# Patient Record
Sex: MALE | Race: WHITE | NOT HISPANIC OR LATINO | Employment: FULL TIME | URBAN - METROPOLITAN AREA
[De-identification: names, ages, dates, MRNs, and addresses within clinical notes are randomized per-mention and may not be internally consistent; named-entity substitution may affect disease eponyms.]

---

## 2018-08-29 ENCOUNTER — OFFICE VISIT (OUTPATIENT)
Dept: FAMILY MEDICINE CLINIC | Facility: CLINIC | Age: 43
End: 2018-08-29
Payer: COMMERCIAL

## 2018-08-29 VITALS
OXYGEN SATURATION: 97 % | WEIGHT: 315 LBS | RESPIRATION RATE: 18 BRPM | HEART RATE: 100 BPM | DIASTOLIC BLOOD PRESSURE: 108 MMHG | SYSTOLIC BLOOD PRESSURE: 174 MMHG | HEIGHT: 69 IN | TEMPERATURE: 98.2 F | BODY MASS INDEX: 46.65 KG/M2

## 2018-08-29 DIAGNOSIS — R11.2 NAUSEA AND VOMITING, INTRACTABILITY OF VOMITING NOT SPECIFIED, UNSPECIFIED VOMITING TYPE: Primary | ICD-10-CM

## 2018-08-29 PROCEDURE — 99213 OFFICE O/P EST LOW 20 MIN: CPT | Performed by: SPECIALIST

## 2018-08-29 RX ORDER — BUPROPION HYDROCHLORIDE 150 MG/1
TABLET ORAL EVERY 24 HOURS
COMMUNITY
Start: 2017-12-22 | End: 2018-09-26 | Stop reason: CLARIF

## 2018-08-29 RX ORDER — ONDANSETRON 4 MG/1
4 TABLET, FILM COATED ORAL EVERY 8 HOURS
COMMUNITY
Start: 2016-10-19 | End: 2018-08-29

## 2018-08-29 RX ORDER — PEN NEEDLE, DIABETIC 31 G X1/4"
NEEDLE, DISPOSABLE MISCELLANEOUS
COMMUNITY
Start: 2017-12-22 | End: 2020-06-02 | Stop reason: SDUPTHER

## 2018-08-29 RX ORDER — LISINOPRIL AND HYDROCHLOROTHIAZIDE 12.5; 1 MG/1; MG/1
TABLET ORAL EVERY 24 HOURS
COMMUNITY
Start: 2017-12-22 | End: 2018-09-26 | Stop reason: CLARIF

## 2018-08-29 RX ORDER — ROSUVASTATIN CALCIUM 10 MG/1
TABLET, COATED ORAL EVERY 24 HOURS
COMMUNITY
Start: 2017-12-22 | End: 2018-09-26 | Stop reason: CLARIF

## 2018-08-29 RX ORDER — TRIAMCINOLONE ACETONIDE 1 MG/G
CREAM TOPICAL EVERY 12 HOURS
COMMUNITY
Start: 2018-05-10 | End: 2018-09-26 | Stop reason: CLARIF

## 2018-08-29 RX ORDER — NYSTATIN 100000 U/G
CREAM TOPICAL EVERY 12 HOURS
COMMUNITY
Start: 2018-05-10 | End: 2018-09-26 | Stop reason: CLARIF

## 2018-08-29 RX ORDER — ONDANSETRON 4 MG/1
4 TABLET, FILM COATED ORAL EVERY 8 HOURS PRN
Qty: 20 TABLET | Refills: 0 | Status: SHIPPED | OUTPATIENT
Start: 2018-08-29 | End: 2018-09-26

## 2018-08-29 RX ORDER — DICYCLOMINE HCL 20 MG
20 TABLET ORAL EVERY 6 HOURS
COMMUNITY
Start: 2016-10-19 | End: 2018-09-26 | Stop reason: CLARIF

## 2018-08-29 NOTE — PATIENT INSTRUCTIONS
Try    zofran   4  Mg   Three  Times  A  Day  For  Nausea      Ok   imodium  Ad      Over  The  Counter  For  Diarrhea        Light  Diet    Stop  Metformin  For  Now    Increase  The  lantus  To  14    Bedtime    If  Not  Better   Do  Your  Lab  Tests      And  Call  [place]  For   Follow  Up

## 2018-08-29 NOTE — PROGRESS NOTES
Assessment/Plan:    Pt  Has  n  And  V    Some  Chills   Seems  To  Be  Getting  Better    3  To  4  Days    Viral  Gastroenteritis    With  n   V    D      meds  Adjusted          Diagnoses and all orders for this visit:    Nausea and vomiting, intractability of vomiting not specified, unspecified vomiting type  -     ondansetron (ZOFRAN) 4 mg tablet; Take 1 tablet (4 mg total) by mouth every 8 (eight) hours as needed for nausea or vomiting    Other orders  -     dicyclomine (BENTYL) 20 mg tablet; Take 20 mg by mouth every 6 (six) hours  -     insulin glargine (LANTUS SOLOSTAR) 100 units/mL injection pen; every 24 hours  -     lisinopril-hydrochlorothiazide (PRINZIDE,ZESTORETIC) 10-12 5 MG per tablet; every 24 hours  -     nystatin (MYCOSTATIN) cream; Every 12 hours  -     Discontinue: ondansetron (ZOFRAN) 4 mg tablet; Take 4 mg by mouth every 8 (eight) hours  -     rosuvastatin (CRESTOR) 10 MG tablet; every 24 hours  -     triamcinolone (KENALOG) 0 1 % cream; Every 12 hours  -     buPROPion (WELLBUTRIN XL) 150 mg 24 hr tablet; every 24 hours  -     Insulin Pen Needle (PEN NEEDLES) 31G X 6 MM MISC; use at hs, sliding scale          Subjective:      Patient ID: Kamari Keane is a 44 y o  male  45 Yo  Male   With   4  Days   N   V   D       Last  Stool   2  Hours  Ago      No  abd  Pain    Chills    To  Mild  Degree   But  No  Fever              Fatigue   Associated symptoms include chills, fatigue, nausea and vomiting  Pertinent negatives include no abdominal pain, chest pain, coughing, diaphoresis, fever, headaches, joint swelling, myalgias, neck pain, numbness, rash or weakness  Vomiting    Associated symptoms include chills and diarrhea  Pertinent negatives include no abdominal pain, chest pain, coughing, dizziness, fever, headaches or myalgias  Nausea   Associated symptoms include chills, fatigue, nausea and vomiting   Pertinent negatives include no abdominal pain, chest pain, coughing, diaphoresis, fever, headaches, joint swelling, myalgias, neck pain, numbness, rash or weakness  The following portions of the patient's history were reviewed and updated as appropriate: allergies, current medications, past family history, past medical history, past social history, past surgical history and problem list     Review of Systems   Constitutional: Positive for activity change, appetite change, chills and fatigue  Negative for diaphoresis and fever  HENT: Negative for sinus pain and sinus pressure  Eyes: Negative for visual disturbance  Respiratory: Negative for cough, chest tightness, shortness of breath and wheezing  Cardiovascular: Negative for chest pain and palpitations  Gastrointestinal: Positive for diarrhea, nausea and vomiting  Negative for abdominal distention, abdominal pain and blood in stool  Genitourinary: Negative for dysuria  Musculoskeletal: Negative for back pain, gait problem, joint swelling, myalgias, neck pain and neck stiffness  Skin: Negative for pallor and rash  Neurological: Negative for dizziness, tremors, seizures, syncope, facial asymmetry, speech difficulty, weakness, light-headedness, numbness and headaches  Psychiatric/Behavioral: Negative for agitation, behavioral problems and confusion  Objective:      BP (!) 174/108 (BP Location: Left arm, Patient Position: Sitting, Cuff Size: Large)   Pulse 100   Temp 98 2 °F (36 8 °C) (Tympanic)   Resp 18   Ht 5' 9" (1 753 m)   Wt (!) 154 kg (340 lb)   SpO2 97%   BMI 50 21 kg/m²          Physical Exam   Constitutional: He is oriented to person, place, and time  He appears well-developed and well-nourished  No distress  HENT:   Head: Normocephalic and atraumatic  Mouth/Throat: No oropharyngeal exudate  Eyes: Conjunctivae and EOM are normal  Pupils are equal, round, and reactive to light  Right eye exhibits no discharge  Left eye exhibits no discharge  Neck: No JVD present     Cardiovascular: Normal rate, regular rhythm and intact distal pulses  Murmur heard  Pulmonary/Chest: No respiratory distress  He has no wheezes  He has no rales  He exhibits no tenderness  Abdominal: Soft  Bowel sounds are normal  He exhibits no distension and no mass  There is no tenderness  There is no rebound and no guarding  Musculoskeletal: He exhibits no edema or tenderness  Neurological: He is alert and oriented to person, place, and time  Coordination normal    Skin: Skin is warm and dry  No rash noted  He is not diaphoretic  No erythema  No pallor  Psychiatric: He has a normal mood and affect   His behavior is normal

## 2018-09-24 PROCEDURE — 87070 CULTURE OTHR SPECIMN AEROBIC: CPT | Performed by: SPECIALIST

## 2018-09-24 PROCEDURE — 87147 CULTURE TYPE IMMUNOLOGIC: CPT | Performed by: SPECIALIST

## 2018-09-24 PROCEDURE — 87205 SMEAR GRAM STAIN: CPT | Performed by: SPECIALIST

## 2018-09-26 ENCOUNTER — OFFICE VISIT (OUTPATIENT)
Dept: FAMILY MEDICINE CLINIC | Facility: CLINIC | Age: 43
End: 2018-09-26
Payer: COMMERCIAL

## 2018-09-26 ENCOUNTER — APPOINTMENT (EMERGENCY)
Dept: CT IMAGING | Facility: HOSPITAL | Age: 43
DRG: 854 | End: 2018-09-26
Payer: COMMERCIAL

## 2018-09-26 ENCOUNTER — HOSPITAL ENCOUNTER (INPATIENT)
Facility: HOSPITAL | Age: 43
LOS: 3 days | Discharge: HOME | DRG: 854 | End: 2018-09-29
Attending: EMERGENCY MEDICINE | Admitting: INTERNAL MEDICINE
Payer: COMMERCIAL

## 2018-09-26 VITALS
HEIGHT: 68 IN | TEMPERATURE: 99.3 F | SYSTOLIC BLOOD PRESSURE: 130 MMHG | DIASTOLIC BLOOD PRESSURE: 82 MMHG | WEIGHT: 315 LBS | OXYGEN SATURATION: 97 % | HEART RATE: 125 BPM | BODY MASS INDEX: 47.74 KG/M2

## 2018-09-26 DIAGNOSIS — L02.415 ABSCESS OF RIGHT THIGH: ICD-10-CM

## 2018-09-26 DIAGNOSIS — L03.115 CELLULITIS OF RIGHT THIGH: Primary | ICD-10-CM

## 2018-09-26 DIAGNOSIS — Z78.9 FAILURE OF OUTPATIENT TREATMENT: ICD-10-CM

## 2018-09-26 DIAGNOSIS — E11.9 DIABETES MELLITUS (HCC): ICD-10-CM

## 2018-09-26 DIAGNOSIS — L02.91 ABSCESS: Primary | ICD-10-CM

## 2018-09-26 PROBLEM — A41.9 SEPSIS DUE TO CELLULITIS (HCC): Status: ACTIVE | Noted: 2018-09-26

## 2018-09-26 PROBLEM — L03.119 CELLULITIS AND ABSCESS OF LEG: Status: ACTIVE | Noted: 2018-09-26

## 2018-09-26 PROBLEM — L02.419 CELLULITIS AND ABSCESS OF LEG: Status: ACTIVE | Noted: 2018-09-26

## 2018-09-26 PROBLEM — E66.01 MORBID OBESITY (HCC): Status: ACTIVE | Noted: 2018-09-26

## 2018-09-26 PROBLEM — I10 HYPERTENSION: Status: ACTIVE | Noted: 2018-09-26

## 2018-09-26 PROBLEM — L03.90 SEPSIS DUE TO CELLULITIS (HCC): Status: ACTIVE | Noted: 2018-09-26

## 2018-09-26 PROBLEM — E87.1 HYPONATREMIA: Status: ACTIVE | Noted: 2018-09-26

## 2018-09-26 LAB
ALBUMIN SERPL BCP-MCNC: 3 G/DL (ref 3.5–5)
ALP SERPL-CCNC: 101 U/L (ref 46–116)
ALT SERPL W P-5'-P-CCNC: 22 U/L (ref 12–78)
ANION GAP SERPL CALCULATED.3IONS-SCNC: 13 MMOL/L (ref 4–13)
AST SERPL W P-5'-P-CCNC: 19 U/L (ref 5–45)
BASOPHILS # BLD AUTO: 0.07 THOUSANDS/ΜL (ref 0–0.1)
BASOPHILS NFR BLD AUTO: 1 % (ref 0–1)
BILIRUB DIRECT SERPL-MCNC: 0.19 MG/DL (ref 0–0.2)
BILIRUB SERPL-MCNC: 0.82 MG/DL (ref 0.2–1)
BUN SERPL-MCNC: 21 MG/DL (ref 5–25)
CALCIUM SERPL-MCNC: 9.5 MG/DL (ref 8.3–10.1)
CHLORIDE SERPL-SCNC: 94 MMOL/L (ref 100–108)
CO2 SERPL-SCNC: 24 MMOL/L (ref 21–32)
CREAT SERPL-MCNC: 1.3 MG/DL (ref 0.6–1.3)
EOSINOPHIL # BLD AUTO: 0.31 THOUSAND/ΜL (ref 0–0.61)
EOSINOPHIL NFR BLD AUTO: 2 % (ref 0–6)
ERYTHROCYTE [DISTWIDTH] IN BLOOD BY AUTOMATED COUNT: 12 % (ref 11.6–15.1)
GFR SERPL CREATININE-BSD FRML MDRD: 67 ML/MIN/1.73SQ M
GLUCOSE SERPL-MCNC: 290 MG/DL (ref 65–140)
GLUCOSE SERPL-MCNC: 405 MG/DL (ref 65–140)
HCT VFR BLD AUTO: 42.5 % (ref 36.5–49.3)
HGB BLD-MCNC: 14.6 G/DL (ref 12–17)
IMM GRANULOCYTES # BLD AUTO: 0.07 THOUSAND/UL (ref 0–0.2)
IMM GRANULOCYTES NFR BLD AUTO: 1 % (ref 0–2)
LACTATE SERPL-SCNC: 1.9 MMOL/L (ref 0.5–2)
LYMPHOCYTES # BLD AUTO: 2.05 THOUSANDS/ΜL (ref 0.6–4.47)
LYMPHOCYTES NFR BLD AUTO: 16 % (ref 14–44)
MAGNESIUM SERPL-MCNC: 2 MG/DL (ref 1.6–2.6)
MCH RBC QN AUTO: 30.7 PG (ref 26.8–34.3)
MCHC RBC AUTO-ENTMCNC: 34.4 G/DL (ref 31.4–37.4)
MCV RBC AUTO: 89 FL (ref 82–98)
MONOCYTES # BLD AUTO: 1.23 THOUSAND/ΜL (ref 0.17–1.22)
MONOCYTES NFR BLD AUTO: 9 % (ref 4–12)
NEUTROPHILS # BLD AUTO: 9.53 THOUSANDS/ΜL (ref 1.85–7.62)
NEUTS SEG NFR BLD AUTO: 71 % (ref 43–75)
NRBC BLD AUTO-RTO: 0 /100 WBCS
PLATELET # BLD AUTO: 346 THOUSANDS/UL (ref 149–390)
PMV BLD AUTO: 9.4 FL (ref 8.9–12.7)
POTASSIUM SERPL-SCNC: 4.2 MMOL/L (ref 3.5–5.3)
PROT SERPL-MCNC: 8.5 G/DL (ref 6.4–8.2)
RBC # BLD AUTO: 4.76 MILLION/UL (ref 3.88–5.62)
SODIUM SERPL-SCNC: 131 MMOL/L (ref 136–145)
WBC # BLD AUTO: 13.26 THOUSAND/UL (ref 4.31–10.16)

## 2018-09-26 PROCEDURE — 96366 THER/PROPH/DIAG IV INF ADDON: CPT

## 2018-09-26 PROCEDURE — 87070 CULTURE OTHR SPECIMN AEROBIC: CPT | Performed by: EMERGENCY MEDICINE

## 2018-09-26 PROCEDURE — 87040 BLOOD CULTURE FOR BACTERIA: CPT | Performed by: EMERGENCY MEDICINE

## 2018-09-26 PROCEDURE — 87147 CULTURE TYPE IMMUNOLOGIC: CPT | Performed by: EMERGENCY MEDICINE

## 2018-09-26 PROCEDURE — 3008F BODY MASS INDEX DOCD: CPT | Performed by: SPECIALIST

## 2018-09-26 PROCEDURE — 80048 BASIC METABOLIC PNL TOTAL CA: CPT | Performed by: EMERGENCY MEDICINE

## 2018-09-26 PROCEDURE — 99284 EMERGENCY DEPT VISIT MOD MDM: CPT

## 2018-09-26 PROCEDURE — 80076 HEPATIC FUNCTION PANEL: CPT | Performed by: EMERGENCY MEDICINE

## 2018-09-26 PROCEDURE — 1111F DSCHRG MED/CURRENT MED MERGE: CPT | Performed by: SPECIALIST

## 2018-09-26 PROCEDURE — 96360 HYDRATION IV INFUSION INIT: CPT

## 2018-09-26 PROCEDURE — 85025 COMPLETE CBC W/AUTO DIFF WBC: CPT | Performed by: EMERGENCY MEDICINE

## 2018-09-26 PROCEDURE — 96365 THER/PROPH/DIAG IV INF INIT: CPT

## 2018-09-26 PROCEDURE — 83735 ASSAY OF MAGNESIUM: CPT | Performed by: EMERGENCY MEDICINE

## 2018-09-26 PROCEDURE — 87205 SMEAR GRAM STAIN: CPT | Performed by: EMERGENCY MEDICINE

## 2018-09-26 PROCEDURE — 83605 ASSAY OF LACTIC ACID: CPT | Performed by: EMERGENCY MEDICINE

## 2018-09-26 PROCEDURE — 99223 1ST HOSP IP/OBS HIGH 75: CPT | Performed by: INTERNAL MEDICINE

## 2018-09-26 PROCEDURE — 73701 CT LOWER EXTREMITY W/DYE: CPT

## 2018-09-26 PROCEDURE — 96361 HYDRATE IV INFUSION ADD-ON: CPT

## 2018-09-26 PROCEDURE — 82948 REAGENT STRIP/BLOOD GLUCOSE: CPT

## 2018-09-26 PROCEDURE — 99212 OFFICE O/P EST SF 10 MIN: CPT | Performed by: SPECIALIST

## 2018-09-26 PROCEDURE — 36415 COLL VENOUS BLD VENIPUNCTURE: CPT | Performed by: EMERGENCY MEDICINE

## 2018-09-26 RX ORDER — DIPHENHYDRAMINE HCL 25 MG
25 TABLET ORAL EVERY 6 HOURS PRN
Status: DISCONTINUED | OUTPATIENT
Start: 2018-09-26 | End: 2018-09-29 | Stop reason: HOSPADM

## 2018-09-26 RX ORDER — ATENOLOL 50 MG/1
100 TABLET ORAL DAILY
Status: DISCONTINUED | OUTPATIENT
Start: 2018-09-27 | End: 2018-09-29 | Stop reason: HOSPADM

## 2018-09-26 RX ORDER — LISINOPRIL 2.5 MG/1
2.5 TABLET ORAL DAILY
Status: DISCONTINUED | OUTPATIENT
Start: 2018-09-27 | End: 2018-09-29 | Stop reason: HOSPADM

## 2018-09-26 RX ORDER — HYDRALAZINE HYDROCHLORIDE 20 MG/ML
10 INJECTION INTRAMUSCULAR; INTRAVENOUS EVERY 4 HOURS PRN
Status: DISCONTINUED | OUTPATIENT
Start: 2018-09-26 | End: 2018-09-29 | Stop reason: HOSPADM

## 2018-09-26 RX ORDER — HYDROCODONE BITARTRATE AND ACETAMINOPHEN 5; 325 MG/1; MG/1
1 TABLET ORAL EVERY 4 HOURS PRN
Status: DISCONTINUED | OUTPATIENT
Start: 2018-09-26 | End: 2018-09-29 | Stop reason: HOSPADM

## 2018-09-26 RX ORDER — INSULIN GLARGINE 100 [IU]/ML
15 INJECTION, SOLUTION SUBCUTANEOUS
Status: DISCONTINUED | OUTPATIENT
Start: 2018-09-26 | End: 2018-09-27

## 2018-09-26 RX ORDER — DOCUSATE SODIUM 100 MG/1
100 CAPSULE, LIQUID FILLED ORAL 2 TIMES DAILY PRN
Status: DISCONTINUED | OUTPATIENT
Start: 2018-09-26 | End: 2018-09-29 | Stop reason: HOSPADM

## 2018-09-26 RX ORDER — METFORMIN HYDROCHLORIDE 750 MG/1
750 TABLET, EXTENDED RELEASE ORAL
Status: DISCONTINUED | OUTPATIENT
Start: 2018-09-27 | End: 2018-09-27

## 2018-09-26 RX ORDER — ONDANSETRON 2 MG/ML
4 INJECTION INTRAMUSCULAR; INTRAVENOUS EVERY 4 HOURS PRN
Status: DISCONTINUED | OUTPATIENT
Start: 2018-09-26 | End: 2018-09-29 | Stop reason: HOSPADM

## 2018-09-26 RX ORDER — ACETAMINOPHEN 160 MG/5ML
650 SUSPENSION, ORAL (FINAL DOSE FORM) ORAL EVERY 4 HOURS PRN
Status: DISCONTINUED | OUTPATIENT
Start: 2018-09-26 | End: 2018-09-29 | Stop reason: HOSPADM

## 2018-09-26 RX ADMIN — VANCOMYCIN HYDROCHLORIDE 2000 MG: 1 INJECTION, POWDER, LYOPHILIZED, FOR SOLUTION INTRAVENOUS at 16:30

## 2018-09-26 RX ADMIN — INSULIN GLARGINE 15 UNITS: 100 INJECTION, SOLUTION SUBCUTANEOUS at 21:27

## 2018-09-26 RX ADMIN — SODIUM CHLORIDE 1000 ML: 0.9 INJECTION, SOLUTION INTRAVENOUS at 16:06

## 2018-09-26 RX ADMIN — IOHEXOL 100 ML: 350 INJECTION, SOLUTION INTRAVENOUS at 17:04

## 2018-09-26 RX ADMIN — CEFAZOLIN SODIUM 2000 MG: 2 SOLUTION INTRAVENOUS at 20:11

## 2018-09-26 RX ADMIN — INSULIN LISPRO 3 UNITS: 100 INJECTION, SOLUTION INTRAVENOUS; SUBCUTANEOUS at 21:25

## 2018-09-26 NOTE — H&P
H&P- Evonne Bucio 1975, 44 y o  male MRN: [id number]    Unit/Bed#: ED 22 Encounter: [id number]    Primary Care Provider: Mela Menendez MD   Date and time admitted to hospital: 9/26/2018  3:43 PM      Assessment and Plan        * Sepsis due to cellulitis [facility])   Assessment & Plan    Sepsis secondary to cellulitis/abscess of leg  Cellulitis and abscess of leg   Assessment & Plan    Recently saw PCP on Monday for symptoms as started Friday  At that time was recommended admission but patient declined  Trialed doxycycline and keflex without improvement  No history of MRSA  Culture consistent with streptococcus  Will trial cefazolin  Have surgery and ID evaluate  Hyponatremia   Assessment & Plan    Nonspecific  Recheck labs in a m  Morbid obesity ([facility] [address] )   Assessment & Plan    Body mass index is 49 28 kg/m²  Diabetes mellitus ([facility] [address] )   Assessment & Plan    Diabetes mellitus with hyperglycemia secondary to infection  Continue glargine 15 units q h s     Add sliding scale  Check A1c in a m  Hold metformin given contrast study  Hypertension   Assessment & Plan    Blood pressure is elevated secondary to acute distress  Continue lisinopril and atenolol in a m  [place] Hydralazine p r n  For SBP greater than 160        VTE Prophylaxis: Enoxaparin (Lovenox)  Code Status:  Level one full code  Anticipated Length of Stay:  Patient will be admitted on an Inpatient basis with an anticipated length of stay of  greater than 2 midnights  Justification for Hospital Stay: Sepsis due to cellulitis [facility])  Total Time for Visit, including Counseling / Coordination of Care: 40 mins  Greater than 50% of this total time spent on direct patient counseling and coordination of care  Chief Complaint:     Chief Complaint   Patient presents with    Abscess     Abscess on R leg   First noticed last week  Has been draining  Pt was told to come to ED for eval and possible admission       History of Present Illness:    Suzan Faustin is a 44 y o  male who presents with worsening "boil" of right inner thigh  Patient first noticed on Friday and had been draining  He denies any trauma to area, camping, was swimming  Saw PCP on Monday who recommended admission for IV antibiotics but patient declined  Was discharged with keflex and doxycycline without improvement  Patient has had subjective fevers without chills  Presents here to the emergency department where initial CT scan negative for abscess however continues to drain purulent material   Admission was requested after one dose vancomycin  Review of Systems:  History obtained from chart review and the patient  General ROS: positive for  - chills and fever  Psychological ROS: negative for - anxiety or depression  Ophthalmic ROS: negative for - dry eyes or eye pain  Respiratory ROS: negative for - cough or shortness of breath  Cardiovascular ROS: negative for - chest pain  Gastrointestinal ROS: negative for - constipation or diarrhea  Genito-Urinary ROS: negative for - dysuria  Musculoskeletal ROS: positive for - erythema and abscess right inner thigh  Neurological ROS: negative for - impaired coordination/balance or memory loss  Otherwise, all other 12 point review of systems normal     Past Medical and Surgical History:   Past Medical History:   Diagnosis Date    Diabetes mellitus ([facility] [address] )     Hypertension      Past Surgical History:   Procedure Laterality Date    CATARACT EXTRACTION       Meds/Allergies: Allergies: Allergies   Allergen Reactions    Penicillins      Prior to Admission Medications   Prescriptions Last Dose Informant Patient Reported? Taking? Insulin Pen Needle (PEN NEEDLES) 31G X 6 MM MISC  Self Yes Yes   Sig: use at hs, sliding scale   atenolol (TENORMIN) 100 mg tablet  Self Yes Yes   Sig: Take by mouth daily     cephalexin (KEFLEX) 500 mg capsule  Self No Yes   Sig: Take 1 capsule (500 mg total) by mouth every 6 (six) hours for 10 days   doxycycline hyclate (VIBRA-TABS) 100 mg tablet  Self No Yes   Sig: Take 1 tablet (100 mg total) by mouth 2 (two) times a day for 10 days   insulin glargine (LANTUS SOLOSTAR) 100 units/mL injection pen  Self Yes Yes   Sig: Inject 15 Units under the skin daily at bedtime     lisinopril (ZESTRIL) 2 5 mg tablet  Self Yes No   Sig: Take by mouth daily  metFORMIN (GLUCOPHAGE-XR) 750 mg 24 hr tablet  Self Yes Yes   Sig: Take 750 mg by mouth daily with breakfast    mupirocin (BACTROBAN) 2 % ointment  Self No Yes   Sig: Apply topically 3 (three) times a day      Facility-Administered Medications: None     Social History:     Social History     Social History    Marital status: Single     Spouse name: N/A    Number of children: N/A    Years of education: N/A     Occupational History    Not on file       Social History Main Topics    Smoking status: Former Smoker    Smokeless tobacco: Never Used      Comment: Stevie says never smoker and no passive smoke exposure    Alcohol use No    Drug use: No    Sexual activity: No     Other Topics Concern    Not on file     Social History Narrative    No narrative on file     Patient Pre-hospital Living Situation:   Patient Pre-hospital Level of Mobility:   Patient Pre-hospital Diet Restrictions:     Family History:  Family History   Problem Relation Age of Onset    Diabetes Mother     Breast cancer Mother     Heart disease Father     Prostate cancer Father      Physical Exam:   Vitals:   Blood Pressure: 163/70 (09/26/18 1542)  Pulse: (!) 119 (09/26/18 1542)  Temperature: 99 °F (37 2 °C) (09/26/18 1542)  Temp Source: Temporal (09/26/18 1542)  Respirations: 18 (09/26/18 1542)  Weight - Scale: (!) 147 kg (324 lb 1 2 oz) (09/26/18 1542)  SpO2: 97 % (09/26/18 1542)    General appearance: alert, appears stated age and cooperative  Skin: Erythema right inner thigh as pictured above  Head: Normocephalic, without obvious abnormality, atraumatic  Eyes: conjunctivae/corneas clear  PERRL, EOM's intact  Lungs: clear to auscultation bilaterally  Heart: Tachycardic but regular no murmurs  Abdomen: soft, non-tender; bowel sounds normal; no masses,  no organomegaly  Back: negative, range of motion normal  Extremities: erythema of right inner thigh  Neurologic: Grossly normal  Psychiatric:  Good eye contact mood appropriate    Lab Results: I have personally reviewed pertinent reports  Results from last 7 days  Lab Units 09/26/18  1602   WBC Thousand/uL 13 26*   HEMOGLOBIN g/dL 14 6   HEMATOCRIT % 42 5   PLATELETS Thousands/uL 346   NEUTROS PCT % 71   LYMPHS PCT % 16   MONOS PCT % 9   EOS PCT % 2       Results from last 7 days  Lab Units 09/26/18  1602   SODIUM mmol/L 131*   POTASSIUM mmol/L 4 2   CHLORIDE mmol/L 94*   CO2 mmol/L 24   ANION GAP mmol/L 13   BUN mg/dL 21   CREATININE mg/dL 1 30   CALCIUM mg/dL 9 5   ALBUMIN g/dL 3 0*   TOTAL BILIRUBIN mg/dL 0 82   ALK PHOS U/L 101   ALT U/L 22   AST U/L 19   EGFR ml/min/1 73sq m 67   GLUCOSE RANDOM mg/dL 405*               Results from last 7 days  Lab Units 09/26/18  1602   LACTIC ACID mmol/L 1 9       Imaging: I have personally reviewed pertinent films in PACS  Ct Femur Right W Contrast  Result Date: 9/26/2018  Impression: Cellulitis in the medial aspect of the right thigh without evidence of abscess, osteomyelitis or necrotizing fasciitis  Workstation performed: [id number]       EKG, Pathology, and Other Studies Reviewed on Admission:   Reviewed outside records    Allscripts/ Epic Records Reviewed: Yes    ** Please Note: This note has been constructed using a voice recognition system   **

## 2018-09-26 NOTE — ASSESSMENT & PLAN NOTE
Recently saw PCP on Monday for symptoms as started Friday  At that time was recommended admission but patient declined  Trialed doxycycline and keflex without improvement  No history of MRSA  Culture consistent with streptococcus  Will trial cefazolin  Have surgery and ID evaluate

## 2018-09-26 NOTE — ASSESSMENT & PLAN NOTE
Diabetes mellitus with hyperglycemia secondary to infection  Continue glargine 15 units q h s     Add sliding scale  Check A1c in a m  Hold metformin given contrast study

## 2018-09-26 NOTE — PROGRESS NOTES
Assessment/Plan:42 Yo  Diabetic  abscess  Left  Inner  Upper  Thigh    Did  Not  See  Surgeon as  Directed   Feels  Better  But   abscess   Larger  And   Skin  Break  Down        agrees  To now  Be  Admitted    Refused first  Visit     Needs  Drainage  And  Iv  Antibiotics  And   Careful  Diabetic  Monitoring            There are no diagnoses linked to this encounter  Subjective:      Patient ID: Higinio Beckman is a 44 y o  male  45 Yo  Return  Visit  For  Abscess   And  Infection  Right  Inner  Thigh     recommendeed  Admission last  Visit   But  declined  Start  On   Cephalexin  And  Doxycycline   And  Ref[place]  [place]  [place] Elizbeth [place]     Failed  To  See  Surgeon       Feels  Better  But  The  Infected  Area   Larger    More  Red  And  Has  Skin   Breakdown     Needs  To  Be  Treated  In  hospital         The following portions of the patient's history were reviewed and updated as appropriate: allergies, current medications, past family history, past medical history, past social history, past surgical history and problem list     Review of Systems   Constitutional: Positive for fatigue  Negative for activity change, appetite change, chills, diaphoresis and fever  Eyes: Negative for visual disturbance  Respiratory: Negative for cough, chest tightness, shortness of breath and wheezing  Cardiovascular: Positive for leg swelling  Negative for chest pain and palpitations  Skin: Positive for color change and wound  Neurological: Positive for weakness  Negative for dizziness, light-headedness and headaches  Psychiatric/Behavioral: Negative for agitation, behavioral problems and confusion           Objective:      /82 (BP Location: Left arm, Patient Position: Sitting, Cuff Size: Large)   Pulse (!) 125   Temp 99 3 °F (37 4 °C) (Tympanic)   Ht 5' 8" (1 727 m)   Wt (!) 147 kg (324 lb 6 4 oz)   SpO2 97%   BMI 49 32 kg/m²          Physical Exam   Constitutional: He is oriented to person, place, and time  He appears well-developed and well-nourished  overweight   HENT:   Head: Normocephalic  Neck: No JVD present  Cardiovascular: Normal rate  No murmur heard  Pulmonary/Chest: No respiratory distress  He has no wheezes  He has no rales  Neurological: He is alert and oriented to person, place, and time  Skin: Rash noted  There is erythema     Large  Swollen  Area  Right  Inner  Thigh   Superficial  Skin  Breakdown    Red  Inflamed     Some  Drainage

## 2018-09-26 NOTE — LETTER
[address] [place]  Dept: [phone]    September 29, 2018     Patient: Swapna Garcia   YOB: 1975   Date of Visit: 9/26/2018       To Whom it May Concern:    Sushma Resendez is under my professional care  He was seen in the hospital from 9/26/2018   to 09/29/18  He may return to work on [employer]  67  10/8/2018       If you have any questions or concerns, please don't hesitate to call           Sincerely,              DO Marcial Leonard [facility]  Diplomat, [employer] Board of Internal Medicine

## 2018-09-26 NOTE — ED PROVIDER NOTES
History  Chief Complaint   Patient presents with    Abscess     Abscess on R leg   First noticed last week  Has been draining  Pt was told to come to ED for eval and possible admission  45 YO male with Hx of DM presents with swelling and pain to the inner thigh of the Right leg  States this began last week, swelling and redness had been worsening  Pt saw his PCP 2 days ago, was placed on Keflex and doxycycline as treatment  States he has felt a little better since he was started on the Abx but was sent in as the swelling appeared worse  Pt states he had felt feverish prior to the starting Abx but this has improved  Pt now has drainage from the site  Denies similar in the past  Pt denies CP/SOB/F/C/N/V/D/C, no dysuria, burning on urination or blood in urine  History provided by:  Patient   used: No    Abscess   Location:  Leg  Leg abscess location:  R upper leg  Abscess quality: draining    Red streaking: no    Duration:  1 week  Progression:  Improving  Chronicity:  New  Context: diabetes    Relieved by:  Nothing  Worsened by:  Nothing  Associated symptoms: no fever, no nausea and no vomiting        Prior to Admission Medications   Prescriptions Last Dose Informant Patient Reported? Taking? Insulin Pen Needle (PEN NEEDLES) 31G X 6 MM MISC  Self Yes Yes   Sig: use at hs, sliding scale   atenolol (TENORMIN) 100 mg tablet  Self Yes Yes   Sig: Take by mouth daily  cephalexin (KEFLEX) 500 mg capsule  Self No Yes   Sig: Take 1 capsule (500 mg total) by mouth every 6 (six) hours for 10 days   doxycycline hyclate (VIBRA-TABS) 100 mg tablet  Self No Yes   Sig: Take 1 tablet (100 mg total) by mouth 2 (two) times a day for 10 days   insulin glargine (LANTUS SOLOSTAR) 100 units/mL injection pen  Self Yes Yes   Sig: Inject 15 Units under the skin daily at bedtime     lisinopril (ZESTRIL) 2 5 mg tablet  Self Yes No   Sig: Take by mouth daily     metFORMIN (GLUCOPHAGE-XR) 750 mg 24 hr tablet Self Yes Yes   Sig: Take 750 mg by mouth daily with breakfast    mupirocin (BACTROBAN) 2 % ointment  Self No Yes   Sig: Apply topically 3 (three) times a day      Facility-Administered Medications: None       Past Medical History:   Diagnosis Date    Diabetes mellitus ([address] )     Hypertension        Past Surgical History:   Procedure Laterality Date    CATARACT EXTRACTION         Family History   Problem Relation Age of Onset    Diabetes Mother     Breast cancer Mother     Heart disease Father     Prostate cancer Father      I have reviewed and agree with the history as documented  Social History   Substance Use Topics    Smoking status: Former Smoker    Smokeless tobacco: Never Used      Comment: Eleanorgen says never smoker and no passive smoke exposure    Alcohol use No        Review of Systems   Constitutional: Negative for fever  HENT: Negative for dental problem  Eyes: Negative for visual disturbance  Respiratory: Negative for shortness of breath  Cardiovascular: Negative for chest pain  Gastrointestinal: Negative for abdominal pain, nausea and vomiting  Genitourinary: Negative for dysuria and frequency  Musculoskeletal: Negative for neck pain and neck stiffness  Skin: Negative for rash  Neurological: Negative for dizziness, weakness and light-headedness  Psychiatric/Behavioral: Negative for agitation, behavioral problems and confusion  All other systems reviewed and are negative  Physical Exam  Physical Exam   Constitutional: He is oriented to person, place, and time  He appears well-developed and well-nourished  HENT:   Head: Normocephalic and atraumatic  Eyes: EOM are normal    Neck: Normal range of motion  Cardiovascular: Normal rate, regular rhythm and normal heart sounds  Pulmonary/Chest: Effort normal and breath sounds normal    Abdominal: Soft  There is no tenderness  Musculoskeletal: Normal range of motion     Neurological: He is alert and oriented to person, place, and time  Skin: Skin is warm and dry  Large area (5cm) of swelling in the Right upper thigh with central necrotic ulceration draining purulosanguinous material, surrounding erythema  Psychiatric: He has a normal mood and affect  His behavior is normal    Nursing note and vitals reviewed  Vital Signs  ED Triage Vitals [09/26/18 1542]   Temperature Pulse Respirations Blood Pressure SpO2   99 °F (37 2 °C) (!) 119 18 163/70 97 %      Temp Source Heart Rate Source Patient Position - Orthostatic VS BP Location FiO2 (%)   Temporal Monitor Sitting Right arm --      Pain Score       5           Vitals:    09/26/18 1542 09/26/18 1828   BP: 163/70 119/57   Pulse: (!) 119 104   Patient Position - Orthostatic VS: Sitting Lying       Visual Acuity      ED Medications  Medications   sodium chloride 0 9 % bolus 1,000 mL (1,000 mL Intravenous New Bag 9/26/18 1606)   vancomycin (VANCOCIN) 2,000 mg in sodium chloride 0 9 % 500 mL IVPB (2,000 mg Intravenous New Bag 9/26/18 1630)   iohexol (OMNIPAQUE) 350 MG/ML injection (MULTI-DOSE) 100 mL (100 mL Intravenous Given 9/26/18 1704)       Diagnostic Studies  Results Reviewed     Procedure Component Value Units Date/Time    Wound culture and Gram stain [[id number]] Collected:  09/26/18 1826    Lab Status:   In process Specimen:  Wound from Leg, Right Updated:  09/26/18 1833    Hepatic function panel [[id number]]  (Abnormal) Collected:  09/26/18 1602    Lab Status:  Final result Specimen:  Blood from Arm, Right Updated:  09/26/18 1721     Total Bilirubin 0 82 mg/dL      Bilirubin, Direct 0 19 mg/dL      Alkaline Phosphatase 101 U/L      AST 19 U/L      ALT 22 U/L      Total Protein 8 5 (H) g/dL      Albumin 3 0 (L) g/dL     Basic metabolic panel [[id number]]  (Abnormal) Collected:  09/26/18 1602    Lab Status:  Final result Specimen:  Blood from Arm, Right Updated:  09/26/18 1640     Sodium 131 (L) mmol/L      Potassium 4 2 mmol/L      Chloride 94 (L) mmol/L      CO2 24 mmol/L      ANION GAP 13 mmol/L      BUN 21 mg/dL      Creatinine 1 30 mg/dL      Glucose 405 (H) mg/dL      Calcium 9 5 mg/dL      eGFR 67 ml/min/1 73sq m     Narrative:         National Kidney Disease Education Program recommendations are as follows:  GFR calculation is accurate only with a steady state creatinine  Chronic Kidney disease less than 60 ml/min/1 73 sq  meters  Kidney failure less than 15 ml/min/1 73 sq  meters  Magnesium [[id number]]  (Normal) Collected:  09/26/18 1602    Lab Status:  Final result Specimen:  Blood from Arm, Right Updated:  09/26/18 1640     Magnesium 2 0 mg/dL     Lactic acid, plasma [[id number]]  (Normal) Collected:  09/26/18 1602    Lab Status:  Final result Specimen:  Blood from Arm, Right Updated:  09/26/18 1633     LACTIC ACID 1 9 mmol/L     Narrative:         Result may be elevated if tourniquet was used during collection  CBC and differential [[id number]]  (Abnormal) Collected:  09/26/18 1602    Lab Status:  Final result Specimen:  Blood from Arm, Right Updated:  09/26/18 1614     WBC 13 26 (H) Thousand/uL      RBC 4 76 Million/uL      Hemoglobin 14 6 g/dL      Hematocrit 42 5 %      MCV 89 fL      MCH 30 7 pg      MCHC 34 4 g/dL      RDW 12 0 %      MPV 9 4 fL      Platelets 067 Thousands/uL      nRBC 0 /100 WBCs      Neutrophils Relative 71 %      Immat GRANS % 1 %      Lymphocytes Relative 16 %      Monocytes Relative 9 %      Eosinophils Relative 2 %      Basophils Relative 1 %      Neutrophils Absolute 9 53 (H) Thousands/µL      Immature Grans Absolute 0 07 Thousand/uL      Lymphocytes Absolute 2 05 Thousands/µL      Monocytes Absolute 1 23 (H) Thousand/µL      Eosinophils Absolute 0 31 Thousand/µL      Basophils Absolute 0 07 Thousands/µL     Blood culture #1 [[id number]] Collected:  09/26/18 1604    Lab Status: In process Specimen:  Blood from Arm, Left Updated:  09/26/18 1610    Blood culture #2 [[id number]] Collected:  09/26/18 1602    Lab Status:   In process Specimen: Blood from Arm, Right Updated:  09/26/18 1609                 CT femur right w contrast   Final Result by Cristina Stoddard MD (09/26 1726)      Cellulitis in the medial aspect of the right thigh without evidence of abscess, osteomyelitis or necrotizing fasciitis  Workstation performed: [id number]                    Procedures  Procedures       Phone Contacts  ED Phone Contact    ED Course                         Initial Sepsis Screening     [address] Name 09/26/18 1831                Is the patient's history suggestive of a new or worsening infection? No  -CL        Suspected source of infection          Are two or more of the following signs & symptoms of infection both present and new to the patient? No  -CL        Indicate SIRS criteria Tachycardia > 90 bpm;Leukocytosis (WBC > 11921 IJL)  -CL        If the answer is yes to both questions, suspicion of sepsis is present          If severe sepsis is present AND tissue hypoperfusion perists in the hour after fluid resuscitation or lactate > 4, the patient meets criteria for SEPTIC SHOCK          Are any of the following organ dysfunction criteria present within 6 hours of suspected infection and SIRS criteria that are NOT considered to be chronic conditions? No  -CL        Organ dysfunction          Date of presentation of severe sepsis          Time of presentation of severe sepsis          Tissue hypoperfusion persists in the hour after crystalloid fluid administration, evidenced, by either:          Was hypotension present within one hour of the conclusion of crystalloid fluid administration?           Date of presentation of septic shock          Time of presentation of septic shock            User Key  (r) = Recorded By, (t) = Taken By, (c) = Cosigned By    Initials Name Provider Type    CL Sergio DO Tommie Physician                  MDM  Number of Diagnoses or Management Options  Abscess of right thigh: new and requires workup  Cellulitis of right thigh: new and requires workup  Failure of outpatient treatment: new and requires workup  Diagnosis management comments: 1  Abscess - Pt has spontaneous drainage from this but possible this tracks deep, there is significant surrounding erythema  Pt has been on O/P Abx for greater than 48 hours  Will check electrolytes for kidney function, CBC, lactic acid, blood and wound culture  Will CT leg, start on vancomycin  Feel pt may be appropriate for surgical consultation due to size and possibly depth of this abscess  Amount and/or Complexity of Data Reviewed  Clinical lab tests: ordered and reviewed  Tests in the radiology section of CPT®: ordered and reviewed  Review and summarize past medical records: yes  Discuss the patient with other providers: yes  Independent visualization of images, tracings, or specimens: yes    Patient Progress  Patient progress: stable    CritCare Time    Disposition  Final diagnoses:   Cellulitis of right thigh   Abscess of right thigh   Failure of outpatient treatment     Time reflects when diagnosis was documented in both MDM as applicable and the Disposition within this note     Time User Action Codes Description Comment    9/26/2018  6:06 PM Cinthya LOPEZ Add [L03 115] Cellulitis of right thigh     9/26/2018  6:06 PM Keith Chavarria Add [L02 415] Abscess of right thigh     9/26/2018  6:06 PM Cinthya LOPEZ Add [Z78 9] Failure of outpatient treatment       ED Disposition     ED Disposition Condition Comment    Admit  Case was discussed with Dr Ravinder Prater and the patient's admission status was agreed to be Admission Status: inpatient status to the service of Dr Ravinder Prater   Follow-up Information    None         Patient's Medications   Discharge Prescriptions    No medications on file     No discharge procedures on file      ED Provider  Electronically Signed by           Flor Cuenca MD  09/26/18 2303

## 2018-09-27 LAB
ALBUMIN SERPL BCP-MCNC: 2.6 G/DL (ref 3.5–5)
ALP SERPL-CCNC: 89 U/L (ref 46–116)
ALT SERPL W P-5'-P-CCNC: 19 U/L (ref 12–78)
ANION GAP SERPL CALCULATED.3IONS-SCNC: 8 MMOL/L (ref 4–13)
AST SERPL W P-5'-P-CCNC: 17 U/L (ref 5–45)
BILIRUB SERPL-MCNC: 0.35 MG/DL (ref 0.2–1)
BUN SERPL-MCNC: 17 MG/DL (ref 5–25)
CALCIUM SERPL-MCNC: 8.6 MG/DL (ref 8.3–10.1)
CHLORIDE SERPL-SCNC: 98 MMOL/L (ref 100–108)
CHOLEST SERPL-MCNC: 175 MG/DL (ref 50–200)
CO2 SERPL-SCNC: 25 MMOL/L (ref 21–32)
CREAT SERPL-MCNC: 1.04 MG/DL (ref 0.6–1.3)
ERYTHROCYTE [DISTWIDTH] IN BLOOD BY AUTOMATED COUNT: 12.1 % (ref 11.6–15.1)
EST. AVERAGE GLUCOSE BLD GHB EST-MCNC: 237 MG/DL
GFR SERPL CREATININE-BSD FRML MDRD: 88 ML/MIN/1.73SQ M
GLUCOSE SERPL-MCNC: 267 MG/DL (ref 65–140)
GLUCOSE SERPL-MCNC: 291 MG/DL (ref 65–140)
GLUCOSE SERPL-MCNC: 294 MG/DL (ref 65–140)
GLUCOSE SERPL-MCNC: 303 MG/DL (ref 65–140)
GLUCOSE SERPL-MCNC: 343 MG/DL (ref 65–140)
HBA1C MFR BLD: 9.9 % (ref 4.2–6.3)
HCT VFR BLD AUTO: 39.4 % (ref 36.5–49.3)
HDLC SERPL-MCNC: 23 MG/DL (ref 40–60)
HGB BLD-MCNC: 13.5 G/DL (ref 12–17)
LDLC SERPL CALC-MCNC: 102 MG/DL (ref 0–100)
MCH RBC QN AUTO: 30.8 PG (ref 26.8–34.3)
MCHC RBC AUTO-ENTMCNC: 34.3 G/DL (ref 31.4–37.4)
MCV RBC AUTO: 90 FL (ref 82–98)
NONHDLC SERPL-MCNC: 152 MG/DL
PLATELET # BLD AUTO: 336 THOUSANDS/UL (ref 149–390)
PMV BLD AUTO: 9.5 FL (ref 8.9–12.7)
POTASSIUM SERPL-SCNC: 3.6 MMOL/L (ref 3.5–5.3)
PROT SERPL-MCNC: 7.7 G/DL (ref 6.4–8.2)
RBC # BLD AUTO: 4.38 MILLION/UL (ref 3.88–5.62)
SODIUM SERPL-SCNC: 131 MMOL/L (ref 136–145)
TRIGL SERPL-MCNC: 252 MG/DL
WBC # BLD AUTO: 11.16 THOUSAND/UL (ref 4.31–10.16)

## 2018-09-27 PROCEDURE — 83036 HEMOGLOBIN GLYCOSYLATED A1C: CPT | Performed by: INTERNAL MEDICINE

## 2018-09-27 PROCEDURE — 10060 I&D ABSCESS SIMPLE/SINGLE: CPT | Performed by: PHYSICIAN ASSISTANT

## 2018-09-27 PROCEDURE — 80053 COMPREHEN METABOLIC PANEL: CPT | Performed by: INTERNAL MEDICINE

## 2018-09-27 PROCEDURE — 82948 REAGENT STRIP/BLOOD GLUCOSE: CPT

## 2018-09-27 PROCEDURE — 85027 COMPLETE CBC AUTOMATED: CPT | Performed by: INTERNAL MEDICINE

## 2018-09-27 PROCEDURE — 99232 SBSQ HOSP IP/OBS MODERATE 35: CPT | Performed by: INTERNAL MEDICINE

## 2018-09-27 PROCEDURE — 0J9L0ZZ DRAINAGE OF RIGHT UPPER LEG SUBCUTANEOUS TISSUE AND FASCIA, OPEN APPROACH: ICD-10-PCS | Performed by: SURGERY

## 2018-09-27 PROCEDURE — 80061 LIPID PANEL: CPT | Performed by: INTERNAL MEDICINE

## 2018-09-27 PROCEDURE — 99255 IP/OBS CONSLTJ NEW/EST HI 80: CPT | Performed by: INTERNAL MEDICINE

## 2018-09-27 RX ORDER — INSULIN GLARGINE 100 [IU]/ML
30 INJECTION, SOLUTION SUBCUTANEOUS
Status: DISCONTINUED | OUTPATIENT
Start: 2018-09-27 | End: 2018-09-28

## 2018-09-27 RX ORDER — LIDOCAINE HYDROCHLORIDE AND EPINEPHRINE 10; 10 MG/ML; UG/ML
10 INJECTION, SOLUTION INFILTRATION; PERINEURAL ONCE
Status: DISCONTINUED | OUTPATIENT
Start: 2018-09-27 | End: 2018-09-27

## 2018-09-27 RX ORDER — LIDOCAINE HYDROCHLORIDE 10 MG/ML
10 INJECTION, SOLUTION EPIDURAL; INFILTRATION; INTRACAUDAL; PERINEURAL ONCE
Status: COMPLETED | OUTPATIENT
Start: 2018-09-27 | End: 2018-09-27

## 2018-09-27 RX ADMIN — CEFAZOLIN SODIUM 2000 MG: 2 SOLUTION INTRAVENOUS at 20:18

## 2018-09-27 RX ADMIN — METFORMIN HYDROCHLORIDE 750 MG: 750 TABLET, EXTENDED RELEASE ORAL at 09:27

## 2018-09-27 RX ADMIN — INSULIN LISPRO 3 UNITS: 100 INJECTION, SOLUTION INTRAVENOUS; SUBCUTANEOUS at 21:32

## 2018-09-27 RX ADMIN — INSULIN LISPRO 5 UNITS: 100 INJECTION, SOLUTION INTRAVENOUS; SUBCUTANEOUS at 17:13

## 2018-09-27 RX ADMIN — CEFAZOLIN SODIUM 2000 MG: 2 SOLUTION INTRAVENOUS at 03:49

## 2018-09-27 RX ADMIN — CEFAZOLIN SODIUM 2000 MG: 2 SOLUTION INTRAVENOUS at 11:48

## 2018-09-27 RX ADMIN — INSULIN GLARGINE 30 UNITS: 100 INJECTION, SOLUTION SUBCUTANEOUS at 21:32

## 2018-09-27 RX ADMIN — ENOXAPARIN SODIUM 40 MG: 40 INJECTION SUBCUTANEOUS at 09:27

## 2018-09-27 RX ADMIN — LISINOPRIL 2.5 MG: 2.5 TABLET ORAL at 09:26

## 2018-09-27 RX ADMIN — INSULIN LISPRO 6 UNITS: 100 INJECTION, SOLUTION INTRAVENOUS; SUBCUTANEOUS at 17:14

## 2018-09-27 RX ADMIN — INSULIN LISPRO 4 UNITS: 100 INJECTION, SOLUTION INTRAVENOUS; SUBCUTANEOUS at 11:53

## 2018-09-27 RX ADMIN — ATENOLOL 100 MG: 50 TABLET ORAL at 09:26

## 2018-09-27 RX ADMIN — HYDROMORPHONE HYDROCHLORIDE 1 MG: 1 INJECTION, SOLUTION INTRAMUSCULAR; INTRAVENOUS; SUBCUTANEOUS at 09:21

## 2018-09-27 RX ADMIN — INSULIN LISPRO 6 UNITS: 100 INJECTION, SOLUTION INTRAVENOUS; SUBCUTANEOUS at 11:53

## 2018-09-27 RX ADMIN — LIDOCAINE HYDROCHLORIDE 10 ML: 10 INJECTION, SOLUTION EPIDURAL; INFILTRATION; INTRACAUDAL; PERINEURAL at 09:24

## 2018-09-27 RX ADMIN — INSULIN LISPRO 3 UNITS: 100 INJECTION, SOLUTION INTRAVENOUS; SUBCUTANEOUS at 09:30

## 2018-09-27 NOTE — ASSESSMENT & PLAN NOTE
Recently saw PCP on Monday for symptoms as started Friday  At that time was recommended admission but patient declined  Trialed doxycycline and keflex without improvement  No history of MRSA  Culture consistent with streptococcus  Surgery attempted I&D this morning    Continue cefazolin for now

## 2018-09-27 NOTE — PLAN OF CARE
DISCHARGE PLANNING     Discharge to home or other facility with appropriate resources Progressing        INFECTION - ADULT     Absence or prevention of progression during hospitalization Progressing     Absence of fever/infection during neutropenic period Progressing        Knowledge Deficit     Patient/family/caregiver demonstrates understanding of disease process, treatment plan, medications, and discharge instructions Progressing        METABOLIC, FLUID AND ELECTROLYTES - ADULT     Fluid balance maintained Progressing     Glucose maintained within target range Progressing        MUSCULOSKELETAL - ADULT     Maintain or return mobility to safest level of function Progressing        PAIN - ADULT     Verbalizes/displays adequate comfort level or baseline comfort level Progressing        SAFETY ADULT     Patient will remain free of falls Progressing     Maintain or return to baseline ADL function Progressing     Maintain or return mobility status to optimal level Progressing        SKIN/TISSUE INTEGRITY - ADULT     Skin integrity remains intact Progressing     Incision(s), wounds(s) or drain site(s) healing without S/S of infection Progressing

## 2018-09-27 NOTE — CASE MANAGEMENT
Initial Clinical Review    Admission: Date/Time/Statement: 9/26/18 @ 1807     Orders Placed This Encounter   Procedures    Inpatient Admission (expected length of stay for this patient is greater than two midnights)     Standing Status:   Standing     Number of Occurrences:   1     Order Specific Question:   Admitting Physician     Answer:   Dhruv Avila [[id number]]     Order Specific Question:   Level of Care     Answer:   Med Surg [[id number]]     Order Specific Question:   Estimated length of stay     Answer:   More than 2 Midnights     Order Specific Question:   Certification     Answer:   I certify that inpatient services are medically necessary for this patient for a duration of greater than two midnights  See H&P and MD Progress Notes for additional information about the patient's course of treatment  ED: Date/Time/Mode of Arrival:   ED Arrival Information     Expected Arrival Acuity Means of Arrival Escorted By Service Admission Type    - 9/26/2018 15:36 Urgent Walk-In Self General Medicine Urgent    Arrival Complaint    mass          Chief Complaint:   Chief Complaint   Patient presents with    Abscess     Abscess on R leg   First noticed last week  Has been draining  Pt was told to come to ED for eval and possible admission  History of Illness:   Pt  Presents to ED with a  Worsening  "boil" on R  Inner thigh for past  4 days  Denies  Any  Trauma, swimming, etc   Saw  PCP  The day PTA and  DOC  Recommended in house but  Pt  Declined  Was  Given po keflex and  Doxycycline  W/o improvement  Has  Fevers but  No chills  Initial  CT scan in ED  Neg for abscess but  Has persistent  Purulent  Drainage      ED Vital Signs:   ED Triage Vitals [09/26/18 1542]   Temperature Pulse Respirations Blood Pressure SpO2   99 °F (37 2 °C) (!) 119 18 163/70 97 %      Temp Source Heart Rate Source Patient Position - Orthostatic VS BP Location FiO2 (%)   Temporal Monitor Sitting Right arm --      Pain Score       5 Wt Readings from Last 1 Encounters:   09/26/18 (!) 147 kg (324 lb 1 2 oz)       Vital Signs (abnormal):    above    Abnormal Labs/Diagnostic Test Results: Albumin  3 0  NA  131  BS  405  Chloride   94  WBC   13 26  Abs  neutro    9 53  Ct   R  Femur:     Cellulitis in the medial aspect of the right thigh without evidence of abscess, osteomyelitis or necrotizing fasciitis  ED Treatment:   Medication Administration from 09/26/2018 1536 to 09/26/2018 1940       Date/Time Order Dose Route Action Action by Comments     09/26/2018 1900 sodium chloride 0 9 % bolus 1,000 mL 0 mL Intravenous Stopped Kory Gibson RN      09/26/2018 1606 sodium chloride 0 9 % bolus 1,000 mL 1,000 mL Intravenous [address] Benito Che RN      09/26/2018 1900 vancomycin (VANCOCIN) 2,000 mg in sodium chloride 0 9 % 500 mL IVPB 0 mg Intravenous Stopped Kory Gibson RN      09/26/2018 1630 vancomycin (VANCOCIN) 2,000 mg in sodium chloride 0 9 % 500 mL IVPB 2,000 mg Intravenous [address] Benito Che RN      09/26/2018 1704 iohexol (OMNIPAQUE) 350 MG/ML injection (MULTI-DOSE) 100 mL 100 mL Intravenous Given Navi Band           Past Medical/Surgical History: Active Ambulatory Problems     Diagnosis Date Noted    No Active Ambulatory Problems     Resolved Ambulatory Problems     Diagnosis Date Noted    No Resolved Ambulatory Problems     Past Medical History:   Diagnosis Date    Diabetes mellitus ([facility] [address] )     Hypertension        Admitting Diagnosis: Mass [R22 9]  Cellulitis of right thigh [N91 948]  Abscess of right thigh [L02 415]  Failure of outpatient treatment [Z78 9]    Age/Sex: 44 y o  male    Assessment/Plan:   Sepsis due to cellulitis [facility])   Assessment & Plan     Sepsis secondary to cellulitis/abscess of leg           Cellulitis and abscess of leg   Assessment & Plan     Recently saw PCP on Monday for symptoms as started Friday  At that time was recommended admission but patient declined    Trialed doxycycline and keflex without improvement  No history of MRSA  Culture consistent with streptococcus  Will trial cefazolin  Have surgery and ID evaluate           Hyponatremia   Assessment & Plan     Nonspecific  Recheck labs in a m           Morbid obesity ([facility] [address] )   Assessment & Plan     Body mass index is 49 28 kg/m²  Diabetes mellitus ([facility] [address] )   Assessment & Plan     Diabetes mellitus with hyperglycemia secondary to infection  Continue glargine 15 units q h s     Add sliding scale  Check A1c in a m  Hold metformin given contrast study              Hypertension   Assessment & Plan     Blood pressure is elevated secondary to acute distress  Continue lisinopril and atenolol in a m  Nicol Stephen Hydralazine p r n  For SBP greater than 160   Patient will be admitted on an Inpatient basis with an anticipated length of stay of  greater than 2 midnights       Justification for Hospital Stay: Sepsis due to cellulitis [facility]      Admission Orders:    IP  9/26  @  1037  Scheduled Meds:   Current Facility-Administered Medications:  acetaminophen 650 mg Oral Q4H PRN Asmita Philipp, DO    atenolol 100 mg Oral Daily Horacio Ulrich, DO    cefazolin 2,000 mg Intravenous Q8H Horacio Ulrich, DO Last Rate: 2,000 mg (09/27/18 0349)   diphenhydrAMINE 25 mg Oral Q6H PRN Asmita Segal, DO    docusate sodium 100 mg Oral BID PRN Asmita Segal, DO    enoxaparin 40 mg Subcutaneous Q24H [facility] Horacio Ulrich, DO    hydrALAZINE 10 mg Intravenous Q4H PRN Asmita Philipp, DO    HYDROcodone-acetaminophen 1 tablet Oral Q4H PRN sAmita Segal, DO    HYDROmorphone 0 5 mg Intravenous Q4H PRN Asmita Brewsterert, DO    insulin glargine 15 Units Subcutaneous HS Horacio Ulrich, DO    insulin lispro 1-5 Units Subcutaneous HS Horacio Ulrich, DO    insulin lispro 1-6 Units Subcutaneous TID AC Horacio Ulrich, DO    lisinopril 2 5 mg Oral Daily Horacio Ulrich, DO    metFORMIN 750 mg Oral Daily With Breakfast Horacio Ulrich, DO    ondansetron 4 mg Intravenous Q4H PRN Asmita Segal, DO      Continuous Infusions:    PRN Meds:   acetaminophen    diphenhydrAMINE    docusate sodium    hydrALAZINE    HYDROcodone-acetaminophen    HYDROmorphone    ondansetron     BC  Wound  C/s  CCD diet  Cons general surgery  Cons  ID    Per  General  Surgery  Consult:  Cellulitis, right thigh, with abscess               -Preliminarily growing Group B Strep  PCN allergy, on Cefazolin               -Clinically improving on IV ABX with WBC improving, down to 11,000 today from 13,000 yesterday               -Bedside I&D today  If abscess persists, may need operative mgmt  Will make NPO initially this AM pending bedside procedure               -Infectious disease consult appreciated for PO ABX recommendations  Continue Cefazolin 2gm Q8H IV until PO transition  Poorly controlled, type 2 Diabetes Mellitus              -Expect labile blood sugars during active infection  Defer to hospital medicine for medical mgmt  Hypertension              -resume home meds per hospital medicine  Blood pressure continues to be elevated up to 656Z systolic and may need medication adjustment  Defer to hospital medicine    Bedside  I & D   9/27  Location:     Type:  Abscess    Size:  5 cm   Procedure details:     Complexity:  Simple    Needle aspiration: no      Incision types:  Elliptical    Scalpel blade:  11    Approach:  Open    Incision depth:  Skin and subcutaneous    Wound management:  Probed and deloculated and irrigated with saline    Drainage:  Bloody and purulent    Drainage amount: Moderate    Wound treatment:  Packing placed    Thank you,  [address]  Utilization Review Department  Phone: [phone]; Fax [phone]  ATTENTION: Please call with any questions or concerns to [phone]  and carefully follow the prompts so that you are directed to the right person     Send all requests for admission clinical reviews, approved or denied determinations and any other requests to fax [phone]   All voicemails are confidential

## 2018-09-27 NOTE — ASSESSMENT & PLAN NOTE
Results from last 7 days  Lab Units 09/27/18  0814 09/26/18  2111   POC GLUCOSE mg/dl 267* 290*     Diabetes mellitus with hyperglycemia secondary to infection  Increase glargine to 30 units q h s     Await A1c  Add standing lispro 6 units t i d  on top of sliding scale  DC metformin given contrast study yesterday

## 2018-09-27 NOTE — CONSULTS
Consultation - General Surgery   Tarsha Wilde 44 y o  male MRN: [id number]  Unit/Bed#: [place] 2 -01 Encounter: [id number]    Assessment/Plan   Cellulitis, right thigh, with abscess    -Preliminarily growing Group B Strep  PCN allergy, on Cefazolin    -Clinically improving on IV ABX with WBC improving, down to 11,000 today from 13,000 yesterday    -Bedside I&D today  If abscess persists, may need operative mgmt  Will make NPO initially this AM pending bedside procedure    -Infectious disease consult appreciated for PO ABX recommendations  Continue Cefazolin 2gm Q8H IV until PO transition  Poorly controlled, type 2 Diabetes Mellitus   -Expect labile blood sugars during active infection  Defer to hospital medicine for medical mgmt  Hypertension   -resume home meds per hospital medicine  Blood pressure continues to be elevated up to 192M systolic and may need medication adjustment  Defer to hospital medicine    History of Present Illness     HPI:  Tarsha Wilde is a 44 y o  male who presents with an abscess of his right proximal medial thigh, not involving his scrotum or perineum  He states that for 2 days he was treated by his PCP with oral antibiotics (Keflex and Doxycycline), and then upon reevaluation, his PCP referred him for inpatient management  He was started on Cefazolin 2 gm Q8h IV since admission  His preliminary wound cultures grew Group B Strep  He states that he started with a "boil" but then it got bigger  He denies F/S/C  He has had boils in the past but no prior admissions for cellulitis  He is allergic to PCN  He is diabetic, and his blood sugars have been markedly elevated since his infection started  He is being managed by the medicine service while he is admitted  He denies severe pain at the infection site but has noticed moderate amounts of bloody, foul smelling drainage  This does not involve his scrotum or perineum    He denies change in bowel or bladder habits  Inpatient consult to Acute Care Surgery  Consult performed by: Malia Shaver ordered by: Vasquez Bradshaw          Review of Systems   Constitutional: Negative for activity change and fever  HENT: Negative  Eyes: Negative  Respiratory: Negative  Cardiovascular: Negative  Gastrointestinal: Negative  Endocrine: Negative  Genitourinary: Negative for difficulty urinating, scrotal swelling, testicular pain and urgency  Musculoskeletal: Negative  Skin: Positive for wound  Negative for color change, pallor and rash  Allergic/Immunologic: Negative  Neurological: Negative  Hematological: Negative  Psychiatric/Behavioral: Negative  All other systems reviewed and are negative        Historical Information   Past Medical History:   Diagnosis Date    Diabetes mellitus ([address] )     Hypertension      Past Surgical History:   Procedure Laterality Date    CATARACT EXTRACTION       Social History   History   Alcohol Use No     History   Drug Use No     History   Smoking Status    Former Smoker   Smokeless Tobacco    Never Used     Comment: Stevie says never smoker and no passive smoke exposure     Family History:   Family History   Problem Relation Age of Onset    Diabetes Mother     Breast cancer Mother     Heart disease Father     Prostate cancer Father        Meds/Allergies   current meds:   Current Facility-Administered Medications   Medication Dose Route Frequency    acetaminophen (TYLENOL) oral suspension 650 mg  650 mg Oral Q4H PRN    atenolol (TENORMIN) tablet 100 mg  100 mg Oral Daily    ceFAZolin (ANCEF) IVPB (premix) 2,000 mg  2,000 mg Intravenous Q8H    diphenhydrAMINE (BENADRYL) tablet 25 mg  25 mg Oral Q6H PRN    docusate sodium (COLACE) capsule 100 mg  100 mg Oral BID PRN    enoxaparin (LOVENOX) subcutaneous injection 40 mg  40 mg Subcutaneous Q24H STEFFI    hydrALAZINE (APRESOLINE) injection 10 mg  10 mg Intravenous Q4H PRN    HYDROcodone-acetaminophen (NORCO) 5-325 mg per tablet 1 tablet  1 tablet Oral Q4H PRN    HYDROmorphone (DILAUDID) injection 0 5 mg  0 5 mg Intravenous Q4H PRN    HYDROmorphone (DILAUDID) injection 1 mg  1 mg Intravenous Once    insulin glargine (LANTUS) subcutaneous injection 15 Units 0 15 mL  15 Units Subcutaneous HS    insulin lispro (HumaLOG) 100 units/mL subcutaneous injection 1-5 Units  1-5 Units Subcutaneous HS    insulin lispro (HumaLOG) 100 units/mL subcutaneous injection 1-6 Units  1-6 Units Subcutaneous TID AC    lidocaine-epinephrine (XYLOCAINE/EPINEPHRINE) 1 %-1:100,000 injection 10 mL  10 mL Infiltration Once    lisinopril (ZESTRIL) tablet 2 5 mg  2 5 mg Oral Daily    metFORMIN (GLUCOPHAGE-XR) 24 hr tablet 750 mg  750 mg Oral Daily With Breakfast    ondansetron (ZOFRAN) injection 4 mg  4 mg Intravenous Q4H PRN     Allergies   Allergen Reactions    Penicillins        Objective   First Vitals:   Blood Pressure: 163/70 (09/26/18 1542)  Pulse: (!) 119 (09/26/18 1542)  Temperature: 99 °F (37 2 °C) (09/26/18 1542)  Temp Source: Temporal (09/26/18 1542)  Respirations: 18 (09/26/18 1542)  Weight - Scale: (!) 147 kg (324 lb 1 2 oz) (09/26/18 1542)  SpO2: 97 % (09/26/18 1542)    Current Vitals:   Blood Pressure: 137/76 (09/26/18 2300)  Pulse: 104 (09/26/18 2300)  Temperature: 98 5 °F (36 9 °C) (09/26/18 2300)  Temp Source: Temporal (09/26/18 2300)  Respirations: 18 (09/26/18 2300)  Weight - Scale: (!) 147 kg (324 lb 1 2 oz) (09/26/18 1542)  SpO2: 97 % (09/26/18 2300)      Intake/Output Summary (Last 24 hours) at 09/27/18 0814  Last data filed at 09/26/18 1900   Gross per 24 hour   Intake             1625 ml   Output                0 ml   Net             1625 ml       Invasive Devices     Peripheral Intravenous Line            Peripheral IV 09/26/18 Left Antecubital less than 1 day    Peripheral IV 09/26/18 Right Antecubital less than 1 day                Physical Exam   Constitutional: He is oriented to person, place, and time  He appears well-developed and well-nourished  No distress  HENT:   Head: Normocephalic and atraumatic  Eyes: Pupils are equal, round, and reactive to light  No scleral icterus  Cardiovascular: Normal rate, regular rhythm, normal heart sounds and intact distal pulses  Exam reveals no gallop and no friction rub  No murmur heard  Pulmonary/Chest: Effort normal and breath sounds normal  No respiratory distress  He has no wheezes  He has no rales  He exhibits no tenderness  Abdominal: Soft  He exhibits no distension  There is no tenderness  Musculoskeletal: Normal range of motion  He exhibits tenderness  He exhibits no edema or deformity  Mild-mod tenderness of right proximal medial thigh as below  Neurological: He is alert and oriented to person, place, and time  Skin: Skin is warm and dry  Rash noted  There is erythema  No pallor  Right proximal, medial thigh with erythema and induration extending [id number] approaching but not including groin fold, perineum, or scrotum  Central area 3x3cm of wound breakdown with malodorous, sanguinous, purulent drainage easily expressed  No discrete abscess pocket  Psychiatric: He has a normal mood and affect  His behavior is normal    Nursing note and vitals reviewed  Bedside I&D Procedure Note to be completed separately      Lab Results:   CBC:   Lab Results   Component Value Date    WBC 11 16 (H) 09/27/2018    HGB 13 5 09/27/2018    HCT 39 4 09/27/2018    MCV 90 09/27/2018     09/27/2018    MCH 30 8 09/27/2018    MCHC 34 3 09/27/2018    RDW 12 1 09/27/2018    MPV 9 5 09/27/2018    NRBC 0 09/26/2018   , CMP:   Lab Results   Component Value Date     (L) 09/27/2018    K 3 6 09/27/2018    CL 98 (L) 09/27/2018    CO2 25 09/27/2018    BUN 17 09/27/2018    CREATININE 1 04 09/27/2018    CALCIUM 8 6 09/27/2018    AST 17 09/27/2018    ALT 19 09/27/2018    ALKPHOS 89 09/27/2018    EGFR 88 09/27/2018     Imaging: I have personally reviewed pertinent reports  CT Right Thigh 9/26/18  FINDINGS:   OSSEOUS STRUCTURES: No fracture, dislocation or destructive osseous lesion  VISUALIZED MUSCULATURE: Unremarkable  SOFT TISSUES: Increased attenuation in the cutaneous soft tissues and subjacent subcutaneous fat, consistent with cellulitis, in the medial right thigh centered at the level of the midshaft of the right femur with subcutaneous edema tracking cephalad   into the proximal right thigh and portions of the right buttock  No evidence of extension into the perineum or scrotum  Small amount of air in the central portion of the cellulitis  This more likely represents room air introduced via cutaneous lesion rather than the result of a gas-forming organism  No discrete fluid collection to suggest the presence of an abscess  No evidence of involvement in the muscles of the right thigh or the intermuscular fascia  OTHER PERTINENT FINDINGS: No evidence of soft tissue mass  Several nonspecific mildly prominent right inguinal lymph nodes  IMPRESSION:   Cellulitis in the medial aspect of the right thigh without evidence of abscess, osteomyelitis or necrotizing fasciitis  EKG, Pathology, and Other Studies: I have personally reviewed pertinent reports  Preliminary wound cultures taken PTA 9/24/18 show 3+ growth of Group B Beta Hemolytic Strep intrinsically susceptible to PCN

## 2018-09-27 NOTE — PROGRESS NOTES
Progress Note - Tami Bustos 1975, 44 y o  male MRN: [id number]    Unit/Bed#: [place] [address] -01 Encounter: [id number]    Primary Care Provider: Richard Madrigal MD   Date and time admitted to hospital: 9/26/2018  3:43 PM        Assessment and Plan  * Sepsis due to cellulitis [facility])   Assessment & Plan    Sepsis secondary to cellulitis/abscess of leg  Cellulitis and abscess of leg   Assessment & Plan    Recently saw PCP on Monday for symptoms as started Friday  At that time was recommended admission but patient declined  Trialed doxycycline and keflex without improvement  No history of MRSA  Culture consistent with streptococcus  Surgery attempted I&D this morning  Continue cefazolin for now     Hyponatremia   Assessment & Plan    stable     Morbid obesity (HCC)   Assessment & Plan    Body mass index is 49 28 kg/m²  Diabetes mellitus ([facility] [address] )   Assessment & Plan      Results from last 7 days  Lab Units 09/27/18  0814 09/26/18  2111   POC GLUCOSE mg/dl 267* 290*     Diabetes mellitus with hyperglycemia secondary to infection  Increase glargine to 30 units q h s     Await A1c  Add standing lispro 6 units t i d  on top of sliding scale  DC metformin given contrast study yesterday  Hypertension   Assessment & Plan    Blood pressure is elevated secondary to acute distress  Continue lisinopril and atenolol; consider HCTZ if continues to elevate     VTE Pharmacologic Prophylaxis: Enoxaparin (Lovenox)    Patient Centered Rounds: I have performed bedside rounds with nursing staff today  Discussions with Specialists or Other Care Team Provider:  Surgery    Education and Discussions with Family / Patient:     Time Spent for Care: 25 mins  More than 50% of total time spent on counseling and coordination of care as described above      Current Length of Stay: 1 day(s)    Current Patient Status: Inpatient   Certification Statement: The patient will continue to require additional inpatient hospital stay due to Sepsis due to cellulitis [facility])    Discharge Plan / Estimated Discharge Date:     Code Status: Level 1 - Full Code  ______________________________________________________________________________    Subjective:   Patient seen and examined  Somewhat lethargic after given hydromorphone for procedure  No new complaints  Attempted I and D by surgery at bedside  Objective:   Vitals: Blood pressure 164/91, pulse 98, temperature (!) 97 4 °F (36 3 °C), temperature source Temporal, resp  rate 18, weight (!) 147 kg (324 lb 1 2 oz), SpO2 97 %  Physical Exam:   General appearance: alert, appears stated age and cooperative  Head: Normocephalic, without obvious abnormality, atraumatic  Lungs: clear to auscultation bilaterally  Heart: regular rate and rhythm  Abdomen: obese nontender positive bowel sounds  Back: negative, range of motion normal  Extremities: right inner thigh wrapped  Neurologic: Grossly normal    Additional Data:   Labs:    Results from last 7 days  Lab Units 09/27/18  0501 09/26/18  1602   WBC Thousand/uL 11 16* 13 26*   HEMOGLOBIN g/dL 13 5 14 6   HEMATOCRIT % 39 4 42 5   MCV fL 90 89   PLATELETS Thousands/uL 336 346       Results from last 7 days  Lab Units 09/27/18  0501 09/26/18  1602   SODIUM mmol/L 131* 131*   POTASSIUM mmol/L 3 6 4 2   CHLORIDE mmol/L 98* 94*   CO2 mmol/L 25 24   ANION GAP mmol/L 8 13   BUN mg/dL 17 21   CREATININE mg/dL 1 04 1 30   CALCIUM mg/dL 8 6 9 5   ALBUMIN g/dL 2 6* 3 0*   TOTAL BILIRUBIN mg/dL 0 35 0 82   ALK PHOS U/L 89 101   ALT U/L 19 22   AST U/L 17 19   EGFR ml/min/1 73sq m 88 67   GLUCOSE RANDOM mg/dL 294* 405*       Results from last 7 days  Lab Units 09/26/18  1602   MAGNESIUM mg/dL 2 0                Results from last 7 days  Lab Units 09/26/18  1602   LACTIC ACID mmol/L 1 9       Results from last 7 days  Lab Units 09/27/18  0814 09/26/18  2111   POC GLUCOSE mg/dl 267* 290*             * I Have Reviewed All Lab Data Listed Above      Cultures:     Results from last 7 days  Lab Units 09/26/18  1826 09/24/18  1555   GRAM STAIN RESULT  2+ Polys  2+ Gram positive cocci in pairs and chains 1+ Disintegrating polys  2+ Gram positive cocci in pairs and chains  1+ Gram negative rods   WOUND CULTURE   --  3+ Growth of Beta Hemolytic Streptococcus Group B*  1+ Growth of          Imaging:  Imaging Reports Reviewed Today Include:   Procedure: Ct Femur Right W Contrast  Result Date: 9/26/2018  Impression: Cellulitis in the medial aspect of the right thigh without evidence of abscess, osteomyelitis or necrotizing fasciitis  Workstation performed: [id number]         Scheduled Meds:  Current Facility-Administered Medications:  acetaminophen 650 mg Oral Q4H PRN Chavez Spotted, DO    atenolol 100 mg Oral Daily Horacio Ulrich, DO    cefazolin 2,000 mg Intravenous Q8H Horacio Ulrich, DO Last Rate: 2,000 mg (09/27/18 0349)   diphenhydrAMINE 25 mg Oral Q6H PRN Chavez Spotted, DO    docusate sodium 100 mg Oral BID PRN Chavez Spotted, DO    enoxaparin 40 mg Subcutaneous Q24H [address] Horacio Ulrich, DO    hydrALAZINE 10 mg Intravenous Q4H PRN Chavez Spotted, DO    HYDROcodone-acetaminophen 1 tablet Oral Q4H PRN Chavez Spotted, DO    HYDROmorphone 0 5 mg Intravenous Q4H PRN Chavez Spotted, DO    insulin glargine 15 Units Subcutaneous HS Horacio Ulrich, DO    insulin lispro 1-5 Units Subcutaneous HS Horacio Ulrich, DO    insulin lispro 1-6 Units Subcutaneous TID AC Horacio Ulrich, DO    lisinopril 2 5 mg Oral Daily Horacio Ulrich, DO    metFORMIN 750 mg Oral Daily With Breakfast Horacio Ulrich, DO    ondansetron 4 mg Intravenous Q4H PRN Chavez Spotted, DO        Chavez Spotted, DO  [facility] Internal Medicine  Hospitalist    ** Please Note: This note has been constructed using a voice recognition system   **

## 2018-09-27 NOTE — CONSULTS
Consultation - Infectious Disease   Pavithra Salinas 44 y o  male MRN: [id number]  Unit/Bed#: [place] [address] -01 Encounter: [id number]      IMPRESSION & RECOMMENDATIONS:   Impression/Recommendations:  1  Sepsis  POA: Tachycardia and leukocytosis  Due to #2  Blood cultures Negative   Clinically stable and nontoxic; improving  Rec:  ·  continue antibiotics as below  · Follow up final blood cultures from admission   · Follow temperatures closely  · Check CBC in a m  · Supportive care as per the primary service    2  Right thigh cellulitis with abscess  Recent outpatient culture with GBS  Status post bedside I&D  Still with significant amount of induration and erythema  Rec:  · Continue high-dose cefazolin for now   · Serial exams   · [address] per surgery  · Anticipate hopeful transition to PO antibiotics in next 48-72 hours depending on rate of clinical improvement    3  Diabetes with hyperglycemia  Poorly controlled with A1c 9 9   Risk factor for infection  Rec:  · Continue management per the primary service    4  MO  Risk factor for all of above  Lost 25 lbs in past year with dietary changes  Rec:  · Needs weight loss  Discussed with patient    Antibiotics:  Cefazolin #2    Thank you for this consultation  We will follow along with you  HISTORY OF PRESENT ILLNESS:  Reason for Consult: Right thigh cellulitis    HPI: Pavithra Salinas is a 44 y o  male  With diabetes and morbid obesity  Who seen by his primary care doctor initially on 09/24 complaining of 4 days of swelling, pain, and drainage over his right thigh  Was recommended that that time that the patient seek patient at the emergency department but he declined  He was started on Keflex and doxycycline and advised to see a surgeon but he also did not follow through with this  A wound culture was obtained and grew group B strep    He was seen in follow-up in the office  Yesterday and was noted to have increased erythema and breakdown and he was referred to the emergency department  Upon presentation he was noted to be afebrile but had  Tachycardia and leukocytosis  He underwent a CT of the thigh which showed cellulitis without abscess or necrotizing fasciitis  He was given a dose of vancomycin and started on high-dose cefazolin  He was seen by surgery and underwent I and D of a superficial abscess at the bedside which yielded bloody purulent fluid  We are asked to comment on further evaluation and management  REVIEW OF SYSTEMS:  Denies fevers, chills, sweats, nausea, vomiting, or diarrhea  A complete system-based review of systems is otherwise negative  PAST MEDICAL HISTORY:  Past Medical History:   Diagnosis Date    Diabetes mellitus ([facility] [address] )     Hypertension      Past Surgical History:   Procedure Laterality Date    CATARACT EXTRACTION         FAMILY HISTORY:  Non-contributory    SOCIAL HISTORY:  History   Alcohol Use No     History   Drug Use No     History   Smoking Status    Former Smoker   Smokeless Tobacco    Never Used     Comment: Stevie says never smoker and no passive smoke exposure       ALLERGIES:  Allergies   Allergen Reactions    Penicillins        MEDICATIONS:  All current active medications have been reviewed      PHYSICAL EXAM:  Vitals:  HR:  [] 98  Resp:  [18] 18  BP: (119-164)/(57-91) 164/91  SpO2:  [95 %-98 %] 97 %  Temp (24hrs), Av 6 °F (37 °C), Min:97 4 °F (36 3 °C), Max:99 3 °F (37 4 °C)  Current: Temperature: (!) 97 4 °F (36 3 °C)     Physical Exam:  General:  Well-nourished, well-developed, in no acute distress  Eyes:  Conjunctive clear with no hemorrhages or effusions  Oropharynx:  No ulcers, no lesions  Neck:  Supple, no lymphadenopathy  Lungs:  Clear to auscultation bilaterally, no accessory muscle use  Cardiac:  Regular rate and rhythm, no murmurs  Abdomen:  Soft, non-tender, non-distended, obese  Extremities:  No peripheral cyanosis, clubbing, or edema  Skin:  No rashes, no ulcers  Neurological:  Moves all four extremities spontaneously, sensation grossly intact  Right thigh:  Large area of erythema and induration with central I&D area with bloody drainage    LABS, IMAGING, & OTHER STUDIES:  Lab Results:  I have personally reviewed pertinent labs  Results from last 7 days  Lab Units 09/27/18  0501 09/26/18  1602   SODIUM mmol/L 131* 131*   POTASSIUM mmol/L 3 6 4 2   CHLORIDE mmol/L 98* 94*   CO2 mmol/L 25 24   BUN mg/dL 17 21   CREATININE mg/dL 1 04 1 30   EGFR ml/min/1 73sq m 88 67   CALCIUM mg/dL 8 6 9 5   AST U/L 17 19   ALT U/L 19 22   ALK PHOS U/L 89 101       Results from last 7 days  Lab Units 09/27/18  0501 09/26/18  1602   WBC Thousand/uL 11 16* 13 26*   HEMOGLOBIN g/dL 13 5 14 6   PLATELETS Thousands/uL 336 346       Results from last 7 days  Lab Units 09/26/18  1826 09/24/18  1555   GRAM STAIN RESULT  2+ Polys  2+ Gram positive cocci in pairs and chains 1+ Disintegrating polys  2+ Gram positive cocci in pairs and chains  1+ Gram negative rods   WOUND CULTURE   --  3+ Growth of Beta Hemolytic Streptococcus Group B*  1+ Growth of        Imaging Studies:   I have personally reviewed pertinent imaging study reports and images in PACS  CT right thigh cellulitis without abscess    EKG, Pathology, and Other Studies:   I have personally reviewed pertinent reports

## 2018-09-27 NOTE — PROCEDURES
Incision and drain  Date/Time: 9/27/2018 9:39 AM  Performed by: Roxana Higginbotham  Authorized by: Roxana Higginbotham     Patient location:  Bedside  Consent:     Consent obtained:  Verbal    Consent given by:  Patient    Risks discussed:  Bleeding, incomplete drainage and infection    Alternatives discussed:  Delayed treatment, alternative treatment and observation  Universal protocol:     Procedure explained and questions answered to patient or proxy's satisfaction: yes      Site/side marked: yes      Immediately prior to procedure a time out was called: yes      Patient identity confirmed:  Verbally with patient  Location:     Type:  Abscess    Size:  5 cm   Pre-procedure details:     Skin preparation:  Betadine  Anesthesia (see MAR for exact dosages): Anesthesia method:  Local infiltration    Local anesthetic:  Lidocaine 1% w/o epi  Procedure details:     Complexity:  Simple    Needle aspiration: no      Incision types:  Elliptical    Scalpel blade:  11    Approach:  Open    Incision depth:  Skin and subcutaneous    Wound management:  Probed and deloculated and irrigated with saline    Drainage:  Bloody and purulent    Drainage amount: Moderate    Wound treatment:  Packing placed    Packing materials:  1/2 in iodoform gauze  Post-procedure details:     Patient tolerance of procedure:   Tolerated well, no immediate complications

## 2018-09-27 NOTE — ASSESSMENT & PLAN NOTE
Blood pressure is elevated secondary to acute distress    Continue lisinopril and atenolol; consider HCTZ if continues to elevate

## 2018-09-28 PROBLEM — K59.00 CONSTIPATION: Status: ACTIVE | Noted: 2018-09-28

## 2018-09-28 LAB
ANION GAP SERPL CALCULATED.3IONS-SCNC: 11 MMOL/L (ref 4–13)
BACTERIA WND AEROBE CULT: ABNORMAL
BUN SERPL-MCNC: 18 MG/DL (ref 5–25)
CALCIUM SERPL-MCNC: 8.7 MG/DL (ref 8.3–10.1)
CHLORIDE SERPL-SCNC: 100 MMOL/L (ref 100–108)
CO2 SERPL-SCNC: 25 MMOL/L (ref 21–32)
CREAT SERPL-MCNC: 0.96 MG/DL (ref 0.6–1.3)
ERYTHROCYTE [DISTWIDTH] IN BLOOD BY AUTOMATED COUNT: 12.2 % (ref 11.6–15.1)
GFR SERPL CREATININE-BSD FRML MDRD: 97 ML/MIN/1.73SQ M
GLUCOSE SERPL-MCNC: 233 MG/DL (ref 65–140)
GLUCOSE SERPL-MCNC: 256 MG/DL (ref 65–140)
GLUCOSE SERPL-MCNC: 284 MG/DL (ref 65–140)
GLUCOSE SERPL-MCNC: 300 MG/DL (ref 65–140)
GLUCOSE SERPL-MCNC: 359 MG/DL (ref 65–140)
GRAM STN SPEC: ABNORMAL
GRAM STN SPEC: ABNORMAL
HCT VFR BLD AUTO: 38.1 % (ref 36.5–49.3)
HGB BLD-MCNC: 13.1 G/DL (ref 12–17)
MCH RBC QN AUTO: 30.8 PG (ref 26.8–34.3)
MCHC RBC AUTO-ENTMCNC: 34.4 G/DL (ref 31.4–37.4)
MCV RBC AUTO: 90 FL (ref 82–98)
PLATELET # BLD AUTO: 370 THOUSANDS/UL (ref 149–390)
PMV BLD AUTO: 9.3 FL (ref 8.9–12.7)
POTASSIUM SERPL-SCNC: 3.8 MMOL/L (ref 3.5–5.3)
RBC # BLD AUTO: 4.25 MILLION/UL (ref 3.88–5.62)
SODIUM SERPL-SCNC: 136 MMOL/L (ref 136–145)
WBC # BLD AUTO: 9.85 THOUSAND/UL (ref 4.31–10.16)

## 2018-09-28 PROCEDURE — 85027 COMPLETE CBC AUTOMATED: CPT | Performed by: INTERNAL MEDICINE

## 2018-09-28 PROCEDURE — 82948 REAGENT STRIP/BLOOD GLUCOSE: CPT

## 2018-09-28 PROCEDURE — 80048 BASIC METABOLIC PNL TOTAL CA: CPT | Performed by: INTERNAL MEDICINE

## 2018-09-28 PROCEDURE — 99232 SBSQ HOSP IP/OBS MODERATE 35: CPT | Performed by: INTERNAL MEDICINE

## 2018-09-28 RX ORDER — LACTULOSE 20 G/30ML
20 SOLUTION ORAL 3 TIMES DAILY PRN
Status: DISCONTINUED | OUTPATIENT
Start: 2018-09-28 | End: 2018-09-29 | Stop reason: HOSPADM

## 2018-09-28 RX ORDER — INSULIN GLARGINE 100 [IU]/ML
45 INJECTION, SOLUTION SUBCUTANEOUS
Status: DISCONTINUED | OUTPATIENT
Start: 2018-09-28 | End: 2018-09-29 | Stop reason: HOSPADM

## 2018-09-28 RX ADMIN — LISINOPRIL 2.5 MG: 2.5 TABLET ORAL at 08:15

## 2018-09-28 RX ADMIN — CEFAZOLIN SODIUM 2000 MG: 2 SOLUTION INTRAVENOUS at 12:06

## 2018-09-28 RX ADMIN — INSULIN LISPRO 4 UNITS: 100 INJECTION, SOLUTION INTRAVENOUS; SUBCUTANEOUS at 12:06

## 2018-09-28 RX ADMIN — CEFAZOLIN SODIUM 2000 MG: 2 SOLUTION INTRAVENOUS at 19:53

## 2018-09-28 RX ADMIN — INSULIN LISPRO 3 UNITS: 100 INJECTION, SOLUTION INTRAVENOUS; SUBCUTANEOUS at 21:34

## 2018-09-28 RX ADMIN — INSULIN GLARGINE 45 UNITS: 100 INJECTION, SOLUTION SUBCUTANEOUS at 21:35

## 2018-09-28 RX ADMIN — INSULIN LISPRO 6 UNITS: 100 INJECTION, SOLUTION INTRAVENOUS; SUBCUTANEOUS at 08:16

## 2018-09-28 RX ADMIN — INSULIN LISPRO 6 UNITS: 100 INJECTION, SOLUTION INTRAVENOUS; SUBCUTANEOUS at 12:06

## 2018-09-28 RX ADMIN — INSULIN LISPRO 3 UNITS: 100 INJECTION, SOLUTION INTRAVENOUS; SUBCUTANEOUS at 08:16

## 2018-09-28 RX ADMIN — ATENOLOL 100 MG: 50 TABLET ORAL at 08:15

## 2018-09-28 RX ADMIN — INSULIN LISPRO 15 UNITS: 100 INJECTION, SOLUTION INTRAVENOUS; SUBCUTANEOUS at 18:28

## 2018-09-28 RX ADMIN — CEFAZOLIN SODIUM 2000 MG: 2 SOLUTION INTRAVENOUS at 03:23

## 2018-09-28 RX ADMIN — INSULIN LISPRO 6 UNITS: 100 INJECTION, SOLUTION INTRAVENOUS; SUBCUTANEOUS at 18:28

## 2018-09-28 RX ADMIN — ENOXAPARIN SODIUM 40 MG: 40 INJECTION SUBCUTANEOUS at 08:16

## 2018-09-28 NOTE — PROGRESS NOTES
Progress Note - Infectious Disease   Jayesh Brown 44 y o  male MRN: [id number]  [address] Encounter: [id number]      Impression/Recommendations:  1  Sepsis  POA: Tachycardia and leukocytosis  Due to #2  Blood cultures negative   Clinically stable and nontoxic; improving  Rec:  ? Continue antibiotics as below  ? Follow up final blood cultures from admission   ? Follow temperatures closely  ? Supportive care as per the primary service     2  Right thigh cellulitis with abscess  Recent outpatient culture with GBS  Status post bedside I&D  Slow clinical improvement  Rec:  ? Continue high-dose cefazolin for now   ? Serial exams   ? [address] per surgery  ? If continues to improve anticipate hopeful transition to Keflex 500 mg PO Q6 in next 24-48 hours with plan to complete 10 days total of antibiotics through 10/5/18     3  Diabetes with hyperglycemia  Poorly controlled with A1c 9 9   Risk factor for infection  Rec:  ? Continue management per the primary service     4   MO  Risk factor for all of above  Lost 25 lbs in past year with dietary changes  Rec:  ? Needs weight loss  Discussed with patient     Antibiotics:  Cefazolin #3    Subjective:  Patient seen on PM rounds  Denies fevers, chills, sweats, nausea, vomiting, or diarrhea  24 Hour Events:  No documented fevers, chills, sweats, nausea, vomiting, or diarrhea  Objective:  Vitals:  HR:  [76-86] 86  Resp:  [18-20] 20  BP: (125-163)/(82-92) 163/92  SpO2:  [93 %-99 %] 96 %  Temp (24hrs), Av 8 °F (36 6 °C), Min:97 6 °F (36 4 °C), Max:98 1 °F (36 7 °C)  Current: Temperature: 98 1 °F (36 7 °C)    Physical Exam:   General:  No acute distress  Psychiatric:  Awake and alert  Pulmonary:  Normal respiratory excursion without accessory muscle use  Abdomen:  Soft, nontender  Extremities:  Decreasing erythema and induration right inner thigh  Skin:  No rashes    Lab Results:  I have personally reviewed pertinent labs      Results from last 7 days  Lab Units 09/28/18  0621 09/27/18  0501 09/26/18  1602   SODIUM mmol/L 136 131* 131*   POTASSIUM mmol/L 3 8 3 6 4 2   CHLORIDE mmol/L 100 98* 94*   CO2 mmol/L 25 25 24   BUN mg/dL 18 17 21   CREATININE mg/dL 0 96 1 04 1 30   EGFR ml/min/1 73sq m 97 88 67   CALCIUM mg/dL 8 7 8 6 9 5   AST U/L  --  17 19   ALT U/L  --  19 22   ALK PHOS U/L  --  89 101       Results from last 7 days  Lab Units 09/28/18  0621 09/27/18  0501 09/26/18  1602   WBC Thousand/uL 9 85 11 16* 13 26*   HEMOGLOBIN g/dL 13 1 13 5 14 6   PLATELETS Thousands/uL 370 336 346       Results from last 7 days  Lab Units 09/26/18  1826 09/26/18  1604 09/26/18  1602 09/24/18  1555   BLOOD CULTURE   --  No Growth at 24 hrs  No Growth at 24 hrs   --    GRAM STAIN RESULT  2+ Polys  2+ Gram positive cocci in pairs and chains  --   --  1+ Disintegrating polys  2+ Gram positive cocci in pairs and chains  1+ Gram negative rods   WOUND CULTURE  1+ Growth of Beta Hemolytic Streptococcus Group B*  --   --  3+ Growth of Beta Hemolytic Streptococcus Group B*  1+ Growth of        Imaging Studies:   I have personally reviewed pertinent imaging study reports and images in PACS  EKG, Pathology, and Other Studies:   I have personally reviewed pertinent reports

## 2018-09-28 NOTE — ASSESSMENT & PLAN NOTE
Status post I&D by surgery yesterday  Culture prior to admission consistent with streptococcus  Awaiting follow-up cultures  Stable cefazolin per ID

## 2018-09-28 NOTE — ASSESSMENT & PLAN NOTE
Results from last 7 days  Lab Units 09/28/18  1556 09/28/18  1110 09/28/18  0756 09/27/18  2057 09/27/18  1609 09/27/18  1102 09/27/18  0814 09/26/18  2111   POC GLUCOSE mg/dl 359* 284* 233* 303* 343* 291* 267* 290*     Diabetes mellitus with hyperglycemia secondary to infection  Continue to further increase glargine and standing lispro    A1c 9 9 prior to arrival

## 2018-09-28 NOTE — PLAN OF CARE
DISCHARGE PLANNING     Discharge to home or other facility with appropriate resources Progressing        INFECTION - ADULT     Absence or prevention of progression during hospitalization Progressing     Absence of fever/infection during neutropenic period Progressing        Knowledge Deficit     Patient/family/caregiver demonstrates understanding of disease process, treatment plan, medications, and discharge instructions Progressing        METABOLIC, FLUID AND ELECTROLYTES - ADULT     Fluid balance maintained Progressing     Glucose maintained within target range Progressing        MUSCULOSKELETAL - ADULT     Maintain or return mobility to safest level of function Progressing        PAIN - ADULT     Verbalizes/displays adequate comfort level or baseline comfort level Progressing        Potential for Falls     Patient will remain free of falls Progressing        SAFETY ADULT     Patient will remain free of falls Progressing     Maintain or return to baseline ADL function Progressing     Maintain or return mobility status to optimal level Progressing        SKIN/TISSUE INTEGRITY - ADULT     Skin integrity remains intact Progressing     Incision(s), wounds(s) or drain site(s) healing without S/S of infection Progressing

## 2018-09-28 NOTE — PROGRESS NOTES
Progress Note - General Surgery   Suzan Faustin 44 y o  male MRN: [id number]  Unit/Bed#: [address] [address] [place] 204-01 Encounter: [id number]    Assessment/Plan:  Suzan Faustin is doing improved with cellulitis and abscess right groin  1  Right groin abscess s/p bedside I+D  - area redressed and irrgated at time of visit  -improved erythema and edema  -continue local wound care   -follow up as outpatient    2  VTE Prophylaxis   Pharmacologic: Fondaparinux (Arixtra)   Mechanical: sequential compression device    Subjective:  - Pain: has  - Current diet: Regular house diet   - no nausea and no vomiting  - + BM and + Flatus      Objective:     Vitals:   Vitals:    09/27/18 0710 09/27/18 1514 09/27/18 2352 09/28/18 0751   BP: 164/91 135/87 125/82 163/92   BP Location: Right arm Left arm Left arm Left arm   Pulse: 98 76 82 86   Resp: 18 18 20 20   Temp: (!) 97 4 °F (36 3 °C) 97 7 °F (36 5 °C) 97 6 °F (36 4 °C) 98 1 °F (36 7 °C)   TempSrc: Temporal Temporal Tympanic Tympanic   SpO2: 97% 93% 99% 96%   Weight:           I/O:   I/O       09/26 0701 - 09/27 0700 09/27 0701 - 09/28 0700 09/28 0701 - 09/29 0700    P  O   900     I V  (mL/kg)  40 (0 3)     IV Piggyback 1625 50     Total Intake(mL/kg) 1625 (11 1) 990 (6 7)     Net +1625 +990             Unmeasured Urine Occurrence  1 x           Labs:  [unfilled]    [unfilled]    [unfilled]      Results from last 7 days  Lab Units 09/27/18  0501   HEMOGLOBIN A1C % 9 9*       Imaging: No new pertinent imaging studies  Exam:  General: alert, appears stated age and no distress  HEENT: Head: Normocephalic, no lesions, without obvious abnormality  Eyes: PERRL, EOM's intact  Fundi benign  , conjunctivae/corneas clear  Neck: normal, supple, no adenopathy and trachea midline  Skin: open wound right thigh/groin with surrounding erythema, moderate serous sanguinous drainage  Chest: Normal chest wall and respirations  Clear to auscultation    Heart[de-identified] regular rate and rhythm, S1, S2 normal, no murmur, click, rub or gallop  Abdomen: abdomen is soft without significant tenderness, masses, organomegaly or guarding Bowel sounds are normal   Extremities: no redness or tenderness in the calves or thighs, no edema      Some portions of this records may have been generated with voice recognition software  There may be translation, syntax,  or grammatical errors  Occasional wrong word or "sound-a-like" substitutions may have occurred due to the inherent limitations of the voice recognition software  Read the chart carefully and recognize, using context, where substations may have occurred  If you have any questions, please contact the dictating provider for clarification or correction, as needed          Holli Hines PA-C  9/28/2018

## 2018-09-28 NOTE — PROGRESS NOTES
Progress Note - Alvin Villanueva 1975, 44 y o  male MRN: [id number]    Unit/Bed#: [place] [address] -01 Encounter: [id number]    Primary Care Provider: Daren Chowdhury MD   Date and time admitted to hospital: 9/26/2018  3:43 PM        Assessment and Plan  * Sepsis due to cellulitis [facility])   Assessment & Plan    Sepsis secondary to cellulitis/abscess of leg  Cellulitis and abscess of leg   Assessment & Plan    Status post I&D by surgery yesterday  Culture prior to admission consistent with streptococcus  Awaiting follow-up cultures  Stable cefazolin per ID  Constipation   Assessment & Plan    No bowel movement x2 days  Will add laxative p r n  Hyponatremia   Assessment & Plan    Resolved     Morbid obesity ([facility] [address] )   Assessment & Plan    Body mass index is 49 28 kg/m²  Diabetes mellitus [facility])   Assessment & Plan      Results from last 7 days  Lab Units 09/28/18  1556 09/28/18  1110 09/28/18  0756 09/27/18  2057 09/27/18  1609 09/27/18  1102 09/27/18  0814 09/26/18  2111   POC GLUCOSE mg/dl 359* 284* 233* 303* 343* 291* 267* 290*     Diabetes mellitus with hyperglycemia secondary to infection  Continue to further increase glargine and standing lispro  A1c 9 9 prior to arrival     Hypertension   Assessment & Plan    Blood pressure stable on lisinopril and atenolol     VTE Pharmacologic Prophylaxis: Enoxaparin (Lovenox)    Patient Centered Rounds: I have performed bedside rounds with nursing staff today  Discussions with Specialists or Other Care Team Provider:     Education and Discussions with Family / Patient:     Time Spent for Care: 30 mins  More than 50% of total time spent on counseling and coordination of care as described above      Current Length of Stay: 2 day(s)    Current Patient Status: Inpatient   Certification Statement: The patient will continue to require additional inpatient hospital stay due to Sepsis due to cellulitis [facility])    Discharge Plan / Estimated Discharge Date: 24-48 hours    Code Status: Level 1 - Full Code  ______________________________________________________________________________    Subjective:   Patient seen and examined  Pain control, no new complaints  Afebrile  Objective:   Vitals: Blood pressure 133/73, pulse 79, temperature 98 2 °F (36 8 °C), temperature source Tympanic, resp  rate 20, weight (!) 147 kg (324 lb 1 2 oz), SpO2 97 %      Physical Exam:   General appearance: alert, appears stated age and cooperative  Head: Normocephalic, without obvious abnormality, atraumatic  Lungs: clear to auscultation bilaterally  Heart: regular rate and rhythm  Abdomen: Soft obese nontender positive bowel sounds  Back: negative, range of motion normal  Extremities: right inner thigh covered  Neurologic: Grossly normal    Additional Data:   Labs:    Results from last 7 days  Lab Units 09/28/18  0621 09/27/18  0501 09/26/18  1602   WBC Thousand/uL 9 85 11 16* 13 26*   HEMOGLOBIN g/dL 13 1 13 5 14 6   HEMATOCRIT % 38 1 39 4 42 5   MCV fL 90 90 89   PLATELETS Thousands/uL 370 336 346       Results from last 7 days  Lab Units 09/28/18  0621 09/27/18  0501 09/26/18  1602   SODIUM mmol/L 136 131* 131*   POTASSIUM mmol/L 3 8 3 6 4 2   CHLORIDE mmol/L 100 98* 94*   CO2 mmol/L 25 25 24   ANION GAP mmol/L 11 8 13   BUN mg/dL 18 17 21   CREATININE mg/dL 0 96 1 04 1 30   CALCIUM mg/dL 8 7 8 6 9 5   ALBUMIN g/dL  --  2 6* 3 0*   TOTAL BILIRUBIN mg/dL  --  0 35 0 82   ALK PHOS U/L  --  89 101   ALT U/L  --  19 22   AST U/L  --  17 19   EGFR ml/min/1 73sq m 97 88 67   GLUCOSE RANDOM mg/dL 256* 294* 405*       Results from last 7 days  Lab Units 09/26/18  1602   MAGNESIUM mg/dL 2 0                Results from last 7 days  Lab Units 09/26/18  1602   LACTIC ACID mmol/L 1 9       Results from last 7 days  Lab Units 09/28/18  1556 09/28/18  1110 09/28/18  0756 09/27/18  2057 09/27/18  1609 09/27/18  1102 09/27/18  0814 09/26/18  2111   POC GLUCOSE mg/dl 359* 284* 233* 303* 343* 291* 267* 290* Results from last 7 days  Lab Units 09/27/18  0501   HEMOGLOBIN A1C % 9 9*         * I Have Reviewed All Lab Data Listed Above  Cultures:     Results from last 7 days  Lab Units 09/26/18  1826 09/26/18  1604 09/26/18  1602 09/24/18  1555   BLOOD CULTURE   --  No Growth at 24 hrs  No Growth at 24 hrs   --    GRAM STAIN RESULT  2+ Polys  2+ Gram positive cocci in pairs and chains  --   --  1+ Disintegrating polys  2+ Gram positive cocci in pairs and chains  1+ Gram negative rods   WOUND CULTURE  1+ Growth of Beta Hemolytic Streptococcus Group B*  --   --  3+ Growth of Beta Hemolytic Streptococcus Group B*  1+ Growth of      Scheduled Meds:  Current Facility-Administered Medications:  acetaminophen 650 mg Oral Q4H PRN Mathew Yael, DO    atenolol 100 mg Oral Daily Horacio Ulrich, DO    cefazolin 2,000 mg Intravenous Q8H Horacio Ulrich, DO Last Rate: Stopped (09/28/18 1236)   diphenhydrAMINE 25 mg Oral Q6H PRN Mathew Yael, DO    docusate sodium 100 mg Oral BID PRN Mathew Yael, DO    enoxaparin 40 mg Subcutaneous Q24H [address] Horacio Ulrich, DO    hydrALAZINE 10 mg Intravenous Q4H PRN Mathew Yael, DO    HYDROcodone-acetaminophen 1 tablet Oral Q4H PRN Mathew Yael, DO    HYDROmorphone 0 5 mg Intravenous Q4H PRN Mathew Yael, DO    insulin glargine 30 Units Subcutaneous HS Horacio Ulrich, DO    insulin lispro 1-5 Units Subcutaneous HS Horacio Ulrich, DO    insulin lispro 1-6 Units Subcutaneous TID AC Horacio Ulrich, DO    insulin lispro 6 Units Subcutaneous TID With Meals Horacio Ulrich, DO    lisinopril 2 5 mg Oral Daily Horacio Ulrich, DO    ondansetron 4 mg Intravenous Q4H PRN Mathew Yael, DO        Mathew Yael, DO  [facility] Internal Medicine  Hospitalist    ** Please Note: This note has been constructed using a voice recognition system   **

## 2018-09-29 VITALS
RESPIRATION RATE: 20 BRPM | SYSTOLIC BLOOD PRESSURE: 127 MMHG | DIASTOLIC BLOOD PRESSURE: 76 MMHG | TEMPERATURE: 96.8 F | OXYGEN SATURATION: 97 % | HEART RATE: 77 BPM | BODY MASS INDEX: 49.28 KG/M2 | WEIGHT: 315 LBS

## 2018-09-29 PROBLEM — E87.1 HYPONATREMIA: Status: RESOLVED | Noted: 2018-09-26 | Resolved: 2018-09-29

## 2018-09-29 LAB
GLUCOSE SERPL-MCNC: 196 MG/DL (ref 65–140)
GLUCOSE SERPL-MCNC: 225 MG/DL (ref 65–140)

## 2018-09-29 PROCEDURE — 82948 REAGENT STRIP/BLOOD GLUCOSE: CPT

## 2018-09-29 PROCEDURE — 99239 HOSP IP/OBS DSCHRG MGMT >30: CPT | Performed by: INTERNAL MEDICINE

## 2018-09-29 PROCEDURE — 99232 SBSQ HOSP IP/OBS MODERATE 35: CPT | Performed by: INTERNAL MEDICINE

## 2018-09-29 RX ADMIN — LISINOPRIL 2.5 MG: 2.5 TABLET ORAL at 08:28

## 2018-09-29 RX ADMIN — INSULIN LISPRO 15 UNITS: 100 INJECTION, SOLUTION INTRAVENOUS; SUBCUTANEOUS at 08:28

## 2018-09-29 RX ADMIN — CEFAZOLIN SODIUM 2000 MG: 2 SOLUTION INTRAVENOUS at 04:09

## 2018-09-29 RX ADMIN — ATENOLOL 100 MG: 50 TABLET ORAL at 08:28

## 2018-09-29 RX ADMIN — INSULIN LISPRO 2 UNITS: 100 INJECTION, SOLUTION INTRAVENOUS; SUBCUTANEOUS at 08:28

## 2018-09-29 RX ADMIN — ENOXAPARIN SODIUM 40 MG: 40 INJECTION SUBCUTANEOUS at 08:29

## 2018-09-29 NOTE — NURSING NOTE
Patient discharged to home  Discharge instructions were reviewed along with wound care instructions  Patients IV was removed and patient agreed to follow up with General surgery within 2 weeks

## 2018-09-29 NOTE — PROGRESS NOTES
Progress Note - Aiken Peak 1975, 44 y o  male MRN: [id number]    Unit/Bed#: [place] [address] -01 Encounter: [id number]    Primary Care Provider: Keysha Hernández MD   Date and time admitted to hospital: 9/26/2018  3:43 PM        Assessment and Plan  * Sepsis due to cellulitis [facility])   Assessment & Plan    Sepsis secondary to cellulitis/abscess of leg  Cellulitis and abscess of leg   Assessment & Plan    Status post I&D by surgery; erythema much improved compared to time of admission  Culture prior to admission and does obtain on 9/26/2018 consistent with streptococcus  Continue cefazolin until okay by ID to switch to oral     Constipation   Assessment & Plan    Resolved with out the need for laxative; will be available p r n  Morbid obesity ([facility] [address] )   Assessment & Plan    Body mass index is 49 28 kg/m²  Diabetes mellitus [facility])   Assessment & Plan      Results from last 7 days  Lab Units 09/29/18  0724 09/28/18  2048 09/28/18  1556 09/28/18  1110 09/28/18  0756 09/27/18  2057 09/27/18  1609 09/27/18  1102 09/27/18  0814 09/26/18  2111   POC GLUCOSE mg/dl 196* 300* 359* 284* 233* 303* 343* 291* 267* 290*     Diabetes mellitus with hyperglycemia secondary to infection  Sugars much improved with the increase of glargine and standing lispro to 45 units and 15 units t i d  respective     Hypertension   Assessment & Plan    Blood pressure stable on lisinopril and atenolol     Hyponatremiaresolved as of 9/29/2018   Assessment & Plan    Resolved     VTE Pharmacologic Prophylaxis: Enoxaparin (Lovenox)    Patient Centered Rounds: I have performed bedside rounds with nursing staff today  Discussions with Specialists or Other Care Team Provider:     Education and Discussions with Family / Patient:     Time Spent for Care: 25 mins  More than 50% of total time spent on counseling and coordination of care as described above      Current Length of Stay: 3 day(s)    Current Patient Status: Inpatient Certification Statement: The patient will continue to require additional inpatient hospital stay due to Sepsis due to cellulitis [facility])    Discharge Plan / Estimated Discharge Date:  Hopefully next 24-48 hours    Code Status: Level 1 - Full Code  ______________________________________________________________________________    Subjective:   Patient seen and examined  No new complaints  Had bowel movement    Objective:   Vitals: Blood pressure 127/76, pulse 77, temperature (!) 96 8 °F (36 °C), temperature source Temporal, resp  rate 20, weight (!) 147 kg (324 lb 1 2 oz), SpO2 97 %      Physical Exam:   General appearance: alert, appears stated age and cooperative  Head: Normocephalic, without obvious abnormality, atraumatic  Lungs: clear to auscultation bilaterally  Heart: regular rate and rhythm  Abdomen: Soft obese nontender positive bowel sounds  Back: negative  Extremities: Improved erythema of right inner thigh  Neurologic: Grossly normal    Additional Data:   Labs:    Results from last 7 days  Lab Units 09/28/18  0621 09/27/18  0501 09/26/18  1602   WBC Thousand/uL 9 85 11 16* 13 26*   HEMOGLOBIN g/dL 13 1 13 5 14 6   HEMATOCRIT % 38 1 39 4 42 5   MCV fL 90 90 89   PLATELETS Thousands/uL 370 336 346       Results from last 7 days  Lab Units 09/28/18  0621 09/27/18  0501 09/26/18  1602   SODIUM mmol/L 136 131* 131*   POTASSIUM mmol/L 3 8 3 6 4 2   CHLORIDE mmol/L 100 98* 94*   CO2 mmol/L 25 25 24   ANION GAP mmol/L 11 8 13   BUN mg/dL 18 17 21   CREATININE mg/dL 0 96 1 04 1 30   CALCIUM mg/dL 8 7 8 6 9 5   ALBUMIN g/dL  --  2 6* 3 0*   TOTAL BILIRUBIN mg/dL  --  0 35 0 82   ALK PHOS U/L  --  89 101   ALT U/L  --  19 22   AST U/L  --  17 19   EGFR ml/min/1 73sq m 97 88 67   GLUCOSE RANDOM mg/dL 256* 294* 405*       Results from last 7 days  Lab Units 09/26/18  1602   MAGNESIUM mg/dL 2 0                Results from last 7 days  Lab Units 09/26/18  1602   LACTIC ACID mmol/L 1 9       Results from last 7 days  Lab Units 09/29/18  0724 09/28/18  2048 09/28/18  1556 09/28/18  1110 09/28/18  0756 09/27/18  2057 09/27/18  1609 09/27/18  1102 09/27/18  0814 09/26/18  2111   POC GLUCOSE mg/dl 196* 300* 359* 284* 233* 303* 343* 291* 267* 290*       Results from last 7 days  Lab Units 09/27/18  0501   HEMOGLOBIN A1C % 9 9*         * I Have Reviewed All Lab Data Listed Above  Cultures:     Results from last 7 days  Lab Units 09/26/18  1826 09/26/18  1604 09/26/18  1602 09/24/18  1555   BLOOD CULTURE   --  No Growth at 48 hrs  No Growth at 48 hrs   --    GRAM STAIN RESULT  2+ Polys  2+ Gram positive cocci in pairs and chains  --   --  1+ Disintegrating polys  2+ Gram positive cocci in pairs and chains  1+ Gram negative rods   WOUND CULTURE  1+ Growth of Beta Hemolytic Streptococcus Group B*  --   --  3+ Growth of Beta Hemolytic Streptococcus Group B*  1+ Growth of          Imaging:  Imaging Reports Reviewed Today Include:   Procedure: Ct Femur Right W Contrast  Result Date: 9/26/2018  Impression: Cellulitis in the medial aspect of the right thigh without evidence of abscess, osteomyelitis or necrotizing fasciitis   Workstation performed: [id number]         Scheduled Meds:  Current Facility-Administered Medications:  acetaminophen 650 mg Oral Q4H PRN Claribel Bering, DO    atenolol 100 mg Oral Daily Horacio Ulrich, DO    cefazolin 2,000 mg Intravenous Q8H Horacio Ulrich,  Last Rate: 2,000 mg (09/29/18 0409)   diphenhydrAMINE 25 mg Oral Q6H PRN Claribel Bering, DO    docusate sodium 100 mg Oral BID PRN Claribel Bering, DO    enoxaparin 40 mg Subcutaneous Q24H [address] Horacio Ulrich, DO    hydrALAZINE 10 mg Intravenous Q4H PRN Claribel Bering, DO    HYDROcodone-acetaminophen 1 tablet Oral Q4H PRN Claribel Bering, DO    HYDROmorphone 0 5 mg Intravenous Q4H PRN Horacio Ulrich, DO    insulin glargine 45 Units Subcutaneous HS Horacio Serg, DO    insulin lispro 1-5 Units Subcutaneous HS Horacio Ulrich, DO    insulin lispro 1-6 Units Subcutaneous TID AC Richard Shah, DO    insulin lispro 15 Units Subcutaneous TID With Meals Horacio Ulrich, DO    lactulose 20 g Oral TID PRN Richard Shah, DO    lisinopril 2 5 mg Oral Daily Horacio Ulrich, DO    ondansetron 4 mg Intravenous Q4H PRN Richard Hawthorneing, DO        Richard Shah, DO  [facility] Internal Medicine  Hospitalist    ** Please Note: This note has been constructed using a voice recognition system   **

## 2018-09-29 NOTE — DISCHARGE SUMMARY
Discharge- Miguel Angel Baumann 1975, 44 y o  male MRN: [id number]    Unit/Bed#: [address] 204-01 Encounter: [id number]      Admitting Provider:  Chuck Anaya DO  Discharge Provider:  Chuck Anaya DO  Admission Date: 9/26/2018       Discharge Date: 09/29/18   LOS: 3  Primary Care Physician at Discharge: Lucina Bryson 38:  Miguel Angel Baumann is a 44 y o  male who presented to the hospital worsening erythema right thigh  Failed outpatient Keflex/doxycycline x2 days  He was recommended admission initially but then due to worsening decided to come to the emergency department where he was admitted  Please see below  DISCHARGE DIAGNOSES  * Sepsis due to cellulitis [facility])   Assessment & Plan    Sepsis secondary to cellulitis/abscess of leg  Cellulitis and abscess of leg   Assessment & Plan    Status post I&D by surgery; erythema much improved compared to time of admission  Culture prior to admission and does obtain on 9/26/2018 consistent with streptococcus  Much improved with cefazolin during hospitalization  Will be discharged with keflex for five more days to complete seven day course  Constipation   Assessment & Plan    Resolved with out the need for laxative     Morbid obesity ([address] )   Assessment & Plan    Body mass index is 49 28 kg/m²  Diabetes mellitus [facility])   Assessment & Plan      Results from last 7 days  Lab Units 09/29/18  0724 09/28/18  2048 09/28/18  1556 09/28/18  1110 09/28/18  0756   POC GLUCOSE mg/dl 196* 300* 359* 284* 233*       Results from last 7 days  Lab Units 09/27/18  0501   HEMOGLOBIN A1C % 9 9*     Diabetes mellitus with hyperglycemia secondary to infection  Sugars much improved with the increase of glargine to 45 units  Advised to continue glargine at this dose and follow up with PCP       Hypertension   Assessment & Plan    Blood pressure stable on lisinopril and atenolol     Delynn Martinet Dr Lehman Burns Dr Jolyn Goltz - surgery    PROCEDURES PERFORMED  Ct Femur Right W Contrast  Result Date: 9/26/2018  Impression: Cellulitis in the medial aspect of the right thigh without evidence of abscess, osteomyelitis or necrotizing fasciitis  Workstation performed: [id number]     I&D right thigh  Result Date: 9/27/2018  Impression:  Cultures resulted as below    Other Pertinent Test Results    Results from last 7 days  Lab Units 09/28/18  0621 09/27/18  0501 09/26/18  1602   WBC Thousand/uL 9 85 11 16* 13 26*   HEMOGLOBIN g/dL 13 1 13 5 14 6   HEMATOCRIT % 38 1 39 4 42 5   MCV fL 90 90 89   PLATELETS Thousands/uL 370 336 346       Results from last 7 days  Lab Units 09/28/18  0621 09/27/18  0501 09/26/18  1602   SODIUM mmol/L 136 131* 131*   POTASSIUM mmol/L 3 8 3 6 4 2   CHLORIDE mmol/L 100 98* 94*   CO2 mmol/L 25 25 24   BUN mg/dL 18 17 21   CREATININE mg/dL 0 96 1 04 1 30   CALCIUM mg/dL 8 7 8 6 9 5   ALBUMIN g/dL  --  2 6* 3 0*   TOTAL BILIRUBIN mg/dL  --  0 35 0 82   ALK PHOS U/L  --  89 101   ALT U/L  --  19 22   AST U/L  --  17 19   EGFR ml/min/1 73sq m 97 88 67   GLUCOSE RANDOM mg/dL 256* 294* 405*       Results from last 7 days  Lab Units 09/29/18  0724 09/28/18  2048 09/28/18  1556 09/28/18  1110 09/28/18  0756 09/27/18  2057 09/27/18  1609 09/27/18  1102 09/27/18  0814 09/26/18  2111   POC GLUCOSE mg/dl 196* 300* 359* 284* 233* 303* 343* 291* 267* 290*       Results from last 7 days  Lab Units 09/27/18  0501   HEMOGLOBIN A1C % 9 9*           Results from last 7 days  Lab Units 09/26/18  1602   LACTIC ACID mmol/L 1 9       Results from last 7 days  Lab Units 09/27/18  0501   TRIGLYCERIDES mg/dL 252*   LDL CALC mg/dL 102*   HDL mg/dL 23*       Cultures:       Results from last 7 days  Lab Units 09/26/18  1826 09/26/18  1604 09/26/18  1602 09/24/18  1555   BLOOD CULTURE   --  No Growth at 48 hrs   No Growth at 48 hrs   --    GRAM STAIN RESULT  2+ Polys  2+ Gram positive cocci in pairs and chains  --   --  1+ Disintegrating polys  2+ Gram positive cocci in pairs and chains  1+ Gram negative rods   WOUND CULTURE  1+ Growth of Beta Hemolytic Streptococcus Group B*  --   --  3+ Growth of Beta Hemolytic Streptococcus Group B*  1+ Growth of      Planned Re-admission: No  Discharge Disposition: Home/Self Care  Test Results Pending at Discharge:    Order Current Status    Blood culture #1 Preliminary result    Blood culture #2 Preliminary result        Incidental findings: None    Medications   Please see After Visit Summary for reconciled discharge medications provided to patient and family  Diet restrictions: Diabetic diet   Activity restrictions: No strenuous activity  Discharge Condition: good    Outpatient Follow-Up  1  [facility], [address] [place] [address] 500 / [id number] [facility] [phone] - follow-up within one week  2  Kiera Ochoa MD general surgery  Follow-up within one two weeks    Code Status: Level 1 - Full Code  Discharge Statement   I spent 40 minutes discharging the patient  This time was spent on the day of discharge  Greater than 50% of total time was spent with the patient and / or family counseling and / or coordination of care  ** Please Note: This note has been constructed using a voice recognition system   **

## 2018-09-29 NOTE — PLAN OF CARE
DISCHARGE PLANNING     Discharge to home or other facility with appropriate resources Adequate for Discharge        INFECTION - ADULT     Absence or prevention of progression during hospitalization Adequate for Discharge     Absence of fever/infection during neutropenic period Adequate for Discharge        Knowledge Deficit     Patient/family/caregiver demonstrates understanding of disease process, treatment plan, medications, and discharge instructions Adequate for Discharge        METABOLIC, FLUID AND ELECTROLYTES - ADULT     Fluid balance maintained Adequate for Discharge     Glucose maintained within target range Adequate for Discharge        MUSCULOSKELETAL - ADULT     Maintain or return mobility to safest level of function Adequate for Discharge        PAIN - ADULT     Verbalizes/displays adequate comfort level or baseline comfort level Adequate for Discharge        Potential for Falls     Patient will remain free of falls Adequate for Discharge        SAFETY ADULT     Patient will remain free of falls Adequate for Discharge     Maintain or return to baseline ADL function Adequate for Discharge     Maintain or return mobility status to optimal level Adequate for Discharge        SKIN/TISSUE INTEGRITY - ADULT     Skin integrity remains intact Adequate for Discharge     Incision(s), wounds(s) or drain site(s) healing without S/S of infection Adequate for Discharge

## 2018-09-29 NOTE — ASSESSMENT & PLAN NOTE
Results from last 7 days  Lab Units 09/29/18  0724 09/28/18  2048 09/28/18  1556 09/28/18  1110 09/28/18  0756   POC GLUCOSE mg/dl 196* 300* 359* 284* 233*       Results from last 7 days  Lab Units 09/27/18  0501   HEMOGLOBIN A1C % 9 9*     Diabetes mellitus with hyperglycemia secondary to infection  Sugars much improved with the increase of glargine to 45 units  Advised to continue glargine at this dose and follow up with PCP

## 2018-09-29 NOTE — PROGRESS NOTES
Progress Note - Infectious Disease   Robert Ansari 44 y o  male MRN: [id number]  Unit/Bed#: [address] 204-01 Encounter: [id number]      Impression/Recommendations: 1    Sepsis  POA: Tachycardia and leukocytosis  Due to #2  Blood cultures negative   Clinically stable and nontoxic; improving  Rec:  ? Continue antibiotics as below  ? Follow temperatures closely  ? Supportive care as per the primary service     2    Right thigh GBS cellulitis with abscess  Status post bedside I&D  Slow clinical improvement  Rec:  ? OK for D/C on Keflex 500 mg PO Q6 to complete 10 days total of antibiotics through 10/5/18  ? [facility] per surgery     3    Diabetes with hyperglycemia  Poorly controlled with A1c 9 9   Risk factor for infection  Rec:  ? Continue management per the primary service     4   MO  Risk factor for all of above  Lost 25 lbs in past year with dietary changes  Rec:  ? Needs weight loss   Discussed with patient     The patient is stable from an ID standpoint for D/C home today    Antibiotics:  Cefazolin #4    Subjective:  Patient seen on AM rounds  Denies fevers, chills, sweats, nausea, vomiting, or diarrhea  24 Hour Events:  No documented fevers, chills, sweats, nausea, vomiting, or diarrhea  Objective:  Vitals:  HR:  [77-79] 77  Resp:  [20] 20  BP: (127-139)/(73-83) 127/76  SpO2:  [97 %-99 %] 97 %  Temp (24hrs), Av 7 °F (36 5 °C), Min:96 8 °F (36 °C), Max:98 2 °F (36 8 °C)  Current: Temperature: (!) 96 8 °F (36 °C)    Physical Exam:   General:  No acute distress  Psychiatric:  Awake and alert  Pulmonary:  Normal respiratory excursion without accessory muscle use  Abdomen:  Soft, nontender  Extremities:  No edema  Skin:  No rashes  Right thigh:  Persistent induration but markedly improved erythema and warmth    Lab Results:  I have personally reviewed pertinent labs      Results from last 7 days  Lab Units 18  0621 18  0501 18  1602   SODIUM mmol/L 136 131* 131*   POTASSIUM mmol/L 3 8 3 6 4 2 CHLORIDE mmol/L 100 98* 94*   CO2 mmol/L 25 25 24   BUN mg/dL 18 17 21   CREATININE mg/dL 0 96 1 04 1 30   EGFR ml/min/1 73sq m 97 88 67   CALCIUM mg/dL 8 7 8 6 9 5   AST U/L  --  17 19   ALT U/L  --  19 22   ALK PHOS U/L  --  89 101       Results from last 7 days  Lab Units 09/28/18  0621 09/27/18  0501 09/26/18  1602   WBC Thousand/uL 9 85 11 16* 13 26*   HEMOGLOBIN g/dL 13 1 13 5 14 6   PLATELETS Thousands/uL 370 336 346       Results from last 7 days  Lab Units 09/26/18  1826 09/26/18  1604 09/26/18  1602 09/24/18  1555   BLOOD CULTURE   --  No Growth at 48 hrs  No Growth at 48 hrs   --    GRAM STAIN RESULT  2+ Polys  2+ Gram positive cocci in pairs and chains  --   --  1+ Disintegrating polys  2+ Gram positive cocci in pairs and chains  1+ Gram negative rods   WOUND CULTURE  1+ Growth of Beta Hemolytic Streptococcus Group B*  --   --  3+ Growth of Beta Hemolytic Streptococcus Group B*  1+ Growth of        Imaging Studies:   I have personally reviewed pertinent imaging study reports and images in PACS  EKG, Pathology, and Other Studies:   I have personally reviewed pertinent reports

## 2018-09-29 NOTE — ASSESSMENT & PLAN NOTE
Status post I&D by surgery; erythema much improved compared to time of admission  Culture prior to admission and does obtain on 9/26/2018 consistent with streptococcus  Much improved with cefazolin during hospitalization  Will be discharged with keflex for five more days to complete seven day course

## 2018-09-29 NOTE — ASSESSMENT & PLAN NOTE
Status post I&D by surgery; erythema much improved compared to time of admission  Culture prior to admission and does obtain on 9/26/2018 consistent with streptococcus    Continue cefazolin until okay by ID to switch to oral

## 2018-09-29 NOTE — ASSESSMENT & PLAN NOTE
Results from last 7 days  Lab Units 09/29/18  0724 09/28/18  2048 09/28/18  1556 09/28/18  1110 09/28/18  0756 09/27/18  2057 09/27/18  1609 09/27/18  1102 09/27/18  0814 09/26/18  2111   POC GLUCOSE mg/dl 196* 300* 359* 284* 233* 303* 343* 291* 267* 290*     Diabetes mellitus with hyperglycemia secondary to infection    Sugars much improved with the increase of glargine and standing lispro to 45 units and 15 units t i d  respective

## 2018-10-01 LAB
BACTERIA BLD CULT: NORMAL
BACTERIA BLD CULT: NORMAL

## 2018-10-22 ENCOUNTER — OFFICE VISIT (OUTPATIENT)
Dept: FAMILY MEDICINE CLINIC | Facility: CLINIC | Age: 43
End: 2018-10-22
Payer: COMMERCIAL

## 2018-10-22 VITALS
HEART RATE: 113 BPM | RESPIRATION RATE: 18 BRPM | SYSTOLIC BLOOD PRESSURE: 120 MMHG | DIASTOLIC BLOOD PRESSURE: 80 MMHG | OXYGEN SATURATION: 98 % | WEIGHT: 315 LBS | BODY MASS INDEX: 50.63 KG/M2 | TEMPERATURE: 98.4 F

## 2018-10-22 DIAGNOSIS — A49.9 BACTERIAL URINARY INFECTION: Primary | ICD-10-CM

## 2018-10-22 DIAGNOSIS — N39.0 BACTERIAL URINARY INFECTION: Primary | ICD-10-CM

## 2018-10-22 LAB
BACTERIA UR QL AUTO: ABNORMAL /HPF
BILIRUB UR QL STRIP: NEGATIVE
CLARITY UR: ABNORMAL
COLOR UR: ABNORMAL
GLUCOSE UR STRIP-MCNC: ABNORMAL MG/DL
HGB UR QL STRIP.AUTO: ABNORMAL
HYALINE CASTS #/AREA URNS LPF: ABNORMAL /LPF
KETONES UR STRIP-MCNC: ABNORMAL MG/DL
LEUKOCYTE ESTERASE UR QL STRIP: ABNORMAL
NITRITE UR QL STRIP: POSITIVE
NON-SQ EPI CELLS URNS QL MICRO: ABNORMAL /HPF
PH UR STRIP.AUTO: 5.5 [PH] (ref 4.5–8)
PROT UR STRIP-MCNC: ABNORMAL MG/DL
RBC #/AREA URNS AUTO: ABNORMAL /HPF
SL AMB  POCT GLUCOSE, UA: ABNORMAL
SL AMB LEUKOCYTE ESTERASE,UA: POSITIVE
SL AMB POCT BILIRUBIN,UA: ABNORMAL
SL AMB POCT BLOOD,UA: ABNORMAL
SL AMB POCT KETONES,UA: ABNORMAL
SL AMB POCT NITRITE,UA: POSITIVE
SL AMB POCT PH,UA: 5
SL AMB POCT SPECIFIC GRAVITY,UA: 1.02
SL AMB POCT URINE PROTEIN: ABNORMAL
SL AMB POCT UROBILINOGEN: NORMAL
SP GR UR STRIP.AUTO: 1.04 (ref 1–1.03)
UROBILINOGEN UR QL STRIP.AUTO: 0.2 E.U./DL
WBC #/AREA URNS AUTO: ABNORMAL /HPF

## 2018-10-22 PROCEDURE — 99212 OFFICE O/P EST SF 10 MIN: CPT | Performed by: SPECIALIST

## 2018-10-22 PROCEDURE — 81001 URINALYSIS AUTO W/SCOPE: CPT | Performed by: SPECIALIST

## 2018-10-22 PROCEDURE — 1036F TOBACCO NON-USER: CPT | Performed by: SPECIALIST

## 2018-10-22 PROCEDURE — 87086 URINE CULTURE/COLONY COUNT: CPT | Performed by: SPECIALIST

## 2018-10-22 PROCEDURE — 87077 CULTURE AEROBIC IDENTIFY: CPT | Performed by: SPECIALIST

## 2018-10-22 PROCEDURE — 81002 URINALYSIS NONAUTO W/O SCOPE: CPT | Performed by: SPECIALIST

## 2018-10-22 PROCEDURE — 87186 SC STD MICRODIL/AGAR DIL: CPT | Performed by: SPECIALIST

## 2018-10-22 RX ORDER — CIPROFLOXACIN 500 MG/1
500 TABLET, FILM COATED ORAL 2 TIMES DAILY
Qty: 14 TABLET | Refills: 0 | Status: SHIPPED | OUTPATIENT
Start: 2018-10-22 | End: 2018-10-29

## 2018-10-22 NOTE — PROGRESS NOTES
Assessment/Plan:    RIGHT  LEG  ABSCESS  WAS  DRAINED  AND  TREATED  AT  [facility]  HAS  FOLLOW  UP    RE   DM2  NEEDS  TO   TITRATE   HIS  LANTUS   TO   INCREASE  4  UNITS   EVERY  14  DAYS  UNTIL  AM   SUGARS  90  TO  150  WITHOUT HYPOGLYCEMIA         CONTINUE  THE  METFORMIN   EVALUATE  FOR   GLP1   RX   EG  SOLIQUA          HAS  URINARY  SYMTOMS  AND   LEUKOCYTES  IN URINE   RX  CIPRO   FOR  7  DAYS    MONITOR   SYMPTOMS   TEMP   AND  AWAIT  THE  URINE   CULTURE  AT  THE  LAB         Diagnoses and all orders for this visit:    Bacterial urinary infection  -     ciprofloxacin (CIPRO) 500 mg tablet; Take 1 tablet (500 mg total) by mouth 2 (two) times a day for 7 days  -     UA w Reflex to Microscopic w Reflex to Culture - Clinic Collect          Subjective:      Patient ID: Karlie Tapia is a 44 y o  male  45 YO   MALE  WITH  RECENT   ABSCESS  RIGHT  LEG     TREATED  AND  DRAINED            I  THINK  THERE  MAY  BE   EXCESSIVE  IRRITATION   DUE  TO  WEIGHT        ALSO  NOW  HAS  URINARY  SYMPTOMS  PLAN  CIPRO           The following portions of the patient's history were reviewed and updated as appropriate: allergies, current medications, past family history, past medical history, past social history, past surgical history and problem list     Review of Systems   Constitutional: Positive for chills  Negative for activity change, appetite change, diaphoresis, fatigue and fever  HENT: Negative for sinus pain and voice change  Eyes: Negative for visual disturbance  Respiratory: Negative for chest tightness, shortness of breath and wheezing  Cardiovascular: Negative for chest pain, palpitations and leg swelling  Gastrointestinal: Negative for abdominal distention  Genitourinary: Negative for difficulty urinating and dysuria  Musculoskeletal: Negative for arthralgias, back pain, gait problem, joint swelling and myalgias  Skin: Positive for color change and wound  Negative for pallor and rash  INNER  THIGHS    DARKER  IN  COLOR      Neurological: Negative for dizziness, tremors, speech difficulty, weakness and light-headedness  Hematological: Negative for adenopathy  Psychiatric/Behavioral: Negative for agitation, behavioral problems and confusion  Objective:      /80   Pulse (!) 113   Temp 98 4 °F (36 9 °C)   Resp 18   Wt (!) 151 kg (333 lb)   SpO2 98%   BMI 50 63 kg/m²          Physical Exam   Constitutional: He appears well-developed and well-nourished  No distress  HENT:   Head: Normocephalic  Right Ear: External ear normal    Eyes: Pupils are equal, round, and reactive to light  Conjunctivae are normal  Right eye exhibits no discharge  Left eye exhibits no discharge  No scleral icterus  Neck: No JVD present  Cardiovascular: Normal rate and normal heart sounds  Skin: Skin is warm and dry  He is not diaphoretic  INNER  RIGHT  THIGH  HAS OPENING  WHERE  ABSCESS  WAS  DRAINED      INNER  THIGHS  BILAT   DARKENED  IN  COLOR       Psychiatric: He has a normal mood and affect   His behavior is normal

## 2018-10-22 NOTE — PATIENT INSTRUCTIONS
TAKE  TEMP  THREE  TIMES  A  DAY  FOR   3  DAYS    CALL  IF  ELEVATED    AWAITING  THE  URINE  CULTURE    TAKE  CIPRO   2  TIMES  A    DAY   FOR    7  DAYS      CALL  IF  WORSE

## 2018-10-22 NOTE — LETTER
October 22, 2018     Patient: Carlos Moser   YOB: 1975   Date of Visit: 10/22/2018       To Whom it May Concern:    Zuleika Hollis is under my professional care  He was seen in my office on 10/22/2018  He may return to work on Wednesday, October 24, 2018  If you have any questions or concerns, please don't hesitate to call           Sincerely,          Letty Ochoa MD

## 2018-10-24 LAB — BACTERIA UR CULT: ABNORMAL

## 2019-03-21 ENCOUNTER — TELEPHONE (OUTPATIENT)
Dept: FAMILY MEDICINE CLINIC | Facility: CLINIC | Age: 44
End: 2019-03-21

## 2019-03-22 DIAGNOSIS — L30.9 ECZEMA, UNSPECIFIED TYPE: Primary | ICD-10-CM

## 2019-03-25 RX ORDER — NYSTATIN 100000 U/G
CREAM TOPICAL 2 TIMES DAILY
Qty: 30 G | Refills: 2 | Status: SHIPPED | OUTPATIENT
Start: 2019-03-25 | End: 2019-03-26 | Stop reason: SDUPTHER

## 2019-03-25 RX ORDER — TRIAMCINOLONE ACETONIDE 1 MG/G
CREAM TOPICAL 2 TIMES DAILY
Qty: 80 G | Refills: 2 | Status: SHIPPED | OUTPATIENT
Start: 2019-03-25 | End: 2019-10-18 | Stop reason: SDUPTHER

## 2019-03-26 DIAGNOSIS — L30.9 ECZEMA, UNSPECIFIED TYPE: ICD-10-CM

## 2019-03-26 DIAGNOSIS — L02.91 ABSCESS: ICD-10-CM

## 2019-03-27 RX ORDER — NYSTATIN 100000 U/G
CREAM TOPICAL 2 TIMES DAILY
Qty: 30 G | Refills: 2 | Status: SHIPPED | OUTPATIENT
Start: 2019-03-27 | End: 2019-10-18 | Stop reason: SDUPTHER

## 2019-03-28 ENCOUNTER — OFFICE VISIT (OUTPATIENT)
Dept: FAMILY MEDICINE CLINIC | Facility: CLINIC | Age: 44
End: 2019-03-28
Payer: COMMERCIAL

## 2019-03-28 VITALS
WEIGHT: 315 LBS | TEMPERATURE: 98.6 F | HEART RATE: 105 BPM | BODY MASS INDEX: 51.54 KG/M2 | SYSTOLIC BLOOD PRESSURE: 120 MMHG | OXYGEN SATURATION: 98 % | DIASTOLIC BLOOD PRESSURE: 80 MMHG

## 2019-03-28 DIAGNOSIS — A08.4 VIRAL GASTROENTERITIS: Primary | ICD-10-CM

## 2019-03-28 PROCEDURE — 99213 OFFICE O/P EST LOW 20 MIN: CPT | Performed by: FAMILY MEDICINE

## 2019-03-28 RX ORDER — ONDANSETRON 4 MG/1
4 TABLET, ORALLY DISINTEGRATING ORAL EVERY 8 HOURS PRN
Qty: 30 TABLET | Refills: 0 | Status: SHIPPED | OUTPATIENT
Start: 2019-03-28 | End: 2019-07-17 | Stop reason: ALTCHOICE

## 2019-03-28 NOTE — LETTER
March 28, 2019     Patient: Kourtney Quinones   YOB: 1975   Date of Visit: 3/28/2019       To Whom it May Concern:    Russell Shone is under my professional care  He was seen in my office on 3/28/2019  He may return to work on 4/1/19  If you have any questions or concerns, please don't hesitate to call           Sincerely,          Cyndie Hopper MD        CC: No Recipients

## 2019-03-29 NOTE — PROGRESS NOTES
Assessment/Plan:    27-year-old male with:  Viral gastroenteritis  Discussed supportive care return parameters  Will give Zofran 0 DT and encouraged ample p o  Liquids with electrolytes as tolerated then advised to call back if not improving worsening    No problem-specific Assessment & Plan notes found for this encounter  Diagnoses and all orders for this visit:    Viral gastroenteritis  -     ondansetron (ZOFRAN-ODT) 4 mg disintegrating tablet; Take 1 tablet (4 mg total) by mouth every 8 (eight) hours as needed for nausea or vomiting          Subjective:     Chief Complaint   Patient presents with    Vomiting     vomiting, diarrhea, headache x 2 days        Patient ID: Roberto Bolanos is a 44 y o  male  Patient is a 27-year-old male who presents complaining of 2 days of nausea vomiting abdominal cramping diarrhea  No fevers chills  Tolerating p o  Intake  No dizziness lightheadedness or near-syncope  No other complaints at this time  The following portions of the patient's history were reviewed and updated as appropriate: allergies, current medications, past family history, past medical history, past social history, past surgical history and problem list     Review of Systems   Constitutional: Negative  HENT: Negative  Eyes: Negative  Respiratory: Negative  Cardiovascular: Negative  Gastrointestinal: Positive for abdominal pain, diarrhea, nausea and vomiting  Endocrine: Negative  Genitourinary: Negative  Musculoskeletal: Negative  Allergic/Immunologic: Negative  Neurological: Negative  Hematological: Negative  Psychiatric/Behavioral: Negative  All other systems reviewed and are negative  Objective:      /80   Pulse 105   Temp 98 6 °F (37 °C)   Wt (!) 154 kg (339 lb)   SpO2 98%   BMI 51 54 kg/m²          Physical Exam   Constitutional: He is oriented to person, place, and time  He appears well-developed and well-nourished     HENT:   Head: Atraumatic  Right Ear: External ear normal    Left Ear: External ear normal    Eyes: Pupils are equal, round, and reactive to light  Conjunctivae and EOM are normal    Neck: Normal range of motion  Cardiovascular: Normal rate, regular rhythm and normal heart sounds  Pulmonary/Chest: Effort normal and breath sounds normal  No respiratory distress  Abdominal: Soft  He exhibits no distension  There is no tenderness  There is no rebound and no guarding  Musculoskeletal: Normal range of motion  Neurological: He is alert and oriented to person, place, and time  No cranial nerve deficit  Skin: Skin is warm and dry  Psychiatric: He has a normal mood and affect   His behavior is normal  Judgment and thought content normal

## 2019-04-25 ENCOUNTER — OFFICE VISIT (OUTPATIENT)
Dept: FAMILY MEDICINE CLINIC | Facility: CLINIC | Age: 44
End: 2019-04-25
Payer: COMMERCIAL

## 2019-04-25 VITALS
OXYGEN SATURATION: 97 % | HEART RATE: 101 BPM | SYSTOLIC BLOOD PRESSURE: 182 MMHG | TEMPERATURE: 98.1 F | RESPIRATION RATE: 18 BRPM | WEIGHT: 315 LBS | HEIGHT: 68 IN | BODY MASS INDEX: 47.74 KG/M2 | DIASTOLIC BLOOD PRESSURE: 100 MMHG

## 2019-04-25 DIAGNOSIS — E11.9 TYPE 2 DIABETES MELLITUS WITHOUT COMPLICATION, UNSPECIFIED WHETHER LONG TERM INSULIN USE (HCC): ICD-10-CM

## 2019-04-25 DIAGNOSIS — I10 ESSENTIAL HYPERTENSION: ICD-10-CM

## 2019-04-25 DIAGNOSIS — A08.4 VIRAL GASTROENTERITIS: Primary | ICD-10-CM

## 2019-04-25 LAB — SL AMB POCT GLUCOSE BLD: 475

## 2019-04-25 PROCEDURE — 82948 REAGENT STRIP/BLOOD GLUCOSE: CPT | Performed by: FAMILY MEDICINE

## 2019-04-25 PROCEDURE — 99214 OFFICE O/P EST MOD 30 MIN: CPT | Performed by: FAMILY MEDICINE

## 2019-04-25 RX ORDER — ONDANSETRON 4 MG/1
4 TABLET, ORALLY DISINTEGRATING ORAL EVERY 8 HOURS PRN
Qty: 30 TABLET | Refills: 0 | Status: SHIPPED | OUTPATIENT
Start: 2019-04-25 | End: 2019-07-17 | Stop reason: ALTCHOICE

## 2019-04-25 RX ORDER — METFORMIN HYDROCHLORIDE 500 MG/1
500 TABLET, EXTENDED RELEASE ORAL
Qty: 90 TABLET | Refills: 1 | Status: SHIPPED | OUTPATIENT
Start: 2019-04-25 | End: 2019-07-17 | Stop reason: SDUPTHER

## 2019-07-17 ENCOUNTER — OFFICE VISIT (OUTPATIENT)
Dept: FAMILY MEDICINE CLINIC | Facility: CLINIC | Age: 44
End: 2019-07-17
Payer: COMMERCIAL

## 2019-07-17 VITALS
DIASTOLIC BLOOD PRESSURE: 100 MMHG | HEIGHT: 69 IN | HEART RATE: 103 BPM | SYSTOLIC BLOOD PRESSURE: 152 MMHG | BODY MASS INDEX: 46.65 KG/M2 | WEIGHT: 315 LBS | OXYGEN SATURATION: 97 %

## 2019-07-17 DIAGNOSIS — S83.91XA SPRAIN OF RIGHT KNEE/LEG, INITIAL ENCOUNTER: Primary | ICD-10-CM

## 2019-07-17 DIAGNOSIS — A08.4 VIRAL GASTROENTERITIS: ICD-10-CM

## 2019-07-17 DIAGNOSIS — I10 ESSENTIAL HYPERTENSION: ICD-10-CM

## 2019-07-17 PROCEDURE — 99213 OFFICE O/P EST LOW 20 MIN: CPT | Performed by: INTERNAL MEDICINE

## 2019-07-17 RX ORDER — ATENOLOL 100 MG/1
100 TABLET ORAL DAILY
Qty: 30 TABLET | Refills: 0 | Status: SHIPPED | OUTPATIENT
Start: 2019-07-17 | End: 2020-06-02 | Stop reason: SDUPTHER

## 2019-07-17 RX ORDER — KETOROLAC TROMETHAMINE 30 MG/ML
30 INJECTION, SOLUTION INTRAMUSCULAR; INTRAVENOUS ONCE
Status: COMPLETED | OUTPATIENT
Start: 2019-07-17 | End: 2019-07-17

## 2019-07-17 RX ORDER — CYCLOBENZAPRINE HCL 5 MG
5 TABLET ORAL 3 TIMES DAILY PRN
Qty: 30 TABLET | Refills: 0 | Status: SHIPPED | OUTPATIENT
Start: 2019-07-17 | End: 2020-06-02 | Stop reason: SDUPTHER

## 2019-07-17 RX ORDER — METFORMIN HYDROCHLORIDE 500 MG/1
500 TABLET, EXTENDED RELEASE ORAL 2 TIMES DAILY WITH MEALS
Qty: 180 TABLET | Refills: 1 | Status: SHIPPED | OUTPATIENT
Start: 2019-07-17 | End: 2019-10-18 | Stop reason: SDUPTHER

## 2019-07-17 RX ADMIN — KETOROLAC TROMETHAMINE 30 MG: 30 INJECTION, SOLUTION INTRAMUSCULAR; INTRAVENOUS at 15:42

## 2019-07-17 NOTE — LETTER
July 17, 2019     Patient: Domenica Search   YOB: 1975   Date of Visit: 7/17/2019       To Whom it May Concern:    Elliot Abdi is under my professional care  He was seen in my office on 7/17/2019  He may return to work on 07/19/2019  If you have any questions or concerns, please don't hesitate to call  Sincerely,          Trung Goetz MD        CC: Bakari Bloom

## 2019-07-17 NOTE — PROGRESS NOTES
Assessment/Plan:         Diagnoses and all orders for this visit:    Sprain of right knee/leg, initial encounter  -     cyclobenzaprine (FLEXERIL) 5 mg tablet; Take 1 tablet (5 mg total) by mouth 3 (three) times a day as needed for muscle spasms  -     XR hip/pelv 2-3 vws right if performed; Future  -     ketorolac (TORADOL) injection 30 mg  Patient will continue use NSAID at home with food as tolerated along with Flexeril  2 days off from work were given to the patient so would not have to drive while on the muscle relaxer  He will follow up with his primary physician as well for further evaluation  Would recommend an MRI if his symptoms persist to rule out torn muscle /AVN hip  Essential hypertension  -     atenolol (TENORMIN) 100 mg tablet; Take 1 tablet (100 mg total) by mouth daily  -     ketorolac (TORADOL) injection 30 mg    Diabetes mellitus  -     metFORMIN (GLUCOPHAGE-XR) 500 mg 24 hr tablet; Take 1 tablet (500 mg total) by mouth 2 (two) times a day with meals  -     ketorolac (TORADOL) injection 30 mg        Subjective:      Patient ID: Beltran Liu is a 44 y o  male  HPI  Patient is here for an acute visit complaining of right knee discomfort that started about 3 days ago  He was trying to get out of the bed at night to use the bathroom where he had a cramp in the back of the right leg which has not resolved  Pain shoots down his calf and slightly up his buttock area as well  He is able to put weight on it  No fevers and chills  No history of blood clot  Denies any shortness of breath  He did use friends muscle relaxer which made him quite sedated  He also mentions that his blood sugars been running in 200s  I will renew his medication and encouraged him to start using his Lantus and asked him to follow up with his PCP for further discussion  He is also out of his blood pressure medication which I have renewed today        The following portions of the patient's history were reviewed and updated as appropriate: allergies, current medications, past medical history, past social history and problem list     Review of Systems      Objective:      /100 (BP Location: Left arm, Patient Position: Sitting, Cuff Size: Extra-Large)   Pulse 103   Ht 5' 9" (1 753 m)   Wt (!) 151 kg (332 lb)   SpO2 97%   BMI 49 03 kg/m²          Physical Exam   Constitutional:   Morbidly obese   Musculoskeletal: He exhibits tenderness (Right calf  Elton's sign negative is able to internal rotation external rotate right hip     Negative calf tenderness  Straight leg raise does cause discomfort and hamstring)     Antalgic gait

## 2019-07-17 NOTE — LETTER
July 17, 2019     Patient: Mynor Turcios   YOB: 1975   Date of Visit: 7/17/2019       To Whom it May Concern:    Felecia Barrios is under my professional care  He was seen in my office on 7/17/2019  He may return to work on 07/19/2019  If you have any questions or concerns, please don't hesitate to call           Sincerely,          Ivis Clark MD        CC: No Recipients

## 2019-07-19 ENCOUNTER — OFFICE VISIT (OUTPATIENT)
Dept: FAMILY MEDICINE CLINIC | Facility: CLINIC | Age: 44
End: 2019-07-19
Payer: COMMERCIAL

## 2019-07-19 VITALS
OXYGEN SATURATION: 98 % | BODY MASS INDEX: 46.65 KG/M2 | HEART RATE: 101 BPM | WEIGHT: 315 LBS | SYSTOLIC BLOOD PRESSURE: 178 MMHG | DIASTOLIC BLOOD PRESSURE: 100 MMHG | HEIGHT: 69 IN

## 2019-07-19 DIAGNOSIS — E11.9 DIABETES MELLITUS (HCC): ICD-10-CM

## 2019-07-19 DIAGNOSIS — Z00.00 ROUTINE ADULT HEALTH MAINTENANCE: ICD-10-CM

## 2019-07-19 DIAGNOSIS — M54.16 RIGHT LUMBAR RADICULITIS: ICD-10-CM

## 2019-07-19 DIAGNOSIS — E66.01 MORBID OBESITY (HCC): ICD-10-CM

## 2019-07-19 DIAGNOSIS — E11.9 TYPE 2 DIABETES MELLITUS WITHOUT COMPLICATION, UNSPECIFIED WHETHER LONG TERM INSULIN USE (HCC): Primary | ICD-10-CM

## 2019-07-19 DIAGNOSIS — S76.301D RIGHT HAMSTRING INJURY, SUBSEQUENT ENCOUNTER: ICD-10-CM

## 2019-07-19 DIAGNOSIS — I10 BENIGN ESSENTIAL HYPERTENSION: ICD-10-CM

## 2019-07-19 PROBLEM — S76.301A RIGHT HAMSTRING INJURY: Status: ACTIVE | Noted: 2019-07-19

## 2019-07-19 LAB — SL AMB POCT HEMOGLOBIN AIC: 11.4 (ref ?–6.5)

## 2019-07-19 PROCEDURE — 99396 PREV VISIT EST AGE 40-64: CPT | Performed by: FAMILY MEDICINE

## 2019-07-19 PROCEDURE — 3008F BODY MASS INDEX DOCD: CPT | Performed by: FAMILY MEDICINE

## 2019-07-19 PROCEDURE — 1036F TOBACCO NON-USER: CPT | Performed by: FAMILY MEDICINE

## 2019-07-19 PROCEDURE — 99214 OFFICE O/P EST MOD 30 MIN: CPT | Performed by: FAMILY MEDICINE

## 2019-07-19 PROCEDURE — 3046F HEMOGLOBIN A1C LEVEL >9.0%: CPT | Performed by: FAMILY MEDICINE

## 2019-07-19 PROCEDURE — 83036 HEMOGLOBIN GLYCOSYLATED A1C: CPT | Performed by: FAMILY MEDICINE

## 2019-07-19 RX ORDER — LISINOPRIL AND HYDROCHLOROTHIAZIDE 12.5; 1 MG/1; MG/1
1 TABLET ORAL DAILY
Qty: 30 TABLET | Refills: 0 | Status: SHIPPED | OUTPATIENT
Start: 2019-07-19 | End: 2020-06-02 | Stop reason: SDUPTHER

## 2019-07-19 NOTE — PROGRESS NOTES
Assessment/Plan:    81-year-old male with:  Right hamstring injury, lumbar radiculitis, accelerated hypertension and type 2 diabetes along with morbid obesity  Discussed workup and treatment options at length with risks and benefits  Discussed heat and cold stretching and anti-inflammatories  Discussed healthy diet like the Mediterranean diet, exercise, weight loss strategies  Will refer to physical therapy and will switch patient to lisinopril hydrochlorothiazide  Will increase his Lantus as well as beginning Januvia  Patient will monitor his blood pressure closely and call with blood pressure numbers in 2 weeks  Otherwise will follow up in 3 months  No problem-specific Assessment & Plan notes found for this encounter  Diagnoses and all orders for this visit:    Type 2 diabetes mellitus without complication, unspecified whether long term insulin use (HCC)  -     POCT hemoglobin A1c    Right hamstring injury, subsequent encounter  -     Ambulatory referral to Physical Therapy; Future    Benign essential hypertension  -     lisinopril-hydrochlorothiazide (PRINZIDE,ZESTORETIC) 10-12 5 MG per tablet; Take 1 tablet by mouth daily    Right lumbar radiculitis  -     Ambulatory referral to Physical Therapy; Future    Diabetes mellitus (HCC)  -     insulin glargine (LANTUS SOLOSTAR) 100 units/mL injection pen; Inject 55 Units under the skin daily at bedtime  -     sitaGLIPtin (JANUVIA) 25 mg tablet; Take 1 tablet (25 mg total) by mouth daily    Morbid obesity ([address] )    Other orders  -     Cancel: Microalbumin / creatinine urine ratio  -     Cancel: POCT hemoglobin A1c          Subjective:     Chief Complaint   Patient presents with    Follow-up     t presents for f/u with leg pain  pt did not have xray   HM     not done due to having issues        Patient ID: Levy Runner is a 44 y o  male      Patient is a 81-year-old male who presents for follow-up on right hamstring injury and also some numbness and tingling in the posterior right thigh  He admits that he has been using rest and anti-inflammatories and some stretching and he feels that he is about 70 percent better  He also has type 2 diabetes, hypertension morbid obesity admits that he is back on his medications and is trying to make lifestyle changes  No fevers chills nausea vomiting  Tolerating p o  Intake  No other complaints at this time      The following portions of the patient's history were reviewed and updated as appropriate: allergies, current medications, past family history, past medical history, past social history, past surgical history and problem list     Review of Systems   Constitutional: Negative  HENT: Negative  Eyes: Negative  Respiratory: Negative  Cardiovascular: Negative  Gastrointestinal: Negative  Endocrine: Negative  Genitourinary: Negative  Musculoskeletal: Positive for arthralgias and myalgias  Allergic/Immunologic: Negative  Neurological: Negative  Hematological: Negative  Psychiatric/Behavioral: Negative  All other systems reviewed and are negative  Objective:      BP (!) 178/100   Pulse 101   Ht 5' 9" (1 753 m)   Wt (!) 153 kg (337 lb)   SpO2 98%   BMI 49 77 kg/m²          Physical Exam   Constitutional: He is oriented to person, place, and time  He appears well-developed and well-nourished  HENT:   Head: Atraumatic  Right Ear: External ear normal    Left Ear: External ear normal    Eyes: Pupils are equal, round, and reactive to light  Conjunctivae and EOM are normal    Neck: Normal range of motion  Cardiovascular: Normal rate, regular rhythm and normal heart sounds  Pulmonary/Chest: Effort normal and breath sounds normal  No respiratory distress  Abdominal: Soft  He exhibits no distension  There is no tenderness  There is no rebound and no guarding  Musculoskeletal: Normal range of motion  Tenderness of the right hamstring with decreased range of motion  Neurological: He is alert and oriented to person, place, and time  No cranial nerve deficit  Skin: Skin is warm and dry  Psychiatric: He has a normal mood and affect  His behavior is normal  Judgment and thought content normal        BMI Counseling: Body mass index is 49 77 kg/m²  Discussed the patient's BMI with him  The BMI is above average  BMI counseling and education was provided to the patient  Nutrition recommendations include 3-5 servings of fruits/vegetables daily  Exercise recommendations include moderate aerobic physical activity for 150 minutes/week

## 2019-07-19 NOTE — PROGRESS NOTES
Assessment/Plan:    80-year-old male with: Annual well visit  Discussed various safety and health maintenance issues including healthy diet like the Mediterranean diet, exercise, healthy weight as tolerated, ample sleep and stress reduction strategies  Discussed supportive care return parameters  No problem-specific Assessment & Plan notes found for this encounter  Diagnoses and all orders for this visit:    Type 2 diabetes mellitus without complication, unspecified whether long term insulin use (HCC)  -     POCT hemoglobin A1c    Right hamstring injury, subsequent encounter  -     Ambulatory referral to Physical Therapy; Future    Benign essential hypertension  -     lisinopril-hydrochlorothiazide (PRINZIDE,ZESTORETIC) 10-12 5 MG per tablet; Take 1 tablet by mouth daily    Right lumbar radiculitis  -     Ambulatory referral to Physical Therapy; Future    Diabetes mellitus (HCC)  -     insulin glargine (LANTUS SOLOSTAR) 100 units/mL injection pen; Inject 55 Units under the skin daily at bedtime  -     sitaGLIPtin (JANUVIA) 25 mg tablet; Take 1 tablet (25 mg total) by mouth daily    Morbid obesity ([address] )    Routine adult health maintenance    Other orders  -     Cancel: Microalbumin / creatinine urine ratio  -     Cancel: POCT hemoglobin A1c          Subjective:     Chief Complaint   Patient presents with    Follow-up     t presents for f/u with leg pain  pt did not have xray   HM     not done due to having issues        Patient ID: Justin Diaz is a 44 y o  male  Patient is a 80-year-old male who presents for an annual well visit  He admits he is trying to be more physically active in trying to eat well  He sleeps well  No other health maintenance complaints        The following portions of the patient's history were reviewed and updated as appropriate: allergies, current medications, past family history, past medical history, past social history, past surgical history and problem list     Review of Systems   Constitutional: Negative  HENT: Negative  Eyes: Negative  Respiratory: Negative  Cardiovascular: Negative  Gastrointestinal: Negative  Endocrine: Negative  Genitourinary: Negative  Musculoskeletal: Positive for arthralgias and myalgias  Allergic/Immunologic: Negative  Neurological: Negative  Hematological: Negative  Psychiatric/Behavioral: Negative  All other systems reviewed and are negative  Objective:      BP (!) 178/100   Pulse 101   Ht 5' 9" (1 753 m)   Wt (!) 153 kg (337 lb)   SpO2 98%   BMI 49 77 kg/m²          Physical Exam   Constitutional: He is oriented to person, place, and time  He appears well-developed and well-nourished  HENT:   Head: Atraumatic  Right Ear: External ear normal    Left Ear: External ear normal    Eyes: Pupils are equal, round, and reactive to light  Conjunctivae and EOM are normal    Neck: Normal range of motion  Cardiovascular: Normal rate, regular rhythm and normal heart sounds  Pulmonary/Chest: Effort normal and breath sounds normal  No respiratory distress  Abdominal: Soft  He exhibits no distension  There is no tenderness  There is no rebound and no guarding  Neurological: He is alert and oriented to person, place, and time  No cranial nerve deficit  Skin: Skin is warm and dry  Psychiatric: He has a normal mood and affect   His behavior is normal  Judgment and thought content normal

## 2019-07-19 NOTE — LETTER
July 19, 2019     Patient: Wilfredo Bales   YOB: 1975   Date of Visit: 7/19/2019       To Whom it May Concern:    Rupinder Betancourt is under my professional care  He was seen in my office on 7/19/2019  He may return to work on 7/22/19  If you have any questions or concerns, please don't hesitate to call           Sincerely,          Mian Martínez MD        CC: No Recipients

## 2019-07-23 ENCOUNTER — TELEPHONE (OUTPATIENT)
Dept: FAMILY MEDICINE CLINIC | Facility: CLINIC | Age: 44
End: 2019-07-23

## 2019-07-23 DIAGNOSIS — E13.9 DIABETES MELLITUS OF OTHER TYPE WITHOUT COMPLICATION, UNSPECIFIED WHETHER LONG TERM INSULIN USE (HCC): ICD-10-CM

## 2019-07-23 DIAGNOSIS — E13.69 OTHER SPECIFIED DIABETES MELLITUS WITH OTHER SPECIFIED COMPLICATION, UNSPECIFIED WHETHER LONG TERM INSULIN USE (HCC): Primary | ICD-10-CM

## 2019-07-23 NOTE — TELEPHONE ENCOUNTER
Please discussed with patient if he had tried anything else    If not I am okay with him trying [place], same dose

## 2019-07-23 NOTE — TELEPHONE ENCOUNTER
Pt's insurance is requiring an [address] [facility] for lantus solostar  Upon attempting to intiate the auth, the insurance provided formulary alternatives  Would you feel changing the medication would be appropriate? ? Alternatives include:   Basaglar Kwikpen (T1), Samantha Schmitz Flextouch (T2), Levemlawanda Flextouch (T3)

## 2019-07-23 NOTE — TELEPHONE ENCOUNTER
LMOM for patient to call back, please confirm he will like to start [place] and confirm Lantus dosing and pharmacy   Prep med in chart and send to Dr Tsering Huggins to [facility]

## 2019-07-31 ENCOUNTER — TELEPHONE (OUTPATIENT)
Dept: FAMILY MEDICINE CLINIC | Facility: CLINIC | Age: 44
End: 2019-07-31

## 2019-07-31 NOTE — TELEPHONE ENCOUNTER
I received another notification about Campos Gonzalez being needed  I cotnacted the pharmacy again, explained the situation  She asked me to hold while she contacted the patient  She was able to speak to the patient, he explained to disregard at a store level - he is going to try to get the med thru mail order  I acknowledged and asked the pharmacy to notify me if something changes but I will disregard for now

## 2019-07-31 NOTE — TELEPHONE ENCOUNTER
Received a notification regarding an auth being needed for Januvia 25mg  I attempted to initiate the auth on 7/25 however the insurance was not able to locate him in their system  I contacted the pharmacy - the pharmacist advised to disregard for now as she was going to try to reach out to the patient

## 2019-10-18 DIAGNOSIS — A08.4 VIRAL GASTROENTERITIS: ICD-10-CM

## 2019-10-18 DIAGNOSIS — L30.9 ECZEMA, UNSPECIFIED TYPE: ICD-10-CM

## 2019-10-18 RX ORDER — TRIAMCINOLONE ACETONIDE 1 MG/G
CREAM TOPICAL 2 TIMES DAILY
Qty: 80 G | Refills: 2 | Status: SHIPPED | OUTPATIENT
Start: 2019-10-18 | End: 2020-06-02 | Stop reason: SDUPTHER

## 2019-10-18 RX ORDER — METFORMIN HYDROCHLORIDE 500 MG/1
500 TABLET, EXTENDED RELEASE ORAL 2 TIMES DAILY WITH MEALS
Qty: 180 TABLET | Refills: 1 | Status: SHIPPED | OUTPATIENT
Start: 2019-10-18 | End: 2020-06-02 | Stop reason: SDUPTHER

## 2019-10-18 RX ORDER — NYSTATIN 100000 U/G
CREAM TOPICAL 2 TIMES DAILY
Qty: 30 G | Refills: 2 | Status: SHIPPED | OUTPATIENT
Start: 2019-10-18 | End: 2020-06-02 | Stop reason: SDUPTHER

## 2019-12-03 ENCOUNTER — TELEPHONE (OUTPATIENT)
Dept: FAMILY MEDICINE CLINIC | Facility: CLINIC | Age: 44
End: 2019-12-03

## 2020-06-02 ENCOUNTER — TELEMEDICINE (OUTPATIENT)
Dept: FAMILY MEDICINE CLINIC | Facility: CLINIC | Age: 45
End: 2020-06-02
Payer: COMMERCIAL

## 2020-06-02 VITALS — TEMPERATURE: 97.6 F

## 2020-06-02 DIAGNOSIS — E66.01 MORBID OBESITY (HCC): ICD-10-CM

## 2020-06-02 DIAGNOSIS — I10 ESSENTIAL HYPERTENSION: ICD-10-CM

## 2020-06-02 DIAGNOSIS — E78.5 HYPERLIPIDEMIA, UNSPECIFIED HYPERLIPIDEMIA TYPE: Primary | ICD-10-CM

## 2020-06-02 DIAGNOSIS — E13.9 DIABETES MELLITUS OF OTHER TYPE WITHOUT COMPLICATION, UNSPECIFIED WHETHER LONG TERM INSULIN USE (HCC): ICD-10-CM

## 2020-06-02 DIAGNOSIS — L30.9 ECZEMA, UNSPECIFIED TYPE: ICD-10-CM

## 2020-06-02 DIAGNOSIS — A08.4 VIRAL GASTROENTERITIS: ICD-10-CM

## 2020-06-02 DIAGNOSIS — S83.91XA SPRAIN OF RIGHT KNEE/LEG, INITIAL ENCOUNTER: ICD-10-CM

## 2020-06-02 DIAGNOSIS — I10 BENIGN ESSENTIAL HYPERTENSION: ICD-10-CM

## 2020-06-02 DIAGNOSIS — Z20.828 EXPOSURE TO SARS-ASSOCIATED CORONAVIRUS: ICD-10-CM

## 2020-06-02 DIAGNOSIS — E11.9 DIABETES MELLITUS (HCC): ICD-10-CM

## 2020-06-02 PROCEDURE — U0003 INFECTIOUS AGENT DETECTION BY NUCLEIC ACID (DNA OR RNA); SEVERE ACUTE RESPIRATORY SYNDROME CORONAVIRUS 2 (SARS-COV-2) (CORONAVIRUS DISEASE [COVID-19]), AMPLIFIED PROBE TECHNIQUE, MAKING USE OF HIGH THROUGHPUT TECHNOLOGIES AS DESCRIBED BY CMS-2020-01-R: HCPCS

## 2020-06-02 PROCEDURE — 99214 OFFICE O/P EST MOD 30 MIN: CPT | Performed by: FAMILY MEDICINE

## 2020-06-02 RX ORDER — ATENOLOL 100 MG/1
100 TABLET ORAL DAILY
Qty: 30 TABLET | Refills: 0 | Status: SHIPPED | OUTPATIENT
Start: 2020-06-02 | End: 2021-04-06 | Stop reason: SDUPTHER

## 2020-06-02 RX ORDER — LISINOPRIL AND HYDROCHLOROTHIAZIDE 12.5; 1 MG/1; MG/1
1 TABLET ORAL DAILY
Qty: 30 TABLET | Refills: 0 | Status: SHIPPED | OUTPATIENT
Start: 2020-06-02 | End: 2021-04-06 | Stop reason: SDUPTHER

## 2020-06-02 RX ORDER — PEN NEEDLE, DIABETIC 31 G X1/4"
NEEDLE, DISPOSABLE MISCELLANEOUS 3 TIMES DAILY
Qty: 300 EACH | Refills: 1 | Status: SHIPPED | OUTPATIENT
Start: 2020-06-02

## 2020-06-02 RX ORDER — TRIAMCINOLONE ACETONIDE 1 MG/G
CREAM TOPICAL 2 TIMES DAILY
Qty: 80 G | Refills: 2 | Status: SHIPPED | OUTPATIENT
Start: 2020-06-02 | End: 2022-02-01 | Stop reason: HOSPADM

## 2020-06-02 RX ORDER — CYCLOBENZAPRINE HCL 5 MG
5 TABLET ORAL 3 TIMES DAILY PRN
Qty: 30 TABLET | Refills: 0 | Status: SHIPPED | OUTPATIENT
Start: 2020-06-02 | End: 2021-10-21

## 2020-06-02 RX ORDER — METFORMIN HYDROCHLORIDE 500 MG/1
500 TABLET, EXTENDED RELEASE ORAL 2 TIMES DAILY WITH MEALS
Qty: 180 TABLET | Refills: 1 | Status: SHIPPED | OUTPATIENT
Start: 2020-06-02 | End: 2022-03-22 | Stop reason: SDUPTHER

## 2020-06-02 RX ORDER — NYSTATIN 100000 U/G
CREAM TOPICAL 2 TIMES DAILY
Qty: 30 G | Refills: 2 | Status: SHIPPED | OUTPATIENT
Start: 2020-06-02 | End: 2022-02-01 | Stop reason: HOSPADM

## 2020-06-03 ENCOUNTER — TELEPHONE (OUTPATIENT)
Dept: FAMILY MEDICINE CLINIC | Facility: CLINIC | Age: 45
End: 2020-06-03

## 2020-06-04 ENCOUNTER — TELEPHONE (OUTPATIENT)
Dept: FAMILY MEDICINE CLINIC | Facility: CLINIC | Age: 45
End: 2020-06-04

## 2020-06-04 LAB — SARS-COV-2 RNA SPEC QL NAA+PROBE: NOT DETECTED

## 2020-08-11 ENCOUNTER — OFFICE VISIT (OUTPATIENT)
Dept: FAMILY MEDICINE CLINIC | Facility: CLINIC | Age: 45
End: 2020-08-11
Payer: COMMERCIAL

## 2020-08-11 VITALS
BODY MASS INDEX: 49.03 KG/M2 | WEIGHT: 315 LBS | OXYGEN SATURATION: 98 % | SYSTOLIC BLOOD PRESSURE: 120 MMHG | DIASTOLIC BLOOD PRESSURE: 80 MMHG | TEMPERATURE: 97.6 F | HEART RATE: 106 BPM

## 2020-08-11 DIAGNOSIS — S99.911A INJURY OF RIGHT ANKLE, INITIAL ENCOUNTER: Primary | ICD-10-CM

## 2020-08-11 DIAGNOSIS — E66.01 MORBID OBESITY (HCC): ICD-10-CM

## 2020-08-11 DIAGNOSIS — E13.9 DIABETES MELLITUS OF OTHER TYPE WITHOUT COMPLICATION, UNSPECIFIED WHETHER LONG TERM INSULIN USE (HCC): ICD-10-CM

## 2020-08-11 LAB — SL AMB POCT HEMOGLOBIN AIC: 10.3 (ref ?–6.5)

## 2020-08-11 PROCEDURE — 3046F HEMOGLOBIN A1C LEVEL >9.0%: CPT | Performed by: FAMILY MEDICINE

## 2020-08-11 PROCEDURE — 3074F SYST BP LT 130 MM HG: CPT | Performed by: FAMILY MEDICINE

## 2020-08-11 PROCEDURE — 1036F TOBACCO NON-USER: CPT | Performed by: FAMILY MEDICINE

## 2020-08-11 PROCEDURE — 83036 HEMOGLOBIN GLYCOSYLATED A1C: CPT | Performed by: FAMILY MEDICINE

## 2020-08-11 PROCEDURE — 3079F DIAST BP 80-89 MM HG: CPT | Performed by: FAMILY MEDICINE

## 2020-08-11 PROCEDURE — 99214 OFFICE O/P EST MOD 30 MIN: CPT | Performed by: FAMILY MEDICINE

## 2020-08-11 RX ORDER — NAPROXEN 500 MG/1
500 TABLET ORAL 2 TIMES DAILY WITH MEALS
Qty: 60 TABLET | Refills: 0 | Status: SHIPPED | OUTPATIENT
Start: 2020-08-11 | End: 2022-01-24 | Stop reason: ALTCHOICE

## 2020-08-11 NOTE — LETTER
August 11, 2020     Patient: Gregg Allen   YOB: 1975   Date of Visit: 8/11/2020       To Whom it May Concern:    Mehdi Thakur is under my professional care  He was seen in my office on 8/11/2020  He will be out 8/11/2020, 8/12/2020, 8/13/2020 and return to work 8/14/2020  If you have any questions or concerns, please don't hesitate to call           Sincerely,          Dalton Minor MD        CC: No Recipients

## 2020-08-12 NOTE — PROGRESS NOTES
Assessment/Plan:    67-year-old male with:  Right ankle injury type 2 diabetes morbid obesity  Discussed heat and cold stretching anti-inflammatories will check an x-ray and refer to PT and if not improving to Podiatry  Will continue current medications and lifestyle modifications and will begin trial of  agonist and follow-up in 3 months  Discussed supportive care return parameters otherwise    No problem-specific Assessment & Plan notes found for this encounter  Diagnoses and all orders for this visit:    Injury of right ankle, initial encounter  -     XR ankle 3+ vw right; Future  -     naproxen (NAPROSYN) 500 mg tablet; Take 1 tablet (500 mg total) by mouth 2 (two) times a day with meals  -     Ambulatory referral to Physical Therapy; Future  -     Ambulatory referral to Podiatry; Future    Diabetes mellitus of other type without complication, unspecified whether long term insulin use (HCC)  -     POCT hemoglobin A1c  -     Dulaglutide 0 75 MG/0 5ML SOPN; Inject 0 5 mL (0 75 mg total) under the skin once a week    Morbid obesity (HCC)          Subjective:     Chief Complaint   Patient presents with    Ankle Pain     felt a popping sound,heal, ankle, foot pain hurt walking out to car after work, no trauma        Patient ID: Gregg Allen is a 38 y o  male  Patient is a 67-year-old male who presents for follow-up on type 2 diabetes morbid obesity and right ankle injury he was walking he felt a pop  No fevers chills nausea vomiting  Tolerating p o  Intake patient declines other complaints at this time      The following portions of the patient's history were reviewed and updated as appropriate: allergies, current medications, past family history, past medical history, past social history, past surgical history and problem list     Review of Systems   Constitutional: Negative  HENT: Negative  Eyes: Negative  Respiratory: Negative  Cardiovascular: Negative      Gastrointestinal: Negative  Endocrine: Negative  Genitourinary: Negative  Musculoskeletal: Negative  Allergic/Immunologic: Negative  Neurological: Negative  Hematological: Negative  Psychiatric/Behavioral: Negative  All other systems reviewed and are negative  Objective:      /80   Pulse (!) 106   Temp 97 6 °F (36 4 °C)   Wt (!) 151 kg (332 lb)   SpO2 98%   BMI 49 03 kg/m²          Physical Exam  Constitutional:       Appearance: He is well-developed  HENT:      Head: Atraumatic  Right Ear: External ear normal       Left Ear: External ear normal    Eyes:      Conjunctiva/sclera: Conjunctivae normal       Pupils: Pupils are equal, round, and reactive to light  Neck:      Musculoskeletal: Normal range of motion  Cardiovascular:      Rate and Rhythm: Normal rate and regular rhythm  Pulses: no weak pulses          Dorsalis pedis pulses are 2+ on the right side and 2+ on the left side  Posterior tibial pulses are 2+ on the right side and 2+ on the left side  Heart sounds: Normal heart sounds  Pulmonary:      Effort: Pulmonary effort is normal  No respiratory distress  Breath sounds: Normal breath sounds  Abdominal:      General: Bowel sounds are normal  There is no distension  Palpations: Abdomen is soft  Tenderness: There is no abdominal tenderness  There is no guarding or rebound  Musculoskeletal: Normal range of motion  Feet:      Right foot:      Skin integrity: No ulcer, skin breakdown, erythema, warmth, callus or dry skin  Left foot:      Skin integrity: No ulcer, skin breakdown, erythema, warmth, callus or dry skin  Skin:     General: Skin is warm and dry  Neurological:      Mental Status: He is alert and oriented to person, place, and time  Cranial Nerves: No cranial nerve deficit  Psychiatric:         Behavior: Behavior normal          Thought Content:  Thought content normal          Judgment: Judgment normal  BMI Counseling: Body mass index is 49 03 kg/m²  The BMI is above normal  Nutrition recommendations include 3-5 servings of fruits/vegetables daily  Exercise recommendations include moderate aerobic physical activity for 150 minutes/week  Patient's shoes and socks removed  Right Foot/Ankle   Right Foot Inspection  Skin Exam: skin normal and skin intact no dry skin, no warmth, no callus, no erythema, no maceration, no abnormal color, no pre-ulcer, no ulcer and no callus                          Toe Exam: ROM and strength within normal limits  Sensory       Monofilament testing: intact  Vascular    The right DP pulse is 2+  The right PT pulse is 2+  Left Foot/Ankle  Left Foot Inspection  Skin Exam: skin normal and skin intactno dry skin, no warmth, no erythema, no maceration, normal color, no pre-ulcer, no ulcer and no callus                         Toe Exam: ROM and strength within normal limits                   Sensory       Monofilament: intact  Vascular    The left DP pulse is 2+  The left PT pulse is 2+  Assign Risk Category:  No deformity present; No loss of protective sensation;  No weak pulses       Risk: 0

## 2020-08-18 ENCOUNTER — OFFICE VISIT (OUTPATIENT)
Dept: FAMILY MEDICINE CLINIC | Facility: CLINIC | Age: 45
End: 2020-08-18
Payer: COMMERCIAL

## 2020-08-18 VITALS
OXYGEN SATURATION: 97 % | TEMPERATURE: 97.6 F | WEIGHT: 315 LBS | BODY MASS INDEX: 48.73 KG/M2 | SYSTOLIC BLOOD PRESSURE: 130 MMHG | DIASTOLIC BLOOD PRESSURE: 72 MMHG | HEART RATE: 113 BPM

## 2020-08-18 DIAGNOSIS — M54.16 RIGHT LUMBAR RADICULITIS: Primary | ICD-10-CM

## 2020-08-18 DIAGNOSIS — S99.911D INJURY OF RIGHT ANKLE, SUBSEQUENT ENCOUNTER: ICD-10-CM

## 2020-08-18 PROBLEM — S76.301A RIGHT HAMSTRING INJURY: Status: RESOLVED | Noted: 2019-07-19 | Resolved: 2020-08-18

## 2020-08-18 PROCEDURE — 3078F DIAST BP <80 MM HG: CPT | Performed by: FAMILY MEDICINE

## 2020-08-18 PROCEDURE — 1036F TOBACCO NON-USER: CPT | Performed by: FAMILY MEDICINE

## 2020-08-18 PROCEDURE — 3075F SYST BP GE 130 - 139MM HG: CPT | Performed by: FAMILY MEDICINE

## 2020-08-18 PROCEDURE — 99214 OFFICE O/P EST MOD 30 MIN: CPT | Performed by: FAMILY MEDICINE

## 2020-08-18 PROCEDURE — 3046F HEMOGLOBIN A1C LEVEL >9.0%: CPT | Performed by: FAMILY MEDICINE

## 2020-08-18 NOTE — LETTER
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

## 2020-08-19 NOTE — PROGRESS NOTES
Assessment/Plan:    81-year-old male with:  Right ankle injury  Discussed continued heat and cold along with stretching and will refer to physical therapy and check x-ray to rule out occult pathology  Will have patient do light duty physically at work and reassess in 3 weeks    No problem-specific [address] [place] [address] notes found for this encounter  Diagnoses and all orders for this visit:    Right lumbar radiculitis    Injury of right ankle, subsequent encounter  -     Ambulatory referral to Physical Therapy; Future  -     XR ankle 3+ vw right; Future          Subjective:     Chief Complaint   Patient presents with    Foot Pain     states feels like it is in heal now, did not get xray ordered        Patient ID: Suzan Fausitn is a 38 y o  male  Patient is a 81-year-old male who presents complaining of persistent right ankle pain that is not improving he is having difficulty with physical activity specially using machinery work no fevers chills nausea vomiting weakness numbness or tingling other complaints at this time      The following portions of the patient's history were reviewed and updated as appropriate: allergies, current medications, past family history, past medical history, past social history, past surgical history and problem list     Review of Systems   Constitutional: Negative  HENT: Negative  Eyes: Negative  Respiratory: Negative  Cardiovascular: Negative  Gastrointestinal: Negative  Endocrine: Negative  Genitourinary: Negative  Musculoskeletal: Positive for arthralgias and myalgias  Allergic/Immunologic: Negative  Neurological: Negative  Hematological: Negative  Psychiatric/Behavioral: Negative  All other systems reviewed and are negative  Objective:      /72   Pulse (!) 113   Temp 97 6 °F (36 4 °C)   Wt (!) 150 kg (330 lb)   SpO2 97%   BMI 48 73 kg/m²          Physical Exam  Constitutional:       Appearance: He is well-developed  HENT:      Head: Atraumatic  Right Ear: External ear normal       Left Ear: External ear normal    Eyes:      Conjunctiva/sclera: Conjunctivae normal       Pupils: Pupils are equal, round, and reactive to light  Neck:      Musculoskeletal: Normal range of motion  Pulmonary:      Effort: Pulmonary effort is normal  No respiratory distress  Abdominal:      General: There is no distension  Musculoskeletal: Normal range of motion  Comments: Tenderness to the medial posterior ankle near the Achilles tendon   Skin:     General: Skin is warm and dry  Neurological:      Mental Status: He is alert and oriented to person, place, and time  Cranial Nerves: No cranial nerve deficit  Psychiatric:         Behavior: Behavior normal          Thought Content:  Thought content normal          Judgment: Judgment normal

## 2020-08-21 ENCOUNTER — APPOINTMENT (OUTPATIENT)
Dept: RADIOLOGY | Facility: MEDICAL CENTER | Age: 45
End: 2020-08-21
Payer: COMMERCIAL

## 2020-08-21 DIAGNOSIS — S99.911D INJURY OF RIGHT ANKLE, SUBSEQUENT ENCOUNTER: ICD-10-CM

## 2020-08-21 PROCEDURE — 73610 X-RAY EXAM OF ANKLE: CPT

## 2020-08-25 DIAGNOSIS — M19.072 ARTHRITIS OF LEFT ANKLE: Primary | ICD-10-CM

## 2020-10-10 ENCOUNTER — TELEPHONE (OUTPATIENT)
Dept: OTHER | Facility: OTHER | Age: 45
End: 2020-10-10

## 2020-10-12 ENCOUNTER — TELEPHONE (OUTPATIENT)
Dept: FAMILY MEDICINE CLINIC | Facility: CLINIC | Age: 45
End: 2020-10-12

## 2020-10-12 ENCOUNTER — TELEMEDICINE (OUTPATIENT)
Dept: FAMILY MEDICINE CLINIC | Facility: CLINIC | Age: 45
End: 2020-10-12
Payer: COMMERCIAL

## 2020-10-12 DIAGNOSIS — Z91.89 AT INCREASED RISK OF EXPOSURE TO COVID-19 VIRUS: ICD-10-CM

## 2020-10-12 DIAGNOSIS — A08.4 VIRAL GASTROENTERITIS: Primary | ICD-10-CM

## 2020-10-12 PROCEDURE — 99213 OFFICE O/P EST LOW 20 MIN: CPT | Performed by: INTERNAL MEDICINE

## 2020-10-12 PROCEDURE — U0003 INFECTIOUS AGENT DETECTION BY NUCLEIC ACID (DNA OR RNA); SEVERE ACUTE RESPIRATORY SYNDROME CORONAVIRUS 2 (SARS-COV-2) (CORONAVIRUS DISEASE [COVID-19]), AMPLIFIED PROBE TECHNIQUE, MAKING USE OF HIGH THROUGHPUT TECHNOLOGIES AS DESCRIBED BY CMS-2020-01-R: HCPCS | Performed by: INTERNAL MEDICINE

## 2020-10-13 LAB — SARS-COV-2 RNA SPEC QL NAA+PROBE: NOT DETECTED

## 2020-10-14 DIAGNOSIS — J20.9 ACUTE BRONCHITIS, UNSPECIFIED ORGANISM: ICD-10-CM

## 2020-10-15 ENCOUNTER — TELEPHONE (OUTPATIENT)
Dept: FAMILY MEDICINE CLINIC | Facility: CLINIC | Age: 45
End: 2020-10-15

## 2020-10-15 DIAGNOSIS — J20.9 ACUTE BRONCHITIS, UNSPECIFIED ORGANISM: Primary | ICD-10-CM

## 2020-10-15 RX ORDER — AZITHROMYCIN 250 MG/1
TABLET, FILM COATED ORAL
Qty: 6 TABLET | Refills: 0 | Status: SHIPPED | OUTPATIENT
Start: 2020-10-15 | End: 2020-10-19

## 2020-10-15 RX ORDER — BENZONATATE 200 MG/1
200 CAPSULE ORAL 3 TIMES DAILY PRN
Qty: 20 CAPSULE | Refills: 0 | Status: SHIPPED | OUTPATIENT
Start: 2020-10-15 | End: 2021-08-03

## 2020-10-15 RX ORDER — AZITHROMYCIN 250 MG/1
TABLET, FILM COATED ORAL
Qty: 6 TABLET | Refills: 0 | Status: SHIPPED | OUTPATIENT
Start: 2020-10-15 | End: 2020-10-15 | Stop reason: SDUPTHER

## 2020-10-20 ENCOUNTER — TELEPHONE (OUTPATIENT)
Dept: FAMILY MEDICINE CLINIC | Facility: CLINIC | Age: 45
End: 2020-10-20

## 2020-12-03 ENCOUNTER — APPOINTMENT (OUTPATIENT)
Dept: URGENT CARE | Facility: MEDICAL CENTER | Age: 45
End: 2020-12-03
Payer: OTHER MISCELLANEOUS

## 2020-12-03 PROCEDURE — G0382 LEV 3 HOSP TYPE B ED VISIT: HCPCS | Performed by: FAMILY MEDICINE

## 2020-12-03 PROCEDURE — 99283 EMERGENCY DEPT VISIT LOW MDM: CPT | Performed by: FAMILY MEDICINE

## 2020-12-11 ENCOUNTER — OCCMED (OUTPATIENT)
Dept: URGENT CARE | Facility: MEDICAL CENTER | Age: 45
End: 2020-12-11
Payer: OTHER MISCELLANEOUS

## 2020-12-11 DIAGNOSIS — Z02.6 ENCOUNTER RELATED TO WORKER'S COMPENSATION CLAIM: Primary | ICD-10-CM

## 2020-12-11 PROCEDURE — 99213 OFFICE O/P EST LOW 20 MIN: CPT | Performed by: PHYSICIAN ASSISTANT

## 2021-01-05 ENCOUNTER — OFFICE VISIT (OUTPATIENT)
Dept: FAMILY MEDICINE CLINIC | Facility: CLINIC | Age: 46
End: 2021-01-05
Payer: COMMERCIAL

## 2021-01-05 VITALS
WEIGHT: 315 LBS | BODY MASS INDEX: 47.74 KG/M2 | TEMPERATURE: 97.8 F | HEIGHT: 68 IN | OXYGEN SATURATION: 99 % | HEART RATE: 74 BPM

## 2021-01-05 DIAGNOSIS — K59.00 CONSTIPATION, UNSPECIFIED CONSTIPATION TYPE: ICD-10-CM

## 2021-01-05 DIAGNOSIS — R10.84 GENERALIZED ABDOMINAL PAIN: Primary | ICD-10-CM

## 2021-01-05 DIAGNOSIS — R19.15 DECREASED BOWEL SOUNDS: ICD-10-CM

## 2021-01-05 PROCEDURE — 99213 OFFICE O/P EST LOW 20 MIN: CPT | Performed by: INTERNAL MEDICINE

## 2021-01-05 RX ORDER — BACLOFEN 10 MG/1
10 TABLET ORAL EVERY 8 HOURS
COMMUNITY
Start: 2020-12-03 | End: 2021-10-21

## 2021-01-05 NOTE — PROGRESS NOTES
Assessment/Plan:         Diagnoses and all orders for this visit:    Generalized abdominal pain  -     XR abdomen 1 view kub; Future  X-ray would be ordered rule out ileus  Encouraged hydration fiber intake increase ambulation and follow-up with lab studies are in the chart  Patient may use an over-the-counter along with Dulcolax suppository and encouraged him to keep us posted about his progress  Decreased bowel sounds  -     XR abdomen 1 view kub; Future    Constipation, unspecified constipation type  -     XR abdomen 1 view kub; Future        Subjective:      Patient ID: Tom Garcia is a 41 y o  male  HPI  Patient has diabetes mellitus history of gastroparesis morbid obesity is here with 3 days of constipation  He had a normal bowel movement 3 days ago  There was no blood or mucus in it  He denies any nausea vomiting able to keep liquids down has very little amount of gas coming out  No history of abdominal surgery  His blood sugars have been uncontrolled with a hemoglobin A1c of 10 3 several months ago  His fasting blood sugars have been around 180  Encouraged him to follow-up with Dr Norah Blizzard to discuss is diabetic management  He is also due for lab studies are in the chart  Patient does take baclofen for leg cramps  Family history negative for colon cancer but does have family history history of prostate and breast cancer  The following portions of the patient's history were reviewed and updated as appropriate: allergies, current medications, past family history, past medical history, past social history, past surgical history and problem list     Review of Systems   Constitutional: Negative for chills and fever  Gastrointestinal: Negative for nausea  Objective:      Pulse 74   Temp 97 8 °F (36 6 °C)   Ht 5' 8" (1 727 m)   Wt (!) 151 kg (333 lb)   SpO2 99%   BMI 50 63 kg/m²          Physical Exam  Constitutional:       Appearance: He is not ill-appearing or toxic-appearing  Abdominal:      General: There is no distension  Tenderness: There is no abdominal tenderness        Comments: Decreased bowel sounds

## 2021-02-11 ENCOUNTER — TELEMEDICINE (OUTPATIENT)
Dept: FAMILY MEDICINE CLINIC | Facility: CLINIC | Age: 46
End: 2021-02-11
Payer: COMMERCIAL

## 2021-02-11 VITALS — TEMPERATURE: 97.6 F

## 2021-02-11 DIAGNOSIS — R50.9 FEVER, UNSPECIFIED FEVER CAUSE: ICD-10-CM

## 2021-02-11 DIAGNOSIS — R09.81 CONGESTION OF NASAL SINUS: ICD-10-CM

## 2021-02-11 DIAGNOSIS — Z91.89 AT INCREASED RISK OF EXPOSURE TO COVID-19 VIRUS: ICD-10-CM

## 2021-02-11 DIAGNOSIS — R50.9 FEVER, UNSPECIFIED FEVER CAUSE: Primary | ICD-10-CM

## 2021-02-11 PROCEDURE — U0005 INFEC AGEN DETEC AMPLI PROBE: HCPCS | Performed by: FAMILY MEDICINE

## 2021-02-11 PROCEDURE — 99213 OFFICE O/P EST LOW 20 MIN: CPT | Performed by: FAMILY MEDICINE

## 2021-02-11 PROCEDURE — 3725F SCREEN DEPRESSION PERFORMED: CPT | Performed by: FAMILY MEDICINE

## 2021-02-11 PROCEDURE — U0003 INFECTIOUS AGENT DETECTION BY NUCLEIC ACID (DNA OR RNA); SEVERE ACUTE RESPIRATORY SYNDROME CORONAVIRUS 2 (SARS-COV-2) (CORONAVIRUS DISEASE [COVID-19]), AMPLIFIED PROBE TECHNIQUE, MAKING USE OF HIGH THROUGHPUT TECHNOLOGIES AS DESCRIBED BY CMS-2020-01-R: HCPCS | Performed by: FAMILY MEDICINE

## 2021-02-11 NOTE — PROGRESS NOTES
COVID-19 Virtual Visit     Assessment/Plan:    Problem List Items Addressed This Visit        Other    At increased risk of exposure to COVID-19 virus    Relevant Orders    Novel Coronavirus (Covid-19),PCR [facility] - Collected at [place] [address] or [facility]      Other Visit Diagnoses     Fever, unspecified fever cause    -  Primary    Relevant Orders    Novel Coronavirus (COVID-19), PCR [facility] Collected at [facility] or [facility]    Congestion of nasal sinus        Relevant Orders    Novel Coronavirus (COVID-19), PCR [facility] Collected at [place] [address] or [facility]         Disposition:     I referred patient to one of our centralized sites for a COVID-19 swab  I have spent 10 minutes directly with the patient  Greater than 50% of this time was spent in counseling/coordination of care regarding: risks and benefits of treatment options, instructions for management and patient and family education  Encounter provider Nathalia Cornell MD    Provider located at [address] [place] [address] 264, [facility] [address] [place] [address] [place] [address] [phone]    Recent Visits  No visits were found meeting these conditions  Showing recent visits within past 7 days and meeting all other requirements     Today's Visits  Date Type Provider Dept   02/11/21 Telemedicine Nathalia Cornell MD [place] [address] today's visits and meeting all other requirements     Future Appointments  No visits were found meeting these conditions  Showing future appointments within next 150 days and meeting all other requirements        Patient agrees to participate in a virtual check in via telephone or video visit instead of presenting to the office to address urgent/immediate medical needs  Patient is aware this is a billable service  After connecting through Telephone, the patient was identified by name and date of birth   Alex Blanco was informed that this was a telemedicine visit and that the exam was being conducted confidentially over secure lines  My office door was closed  No one else was in the room  Giovany Blanca acknowledged consent and understanding of privacy and security of the telemedicine visit  I informed the patient that I have reviewed his record in Epic and presented the opportunity for him to ask any questions regarding the visit today  The patient agreed to participate  It was my intent to perform this visit via video technology but the patient was not able to do a video connection so the visit was completed via audio telephone only  Subjective:   Giovany Blanca is a 41 y o  male who is concerned about COVID-19  Patient's symptoms include fever, chills, nasal congestion and cough         Exposure:   Contact with a person who is under investigation (PUI) for or who is positive for COVID-19 within the last 14 days?: Yes    Hospitalized recently for fever and/or lower respiratory symptoms?: No      Currently a healthcare worker that is involved in direct patient care?: No      Works in a special setting where the risk of COVID-19 transmission may be high? (this may include long-term care, correctional and jail facilities; homeless shelters; assisted-living facilities and group homes ): No      Resident in a special setting where the risk of COVID-19 transmission may be high? (this may include long-term care, correctional and jail facilities; homeless shelters; assisted-living facilities and group homes ): No      Lab Results   Component Value Date    SARSCOV2 Not Detected 10/12/2020     Past Medical History:   Diagnosis Date    Diabetes mellitus ([facility] [address] )     Hypertension      Past Surgical History:   Procedure Laterality Date    CATARACT EXTRACTION       Current Outpatient Medications   Medication Sig Dispense Refill    atenolol (TENORMIN) 100 mg tablet Take 1 tablet (100 mg total) by mouth daily 30 tablet 0    baclofen 10 mg tablet Take 10 mg by mouth every 8 (eight) hours      cyclobenzaprine (FLEXERIL) 5 mg tablet Take 1 tablet (5 mg total) by mouth 3 (three) times a day as needed for muscle spasms 30 tablet 0    Dulaglutide 0 75 MG/0 5ML SOPN Inject 0 5 mL (0 75 mg total) under the skin once a week 4 pen 2    insulin glargine (Basaglar KwikPen) 100 units/mL injection pen Inject 55 Units under the skin daily at bedtime 5 pen 2    Insulin Pen Needle (PEN NEEDLES) 31G X 6 MM MISC Inject under the skin 3 (three) times a day 300 each 1    lisinopril-hydrochlorothiazide (PRINZIDE,ZESTORETIC) 10-12 5 MG per tablet Take 1 tablet by mouth daily 30 tablet 0    metFORMIN (GLUCOPHAGE-XR) 500 mg 24 hr tablet Take 1 tablet (500 mg total) by mouth 2 (two) times a day with meals 180 tablet 1    naproxen (NAPROSYN) 500 mg tablet Take 1 tablet (500 mg total) by mouth 2 (two) times a day with meals 60 tablet 0    nystatin (MYCOSTATIN) cream Apply topically 2 (two) times a day 30 g 2    triamcinolone (KENALOG) 0 1 % cream Apply topically 2 (two) times a day 80 g 2    benzonatate (TESSALON) 200 MG capsule Take 1 capsule (200 mg total) by mouth 3 (three) times a day as needed for cough (Patient not taking: Reported on 2/11/2021) 20 capsule 0     No current facility-administered medications for this visit  Allergies   Allergen Reactions    Penicillins        Review of Systems   Constitutional: Positive for chills and fever  HENT: Positive for congestion and sinus pressure  Eyes: Negative  Respiratory: Positive for cough  Cardiovascular: Negative  Gastrointestinal: Negative  Endocrine: Negative  Genitourinary: Negative  Musculoskeletal: Negative  Allergic/Immunologic: Negative  Neurological: Negative  Hematological: Negative  Psychiatric/Behavioral: Negative  All other systems reviewed and are negative      Objective:    Vitals:    02/11/21 1112   Temp: 97 6 °F (36 4 °C)       Physical Exam  VIRTUAL VISIT [address] [facility] acknowledges that he has consented to an online visit or consultation  He understands that the online visit is based solely on information provided by him, and that, in the absence of a face-to-face physical evaluation by the physician, the diagnosis he receives is both limited and provisional in terms of accuracy and completeness  This is not intended to replace a full medical face-to-face evaluation by the physician  Steven Levy understands and accepts these terms

## 2021-02-12 PROBLEM — R07.9 CHEST PAIN IN ADULT: Status: ACTIVE | Noted: 2020-01-20

## 2021-02-12 PROBLEM — U07.1 COVID-19: Status: ACTIVE | Noted: 2021-02-12

## 2021-02-12 PROBLEM — R06.02 SOB (SHORTNESS OF BREATH): Status: ACTIVE | Noted: 2020-01-20

## 2021-02-12 LAB — SARS-COV-2 RNA RESP QL NAA+PROBE: POSITIVE

## 2021-02-15 ENCOUNTER — HOSPITAL ENCOUNTER (OUTPATIENT)
Dept: INFUSION CENTER | Facility: HOSPITAL | Age: 46
Discharge: HOME | End: 2021-02-15
Payer: COMMERCIAL

## 2021-02-15 VITALS
HEART RATE: 84 BPM | TEMPERATURE: 97.1 F | SYSTOLIC BLOOD PRESSURE: 179 MMHG | RESPIRATION RATE: 16 BRPM | DIASTOLIC BLOOD PRESSURE: 88 MMHG | OXYGEN SATURATION: 98 %

## 2021-02-15 DIAGNOSIS — U07.1 COVID-19: Primary | ICD-10-CM

## 2021-02-15 PROCEDURE — M0239 BAMLANIVIMAB-XXXX INFUSION: HCPCS | Performed by: FAMILY MEDICINE

## 2021-02-15 RX ORDER — ALBUTEROL SULFATE 90 UG/1
3 AEROSOL, METERED RESPIRATORY (INHALATION) ONCE AS NEEDED
Status: DISCONTINUED | OUTPATIENT
Start: 2021-02-15 | End: 2021-02-18 | Stop reason: HOSPADM

## 2021-02-15 RX ORDER — ACETAMINOPHEN 325 MG/1
650 TABLET ORAL ONCE AS NEEDED
Status: CANCELLED | OUTPATIENT
Start: 2021-02-15

## 2021-02-15 RX ORDER — ALBUTEROL SULFATE 90 UG/1
3 AEROSOL, METERED RESPIRATORY (INHALATION) ONCE AS NEEDED
Status: CANCELLED | OUTPATIENT
Start: 2021-02-15

## 2021-02-15 RX ORDER — SODIUM CHLORIDE 9 MG/ML
20 INJECTION, SOLUTION INTRAVENOUS ONCE
Status: CANCELLED | OUTPATIENT
Start: 2021-02-15

## 2021-02-15 RX ORDER — SODIUM CHLORIDE 9 MG/ML
20 INJECTION, SOLUTION INTRAVENOUS ONCE
Status: COMPLETED | OUTPATIENT
Start: 2021-02-15 | End: 2021-02-15

## 2021-02-15 RX ORDER — ACETAMINOPHEN 325 MG/1
650 TABLET ORAL ONCE AS NEEDED
Status: DISCONTINUED | OUTPATIENT
Start: 2021-02-15 | End: 2021-02-18 | Stop reason: HOSPADM

## 2021-02-15 RX ADMIN — SODIUM CHLORIDE 700 MG: 9 INJECTION, SOLUTION INTRAVENOUS at 08:28

## 2021-02-15 RX ADMIN — SODIUM CHLORIDE 20 ML/HR: 0.9 INJECTION, SOLUTION INTRAVENOUS at 08:13

## 2021-02-15 NOTE — PROGRESS NOTES
Patient tolerated Bamlanivimab infusion and 1 hour post observation without incident  IV discontinued, catheter intact  Gauze applied to site  Discharge instructions reviewed with patient verbally  Copy of discharge instructions given to patient  Patient verbalized understanding of all discharge instructions  Patient discharged without incident

## 2021-02-15 NOTE — PROGRESS NOTES
Pt provided with EUA form, no questions or concerns at this time  Pt provided verbal consent for tx today  Pts vitals stable, pt is hypertensive at this time, non symptomatic and states he took his bp medications prior to coming and feels fine and typically runs higher in the am  rn will monitor  Periphal iv inserted, tolerated well  NSS hanging at this time  Pt educated on s/s of reaction  Pharmacy called at this time for medication  Will continue to monitor

## 2021-02-15 NOTE — PLAN OF CARE
Problem: PAIN - ADULT  Goal: Verbalizes/displays adequate comfort level or baseline comfort level  Description: Interventions:  - Encourage patient to monitor pain and request assistance  - Assess pain using appropriate pain scale  - Administer analgesics based on type and severity of pain and evaluate response  - Implement non-pharmacological measures as appropriate and evaluate response  - Consider cultural and social influences on pain and pain management  - Notify physician/advanced practitioner if interventions unsuccessful or patient reports new pain  Outcome: Progressing     Problem: INFECTION - ADULT  Goal: Absence or prevention of progression during hospitalization  Description: INTERVENTIONS:  - Assess and monitor for signs and symptoms of infection  - Monitor lab/diagnostic results  - Monitor all insertion sites, i e  indwelling lines, tubes, and drains  - Monitor endotracheal if appropriate and nasal secretions for changes in amount and color  - [place] appropriate cooling/warming therapies per order  - Administer medications as ordered  - Instruct and encourage patient and family to use good hand hygiene technique  - Identify and instruct in appropriate isolation precautions for identified infection/condition  Outcome: Progressing  Goal: Absence of fever/infection during neutropenic period  Description: INTERVENTIONS:  - Monitor WBC    Outcome: Progressing     Problem: SAFETY ADULT  Goal: Patient will remain free of falls  Description: INTERVENTIONS:  - Assess patient frequently for physical needs  -  Identify cognitive and physical deficits and behaviors that affect risk of falls    -  [place] fall precautions as indicated by assessment   - Educate patient/family on patient safety including physical limitations  - Instruct patient to call for assistance with activity based on assessment  - Modify environment to reduce risk of injury  - Consider OT/PT consult to assist with strengthening/mobility  Outcome: Progressing  Goal: Maintain or return to baseline ADL function  Description: INTERVENTIONS:  -  Assess patient's ability to carry out ADLs; assess patient's baseline for ADL function and identify physical deficits which impact ability to perform ADLs (bathing, care of mouth/teeth, toileting, grooming, dressing, etc )  - Assess/evaluate cause of self-care deficits   - Assess range of motion  - Assess patient's mobility; develop plan if impaired  - Assess patient's need for assistive devices and provide as appropriate  - Encourage maximum independence but intervene and supervise when necessary  - Involve family in performance of ADLs  - Assess for home care needs following discharge   - Consider OT consult to assist with ADL evaluation and planning for discharge  - Provide patient education as appropriate  Outcome: Progressing  Goal: Maintain or return mobility status to optimal level  Description: INTERVENTIONS:  - Assess patient's baseline mobility status (ambulation, transfers, stairs, etc )    - Identify cognitive and physical deficits and behaviors that affect mobility  - Identify mobility aids required to assist with transfers and/or ambulation (gait belt, sit-to-stand, lift, walker, cane, etc )  - [place] fall precautions as indicated by assessment  - Record patient progress and toleration of activity level on Mobility SBAR; progress patient to next Phase/Stage  - Instruct patient to call for assistance with activity based on assessment  - Consider rehabilitation consult to assist with strengthening/weightbearing, etc   Outcome: Progressing     Problem: DISCHARGE PLANNING  Goal: Discharge to home or other facility with appropriate resources  Description: INTERVENTIONS:  - Identify barriers to discharge w/patient and caregiver  - Arrange for needed discharge resources and transportation as appropriate  - Identify discharge learning needs (meds, wound care, etc )  - Arrange for interpretive services to assist at discharge as needed  - Refer to Case Management Department for coordinating discharge planning if the patient needs post-hospital services based on physician/advanced practitioner order or complex needs related to functional status, cognitive ability, or social support system  Outcome: Progressing     Problem: Knowledge Deficit  Goal: Patient/family/caregiver demonstrates understanding of disease process, treatment plan, medications, and discharge instructions  Description: Complete learning assessment and assess knowledge base    Interventions:  - Provide teaching at level of understanding  - Provide teaching via preferred learning methods  Outcome: Progressing

## 2021-02-15 NOTE — PROGRESS NOTES
Tiger text sent to provider discussing pt bp, per provider as long as he's not symptomatic he can montior his bp at home and call the office if it continues on the higher end   rn educated pt on reasons to go to the er

## 2021-02-19 ENCOUNTER — TELEMEDICINE (OUTPATIENT)
Dept: FAMILY MEDICINE CLINIC | Facility: CLINIC | Age: 46
End: 2021-02-19
Payer: COMMERCIAL

## 2021-02-19 DIAGNOSIS — U07.1 COVID-19: Primary | ICD-10-CM

## 2021-02-19 PROCEDURE — 99441 PR PHYS/QHP TELEPHONE EVALUATION 5-10 MIN: CPT | Performed by: FAMILY MEDICINE

## 2021-02-19 NOTE — PROGRESS NOTES
COVID-19 Virtual Visit     Assessment/Plan:    Problem List Items Addressed This Visit        Other    COVID-19 - Primary         Disposition:     I recommended continued isolation until at least 24 hours have passed since recovery defined as resolution of fever without the use of fever-reducing medications AND improvement in COVID symptoms AND 10 days have passed since onset of symptoms (or 10 days have passed since date of first positive viral diagnostic test for asymptomatic patients)  I have spent 10 minutes directly with the patient  Greater than 50% of this time was spent in counseling/coordination of care regarding: risks and benefits of treatment options, instructions for management and patient and family education  Encounter provider Deanna Meraz MD    Provider located at [address] [place] [id number]    Recent Visits  No visits were found meeting these conditions  Showing recent visits within past 7 days and meeting all other requirements     Today's Visits  Date Type Provider Dept   02/19/21 Telemedicine Deanna Meraz MD [address] [facility] today's visits and meeting all other requirements     Future Appointments  No visits were found meeting these conditions  Showing future appointments within next 150 days and meeting all other requirements        Patient agrees to participate in a virtual check in via telephone or video visit instead of presenting to the office to address urgent/immediate medical needs  Patient is aware this is a billable service  After connecting through Telephone, the patient was identified by name and date of birth  Forest Dunn was informed that this was a telemedicine visit and that the exam was being conducted confidentially over secure lines  My office door was closed  No one else was in the room   Forest Dunn acknowledged consent and understanding of privacy and security of the telemedicine visit  I informed the patient that I have reviewed his record in Epic and presented the opportunity for him to ask any questions regarding the visit today  The patient agreed to participate  It was my intent to perform this visit via video technology but the patient was not able to do a video connection so the visit was completed via audio telephone only  Subjective:   Ilan El is a 41 y o  male who has been screened for COVID-19  Symptom change since last report: improving  Patient's symptoms include fatigue and headache  Eliu Don has been staying home and has isolated themselves in his home  He is taking care to not share personal items and is cleaning all surfaces that are touched often, like counters, tabletops, and doorknobs using household cleaning sprays or wipes  He is wearing a mask when he leaves his room       Date of positive COVID-19 PCR: 2/11/2021    Monoclonal Antibody Follow-up Symptom Questionnaire  I feel overall: much better  My breathing is: much better  My fever is: better  My fatigue is: somewhat better    Lab Results   Component Value Date    SARSCOV2 Positive (A) 02/11/2021    SARSCOV2 Not Detected 10/12/2020     Past Medical History:   Diagnosis Date    Diabetes mellitus ([facility] [address] )     Hypertension      Past Surgical History:   Procedure Laterality Date    CATARACT EXTRACTION       Current Outpatient Medications   Medication Sig Dispense Refill    atenolol (TENORMIN) 100 mg tablet Take 1 tablet (100 mg total) by mouth daily 30 tablet 0    baclofen 10 mg tablet Take 10 mg by mouth every 8 (eight) hours      cyclobenzaprine (FLEXERIL) 5 mg tablet Take 1 tablet (5 mg total) by mouth 3 (three) times a day as needed for muscle spasms 30 tablet 0    Dulaglutide 0 75 MG/0 5ML SOPN Inject 0 5 mL (0 75 mg total) under the skin once a week 4 pen 2    insulin glargine (Basaglar KwikPen) 100 units/mL injection pen Inject 55 Units under the skin daily at bedtime 5 pen 2    Insulin Pen Needle (PEN NEEDLES) 31G X 6 MM MISC Inject under the skin 3 (three) times a day 300 each 1    lisinopril-hydrochlorothiazide (PRINZIDE,ZESTORETIC) 10-12 5 MG per tablet Take 1 tablet by mouth daily 30 tablet 0    metFORMIN (GLUCOPHAGE-XR) 500 mg 24 hr tablet Take 1 tablet (500 mg total) by mouth 2 (two) times a day with meals 180 tablet 1    naproxen (NAPROSYN) 500 mg tablet Take 1 tablet (500 mg total) by mouth 2 (two) times a day with meals 60 tablet 0    nystatin (MYCOSTATIN) cream Apply topically 2 (two) times a day 30 g 2    triamcinolone (KENALOG) 0 1 % cream Apply topically 2 (two) times a day 80 g 2    benzonatate (TESSALON) 200 MG capsule Take 1 capsule (200 mg total) by mouth 3 (three) times a day as needed for cough (Patient not taking: Reported on 2/11/2021) 20 capsule 0     No current facility-administered medications for this visit  Allergies   Allergen Reactions    Penicillins        Review of Systems   Constitutional: Positive for fatigue  HENT: Negative  Eyes: Negative  Respiratory: Negative  Cardiovascular: Negative  Gastrointestinal: Negative  Endocrine: Negative  Genitourinary: Negative  Musculoskeletal: Negative  Allergic/Immunologic: Negative  Neurological: Positive for headaches  Hematological: Negative  Psychiatric/Behavioral: Negative  All other systems reviewed and are negative  Objective:    Vitals:       Physical Exam  VIRTUAL VISIT DISCLAIMER    Kaleb Dyson acknowledges that he has consented to an online visit or consultation  He understands that the online visit is based solely on information provided by him, and that, in the absence of a face-to-face physical evaluation by the physician, the diagnosis he receives is both limited and provisional in terms of accuracy and completeness  This is not intended to replace a full medical face-to-face evaluation by the physician   Raimundo Mcdaniel Donal Sims understands and accepts these terms

## 2021-02-23 ENCOUNTER — TELEMEDICINE (OUTPATIENT)
Dept: FAMILY MEDICINE CLINIC | Facility: CLINIC | Age: 46
End: 2021-02-23
Payer: COMMERCIAL

## 2021-02-23 DIAGNOSIS — U07.1 COVID-19: Primary | ICD-10-CM

## 2021-02-23 PROCEDURE — 99213 OFFICE O/P EST LOW 20 MIN: CPT | Performed by: FAMILY MEDICINE

## 2021-02-23 NOTE — LETTER
February 23, 2021     Patient: Mykel Lux   YOB: 1975   Date of Visit: 2/23/2021       To Whom it May Concern:    Johny Heck is under my professional care  He was seen in my office on 2/23/2021  He may return to work on 2/28/2021  If you have any questions or concerns, please don't hesitate to call           Sincerely,          Marilu Patton MD        CC: No Recipients

## 2021-02-23 NOTE — PROGRESS NOTES
COVID-19 Virtual Visit     Assessment/Plan:    Problem List Items Addressed This Visit        Other    COVID-19 - Primary         Disposition:     I recommended continued isolation until at least 24 hours have passed since recovery defined as resolution of fever without the use of fever-reducing medications AND improvement in COVID symptoms AND 10 days have passed since onset of symptoms (or 10 days have passed since date of first positive viral diagnostic test for asymptomatic patients)  I have spent 10 minutes directly with the patient  Greater than 50% of this time was spent in counseling/coordination of care regarding: risks and benefits of treatment options, instructions for management and patient and family education  Encounter provider Vitor Loco MD    Provider located at [address] , [address] [place] [address]  [phone]    Recent Visits  Date Type Provider Dept   02/19/21 Telemedicine Vitor Loco MD [facility] [address] [facility] recent visits within past 7 days and meeting all other requirements     Today's Visits  Date Type Provider Dept   02/23/21 Telemedicine Vitor Loco MD [facility]  [facility] [place]   Showing today's visits and meeting all other requirements     Future Appointments  No visits were found meeting these conditions  Showing future appointments within next 150 days and meeting all other requirements      This virtual check-in was done via [employer] and patient was informed that this is a secure, HIPAA-compliant platform  He agrees to proceed  Patient agrees to participate in a virtual check in via telephone or video visit instead of presenting to the office to address urgent/immediate medical needs  Patient is aware this is a billable service  After connecting through [facility], the patient was identified by name and date of birth   Michelle elliott informed that this was a telemedicine visit and that the exam was being conducted confidentially over secure lines  My office door was closed  No one else was in the room  Radha Casper acknowledged consent and understanding of privacy and security of the telemedicine visit  I informed the patient that I have reviewed his record in Epic and presented the opportunity for him to ask any questions regarding the visit today  The patient agreed to participate  Subjective:   Radha Casper is a 41 y o  male who has been screened for COVID-19  Symptom change since last report: resolving  Patient is currently asymptomatic  Patient denies fever, chills, fatigue, malaise, congestion, rhinorrhea, sore throat, anosmia, loss of taste, cough, shortness of breath, chest tightness, abdominal pain, nausea, vomiting, diarrhea, myalgias and headaches  Chip Ascencio has been staying home and has isolated themselves in his home  He is taking care to not share personal items and is cleaning all surfaces that are touched often, like counters, tabletops, and doorknobs using household cleaning sprays or wipes  He is wearing a mask when he leaves his room       Date of positive COVID-19 PCR: 2/11/2021    Lab Results   Component Value Date    SARSCOV2 Positive (A) 02/11/2021    SARSCOV2 Not Detected 10/12/2020     Past Medical History:   Diagnosis Date    Diabetes mellitus ([address] )     Hypertension      Past Surgical History:   Procedure Laterality Date    CATARACT EXTRACTION       Current Outpatient Medications   Medication Sig Dispense Refill    atenolol (TENORMIN) 100 mg tablet Take 1 tablet (100 mg total) by mouth daily 30 tablet 0    baclofen 10 mg tablet Take 10 mg by mouth every 8 (eight) hours      cyclobenzaprine (FLEXERIL) 5 mg tablet Take 1 tablet (5 mg total) by mouth 3 (three) times a day as needed for muscle spasms 30 tablet 0    Dulaglutide 0 75 MG/0 5ML SOPN Inject 0 5 mL (0 75 mg total) under the skin once a week 4 pen 2    insulin glargine (Basaglar KwikPen) 100 units/mL injection pen Inject 55 Units under the skin daily at bedtime 5 pen 2    Insulin Pen Needle (PEN NEEDLES) 31G X 6 MM MISC Inject under the skin 3 (three) times a day 300 each 1    lisinopril-hydrochlorothiazide (PRINZIDE,ZESTORETIC) 10-12 5 MG per tablet Take 1 tablet by mouth daily 30 tablet 0    metFORMIN (GLUCOPHAGE-XR) 500 mg 24 hr tablet Take 1 tablet (500 mg total) by mouth 2 (two) times a day with meals 180 tablet 1    naproxen (NAPROSYN) 500 mg tablet Take 1 tablet (500 mg total) by mouth 2 (two) times a day with meals 60 tablet 0    nystatin (MYCOSTATIN) cream Apply topically 2 (two) times a day 30 g 2    triamcinolone (KENALOG) 0 1 % cream Apply topically 2 (two) times a day 80 g 2    benzonatate (TESSALON) 200 MG capsule Take 1 capsule (200 mg total) by mouth 3 (three) times a day as needed for cough (Patient not taking: Reported on 2/11/2021) 20 capsule 0     No current facility-administered medications for this visit  Allergies   Allergen Reactions    Penicillins        Review of Systems   Constitutional: Negative  Negative for chills, fatigue and fever  HENT: Negative  Negative for congestion, rhinorrhea and sore throat  Eyes: Negative  Respiratory: Negative  Negative for cough, chest tightness and shortness of breath  Cardiovascular: Negative  Gastrointestinal: Negative  Negative for abdominal pain, diarrhea, nausea and vomiting  Endocrine: Negative  Genitourinary: Negative  Musculoskeletal: Negative  Negative for myalgias  Allergic/Immunologic: Negative  Neurological: Negative  Negative for headaches  Hematological: Negative  Psychiatric/Behavioral: Negative  All other systems reviewed and are negative  Objective: There were no vitals filed for this visit  Physical Exam  Vitals signs reviewed  Constitutional:       General: He is not in acute distress       Appearance: He is well-developed  He is not diaphoretic  HENT:      Head: Normocephalic and atraumatic  Right Ear: External ear normal       Left Ear: External ear normal       Nose: Nose normal    Eyes:      General: No scleral icterus  Right eye: No discharge  Left eye: No discharge  Conjunctiva/sclera: Conjunctivae normal       Pupils: Pupils are equal, round, and reactive to light  Neck:      Musculoskeletal: Normal range of motion and neck supple  Thyroid: No thyromegaly  Trachea: No tracheal deviation  Cardiovascular:      Heart sounds: No murmur  No friction rub  Pulmonary:      Effort: Pulmonary effort is normal  No respiratory distress  Abdominal:      General: There is no distension  Musculoskeletal: Normal range of motion  Lymphadenopathy:      Cervical: No cervical adenopathy  Skin:     General: Skin is warm  Neurological:      Mental Status: He is alert and oriented to person, place, and time  Cranial Nerves: No cranial nerve deficit  Psychiatric:         Behavior: Behavior normal          Thought Content: Thought content normal          Judgment: Judgment normal        VIRTUAL VISIT DISCLAIMER    Radha Casper acknowledges that he has consented to an online visit or consultation  He understands that the online visit is based solely on information provided by him, and that, in the absence of a face-to-face physical evaluation by the physician, the diagnosis he receives is both limited and provisional in terms of accuracy and completeness  This is not intended to replace a full medical face-to-face evaluation by the physician  Radha Casper understands and accepts these terms

## 2021-03-10 DIAGNOSIS — Z23 ENCOUNTER FOR IMMUNIZATION: ICD-10-CM

## 2021-03-15 ENCOUNTER — HOSPITAL ENCOUNTER (OUTPATIENT)
Dept: NON INVASIVE DIAGNOSTICS | Facility: HOSPITAL | Age: 46
Discharge: HOME | End: 2021-03-15
Payer: COMMERCIAL

## 2021-03-15 ENCOUNTER — OFFICE VISIT (OUTPATIENT)
Dept: FAMILY MEDICINE CLINIC | Facility: CLINIC | Age: 46
End: 2021-03-15
Payer: COMMERCIAL

## 2021-03-15 VITALS
BODY MASS INDEX: 47.74 KG/M2 | SYSTOLIC BLOOD PRESSURE: 130 MMHG | HEIGHT: 68 IN | TEMPERATURE: 97.7 F | RESPIRATION RATE: 12 BRPM | HEART RATE: 84 BPM | DIASTOLIC BLOOD PRESSURE: 90 MMHG | WEIGHT: 315 LBS | OXYGEN SATURATION: 97 %

## 2021-03-15 DIAGNOSIS — R60.9 EDEMA, UNSPECIFIED TYPE: ICD-10-CM

## 2021-03-15 DIAGNOSIS — E13.9 DIABETES MELLITUS OF OTHER TYPE WITHOUT COMPLICATION, UNSPECIFIED WHETHER LONG TERM INSULIN USE (HCC): Primary | ICD-10-CM

## 2021-03-15 PROCEDURE — 99214 OFFICE O/P EST MOD 30 MIN: CPT | Performed by: INTERNAL MEDICINE

## 2021-03-15 PROCEDURE — 3008F BODY MASS INDEX DOCD: CPT | Performed by: INTERNAL MEDICINE

## 2021-03-15 PROCEDURE — 93971 EXTREMITY STUDY: CPT

## 2021-03-15 PROCEDURE — 1036F TOBACCO NON-USER: CPT | Performed by: INTERNAL MEDICINE

## 2021-03-15 NOTE — PROGRESS NOTES
Assessment/Plan:         Diagnoses and all orders for this visit:    Diabetes mellitus of other type without complication, unspecified whether long term insulin use ([facility] )  -     Lipid Panel with Direct LDL reflex  -     Hemoglobin A1C    Edema, unspecified type  -     NT-BNP PRO; Future  -     Microalbumin / creatinine urine ratio  -     CBC and differential  -     Comprehensive metabolic panel  -     TSH, 3rd generation with Free T4 reflex  -     Echo complete with contrast if indicated; Future  -     XR chest pa & lateral; Future  -     VAS lower limb venous duplex study, unilateral/limited; Future    differential diagnosis discussed with patient  Lab studies chest x-ray echocardiogram urine for microalbumin metabolic panel will be ordered I would also order venous Doppler for because of induration in the right leg  Encouraged the patient to keep his legs elevated  He may continue with compression stockings in the interim    Subjective:      Patient ID: Luciano Olmstead is a 41 y o  male  HPI  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 complaining of lower extremity edema this been going on for about 5-6 months  He reports his blood sugars have been running high around 170  He does not report any calf tenderness  Denies any urinary frequency urgency frothiness  Denies any chest pain or resting shortness of breath  He does not sleep in his bed which would also be contributing to his lower extremity edema  He is due for lab studies    I encouraged him to discuss with Dr Dimitri Isaac about possibility of getting a sleep test        The following portions of the patient's history were reviewed and updated as appropriate: allergies, current medications, past family history, past medical history, past social history, past surgical history and problem list     Review of Systems      Objective:      /90 (BP Location: Left arm, Patient Position: Sitting, Cuff Size: Adult)   Pulse 84   Temp 97 7 °F (36 5 °C) Resp 12   Ht 5' 8" (1 727 m)   Wt (!) 157 kg (345 lb 9 6 oz)   SpO2 97%   BMI 52 55 kg/m²          Physical Exam  Constitutional:       Comments: High BMI noted   Neck:      Comments: Negative JVD  Cardiovascular:      Rate and Rhythm: Normal rate and regular rhythm  Heart sounds: Normal heart sounds  No murmur  Pulmonary:      Effort: Pulmonary effort is normal  No respiratory distress  Breath sounds: Normal breath sounds  No wheezing or rales  Abdominal:      Tenderness: There is no abdominal tenderness  Comments: Abdomen obese but nontender   Musculoskeletal:      Right lower leg: Edema (Mild right lower extremity tenderness right lower anterior shin) present  Left lower leg: Edema present  Neurological:      Mental Status: He is alert

## 2021-03-15 NOTE — LETTER
March 15, 2021     Patient: Breanna Cazares   YOB: 1975   Date of Visit: 3/15/2021       To Whom it May Concern:    Humairajamalpearl Christen is under my professional care  He was seen in my office on 3/15/2021  He may return to work on 03/16/2021  If you have any questions or concerns, please don't hesitate to call           Sincerely,          Yana Orozco MD

## 2021-03-16 PROCEDURE — 93971 EXTREMITY STUDY: CPT | Performed by: SURGERY

## 2021-03-29 ENCOUNTER — TELEPHONE (OUTPATIENT)
Dept: FAMILY MEDICINE CLINIC | Facility: CLINIC | Age: 46
End: 2021-03-29

## 2021-03-29 NOTE — TELEPHONE ENCOUNTER
Pt called at 3:35 today wanting an appt  We had nothing but I asked him what he needed one for and he said SOB and chest pressure    I advised him that he absolutely needed to seek hellp at the ED

## 2021-04-06 ENCOUNTER — OFFICE VISIT (OUTPATIENT)
Dept: FAMILY MEDICINE CLINIC | Facility: CLINIC | Age: 46
End: 2021-04-06
Payer: COMMERCIAL

## 2021-04-06 VITALS
DIASTOLIC BLOOD PRESSURE: 72 MMHG | HEART RATE: 82 BPM | BODY MASS INDEX: 46.65 KG/M2 | SYSTOLIC BLOOD PRESSURE: 124 MMHG | WEIGHT: 315 LBS | HEIGHT: 69 IN | TEMPERATURE: 97.6 F | OXYGEN SATURATION: 98 %

## 2021-04-06 DIAGNOSIS — I10 ESSENTIAL HYPERTENSION: ICD-10-CM

## 2021-04-06 DIAGNOSIS — E11.65 UNCONTROLLED TYPE 2 DIABETES MELLITUS WITH HYPERGLYCEMIA (HCC): Primary | ICD-10-CM

## 2021-04-06 DIAGNOSIS — I10 BENIGN ESSENTIAL HYPERTENSION: ICD-10-CM

## 2021-04-06 DIAGNOSIS — E66.01 MORBID OBESITY (HCC): ICD-10-CM

## 2021-04-06 DIAGNOSIS — I50.9 ACUTE CONGESTIVE HEART FAILURE, UNSPECIFIED HEART FAILURE TYPE (HCC): ICD-10-CM

## 2021-04-06 PROBLEM — R60.1 ANASARCA: Status: ACTIVE | Noted: 2021-03-29

## 2021-04-06 PROBLEM — U07.1 LAB TEST POSITIVE FOR DETECTION OF COVID-19 VIRUS: Status: ACTIVE | Noted: 2021-03-30

## 2021-04-06 PROBLEM — R79.89 ELEVATED D-DIMER: Status: ACTIVE | Noted: 2021-03-30

## 2021-04-06 LAB — SL AMB POCT HEMOGLOBIN AIC: 8.8 (ref ?–6.5)

## 2021-04-06 PROCEDURE — 3074F SYST BP LT 130 MM HG: CPT | Performed by: FAMILY MEDICINE

## 2021-04-06 PROCEDURE — 3725F SCREEN DEPRESSION PERFORMED: CPT | Performed by: FAMILY MEDICINE

## 2021-04-06 PROCEDURE — 3052F HG A1C>EQUAL 8.0%<EQUAL 9.0%: CPT | Performed by: FAMILY MEDICINE

## 2021-04-06 PROCEDURE — 3008F BODY MASS INDEX DOCD: CPT | Performed by: FAMILY MEDICINE

## 2021-04-06 PROCEDURE — 83036 HEMOGLOBIN GLYCOSYLATED A1C: CPT | Performed by: FAMILY MEDICINE

## 2021-04-06 PROCEDURE — 3078F DIAST BP <80 MM HG: CPT | Performed by: FAMILY MEDICINE

## 2021-04-06 PROCEDURE — 99214 OFFICE O/P EST MOD 30 MIN: CPT | Performed by: FAMILY MEDICINE

## 2021-04-06 PROCEDURE — 1036F TOBACCO NON-USER: CPT | Performed by: FAMILY MEDICINE

## 2021-04-06 RX ORDER — SEMAGLUTIDE 1.34 MG/ML
0.25 INJECTION, SOLUTION SUBCUTANEOUS WEEKLY
Qty: 4 PEN | Refills: 2 | Status: SHIPPED | OUTPATIENT
Start: 2021-04-06 | End: 2022-01-13 | Stop reason: SDUPTHER

## 2021-04-06 RX ORDER — LISINOPRIL AND HYDROCHLOROTHIAZIDE 12.5; 1 MG/1; MG/1
1 TABLET ORAL DAILY
Qty: 90 TABLET | Refills: 1 | Status: SHIPPED | OUTPATIENT
Start: 2021-04-06 | End: 2022-01-13

## 2021-04-06 RX ORDER — ATENOLOL 100 MG/1
100 TABLET ORAL DAILY
Qty: 90 TABLET | Refills: 1 | Status: SHIPPED | OUTPATIENT
Start: 2021-04-06 | End: 2022-01-13 | Stop reason: SDUPTHER

## 2021-04-07 NOTE — PROGRESS NOTES
Assessment/Plan:     76-year-old male with: type 2 diabetes hypertension CHF and morbid obesity  Discussed workup and treatment options with risks and benefits will continue current medications will begin trial of GLP1 agonist discussed supportive care return parameters follow-up with Cardiology follow-up in 3 months    No problem-specific Assessment & Plan notes found for this encounter  Diagnoses and all orders for this visit:    Uncontrolled type 2 diabetes mellitus with hyperglycemia ([facility] )  -     Ambulatory referral to Ophthalmology; Future  -     POCT hemoglobin A1c  -     Semaglutide,0 25 or 0 5MG/DOS, (Ozempic, 0 25 or 0 5 MG/DOSE,) 2 MG/1 5ML SOPN; Inject 0 25 mg under the skin once a week    Essential hypertension  -     atenolol (TENORMIN) 100 mg tablet; Take 1 tablet (100 mg total) by mouth daily    Benign essential hypertension  -     lisinopril-hydrochlorothiazide (PRINZIDE,ZESTORETIC) 10-12 5 MG per tablet; Take 1 tablet by mouth daily    Acute congestive heart failure, unspecified heart failure type ([place] [address] )    Morbid obesity ([place] [address] )          Subjective:     Chief Complaint   Patient presents with    Physical Exam     due for yearly        Patient ID: Alesia Dwyer is a 41 y o  male  Patient is a 76-year-old male who presents for follow-up on type 2 diabetes hypertension CHF and morbid obesity patient admits being stable on medications he recently was in the hospital for acute volume overload he is feeling much better no fevers chills nausea vomiting tolerating p o  intake no chest pain shortness of breath at this time no other complaints at this time      The following portions of the patient's history were reviewed and updated as appropriate: allergies, current medications, past family history, past medical history, past social history, past surgical history and problem list     Review of Systems   Constitutional: Negative  Negative for chills and fever  HENT: Negative    Negative for ear pain and sore throat  Eyes: Negative  Negative for pain and visual disturbance  Respiratory: Negative  Negative for cough and shortness of breath  Cardiovascular: Negative  Negative for chest pain and palpitations  Gastrointestinal: Negative  Negative for abdominal pain and vomiting  Endocrine: Negative  Genitourinary: Negative  Negative for dysuria and hematuria  Musculoskeletal: Negative  Negative for arthralgias and back pain  Skin: Negative for color change and rash  Allergic/Immunologic: Negative  Neurological: Negative  Negative for seizures and syncope  Hematological: Negative  Psychiatric/Behavioral: Negative  All other systems reviewed and are negative  Objective:      /72   Pulse 82   Temp 97 6 °F (36 4 °C)   Ht 5' 9" (1 753 m)   Wt (!) 150 kg (330 lb)   SpO2 98%   BMI 48 73 kg/m²          Physical Exam  Constitutional:       Appearance: He is well-developed  HENT:      Head: Atraumatic  Right Ear: External ear normal       Left Ear: External ear normal    Eyes:      Conjunctiva/sclera: Conjunctivae normal       Pupils: Pupils are equal, round, and reactive to light  Neck:      Musculoskeletal: Normal range of motion  Cardiovascular:      Rate and Rhythm: Normal rate and regular rhythm  Pulses: no weak pulses          Dorsalis pedis pulses are 2+ on the right side and 2+ on the left side  Posterior tibial pulses are 2+ on the right side and 2+ on the left side  Heart sounds: Normal heart sounds  Pulmonary:      Effort: Pulmonary effort is normal  No respiratory distress  Breath sounds: Normal breath sounds  Abdominal:      General: There is no distension  Palpations: Abdomen is soft  Tenderness: There is no abdominal tenderness  There is no guarding or rebound  Musculoskeletal: Normal range of motion     Feet:      Right foot:      Skin integrity: No ulcer, skin breakdown, erythema, warmth, callus or dry skin  Left foot:      Skin integrity: No ulcer, skin breakdown, erythema, warmth, callus or dry skin  Skin:     General: Skin is warm and dry  Neurological:      Mental Status: He is alert and oriented to person, place, and time  Cranial Nerves: No cranial nerve deficit  Psychiatric:         Behavior: Behavior normal          Thought Content: Thought content normal          Judgment: Judgment normal            BMI Counseling: Body mass index is 48 73 kg/m²  The BMI is above normal  Nutrition recommendations include encouraging healthy choices of fruits and vegetables  Exercise recommendations include moderate physical activity 150 minutes/week  Patient's shoes and socks removed  Right Foot/Ankle   Right Foot Inspection  Skin Exam: skin normal and skin intact no dry skin, no warmth, no callus, no erythema, no maceration, no abnormal color, no pre-ulcer, no ulcer and no callus                          Toe Exam: ROM and strength within normal limits  Sensory       Monofilament testing: intact  Vascular    The right DP pulse is 2+  The right PT pulse is 2+  Left Foot/Ankle  Left Foot Inspection  Skin Exam: skin normal and skin intactno dry skin, no warmth, no erythema, no maceration, normal color, no pre-ulcer, no ulcer and no callus                         Toe Exam: ROM and strength within normal limits                   Sensory       Monofilament: intact  Vascular    The left DP pulse is 2+  The left PT pulse is 2+  Assign Risk Category:  No deformity present; No loss of protective sensation;  No weak pulses       Risk: 0

## 2021-05-10 ENCOUNTER — TELEPHONE (OUTPATIENT)
Dept: FAMILY MEDICINE CLINIC | Facility: CLINIC | Age: 46
End: 2021-05-10

## 2021-05-19 ENCOUNTER — TELEPHONE (OUTPATIENT)
Dept: FAMILY MEDICINE CLINIC | Facility: CLINIC | Age: 46
End: 2021-05-19

## 2021-05-19 NOTE — TELEPHONE ENCOUNTER
Incoming call True Brar [phone]  Received a call from [facility]  Patient is scheduled for a Echo but has just recently had one at [facility]  This test was about a month ago  Please confirm if patient is to get a second test or if this may have been missed being outside of network

## 2021-05-20 ENCOUNTER — TELEPHONE (OUTPATIENT)
Dept: FAMILY MEDICINE CLINIC | Facility: CLINIC | Age: 46
End: 2021-05-20

## 2021-05-20 NOTE — TELEPHONE ENCOUNTER
Spoke with Dr Aurelio Mosqueda and it was decided that we cancel echo as one was just done a month ago  I spoke with department and appointment was cancelled

## 2021-05-20 NOTE — TELEPHONE ENCOUNTER
Patients eye doctor called to let you know that patient no showed his appt with them, no records are available to be faxed to us

## 2021-08-03 ENCOUNTER — TELEMEDICINE (OUTPATIENT)
Dept: FAMILY MEDICINE CLINIC | Facility: CLINIC | Age: 46
End: 2021-08-03
Payer: COMMERCIAL

## 2021-08-03 DIAGNOSIS — R05.8 COUGH WITH EXPOSURE TO COVID-19 VIRUS: Primary | ICD-10-CM

## 2021-08-03 DIAGNOSIS — Z20.822 COUGH WITH EXPOSURE TO COVID-19 VIRUS: Primary | ICD-10-CM

## 2021-08-03 PROCEDURE — U0003 INFECTIOUS AGENT DETECTION BY NUCLEIC ACID (DNA OR RNA); SEVERE ACUTE RESPIRATORY SYNDROME CORONAVIRUS 2 (SARS-COV-2) (CORONAVIRUS DISEASE [COVID-19]), AMPLIFIED PROBE TECHNIQUE, MAKING USE OF HIGH THROUGHPUT TECHNOLOGIES AS DESCRIBED BY CMS-2020-01-R: HCPCS | Performed by: FAMILY MEDICINE

## 2021-08-03 PROCEDURE — 99441 PR PHYS/QHP TELEPHONE EVALUATION 5-10 MIN: CPT | Performed by: FAMILY MEDICINE

## 2021-08-03 PROCEDURE — U0005 INFEC AGEN DETEC AMPLI PROBE: HCPCS | Performed by: FAMILY MEDICINE

## 2021-08-03 RX ORDER — AZITHROMYCIN 250 MG/1
TABLET, FILM COATED ORAL
Qty: 6 TABLET | Refills: 0 | Status: SHIPPED | OUTPATIENT
Start: 2021-08-03 | End: 2021-08-08

## 2021-08-03 RX ORDER — FUROSEMIDE 20 MG/1
20 TABLET ORAL DAILY
COMMUNITY
Start: 2021-05-01 | End: 2022-01-24 | Stop reason: ALTCHOICE

## 2021-08-03 NOTE — PROGRESS NOTES
COVID-19 Outpatient Progress Note    Assessment/Plan:    Problem List Items Addressed This Visit     None      Visit Diagnoses     Cough with exposure to COVID-19 virus    -  Primary    Relevant Orders    Novel Coronavirus (Covid-19),PCR [facility] - Collected at   [facility] or [facility]         Disposition:     I referred patient to one of our centralized sites for a COVID-19 swab  I have spent 10 minutes directly with the patient  Greater than 50% of this time was spent in counseling/coordination of care regarding: risks and benefits of treatment options, instructions for management and patient and family education  Verification of patient location:    Patient is located in the following state in which I hold an active license [place]    Encounter provider Christa [place] MD    Provider located at [address]  [facility] [phone]    Recent Visits  No visits were found meeting these conditions  Showing recent visits within past 7 days and meeting all other requirements  Today's Visits  Date Type Provider Dept   08/03/21 Telemedicine Christa [place] MD [facility]   Showing today's visits and meeting all other requirements  Future Appointments  No visits were found meeting these conditions  Showing future appointments within next 150 days and meeting all other requirements       Patient agrees to participate in a virtual check in via telephone or video visit instead of presenting to the office to address urgent/immediate medical needs  Patient is aware this is a billable service  After connecting through Telephone, the patient was identified by name and date of birth  Keena Bautista was informed that this was a telemedicine visit and that the exam was being conducted confidentially over secure lines  My office door was closed  No one else was in the room   Keena Bautista acknowledged consent and understanding of privacy and security of the telemedicine visit  I informed the patient that I have reviewed his record in Epic and presented the opportunity for him to ask any questions regarding the visit today  The patient agreed to participate  It was my intent to perform this visit via video technology but the patient was not able to do a video connection so the visit was completed via audio telephone only  Subjective:   Puma Dale is a 41 y o  male who is concerned about COVID-19  Patient's symptoms include nasal congestion, sore throat and cough         Exposure:   Contact with a person who is under investigation (PUI) for or who is positive for COVID-19 within the last 14 days?: Yes    Hospitalized recently for fever and/or lower respiratory symptoms?: No      Currently a healthcare worker that is involved in direct patient care?: No      Works in a special setting where the risk of COVID-19 transmission may be high? (this may include long-term care, correctional and residential facilities; homeless shelters; assisted-living facilities and group homes ): No      Resident in a special setting where the risk of COVID-19 transmission may be high? (this may include long-term care, correctional and residential facilities; homeless shelters; assisted-living facilities and group homes ): No      Lab Results   Component Value Date    SARSCOV2 Positive (A) 02/11/2021    [facility] Not Detected 10/12/2020     Past Medical History:   Diagnosis Date    Diabetes mellitus ([address] )     Hypertension      Past Surgical History:   Procedure Laterality Date    CATARACT EXTRACTION       Current Outpatient Medications   Medication Sig Dispense Refill    atenolol (TENORMIN) 100 mg tablet Take 1 tablet (100 mg total) by mouth daily 90 tablet 1    furosemide (LASIX) 20 mg tablet Take 20 mg by mouth daily      insulin glargine (Basaglar KwikPen) 100 units/mL injection pen Inject 55 Units under the skin daily at bedtime 5 pen 2    Insulin Pen Needle (PEN NEEDLES) 31G X 6 MM MISC Inject under the skin 3 (three) times a day 300 each 1    lisinopril-hydrochlorothiazide (PRINZIDE,ZESTORETIC) 10-12 5 MG per tablet Take 1 tablet by mouth daily 90 tablet 1    metFORMIN (GLUCOPHAGE-XR) 500 mg 24 hr tablet Take 1 tablet (500 mg total) by mouth 2 (two) times a day with meals 180 tablet 1    naproxen (NAPROSYN) 500 mg tablet Take 1 tablet (500 mg total) by mouth 2 (two) times a day with meals 60 tablet 0    nystatin (MYCOSTATIN) cream Apply topically 2 (two) times a day 30 g 2    Semaglutide,0 25 or 0 5MG/DOS, (Ozempic, 0 25 or 0 5 MG/DOSE,) 2 MG/1 5ML SOPN Inject 0 25 mg under the skin once a week 4 pen 2    triamcinolone (KENALOG) 0 1 % cream Apply topically 2 (two) times a day 80 g 2    baclofen 10 mg tablet Take 10 mg by mouth every 8 (eight) hours (Patient not taking: Reported on 8/3/2021)      cyclobenzaprine (FLEXERIL) 5 mg tablet Take 1 tablet (5 mg total) by mouth 3 (three) times a day as needed for muscle spasms (Patient not taking: Reported on 8/3/2021) 30 tablet 0     No current facility-administered medications for this visit  Allergies   Allergen Reactions    Penicillins        Review of Systems   Constitutional: Negative  HENT: Positive for congestion, sinus pressure and sore throat  Eyes: Negative  Respiratory: Positive for cough  Cardiovascular: Negative  Gastrointestinal: Negative  Endocrine: Negative  Genitourinary: Negative  Musculoskeletal: Negative  Allergic/Immunologic: Negative  Neurological: Negative  Hematological: Negative  Psychiatric/Behavioral: Negative  All other systems reviewed and are negative  Objective: There were no vitals filed for this visit  Physical Exam    VIRTUAL VISIT DISCLAIMER    Britney Barone verbally agrees to participate in [employer]   Pt is aware that Virtual Care Services could be limited without vital signs or the ability to perform a full hands-on physical Sameerawaylon Pond understands he or the provider may request at any time to terminate the video visit and request the patient to seek care or treatment in person

## 2021-08-05 ENCOUNTER — TELEPHONE (OUTPATIENT)
Dept: FAMILY MEDICINE CLINIC | Facility: CLINIC | Age: 46
End: 2021-08-05

## 2021-08-05 NOTE — TELEPHONE ENCOUNTER
DR Eliud ELISE-----Pt needs his covid test faxed to work @ [phone]  Human resources     Pt wants a note to go back to work -was out on 8/3 thru 5th and wants to go back to work tomorrow  Dr Tsering Huggins , I wrote a note for this pt to go back to work tomorrow   He states he is feeling better

## 2021-08-05 NOTE — LETTER
August 5, 2021     Patient: Mando Contreras   YOB: 1975   Date of Visit: 8/3/2021       To Whom it May Concern:    Panda Avina is under my professional care  He was seen in my office on 8/3/2021  He was out of work from 8/3/2021 thru 8/5/2021  He may go back to work on Friday 8/6/2021  His covid test was negative  If you have any questions or concerns, please don't hesitate to call           Sincerely,          Andrea Joe MD        CC:

## 2021-10-21 ENCOUNTER — OFFICE VISIT (OUTPATIENT)
Dept: FAMILY MEDICINE CLINIC | Facility: CLINIC | Age: 46
End: 2021-10-21
Payer: COMMERCIAL

## 2021-10-21 VITALS
SYSTOLIC BLOOD PRESSURE: 120 MMHG | WEIGHT: 315 LBS | RESPIRATION RATE: 18 BRPM | DIASTOLIC BLOOD PRESSURE: 86 MMHG | OXYGEN SATURATION: 98 % | TEMPERATURE: 99.8 F | HEIGHT: 69 IN | BODY MASS INDEX: 46.65 KG/M2 | HEART RATE: 100 BPM

## 2021-10-21 DIAGNOSIS — I10 PRIMARY HYPERTENSION: ICD-10-CM

## 2021-10-21 DIAGNOSIS — E66.01 MORBID OBESITY (HCC): ICD-10-CM

## 2021-10-21 DIAGNOSIS — M54.50 ACUTE RIGHT-SIDED LOW BACK PAIN WITHOUT SCIATICA: ICD-10-CM

## 2021-10-21 DIAGNOSIS — R06.83 SNORING: ICD-10-CM

## 2021-10-21 DIAGNOSIS — E11.65 UNCONTROLLED TYPE 2 DIABETES MELLITUS WITH HYPERGLYCEMIA (HCC): Primary | ICD-10-CM

## 2021-10-21 LAB — SL AMB POCT HEMOGLOBIN AIC: 9.1 (ref ?–6.5)

## 2021-10-21 PROCEDURE — 1036F TOBACCO NON-USER: CPT | Performed by: PHYSICIAN ASSISTANT

## 2021-10-21 PROCEDURE — 83036 HEMOGLOBIN GLYCOSYLATED A1C: CPT | Performed by: PHYSICIAN ASSISTANT

## 2021-10-21 PROCEDURE — 3079F DIAST BP 80-89 MM HG: CPT | Performed by: PHYSICIAN ASSISTANT

## 2021-10-21 PROCEDURE — 3008F BODY MASS INDEX DOCD: CPT | Performed by: PHYSICIAN ASSISTANT

## 2021-10-21 PROCEDURE — 3046F HEMOGLOBIN A1C LEVEL >9.0%: CPT | Performed by: PHYSICIAN ASSISTANT

## 2021-10-21 PROCEDURE — 99214 OFFICE O/P EST MOD 30 MIN: CPT | Performed by: PHYSICIAN ASSISTANT

## 2021-10-21 PROCEDURE — 3074F SYST BP LT 130 MM HG: CPT | Performed by: PHYSICIAN ASSISTANT

## 2021-10-21 RX ORDER — IBUPROFEN 600 MG/1
600 TABLET ORAL EVERY 6 HOURS PRN
Qty: 30 TABLET | Refills: 0 | Status: SHIPPED | OUTPATIENT
Start: 2021-10-21 | End: 2022-01-24 | Stop reason: ALTCHOICE

## 2021-10-21 RX ORDER — BACLOFEN 10 MG/1
TABLET ORAL
Qty: 30 TABLET | Refills: 1 | Status: SHIPPED | OUTPATIENT
Start: 2021-10-21 | End: 2022-01-24 | Stop reason: ALTCHOICE

## 2021-11-18 ENCOUNTER — TELEMEDICINE (OUTPATIENT)
Dept: FAMILY MEDICINE CLINIC | Facility: CLINIC | Age: 46
End: 2021-11-18
Payer: COMMERCIAL

## 2021-11-18 DIAGNOSIS — R05.8 COUGH WITH EXPOSURE TO COVID-19 VIRUS: Primary | ICD-10-CM

## 2021-11-18 DIAGNOSIS — Z20.822 COUGH WITH EXPOSURE TO COVID-19 VIRUS: Primary | ICD-10-CM

## 2021-11-18 PROCEDURE — 99213 OFFICE O/P EST LOW 20 MIN: CPT | Performed by: FAMILY MEDICINE

## 2021-11-18 PROCEDURE — 3725F SCREEN DEPRESSION PERFORMED: CPT | Performed by: FAMILY MEDICINE

## 2021-11-18 PROCEDURE — 1036F TOBACCO NON-USER: CPT | Performed by: FAMILY MEDICINE

## 2021-11-18 PROCEDURE — U0003 INFECTIOUS AGENT DETECTION BY NUCLEIC ACID (DNA OR RNA); SEVERE ACUTE RESPIRATORY SYNDROME CORONAVIRUS 2 (SARS-COV-2) (CORONAVIRUS DISEASE [COVID-19]), AMPLIFIED PROBE TECHNIQUE, MAKING USE OF HIGH THROUGHPUT TECHNOLOGIES AS DESCRIBED BY CMS-2020-01-R: HCPCS | Performed by: FAMILY MEDICINE

## 2021-11-18 PROCEDURE — U0005 INFEC AGEN DETEC AMPLI PROBE: HCPCS | Performed by: FAMILY MEDICINE

## 2021-11-18 RX ORDER — AZITHROMYCIN 250 MG/1
TABLET, FILM COATED ORAL
Qty: 6 TABLET | Refills: 0 | Status: SHIPPED | OUTPATIENT
Start: 2021-11-18 | End: 2021-11-23

## 2022-01-04 ENCOUNTER — TELEMEDICINE (OUTPATIENT)
Dept: FAMILY MEDICINE CLINIC | Facility: CLINIC | Age: 47
End: 2022-01-04
Payer: COMMERCIAL

## 2022-01-04 DIAGNOSIS — R05.8 COUGH WITH EXPOSURE TO COVID-19 VIRUS: Primary | ICD-10-CM

## 2022-01-04 DIAGNOSIS — Z20.822 COUGH WITH EXPOSURE TO COVID-19 VIRUS: Primary | ICD-10-CM

## 2022-01-04 PROCEDURE — U0003 INFECTIOUS AGENT DETECTION BY NUCLEIC ACID (DNA OR RNA); SEVERE ACUTE RESPIRATORY SYNDROME CORONAVIRUS 2 (SARS-COV-2) (CORONAVIRUS DISEASE [COVID-19]), AMPLIFIED PROBE TECHNIQUE, MAKING USE OF HIGH THROUGHPUT TECHNOLOGIES AS DESCRIBED BY CMS-2020-01-R: HCPCS | Performed by: FAMILY MEDICINE

## 2022-01-04 PROCEDURE — 99213 OFFICE O/P EST LOW 20 MIN: CPT | Performed by: FAMILY MEDICINE

## 2022-01-04 PROCEDURE — U0005 INFEC AGEN DETEC AMPLI PROBE: HCPCS | Performed by: FAMILY MEDICINE

## 2022-01-04 NOTE — PROGRESS NOTES
COVID-19 Outpatient Progress Note    Assessment/Plan:    Problem List Items Addressed This Visit        Other    Cough with exposure to COVID-19 virus - Primary    Relevant Orders    COVID Only- Collected at Mobile [facility] or [facility]         Disposition:     Referred patient to centralized site to test for COVID-19  I have spent 10 minutes directly with the patient  Greater than 50% of this time was spent in counseling/coordination of care regarding: risks and benefits of treatment options, instructions for management and patient and family education  Encounter provider Cyndie Hopper MD    Provider located at [facility]  [address], [place] [address] [place] , [address]  [facility] [phone]    Recent Visits  No visits were found meeting these conditions  Showing recent visits within past 7 days and meeting all other requirements  Today's Visits  Date Type Provider Dept   01/04/22 Telemedicine Cyndie Hopper MD [facility]   Showing today's visits and meeting all other requirements  Future Appointments  No visits were found meeting these conditions  Showing future appointments within next 150 days and meeting all other requirements     This virtual check-in was done via telephone and he agrees to proceed  Patient agrees to participate in a virtual check in via telephone or video visit instead of presenting to the office to address urgent/immediate medical needs  Patient is aware this is a billable service  After connecting through Telephone, the patient was identified by name and date of birth  Kourtney Quinones was informed that this was a telemedicine visit and that the exam was being conducted confidentially over secure lines  My office door was closed  No one else was in the room  Kourtney Quinones acknowledged consent and understanding of privacy and security of the telemedicine visit   I informed the patient that I have reviewed his record in [address] [place] [address] and presented the opportunity for him to ask any questions regarding the visit today  The patient agreed to participate  It was my intent to perform this visit via video technology but the patient was not able to do a video connection so the visit was completed via audio telephone only  Verification of patient location:  Patient is located in the following state in which I hold an active license: [place]    Subjective:   Any Wu is a 40 y o  male who is concerned about COVID-19  Patient's symptoms include fever, chills, nasal congestion, cough and myalgias       COVID-19 vaccination status: Fully vaccinated (primary series)    Exposure:   Contact with a person who is under investigation (PUI) for or who is positive for COVID-19 within the last 14 days?: Yes    Hospitalized recently for fever and/or lower respiratory symptoms?: No      Currently a healthcare worker that is involved in direct patient care?: No      Works in a special setting where the risk of COVID-19 transmission may be high? (this may include long-term care, correctional and FPC facilities; homeless shelters; assisted-living facilities and group homes ): No      Resident in a special setting where the risk of COVID-19 transmission may be high? (this may include long-term care, correctional and FPC facilities; homeless shelters; assisted-living facilities and group homes ): No      Lab Results   Component Value Date    SARSCOV2 Negative 11/18/2021    Giovanny Never Not Detected 10/12/2020     Past Medical History:   Diagnosis Date    Diabetes mellitus ([facility] [address] )     Hyperlipemia     Hypertension      Past Surgical History:   Procedure Laterality Date    CATARACT EXTRACTION       Current Outpatient Medications   Medication Sig Dispense Refill    atenolol (TENORMIN) 100 mg tablet Take 1 tablet (100 mg total) by mouth daily 90 tablet 1    baclofen 10 mg tablet Take 1 tablet 3 times daily as needed for back pain 30 tablet 1    furosemide (LASIX) 20 mg tablet Take 20 mg by mouth daily      insulin glargine (Basaglar KwikPen) 100 units/mL injection pen Inject 55 Units under the skin daily at bedtime 5 pen 2    Insulin Pen Needle (PEN NEEDLES) 31G X 6 MM MISC Inject under the skin 3 (three) times a day 300 each 1    lisinopril-hydrochlorothiazide (PRINZIDE,ZESTORETIC) 10-12 5 MG per tablet Take 1 tablet by mouth daily 90 tablet 1    metFORMIN (GLUCOPHAGE-XR) 500 mg 24 hr tablet Take 1 tablet (500 mg total) by mouth 2 (two) times a day with meals 180 tablet 1    Semaglutide,0 25 or 0 5MG/DOS, (Ozempic, 0 25 or 0 5 MG/DOSE,) 2 MG/1 5ML SOPN Inject 0 25 mg under the skin once a week 4 pen 2    ibuprofen (MOTRIN) 600 mg tablet Take 1 tablet (600 mg total) by mouth every 6 (six) hours as needed for mild pain (Patient not taking: Reported on 11/18/2021 ) 30 tablet 0    naproxen (NAPROSYN) 500 mg tablet Take 1 tablet (500 mg total) by mouth 2 (two) times a day with meals (Patient not taking: Reported on 10/21/2021) 60 tablet 0    nystatin (MYCOSTATIN) cream Apply topically 2 (two) times a day (Patient not taking: Reported on 10/21/2021) 30 g 2    triamcinolone (KENALOG) 0 1 % cream Apply topically 2 (two) times a day (Patient not taking: Reported on 10/21/2021) 80 g 2     No current facility-administered medications for this visit  Allergies   Allergen Reactions    Penicillins        Review of Systems   Constitutional: Positive for chills and fever  HENT: Positive for congestion and sinus pressure  Eyes: Negative  Respiratory: Positive for cough  Cardiovascular: Negative  Gastrointestinal: Negative  Endocrine: Negative  Genitourinary: Negative  Musculoskeletal: Positive for arthralgias and myalgias  Allergic/Immunologic: Negative  Neurological: Negative  Hematological: Negative  Psychiatric/Behavioral: Negative      All other systems reviewed and are negative  Objective: There were no vitals filed for this visit  Physical Exam    VIRTUAL VISIT DISCLAIMER    Cesar Paz verbally agrees to participate in [employer]  Pt is aware that [employer] could be limited without vital signs or the ability to perform a full hands-on physical Pinkie Celeste understands he or the provider may request at any time to terminate the video visit and request the patient to seek care or treatment in person

## 2022-01-06 ENCOUNTER — TELEMEDICINE (OUTPATIENT)
Dept: FAMILY MEDICINE CLINIC | Facility: CLINIC | Age: 47
End: 2022-01-06
Payer: COMMERCIAL

## 2022-01-06 VITALS — TEMPERATURE: 96.5 F | BODY MASS INDEX: 49.87 KG/M2 | HEIGHT: 69 IN

## 2022-01-06 DIAGNOSIS — J06.9 ACUTE UPPER RESPIRATORY INFECTION: Primary | ICD-10-CM

## 2022-01-06 DIAGNOSIS — E11.65 UNCONTROLLED TYPE 2 DIABETES MELLITUS WITH HYPERGLYCEMIA (HCC): ICD-10-CM

## 2022-01-06 PROCEDURE — 99214 OFFICE O/P EST MOD 30 MIN: CPT | Performed by: PHYSICIAN ASSISTANT

## 2022-01-06 NOTE — PROGRESS NOTES
Virtual Regular Visit    Verification of patient location:    Patient is located in the following state in which I hold an active license [place]      Assessment/Plan:    Problem List Items Addressed This Visit        Endocrine    Uncontrolled type 2 diabetes mellitus ([address] )       Respiratory    Acute upper respiratory infection - Primary        4 days with upper respiratory symptoms and tested negative for COVID on 01/04/2022    -I did advise him that he should avoid any cough medicines at have sugar which can aggravate his diabetes  -he will continue to monitor his blood sugars  -continue with ibuprofen as needed as package directs   -continue with increase clear liquids   -advised to call with any increasing symptoms or go to the ER if needed  -I did provide him with a note to return to work on 01/10/2022   -the office will call him for a routine follow-up for his diabetes with Dr Suzanne OREILLY*[employer] software was used to dictate this note  It may contain errors with dictating incorrect words/spelling  Please contact provider directly for any questions  Reason for visit is   Chief Complaint   Patient presents with    Headache     1/3 and is covid negative     Generalized Body Aches    Chills    Nausea    Virtual Regular Visit        Encounter provider Alba Deshpande PA-C    Provider located at [address] [place] [phone]      Recent Visits  Date Type Provider Dept   01/04/22 Telemedicine Juan Call MD [facility]   Showing recent visits within past 7 days and meeting all other requirements  Today's Visits  Date Type Provider Dept   01/06/22 Telemedicine Alice Neville PA-C [facility]   Showing today's visits and meeting all other requirements  Future Appointments  No visits were found meeting these conditions    Showing future appointments within next 150 days and meeting all other requirements       The patient was identified by name and date of birth  Tarsha Wilde was informed that this is a telemedicine visit and that the visit is being conducted through [address] [facility] and patient was informed that this is a secure, HIPAA-compliant platform  He agrees to proceed     My office door was closed  No one else was in the room  He acknowledged consent and understanding of privacy and security of the video platform  The patient has agreed to participate and understands they can discontinue the visit at any time  Patient is aware this is a billable service  Subjective  Tarsha Wilde is a 40 y o  male virtual visit for upper respiratory symptoms   Patient presents today for upper respiratory symptoms that started 4 days ago on 01/03  He states that he did get tested for COVID on 01/04 and tested negative  He states he did have a fever in his last fever was yesterday at 100 2  He has been checking his temperature today any has not developed a fever  He states he does have a headache, body aches, chills and nausea  He does have an occasional productive cough with yellow-green mucus  He has been taking over-the-counter NyQuil or ibuprofen as needed which has been helping his symptoms  He denies any known sick contacts  He states that his blood sugars have been doing a lot better  He does not have an upcoming routine appointment with Dr Margarita Blanca    Headache   Associated symptoms include coughing and nausea  Pertinent negatives include no fever  Generalized Body Aches  Associated symptoms include congestion, headaches, coughing and nausea  Pertinent negatives include no fever or shortness of breath  Nausea  Associated symptoms include chills, congestion, coughing, headaches and nausea  Pertinent negatives include no fever          Past Medical History:   Diagnosis Date    Diabetes mellitus ([address] )     Hyperlipemia     Hypertension        Past Surgical History:   Procedure Laterality Date    CATARACT EXTRACTION         Current Outpatient Medications   Medication Sig Dispense Refill    atenolol (TENORMIN) 100 mg tablet Take 1 tablet (100 mg total) by mouth daily 90 tablet 1    baclofen 10 mg tablet Take 1 tablet 3 times daily as needed for back pain 30 tablet 1    furosemide (LASIX) 20 mg tablet Take 20 mg by mouth daily      ibuprofen (MOTRIN) 600 mg tablet Take 1 tablet (600 mg total) by mouth every 6 (six) hours as needed for mild pain (Patient not taking: Reported on 11/18/2021 ) 30 tablet 0    insulin glargine (Basaglar KwikPen) 100 units/mL injection pen Inject 55 Units under the skin daily at bedtime 5 pen 2    Insulin Pen Needle (PEN NEEDLES) 31G X 6 MM MISC Inject under the skin 3 (three) times a day 300 each 1    lisinopril-hydrochlorothiazide (PRINZIDE,ZESTORETIC) 10-12 5 MG per tablet Take 1 tablet by mouth daily 90 tablet 1    metFORMIN (GLUCOPHAGE-XR) 500 mg 24 hr tablet Take 1 tablet (500 mg total) by mouth 2 (two) times a day with meals 180 tablet 1    naproxen (NAPROSYN) 500 mg tablet Take 1 tablet (500 mg total) by mouth 2 (two) times a day with meals (Patient not taking: Reported on 10/21/2021) 60 tablet 0    nystatin (MYCOSTATIN) cream Apply topically 2 (two) times a day (Patient not taking: Reported on 10/21/2021) 30 g 2    Semaglutide,0 25 or 0 5MG/DOS, (Ozempic, 0 25 or 0 5 MG/DOSE,) 2 MG/1 5ML SOPN Inject 0 25 mg under the skin once a week 4 pen 2    triamcinolone (KENALOG) 0 1 % cream Apply topically 2 (two) times a day (Patient not taking: Reported on 10/21/2021) 80 g 2     No current facility-administered medications for this visit  Allergies   Allergen Reactions    Penicillins        Review of Systems   Constitutional: Positive for chills  Negative for fever  HENT: Positive for congestion  Respiratory: Positive for cough  Negative for shortness of breath  Gastrointestinal: Positive for nausea  Neurological: Positive for headaches  Video Exam    Vitals:    01/06/22 1412   Temp: (!) 96 5 °F (35 8 °C)   Height: 5' 9" (1 753 m)       Physical Exam  Constitutional:       General: He is not in acute distress  Appearance: Normal appearance  He is not ill-appearing, toxic-appearing or diaphoretic  HENT:      Head: Normocephalic and atraumatic  Pulmonary:      Effort: Pulmonary effort is normal  No respiratory distress (Patient is able to talk in full sentences without coughing or appearing short of breath)  Musculoskeletal:      Cervical back: Neck supple  Neurological:      General: No focal deficit present  Mental Status: He is alert  Psychiatric:         Mood and Affect: Mood normal          Behavior: Behavior normal          Thought Content: Thought content normal          Judgment: Judgment normal           I spent 7 minutes directly with the patient during this visit    VIRTUAL VISIT Trino [address]Yandy verbally agrees to participate in [facility]  Pt is aware that [facility] could be limited without vital signs or the ability to perform a full hands-on physical Colton Bakari understands he or the provider may request at any time to terminate the video visit and request the patient to seek care or treatment in person

## 2022-01-06 NOTE — LETTER
January 6, 2022     Patient: Parvin Osborne   YOB: 1975   Date of Visit: 1/6/2022       To Whom it May Concern:    Clyde Martino is under my professional care  He was seen in my office on 1/6/2022  He is able to return to work on 01/10/2022  If you have any questions or concerns, please don't hesitate to call           Sincerely,          Alice Neville PA-C        CC: No Recipients

## 2022-01-13 ENCOUNTER — OFFICE VISIT (OUTPATIENT)
Dept: FAMILY MEDICINE CLINIC | Facility: CLINIC | Age: 47
End: 2022-01-13
Payer: COMMERCIAL

## 2022-01-13 VITALS
SYSTOLIC BLOOD PRESSURE: 170 MMHG | BODY MASS INDEX: 46.65 KG/M2 | TEMPERATURE: 99.4 F | HEIGHT: 69 IN | HEART RATE: 106 BPM | DIASTOLIC BLOOD PRESSURE: 100 MMHG | WEIGHT: 315 LBS | OXYGEN SATURATION: 98 %

## 2022-01-13 DIAGNOSIS — I10 ESSENTIAL HYPERTENSION: ICD-10-CM

## 2022-01-13 DIAGNOSIS — E11.65 UNCONTROLLED TYPE 2 DIABETES MELLITUS WITH HYPERGLYCEMIA (HCC): Primary | ICD-10-CM

## 2022-01-13 DIAGNOSIS — E11.621 DIABETIC ULCER OF LEFT GREAT TOE (HCC): ICD-10-CM

## 2022-01-13 DIAGNOSIS — E13.9 DIABETES MELLITUS OF OTHER TYPE WITHOUT COMPLICATION, UNSPECIFIED WHETHER LONG TERM INSULIN USE (HCC): ICD-10-CM

## 2022-01-13 DIAGNOSIS — L97.529 DIABETIC ULCER OF LEFT GREAT TOE (HCC): ICD-10-CM

## 2022-01-13 LAB — SL AMB POCT HEMOGLOBIN AIC: 10.6 (ref ?–6.5)

## 2022-01-13 PROCEDURE — 83036 HEMOGLOBIN GLYCOSYLATED A1C: CPT | Performed by: FAMILY MEDICINE

## 2022-01-13 PROCEDURE — 3008F BODY MASS INDEX DOCD: CPT | Performed by: FAMILY MEDICINE

## 2022-01-13 PROCEDURE — 3046F HEMOGLOBIN A1C LEVEL >9.0%: CPT | Performed by: FAMILY MEDICINE

## 2022-01-13 PROCEDURE — 99213 OFFICE O/P EST LOW 20 MIN: CPT | Performed by: FAMILY MEDICINE

## 2022-01-13 PROCEDURE — 1036F TOBACCO NON-USER: CPT | Performed by: FAMILY MEDICINE

## 2022-01-13 PROCEDURE — 3725F SCREEN DEPRESSION PERFORMED: CPT | Performed by: FAMILY MEDICINE

## 2022-01-13 RX ORDER — SEMAGLUTIDE 1.34 MG/ML
0.5 INJECTION, SOLUTION SUBCUTANEOUS WEEKLY
Qty: 4 ML | Refills: 2 | Status: SHIPPED | OUTPATIENT
Start: 2022-01-13 | End: 2022-01-14 | Stop reason: SDUPTHER

## 2022-01-13 RX ORDER — INSULIN GLARGINE 100 [IU]/ML
55 INJECTION, SOLUTION SUBCUTANEOUS
Qty: 5 ML | Refills: 2 | Status: SHIPPED | OUTPATIENT
Start: 2022-01-13 | End: 2022-01-14 | Stop reason: SDUPTHER

## 2022-01-13 RX ORDER — HYDROCHLOROTHIAZIDE 25 MG/1
25 TABLET ORAL DAILY
Qty: 30 TABLET | Refills: 1 | Status: SHIPPED | OUTPATIENT
Start: 2022-01-13 | End: 2022-01-14 | Stop reason: SDUPTHER

## 2022-01-13 RX ORDER — CLINDAMYCIN HYDROCHLORIDE 300 MG/1
300 CAPSULE ORAL 3 TIMES DAILY
Qty: 21 CAPSULE | Refills: 0 | Status: SHIPPED | OUTPATIENT
Start: 2022-01-13 | End: 2022-01-14 | Stop reason: SDUPTHER

## 2022-01-13 RX ORDER — ATENOLOL 100 MG/1
100 TABLET ORAL DAILY
Qty: 90 TABLET | Refills: 1 | Status: SHIPPED | OUTPATIENT
Start: 2022-01-13 | End: 2022-01-14 | Stop reason: SDUPTHER

## 2022-01-14 DIAGNOSIS — M54.50 ACUTE RIGHT-SIDED LOW BACK PAIN WITHOUT SCIATICA: ICD-10-CM

## 2022-01-14 DIAGNOSIS — L97.529 DIABETIC ULCER OF LEFT GREAT TOE (HCC): ICD-10-CM

## 2022-01-14 DIAGNOSIS — E11.65 UNCONTROLLED TYPE 2 DIABETES MELLITUS WITH HYPERGLYCEMIA (HCC): ICD-10-CM

## 2022-01-14 DIAGNOSIS — I10 ESSENTIAL HYPERTENSION: ICD-10-CM

## 2022-01-14 DIAGNOSIS — E11.621 DIABETIC ULCER OF LEFT GREAT TOE (HCC): ICD-10-CM

## 2022-01-14 RX ORDER — SEMAGLUTIDE 1.34 MG/ML
0.5 INJECTION, SOLUTION SUBCUTANEOUS WEEKLY
Qty: 4 ML | Refills: 2 | Status: SHIPPED | OUTPATIENT
Start: 2022-01-14 | End: 2022-03-03

## 2022-01-14 RX ORDER — CLINDAMYCIN HYDROCHLORIDE 300 MG/1
300 CAPSULE ORAL 3 TIMES DAILY
Qty: 21 CAPSULE | Refills: 0 | Status: SHIPPED | OUTPATIENT
Start: 2022-01-14 | End: 2022-01-21

## 2022-01-14 RX ORDER — HYDROCHLOROTHIAZIDE 25 MG/1
25 TABLET ORAL DAILY
Qty: 30 TABLET | Refills: 1 | Status: SHIPPED | OUTPATIENT
Start: 2022-01-14 | End: 2022-03-08

## 2022-01-14 RX ORDER — ATENOLOL 100 MG/1
100 TABLET ORAL DAILY
Qty: 90 TABLET | Refills: 1 | Status: SHIPPED | OUTPATIENT
Start: 2022-01-14 | End: 2022-07-14

## 2022-01-17 NOTE — PROGRESS NOTES
Assessment/Plan:    61-year-old male with:  Type 2 diabetes and with diabetic ulcer and hypertension will refer to wound care continue medications discussed supportive care return parameters follow-up in 2 weeks    No problem-specific Assessment & Plan notes found for this encounter  Diagnoses and all orders for this visit:    Uncontrolled type 2 diabetes mellitus with hyperglycemia ([facility] )  -     Discontinue: Semaglutide,0 25 or 0 5MG/DOS, (Ozempic, 0 25 or 0 5 MG/DOSE,) 2 MG/1 5ML SOPN; Inject 0 5 mg under the skin once a week  -     POCT hemoglobin A1c    Diabetic ulcer of left great toe ([facility] )  -     Ambulatory Referral to Wound Care; Future  -     Discontinue: clindamycin (CLEOCIN) 300 MG capsule; Take 1 capsule (300 mg total) by mouth 3 (three) times a day for 7 days  -     XR foot 3+ vw left; Future    Essential hypertension  -     Discontinue: atenolol (TENORMIN) 100 mg tablet; Take 1 tablet (100 mg total) by mouth daily  -     Discontinue: hydrochlorothiazide (HYDRODIURIL) 25 mg tablet; Take 1 tablet (25 mg total) by mouth daily    Diabetes mellitus of other type without complication, unspecified whether long term insulin use (HCC)  -     Discontinue: insulin glargine (Basaglar KwikPen) 100 units/mL injection pen; Inject 55 Units under the skin daily at bedtime          Subjective:     Chief Complaint   Patient presents with    Well Check     annual     Foot Pain     left open sore on big toe     Fatigue        Patient ID: Zeyad Norton is a 40 y o  male  Patient is a 61-year-old male who presents for follow-up on type 2 diabetes and hypertension he noticed a wound on his toe no fevers chills nausea vomiting tolerating p o  intake    Fatigue  Associated symptoms include fatigue         The following portions of the patient's history were reviewed and updated as appropriate: allergies, current medications, past family history, past medical history, past social history, past surgical history and problem list     Review of Systems   Constitutional: Positive for fatigue  HENT: Negative  Eyes: Negative  Respiratory: Negative  Cardiovascular: Negative  Gastrointestinal: Negative  Endocrine: Negative  Genitourinary: Negative  Musculoskeletal: Negative  Allergic/Immunologic: Negative  Neurological: Negative  Hematological: Negative  Psychiatric/Behavioral: Negative  All other systems reviewed and are negative  Objective:      /100   Pulse (!) 106   Temp 99 4 °F (37 4 °C)   Ht 5' 9" (1 753 m)   Wt (!) 150 kg (330 lb)   SpO2 98%   BMI 48 73 kg/m²          Physical Exam  Constitutional:       Appearance: He is well-developed  HENT:      Head: Atraumatic  Right Ear: External ear normal       Left Ear: External ear normal    Eyes:      Conjunctiva/sclera: Conjunctivae normal       Pupils: Pupils are equal, round, and reactive to light  Cardiovascular:      Rate and Rhythm: Normal rate and regular rhythm  Pulses: no weak pulses          Dorsalis pedis pulses are 2+ on the right side and 2+ on the left side  Posterior tibial pulses are 2+ on the right side and 2+ on the left side  Heart sounds: Normal heart sounds  Pulmonary:      Effort: Pulmonary effort is normal  No respiratory distress  Breath sounds: Normal breath sounds  Abdominal:      General: Bowel sounds are normal  There is no distension  Palpations: Abdomen is soft  Tenderness: There is no abdominal tenderness  There is no guarding or rebound  Musculoskeletal:         General: Normal range of motion  Cervical back: Normal range of motion  Feet:      Right foot:      Skin integrity: No ulcer, skin breakdown, erythema, warmth, callus or dry skin  Left foot:      Skin integrity: Ulcer present  No skin breakdown, erythema, warmth, callus or dry skin  Skin:     General: Skin is warm and dry     Neurological:      Mental Status: He is alert and oriented to person, place, and time  Cranial Nerves: No cranial nerve deficit  Psychiatric:         Behavior: Behavior normal          Thought Content: Thought content normal          Judgment: Judgment normal          BMI Counseling: Body mass index is 48 73 kg/m²  The BMI is above normal  Nutrition recommendations include encouraging healthy choices of fruits and vegetables  Exercise recommendations include moderate physical activity 150 minutes/week  Rationale for BMI follow-up plan is due to patient being overweight or obese  Depression Screening and Follow-up Plan: Patient was screened for depression during today's encounter  They screened negative with a PHQ-2 score of 0  Patient's shoes and socks removed  Right Foot/Ankle   Right Foot Inspection  Skin Exam: skin normal and skin intact  No dry skin, no warmth, no callus, no erythema, no maceration, no abnormal color, no pre-ulcer, no ulcer and no callus  Toe Exam: ROM and strength within normal limits  Sensory   Monofilament testing: absent    Vascular  The right DP pulse is 2+  The right PT pulse is 2+  Left Foot/Ankle  Left Foot Inspection  Skin Exam: skin normal, skin intact and ulcer  No dry skin, no warmth, no erythema, no maceration, normal color, no pre-ulcer and no callus  Toe Exam: ROM and strength within normal limits  Sensory   Monofilament testing: absent    Vascular  The left DP pulse is 2+  The left PT pulse is 2+       Assign Risk Category  Deformity present  Loss of protective sensation  No weak pulses  Risk: 2

## 2022-01-19 ENCOUNTER — OFFICE VISIT (OUTPATIENT)
Dept: WOUND CARE | Facility: CLINIC | Age: 47
End: 2022-01-19
Payer: COMMERCIAL

## 2022-01-19 VITALS
TEMPERATURE: 100.5 F | SYSTOLIC BLOOD PRESSURE: 160 MMHG | DIASTOLIC BLOOD PRESSURE: 100 MMHG | HEART RATE: 88 BPM | RESPIRATION RATE: 16 BRPM

## 2022-01-19 DIAGNOSIS — L97.525 DIABETIC ULCER OF TOE OF LEFT FOOT ASSOCIATED WITH TYPE 2 DIABETES MELLITUS, WITH MUSCLE INVOLVEMENT WITHOUT EVIDENCE OF NECROSIS (HCC): Primary | ICD-10-CM

## 2022-01-19 DIAGNOSIS — R60.0 LOCALIZED EDEMA: ICD-10-CM

## 2022-01-19 DIAGNOSIS — E11.621 DIABETIC ULCER OF TOE OF LEFT FOOT ASSOCIATED WITH TYPE 2 DIABETES MELLITUS, WITH MUSCLE INVOLVEMENT WITHOUT EVIDENCE OF NECROSIS (HCC): Primary | ICD-10-CM

## 2022-01-19 DIAGNOSIS — E11.42 DIABETIC POLYNEUROPATHY ASSOCIATED WITH TYPE 2 DIABETES MELLITUS (HCC): ICD-10-CM

## 2022-01-19 DIAGNOSIS — L03.116 CELLULITIS OF LEFT FOOT: ICD-10-CM

## 2022-01-19 PROCEDURE — 99213 OFFICE O/P EST LOW 20 MIN: CPT | Performed by: PODIATRIST

## 2022-01-19 PROCEDURE — G0463 HOSPITAL OUTPT CLINIC VISIT: HCPCS | Performed by: PODIATRIST

## 2022-01-19 PROCEDURE — 11042 DBRDMT SUBQ TIS 1ST 20SQCM/<: CPT | Performed by: PODIATRIST

## 2022-01-19 RX ORDER — LIDOCAINE 40 MG/G
CREAM TOPICAL ONCE
Status: COMPLETED | OUTPATIENT
Start: 2022-01-19 | End: 2022-01-19

## 2022-01-19 RX ORDER — CIPROFLOXACIN 500 MG/1
500 TABLET, FILM COATED ORAL EVERY 12 HOURS SCHEDULED
Qty: 14 TABLET | Refills: 0 | Status: SHIPPED | OUTPATIENT
Start: 2022-01-19 | End: 2022-01-24 | Stop reason: ALTCHOICE

## 2022-01-19 RX ADMIN — LIDOCAINE: 40 CREAM TOPICAL at 13:30

## 2022-01-19 NOTE — PROGRESS NOTES
Patient ID: Cesar Paz is a 40 y o  male Date of Birth 1975     Chief Complaint  Chief Complaint   Patient presents with    New Patient Visit     Initial visit to wound center  Pt  here for wound on left great toe  Pt  is diabetic  States he has had the wound for approx  30 days  Pt  has been using bactin spray to cleanse wound and he is wrapping it with gauze  He did see his PCP who prescribed antibiotic and referred him to wound center  Allergies  Penicillins    Assessment:    No problem-specific Assessment & Plan notes found for this encounter  Diagnoses and all orders for this visit:    Diabetic ulcer of toe of left foot associated with type 2 diabetes mellitus, with muscle involvement without evidence of necrosis (HCC)  -     lidocaine (LMX) 4 % cream  -     Wound cleansing and dressings; Future  -     collagenase (SANTYL) ointment; Apply topically daily    Cellulitis of left foot  -     ciprofloxacin (CIPRO) 500 mg tablet; Take 1 tablet (500 mg total) by mouth every 12 (twelve) hours for 7 days    Diabetic polyneuropathy associated with type 2 diabetes mellitus ([facility] [address] )    Localized edema    Other orders  -     Debridement              Debridement   Universal Protocol:  Consent: Verbal consent obtained  Risks and benefits: risks, benefits and alternatives were discussed  Consent given by: patient  Time out: Immediately prior to procedure a "time out" was called to verify the correct patient, procedure, equipment, support staff and site/side marked as required    Timeout called at: 1/19/2022 1:30 PM   Patient identity confirmed: verbally with patient      Performed by: physician  Debridement type: surgical  Level of debridement: subcutaneous tissue  Pain control: lidocaine 4%  Post-debridement measurements  Length (cm): 1 6  Width (cm): 1 7  Depth (cm): 0 8  Percent debrided: 100%  Surface Area (cm^2): 2 72  Area debrided (cm^2): 2 72  Volume (cm^3): 2 18  Tissue and other material debrided: subcutaneous tissue  Devitalized tissue debrided: biofilm and fibrin  Instrument(s) utilized: blade  Bleeding: small  Hemostasis obtained with: pressure  Procedural pain (0-10): 0  Post-procedural pain: 0   Response to treatment: procedure was tolerated well          Plan:     - pt eval and managed    - Number and complexity of problems addressed: 1 or more chronic illness with exacerbation, progression as shown    - Amount/complexity of data reviewed and analyzed:    Category 1: prior patient notes were analyzed today before evaluating and managing patient  All PMH were discussed with pt today  - Risk of complications: moderate risk of morbidity from additional testing or treatment involved with this patient, which includes but not limited to: possible limb loss    - discussed anatomy, condition, treatment plan and options  They were instructed on proper foot care  This is total time spent today involving both face-to-face time and non face-to-face time  This time spent includes reviewing their past medical history, performing a medically appropriate examination and evaluation of the patient, counseling and educating the patient, ordering medication, documenting all findings in EMR, and independently interpreting results and communicating results with patietn and discussing their condition and treatment options, risks, and potential complications  I have discussed the findings of this examination with the patient  The discussion included a complete verbal explanation of the examination results, diagnosis and planned treatment(s)  A schedule for future care needs was explained  The patient has verbalized the understanding of these instructions at this time   If any questions should arise after returning home I have encouraged the patient to feel free to call the office     - Patient is aware that today's procedure may be a part of a staged or collection of procedures for definitive and/or alleviation of symptoms  - wound dressings per nurse orders  - Rx santyl sent for pt  pt instructed on it's use  - Rx cipro sent for pt  finish course of Clindamycin  - pt place in surgical shoe today  to be in this at all times  - pt to remain out of work until we see him next week  - urged patient that he needs to get xrays to r/o underlying osseous involvement  - I will see pt in wound care center on Monday, 1/24, and if wound is not improved or worsened, pt will be sent to the ED   - if wound worsens before we see him or signs of infection worsen, pt to go to ED immediately  - pt is at risk of toe loss or worse  pt understands this      follow up 1/24/2022    Wound 01/19/22 Toe (Comment  which one) Right (Active)   Wound Image Images linked 01/19/22 1325   Wound Description Pink;Slough 01/19/22 1304   Felicity-wound Assessment Swelling;Callus; Maceration 01/19/22 1304   Wound Length (cm) 1 5 cm 01/19/22 1304   Wound Width (cm) 1 7 cm 01/19/22 1304   Wound Depth (cm) 0 6 cm 01/19/22 1304   Wound Surface Area (cm^2) 2 55 cm^2 01/19/22 1304   Wound Volume (cm^3) 1 53 cm^3 01/19/22 1304   Calculated Wound Volume (cm^3) 1 53 cm^3 01/19/22 1304   Drainage Amount Moderate 01/19/22 1304   Drainage Description Serosanguineous 01/19/22 1304   Treatments Irrigation with NSS 01/19/22 1304   Dressing Changed New 01/19/22 1304   Patient Tolerance Tolerated well 01/19/22 1304   Dressing Status Removed 01/19/22 1304       Other Ulcers 09/26/18 Other (Comment) Inner;Right Necrotic Ulceration ,Erythema (Active)   Date First Assessed/Time First Assessed: 09/26/18 2334   Pre-Existing Wound: Yes  Location: (c) Other (Comment)  Orientation: Inner;Right  Wound Description (Comments): Necrotic Ulceration ,Erythema  Dressing Status: Open to air       Wound 01/19/22 Toe (Comment  which one) Right (Active)   Date First Assessed/Time First Assessed: 01/19/22 1303   Location: (c) Toe (Comment  which one)  Wound Location Orientation: Right       Subjective:      38 year old male presents today with L big toe wound  Pt states the wound has been present for about 1 month  Pt has been utilizing Bactrine topical anti-septic spray to the area  Pt did see his PCP on 1/13/2022, and was put on Clindamycin  Pt was urged to go get xrays, however, did not  Pt last HbA1c was 9 1 in Octoboer 2021  Pt diabetic for over 10 years  Pt has no feeling in his feet  Pt states are of wound was a scab that he picked off  States wound started after then  States there is redness and drainage to the area  No pain secondary to neuropathy  The following portions of the patient's history were reviewed and updated as appropriate: allergies, current medications, past family history, past medical history, past social history, past surgical history and problem list     Review of Systems   Constitutional: Negative for chills and fever  Gastrointestinal: Negative for diarrhea, nausea and vomiting  All other systems reviewed and are negative  Objective:       Wound 01/19/22 Toe (Comment  which one) Right (Active)   Wound Image Images linked 01/19/22 1325   Wound Description Pink;Slough 01/19/22 1304   Felicity-wound Assessment Swelling;Callus; Maceration 01/19/22 1304   Wound Length (cm) 1 5 cm 01/19/22 1304   Wound Width (cm) 1 7 cm 01/19/22 1304   Wound Depth (cm) 0 6 cm 01/19/22 1304   Wound Surface Area (cm^2) 2 55 cm^2 01/19/22 1304   Wound Volume (cm^3) 1 53 cm^3 01/19/22 1304   Calculated Wound Volume (cm^3) 1 53 cm^3 01/19/22 1304   Drainage Amount Moderate 01/19/22 1304   Drainage Description Serosanguineous 01/19/22 1304   Treatments Irrigation with NSS 01/19/22 1304   Dressing Changed New 01/19/22 1304   Patient Tolerance Tolerated well 01/19/22 1304   Dressing Status Removed 01/19/22 1304       /100   Pulse 88   Temp 100 5 °F (38 1 °C)   Resp 16     Physical Exam  Constitutional:       Appearance: Normal appearance  He is obese     Cardiovascular:      Pulses: Dorsalis pedis pulses are 2+ on the right side and 2+ on the left side  Posterior tibial pulses are 2+ on the right side and 2+ on the left side  Musculoskeletal:        Feet:    Feet:      Right foot:      Protective Sensation: 8 sites tested  0 sites sensed  Toenail Condition: Right toenails are abnormally thick  Left foot:      Protective Sensation: 8 sites tested  0 sites sensed  Skin integrity: Ulcer present  Toenail Condition: Left toenails are abnormally thick  Neurological:      Mental Status: He is alert  Wound Instructions:  No orders of the defined types were placed in this encounter  Diagnosis ICD-10-CM Associated Orders   1   Diabetic ulcer of toe of left foot associated with type 2 diabetes mellitus, with muscle involvement without evidence of necrosis ([facility] )  E66 094     C04 [id number]

## 2022-01-19 NOTE — LETTER
January 19, 2022     Patient: Cesar Paz   YOB: 1975   Date of Visit: 1/19/2022       To Whom it May Concern:    Horace Perez is under my professional care  He was seen in my office on 1/19/2022  He has a wound to the left foot  Patient is to be off of work until he is seen again by me on Monday, January 24, 2022  If you have any questions or concerns, please don't hesitate to call           Sincerely,          Paramjit Griffin DPM        CC: No Recipients

## 2022-01-19 NOTE — PATIENT INSTRUCTIONS
Orders Placed This Encounter   Procedures    Wound cleansing and dressings     Left great toe:    Wash your hands with soap and water  Remove old dressing, discard into plastic bag and place in trash  Cleanse the wound with soap and water prior to applying a clean dressing  Do not use tissue or cotton balls  Do not scrub the wound  Pat dry using gauze  Shower NO   Apply santyl, nickel thick to the wound bed  Cover with 4x4 gauze  Secure with rolled gauze  Change dressing every day   Wear surgical shoe at all times when ambulating  NO regular shoes or walking barefoot  Dr Juanpablo Shine has ordered a different antibiotic for you to start today  Finish the antibotic that you are currently taking until finished  Please have the xray of your toe ASAP  Dr Juanpablo Shine wants this done by your visit on the 24th  Follow up in wound center at 8:15 Monday Jan 24 to see Dr Juanpablo Shine  Treatment in wound center as stated above  Medihoney applied to wound bed today       Standing Status:   Future     Standing Expiration Date:   1/19/2023    Debridement     This order was created via procedure documentation

## 2022-01-20 ENCOUNTER — APPOINTMENT (OUTPATIENT)
Dept: RADIOLOGY | Facility: MEDICAL CENTER | Age: 47
End: 2022-01-20
Payer: COMMERCIAL

## 2022-01-20 DIAGNOSIS — L97.529 DIABETIC ULCER OF LEFT GREAT TOE (HCC): ICD-10-CM

## 2022-01-20 DIAGNOSIS — E11.621 DIABETIC ULCER OF LEFT GREAT TOE (HCC): ICD-10-CM

## 2022-01-20 PROCEDURE — 73630 X-RAY EXAM OF FOOT: CPT

## 2022-01-24 ENCOUNTER — APPOINTMENT (INPATIENT)
Dept: MRI IMAGING | Facility: HOSPITAL | Age: 47
DRG: 617 | End: 2022-01-24
Payer: COMMERCIAL

## 2022-01-24 ENCOUNTER — HOSPITAL ENCOUNTER (INPATIENT)
Facility: HOSPITAL | Age: 47
LOS: 8 days | Discharge: HOME | DRG: 617 | End: 2022-02-01
Attending: EMERGENCY MEDICINE | Admitting: STUDENT IN AN ORGANIZED HEALTH CARE EDUCATION/TRAINING PROGRAM
Payer: COMMERCIAL

## 2022-01-24 ENCOUNTER — OFFICE VISIT (OUTPATIENT)
Dept: WOUND CARE | Facility: CLINIC | Age: 47
DRG: 617 | End: 2022-01-24
Payer: COMMERCIAL

## 2022-01-24 VITALS
HEART RATE: 78 BPM | DIASTOLIC BLOOD PRESSURE: 82 MMHG | TEMPERATURE: 100.7 F | SYSTOLIC BLOOD PRESSURE: 170 MMHG | RESPIRATION RATE: 18 BRPM

## 2022-01-24 DIAGNOSIS — L97.529 DIABETIC ULCER OF LEFT GREAT TOE (HCC): ICD-10-CM

## 2022-01-24 DIAGNOSIS — M86.172 ACUTE OSTEOMYELITIS OF LEFT FOOT (HCC): ICD-10-CM

## 2022-01-24 DIAGNOSIS — M86.9 OSTEOMYELITIS (HCC): Primary | ICD-10-CM

## 2022-01-24 DIAGNOSIS — E11.42 DIABETIC POLYNEUROPATHY ASSOCIATED WITH TYPE 2 DIABETES MELLITUS (HCC): ICD-10-CM

## 2022-01-24 DIAGNOSIS — R60.0 LOCALIZED EDEMA: ICD-10-CM

## 2022-01-24 DIAGNOSIS — R78.81 STAPHYLOCOCCUS AUREUS BACTEREMIA: ICD-10-CM

## 2022-01-24 DIAGNOSIS — L97.525 DIABETIC ULCER OF TOE OF LEFT FOOT ASSOCIATED WITH TYPE 2 DIABETES MELLITUS, WITH MUSCLE INVOLVEMENT WITHOUT EVIDENCE OF NECROSIS (HCC): Primary | ICD-10-CM

## 2022-01-24 DIAGNOSIS — M86.172 ACUTE OSTEOMYELITIS OF LEFT ANKLE OR FOOT (HCC): ICD-10-CM

## 2022-01-24 DIAGNOSIS — E11.621 DIABETIC ULCER OF LEFT GREAT TOE (HCC): ICD-10-CM

## 2022-01-24 DIAGNOSIS — E11.621 DIABETIC ULCER OF TOE OF LEFT FOOT ASSOCIATED WITH TYPE 2 DIABETES MELLITUS, WITH MUSCLE INVOLVEMENT WITHOUT EVIDENCE OF NECROSIS (HCC): Primary | ICD-10-CM

## 2022-01-24 DIAGNOSIS — L03.116 CELLULITIS OF LEFT FOOT: ICD-10-CM

## 2022-01-24 DIAGNOSIS — B95.61 STAPHYLOCOCCUS AUREUS BACTEREMIA: ICD-10-CM

## 2022-01-24 LAB
ALBUMIN SERPL BCP-MCNC: 4 G/DL (ref 3–5.2)
ALP SERPL-CCNC: 98 U/L (ref 43–122)
ALT SERPL W P-5'-P-CCNC: 13 U/L
ANION GAP SERPL CALCULATED.3IONS-SCNC: 7 MMOL/L (ref 5–14)
AST SERPL W P-5'-P-CCNC: 17 U/L (ref 17–59)
ATRIAL RATE: 77 BPM
ATRIAL RATE: 78 BPM
BASOPHILS # BLD AUTO: 0.1 THOUSANDS/ΜL (ref 0–0.1)
BASOPHILS NFR BLD AUTO: 1 % (ref 0–1)
BILIRUB SERPL-MCNC: 0.47 MG/DL
BUN SERPL-MCNC: 39 MG/DL (ref 5–25)
CALCIUM SERPL-MCNC: 9.1 MG/DL (ref 8.4–10.2)
CHLORIDE SERPL-SCNC: 103 MMOL/L (ref 97–108)
CO2 SERPL-SCNC: 24 MMOL/L (ref 22–30)
CREAT SERPL-MCNC: 1.5 MG/DL (ref 0.7–1.5)
CRP SERPL HS-MCNC: 83.5 MG/L
EOSINOPHIL # BLD AUTO: 0.2 THOUSAND/ΜL (ref 0–0.4)
EOSINOPHIL NFR BLD AUTO: 2 % (ref 0–6)
ERYTHROCYTE [DISTWIDTH] IN BLOOD BY AUTOMATED COUNT: 13.3 %
ERYTHROCYTE [SEDIMENTATION RATE] IN BLOOD: 66 MM/HOUR (ref 0–14)
FLUAV RNA RESP QL NAA+PROBE: NEGATIVE
FLUBV RNA RESP QL NAA+PROBE: NEGATIVE
GFR SERPL CREATININE-BSD FRML MDRD: 55 ML/MIN/1.73SQ M
GLUCOSE SERPL-MCNC: 166 MG/DL (ref 65–140)
GLUCOSE SERPL-MCNC: 173 MG/DL (ref 65–140)
GLUCOSE SERPL-MCNC: 189 MG/DL (ref 70–99)
GLUCOSE SERPL-MCNC: 207 MG/DL (ref 65–140)
HCT VFR BLD AUTO: 34.7 % (ref 41–53)
HGB BLD-MCNC: 11.5 G/DL (ref 13.5–17.5)
LYMPHOCYTES # BLD AUTO: 2.4 THOUSANDS/ΜL (ref 0.5–4)
LYMPHOCYTES NFR BLD AUTO: 17 % (ref 25–45)
MCH RBC QN AUTO: 29.3 PG (ref 26–34)
MCHC RBC AUTO-ENTMCNC: 33.1 G/DL (ref 31–36)
MCV RBC AUTO: 89 FL (ref 80–100)
MONOCYTES # BLD AUTO: 1.2 THOUSAND/ΜL (ref 0.2–0.9)
MONOCYTES NFR BLD AUTO: 9 % (ref 1–10)
NEUTROPHILS # BLD AUTO: 10.6 THOUSANDS/ΜL (ref 1.8–7.8)
NEUTS SEG NFR BLD AUTO: 73 % (ref 45–65)
P AXIS: 34 DEGREES
P AXIS: 37 DEGREES
PLATELET # BLD AUTO: 456 THOUSANDS/UL (ref 150–450)
PMV BLD AUTO: 7.5 FL (ref 8.9–12.7)
POTASSIUM SERPL-SCNC: 5.1 MMOL/L (ref 3.6–5)
PR INTERVAL: 176 MS
PR INTERVAL: 176 MS
PROT SERPL-MCNC: 8.4 G/DL (ref 5.9–8.4)
QRS AXIS: 51 DEGREES
QRS AXIS: 54 DEGREES
QRSD INTERVAL: 88 MS
QRSD INTERVAL: 88 MS
QT INTERVAL: 348 MS
QT INTERVAL: 348 MS
QTC INTERVAL: 393 MS
QTC INTERVAL: 396 MS
RBC # BLD AUTO: 3.92 MILLION/UL (ref 4.5–5.9)
RSV RNA RESP QL NAA+PROBE: NEGATIVE
SARS-COV-2 RNA RESP QL NAA+PROBE: NEGATIVE
SODIUM SERPL-SCNC: 134 MMOL/L (ref 137–147)
T WAVE AXIS: 15 DEGREES
T WAVE AXIS: 16 DEGREES
VENTRICULAR RATE: 77 BPM
VENTRICULAR RATE: 78 BPM
WBC # BLD AUTO: 14.5 THOUSAND/UL (ref 4.5–11)

## 2022-01-24 PROCEDURE — 99285 EMERGENCY DEPT VISIT HI MDM: CPT | Performed by: EMERGENCY MEDICINE

## 2022-01-24 PROCEDURE — 85025 COMPLETE CBC W/AUTO DIFF WBC: CPT | Performed by: EMERGENCY MEDICINE

## 2022-01-24 PROCEDURE — G1004 CDSM NDSC: HCPCS

## 2022-01-24 PROCEDURE — 0241U HB NFCT DS VIR RESP RNA 4 TRGT: CPT | Performed by: EMERGENCY MEDICINE

## 2022-01-24 PROCEDURE — 82948 REAGENT STRIP/BLOOD GLUCOSE: CPT

## 2022-01-24 PROCEDURE — 80053 COMPREHEN METABOLIC PANEL: CPT | Performed by: EMERGENCY MEDICINE

## 2022-01-24 PROCEDURE — 99222 1ST HOSP IP/OBS MODERATE 55: CPT | Performed by: STUDENT IN AN ORGANIZED HEALTH CARE EDUCATION/TRAINING PROGRAM

## 2022-01-24 PROCEDURE — 86141 C-REACTIVE PROTEIN HS: CPT | Performed by: EMERGENCY MEDICINE

## 2022-01-24 PROCEDURE — 87040 BLOOD CULTURE FOR BACTERIA: CPT | Performed by: EMERGENCY MEDICINE

## 2022-01-24 PROCEDURE — 93005 ELECTROCARDIOGRAM TRACING: CPT

## 2022-01-24 PROCEDURE — 99284 EMERGENCY DEPT VISIT MOD MDM: CPT

## 2022-01-24 PROCEDURE — G0463 HOSPITAL OUTPT CLINIC VISIT: HCPCS | Performed by: PODIATRIST

## 2022-01-24 PROCEDURE — 93010 ELECTROCARDIOGRAM REPORT: CPT | Performed by: INTERNAL MEDICINE

## 2022-01-24 PROCEDURE — 36415 COLL VENOUS BLD VENIPUNCTURE: CPT | Performed by: EMERGENCY MEDICINE

## 2022-01-24 PROCEDURE — 99213 OFFICE O/P EST LOW 20 MIN: CPT | Performed by: PODIATRIST

## 2022-01-24 PROCEDURE — 85652 RBC SED RATE AUTOMATED: CPT | Performed by: EMERGENCY MEDICINE

## 2022-01-24 PROCEDURE — 87186 SC STD MICRODIL/AGAR DIL: CPT | Performed by: EMERGENCY MEDICINE

## 2022-01-24 PROCEDURE — A9585 GADOBUTROL INJECTION: HCPCS | Performed by: STUDENT IN AN ORGANIZED HEALTH CARE EDUCATION/TRAINING PROGRAM

## 2022-01-24 PROCEDURE — 73720 MRI LWR EXTREMITY W/O&W/DYE: CPT

## 2022-01-24 RX ORDER — HEPARIN SODIUM 5000 [USP'U]/ML
5000 INJECTION, SOLUTION INTRAVENOUS; SUBCUTANEOUS EVERY 8 HOURS SCHEDULED
Status: DISCONTINUED | OUTPATIENT
Start: 2022-01-24 | End: 2022-02-01 | Stop reason: HOSPADM

## 2022-01-24 RX ORDER — HYDROCHLOROTHIAZIDE 25 MG/1
25 TABLET ORAL DAILY
Status: DISCONTINUED | OUTPATIENT
Start: 2022-01-24 | End: 2022-02-01 | Stop reason: HOSPADM

## 2022-01-24 RX ORDER — INSULIN GLARGINE 100 [IU]/ML
55 INJECTION, SOLUTION SUBCUTANEOUS
Status: DISCONTINUED | OUTPATIENT
Start: 2022-01-24 | End: 2022-01-24

## 2022-01-24 RX ORDER — ACETAMINOPHEN 325 MG/1
650 TABLET ORAL EVERY 6 HOURS PRN
Status: DISCONTINUED | OUTPATIENT
Start: 2022-01-24 | End: 2022-02-01 | Stop reason: HOSPADM

## 2022-01-24 RX ORDER — CEFTRIAXONE 2 G/50ML
2000 INJECTION, SOLUTION INTRAVENOUS EVERY 24 HOURS
Status: DISCONTINUED | OUTPATIENT
Start: 2022-01-24 | End: 2022-01-26

## 2022-01-24 RX ORDER — ATENOLOL 50 MG/1
100 TABLET ORAL DAILY
Status: DISCONTINUED | OUTPATIENT
Start: 2022-01-24 | End: 2022-02-01 | Stop reason: HOSPADM

## 2022-01-24 RX ORDER — INSULIN GLARGINE 100 [IU]/ML
25 INJECTION, SOLUTION SUBCUTANEOUS
Status: DISCONTINUED | OUTPATIENT
Start: 2022-01-24 | End: 2022-01-25

## 2022-01-24 RX ADMIN — VANCOMYCIN HYDROCHLORIDE 2000 MG: 1 INJECTION, POWDER, LYOPHILIZED, FOR SOLUTION INTRAVENOUS at 11:04

## 2022-01-24 RX ADMIN — GADOBUTROL 10 ML: 604.72 INJECTION INTRAVENOUS at 12:20

## 2022-01-24 RX ADMIN — HEPARIN SODIUM 5000 UNITS: 5000 INJECTION INTRAVENOUS; SUBCUTANEOUS at 22:15

## 2022-01-24 RX ADMIN — INSULIN LISPRO 2 UNITS: 100 INJECTION, SOLUTION INTRAVENOUS; SUBCUTANEOUS at 12:56

## 2022-01-24 RX ADMIN — CEFTRIAXONE 2000 MG: 2 INJECTION, SOLUTION INTRAVENOUS at 14:14

## 2022-01-24 RX ADMIN — INSULIN GLARGINE 25 UNITS: 100 INJECTION, SOLUTION SUBCUTANEOUS at 22:16

## 2022-01-24 RX ADMIN — HEPARIN SODIUM 5000 UNITS: 5000 INJECTION INTRAVENOUS; SUBCUTANEOUS at 14:13

## 2022-01-24 RX ADMIN — VANCOMYCIN HYDROCHLORIDE 2000 MG: 1 INJECTION, POWDER, LYOPHILIZED, FOR SOLUTION INTRAVENOUS at 23:31

## 2022-01-24 RX ADMIN — INSULIN LISPRO 4 UNITS: 100 INJECTION, SOLUTION INTRAVENOUS; SUBCUTANEOUS at 17:10

## 2022-01-24 NOTE — PLAN OF CARE
Problem: PAIN - ADULT  Goal: Verbalizes/displays adequate comfort level or baseline comfort level  Description: Interventions:  - Encourage patient to monitor pain and request assistance  - Assess pain using appropriate pain scale  - Administer analgesics based on type and severity of pain and evaluate response  - Implement non-pharmacological measures as appropriate and evaluate response  - Consider cultural and social influences on pain and pain management  - Notify physician/advanced practitioner if interventions unsuccessful or patient reports new pain  Outcome: Progressing     Problem: INFECTION - ADULT  Goal: Absence or prevention of progression during hospitalization  Description: INTERVENTIONS:  - Assess and monitor for signs and symptoms of infection  - Monitor lab/diagnostic results  - Monitor all insertion sites, i e  indwelling lines, tubes, and drains  - Monitor endotracheal if appropriate and nasal secretions for changes in amount and color  - [place] appropriate cooling/warming therapies per order  - Administer medications as ordered  - Instruct and encourage patient and family to use good hand hygiene technique  - Identify and instruct in appropriate isolation precautions for identified infection/condition  Outcome: Progressing     Problem: SAFETY ADULT  Goal: Patient will remain free of falls  Description: INTERVENTIONS:  - Educate patient/family on patient safety including physical limitations  - Instruct patient to call for assistance with activity   - Consult OT/PT to assist with strengthening/mobility   - Keep Call bell within reach  - Keep bed low and locked with side rails adjusted as appropriate  - Keep care items and personal belongings within reach  - Initiate and maintain comfort rounds  - Make Fall Risk Sign visible to staff    - Apply yellow socks and bracelet for high fall risk patients  - Consider moving patient to room near nurses station  Outcome: Progressing     Problem: DISCHARGE PLANNING  Goal: Discharge to home or other facility with appropriate resources  Description: INTERVENTIONS:  - Identify barriers to discharge w/patient and caregiver  - Arrange for needed discharge resources and transportation as appropriate  - Identify discharge learning needs (meds, wound care, etc )  - Arrange for interpretive services to assist at discharge as needed  - Refer to Case Management Department for coordinating discharge planning if the patient needs post-hospital services based on physician/advanced practitioner order or complex needs related to functional status, cognitive ability, or social support system  Outcome: Progressing     Problem: Knowledge Deficit  Goal: Patient/family/caregiver demonstrates understanding of disease process, treatment plan, medications, and discharge instructions  Description: Complete learning assessment and assess knowledge base    Interventions:  - Provide teaching at level of understanding  - Provide teaching via preferred learning methods  Outcome: Progressing

## 2022-01-24 NOTE — PATIENT INSTRUCTIONS
Orders Placed This Encounter   Procedures    Wound cleansing and dressings         Left great toe:      Patient to go to the ER as per Dr Vonnie Argueta your hands with soap and water  Remove old dressing, discard into plastic bag and place in trash  Cleanse the wound with soap and water prior to applying a clean dressing  Do not use tissue or cotton balls  Do not scrub the wound  Pat dry using gauze  Shower NO   Apply santyl, nickel thick to the wound bed  Cover with 4x4 gauze  Secure with rolled gauze  Change dressing every day   Wear surgical shoe at all times when ambulating  NO regular shoes or walking barefoot  Dr Monte has ordered a different antibiotic for you to start today    Finish the antibotic that you are currently taking until finished     Follow up with the wound center after hospitalization        Treatment in the wound center today: Cleansed with normal saline and applied dry dressing,     Standing Status:   Future     Standing Expiration Date:   1/24/2023

## 2022-01-24 NOTE — LETTER
[address] [facility]  [address] [place] [id number] [phone]  Dept: [phone]    February 1, 2022     Patient: Gregg Allen   YOB: 1975   Date of Visit: 1/24/2022       To Whom it May Concern:    Mehdi Thakur is under my professional care  He was seen in the hospital from 1/24/2022   to 02/01/22  He may return to work on 2/28/2022 without limitations  If you have any questions or concerns, please don't hesitate to call           Sincerely,          Pedro Huggins, DO

## 2022-01-24 NOTE — PROGRESS NOTES
Vancomycin Pharmacy to Dose Assessment    Magda Cain is a 40 y o  male who is currently receiving vancomycin 2000 mg IV every 12 hours for diabetic foot infection  Relevant clinical data and objective history reviewed:  Creatinine   Date Value Ref Range Status   01/24/2022 1 50 0 70 - 1 50 mg/dL Final     Comment:     Standardized to IDMS reference method   09/28/2018 0 96 0 60 - 1 30 mg/dL Final     Comment:     Standardized to IDMS reference method   09/27/2018 1 04 0 60 - 1 30 mg/dL Final     Comment:     Standardized to IDMS reference method   09/28/2015 1 03 0 60 - 1 30 mg/dL Final     Comment:     Standardized to IDMS reference method   10/14/2014 0 95 0 60 - 1 30 mg/dL Final     Comment:     Standardized to IDMS reference method   03/09/2014 1 10 0 60 - 1 30 mg/dL Final     Comment:     Standardized to IDMS reference method     /81 (BP Location: Left arm)   Pulse 80   Temp 98 5 °F (36 9 °C) (Oral)   Resp 18   Wt (!) 148 kg (325 lb 2 9 oz)   SpO2 98%   BMI 48 02 kg/m²   No intake/output data recorded  Lab Results   Component Value Date/Time    BUN 39 (H) 01/24/2022 09:30 AM    BUN 16 09/28/2015 08:05 PM    WBC 14 50 (H) 01/24/2022 09:30 AM    WBC 8 68 09/28/2015 08:05 PM    HGB 11 5 (L) 01/24/2022 09:30 AM    HGB 14 4 09/28/2015 08:05 PM    HCT 34 7 (L) 01/24/2022 09:30 AM    HCT 39 7 09/28/2015 08:05 PM    MCV 89 01/24/2022 09:30 AM    MCV 87 09/28/2015 08:05 PM     (H) 01/24/2022 09:30 AM     09/28/2015 08:05 PM     Temp Readings from Last 3 Encounters:   01/24/22 98 5 °F (36 9 °C) (Oral)   01/24/22 (!) 100 7 °F (38 2 °C)   01/19/22 100 5 °F (38 1 °C)     Vancomycin Days of Therapy: 1    Assessment/Plan  The patient is currently on vancomycin utilizing scheduled dosing based on adjusted body weight (due to obesity)  Baseline risks associated with therapy include: pre-existing renal impairment    The patient is currently receiving 2000 mg IV every 12 hours which is clinically appropriate and dose will be continued  Pharmacy will also follow closely for s/sx of nephrotoxicity, infusion reactions, and appropriateness of therapy  BMP and CBC will be ordered per protocol  Plan for trough as patient approaches steady state, prior to the 5th  dose at approximately 1100 on 1/26/22  Due to infection severity, will target a trough of 15-20 mcg/ml  Pharmacy will continue to follow the patients culture results and clinical progress daily      Lam Nunn, Pharmacist

## 2022-01-24 NOTE — ASSESSMENT & PLAN NOTE
Patient sent to ED after outpatient visit with Podiatry    X-ray revealed findings suggestive of osteo with acute pathological fracture    -admit to medicine  -MRI left foot  -vancomycin and Rocephin  -podiatry consult  -carb consistent diet, NPO at midnight  -ekg

## 2022-01-24 NOTE — ASSESSMENT & PLAN NOTE
Lab Results   Component Value Date    HGBA1C 10 6 (A) 01/13/2022       No results for input(s): POCGLU in the last 72 hours      Blood Sugar Average: Last 72 hrs:     -hold home metformin  -insulin glargine 55 units q h s   -carb consistent diet  -sliding scale insulin with Accu-Cheks

## 2022-01-24 NOTE — ED PROVIDER NOTES
History  Chief Complaint   Patient presents with    Wound Infection     left big toe infection; since after [facility]-under wound care for 1 5 weeks; today sent here due to infection-taking oral antibotics since 1/15 and doesn't seem to be better; Blood sugar last night 190 as per patinet  Patient of Dr Minal Ludwig (Podiatry), sent from wound care center for poor wound healing of left hallux, outpatient xray reviewed  By Dr Minal Ludwig shows osteo  Would like patient to be admitted to [facility] for antibiotics, MRI and likely operative procedure  Patient confirms history  No other complaints  Pain worse with walking, mild improvement with rest  No fevers, chills ,n/v/d  Has been taking Clindamycin, was trying to change oral antibiotics, but had issue with insurance  Prior to Admission Medications   Prescriptions Last Dose Informant Patient Reported? Taking?    Insulin Pen Needle (PEN NEEDLES) 31G X 6 MM MISC 1/23/2022 at Unknown time Self No Yes   Sig: Inject under the skin 3 (three) times a day   Semaglutide,0 25 or 0 5MG/DOS, (Ozempic, 0 25 or 0 5 MG/DOSE,) 2 MG/1 5ML SOPN Past Week at Unknown time  No Yes   Sig: Inject 0 5 mg under the skin once a week   atenolol (TENORMIN) 100 mg tablet 1/24/2022 at Unknown time  No Yes   Sig: Take 1 tablet (100 mg total) by mouth daily   collagenase (SANTYL) ointment 1/24/2022 at Unknown time  No Yes   Sig: Apply topically daily   hydrochlorothiazide (HYDRODIURIL) 25 mg tablet 1/24/2022 at Unknown time  No Yes   Sig: Take 1 tablet (25 mg total) by mouth daily   insulin degludec Rheta Syed FlexTouch) 200 units/mL CONCENTRATED U-200 injection pen 1/23/2022 at Unknown time  No Yes   Sig: Inject 55 Units under the skin daily at bedtime   metFORMIN (GLUCOPHAGE-XR) 500 mg 24 hr tablet 1/24/2022 at Unknown time Self No Yes   Sig: Take 1 tablet (500 mg total) by mouth 2 (two) times a day with meals   nystatin (MYCOSTATIN) cream More than a month at Unknown time Self No No   Sig: Apply topically 2 (two) times a day   Patient not taking: Reported on 10/21/2021   triamcinolone (KENALOG) 0 1 % cream More than a month at Unknown time Self No No   Sig: Apply topically 2 (two) times a day   Patient not taking: Reported on 10/21/2021      Facility-Administered Medications: None       Past Medical History:   Diagnosis Date    Diabetes mellitus ([facility] [address] )     Hyperlipemia     Hypertension        Past Surgical History:   Procedure Laterality Date    CATARACT EXTRACTION         Family History   Problem Relation Age of Onset    Diabetes Mother     Breast cancer Mother     Heart disease Father     Prostate cancer Father      I have reviewed and agree with the history as documented  E-Cigarette/Vaping    E-Cigarette Use Never User      E-Cigarette/Vaping Substances    Nicotine No     THC No     CBD No     Flavoring No     Other No     Unknown No      Social History     Tobacco Use    Smoking status: Former Smoker    Smokeless tobacco: Never Used    Tobacco comment: [employer] says never smoker and no passive smoke exposure   Vaping Use    Vaping Use: Never used   Substance Use Topics    Alcohol use: Not Currently    Drug use: No       Review of Systems   Constitutional: Negative  Negative for chills and fever  HENT: Negative  Negative for rhinorrhea, sore throat, trouble swallowing and voice change  Eyes: Negative  Negative for pain and visual disturbance  Respiratory: Negative  Negative for cough, shortness of breath and wheezing  Cardiovascular: Negative  Negative for chest pain and palpitations  Gastrointestinal: Negative for abdominal pain, diarrhea, nausea and vomiting  Genitourinary: Negative  Negative for dysuria and frequency  Musculoskeletal: Positive for arthralgias  Negative for neck pain and neck stiffness  Skin: Positive for wound  Negative for rash  Neurological: Negative  Negative for dizziness, speech difficulty, weakness, light-headedness and numbness  Physical Exam  Physical Exam  Vitals and nursing note reviewed  Constitutional:       General: He is not in acute distress  Appearance: He is well-developed  HENT:      Head: Normocephalic and atraumatic  Eyes:      Conjunctiva/sclera: Conjunctivae normal       Pupils: Pupils are equal, round, and reactive to light  Neck:      Trachea: No tracheal deviation  Cardiovascular:      Rate and Rhythm: Normal rate and regular rhythm  Pulmonary:      Effort: Pulmonary effort is normal  No respiratory distress  Breath sounds: Normal breath sounds  No wheezing or rales  Abdominal:      General: Bowel sounds are normal  There is no distension  Palpations: Abdomen is soft  Tenderness: There is no abdominal tenderness  There is no guarding or rebound  Musculoskeletal:         General: No tenderness or deformity  Normal range of motion  Cervical back: Normal range of motion and neck supple  Feet:    Skin:     General: Skin is warm and dry  Capillary Refill: Capillary refill takes less than 2 seconds  Findings: No rash  Neurological:      Mental Status: He is alert and oriented to person, place, and time     Psychiatric:         Behavior: Behavior normal          Vital Signs  ED Triage Vitals [01/24/22 0913]   Temperature Pulse Respirations Blood Pressure SpO2   98 5 °F (36 9 °C) 80 18 162/81 98 %      Temp Source Heart Rate Source Patient Position - Orthostatic VS BP Location FiO2 (%)   Oral Monitor Sitting Left arm --      Pain Score       No Pain           Vitals:    01/24/22 0913   BP: 162/81   Pulse: 80   Patient Position - Orthostatic VS: Sitting         Visual Acuity      ED Medications  Medications   atenolol (TENORMIN) tablet 100 mg (has no administration in time range)   hydrochlorothiazide (HYDRODIURIL) tablet 25 mg (has no administration in time range)   insulin glargine (LANTUS) subcutaneous injection 55 Units 0 55 mL (has no administration in time range) heparin (porcine) subcutaneous injection 5,000 Units (has no administration in time range)   cefTRIAXone (ROCEPHIN) IVPB (premix in dextrose) 2,000 mg 50 mL (has no administration in time range)   insulin lispro (HumaLOG) 100 units/mL subcutaneous injection 2-12 Units (has no administration in time range)   vancomycin (VANCOCIN) 2,000 mg in sodium chloride 0 9 % 500 mL IVPB (has no administration in time range)       Diagnostic Studies  Results Reviewed     Procedure Component Value Units Date/Time    Platelet count [[id number]]     Lab Status: No result Specimen: Blood     Sedimentation rate, automated [[id number]]  (Abnormal) Collected: 01/24/22 0930    Lab Status: Final result Specimen: Blood from Arm, Right Updated: 01/24/22 1024     Sed Rate 66 mm/hour     COVID/FLU/RSV - 2 hour TAT [[id number]] Collected: 01/24/22 0939    Lab Status: In process Specimen: Nares from Nose Updated: 01/24/22 1015    Blood culture #1 [[id number]] Collected: 01/24/22 0934    Lab Status: In process Specimen: Blood from Arm, Left Updated: 01/24/22 1015    Blood culture #2 [[id number]] Collected: 01/24/22 0930    Lab Status:  In process Specimen: Blood from Arm, Right Updated: 01/24/22 1015    Comprehensive metabolic panel [[id number]]  (Abnormal) Collected: 01/24/22 0930    Lab Status: Final result Specimen: Blood from Arm, Right Updated: 01/24/22 1014     Sodium 134 mmol/L      Potassium 5 1 mmol/L      Chloride 103 mmol/L      CO2 24 mmol/L      ANION GAP 7 mmol/L      BUN 39 mg/dL      Creatinine 1 50 mg/dL      Glucose 189 mg/dL      Calcium 9 1 mg/dL      AST 17 U/L      ALT 13 U/L      Alkaline Phosphatase 98 U/L      Total Protein 8 4 g/dL      Albumin 4 0 g/dL      Total Bilirubin 0 47 mg/dL      eGFR 55 ml/min/1 73sq m     Narrative:      [place] guidelines for Chronic Kidney Disease (CKD):     Stage 1 with normal or high GFR (GFR > 90 mL/min/1 73 square meters)    Stage 2 Mild CKD (GFR = 60-89 mL/min/1 73 square meters)    Stage 3A Moderate CKD (GFR = 45-59 mL/min/1 73 square meters)    Stage 3B Moderate CKD (GFR = 30-44 mL/min/1 73 square meters)    Stage 4 Severe CKD (GFR = 15-29 mL/min/1 73 square meters)    Stage 5 End Stage CKD (GFR <15 mL/min/1 73 square meters)  Note: GFR calculation is accurate only with a steady state creatinine    CBC and differential [[id number]]  (Abnormal) Collected: 01/24/22 0930    Lab Status: Final result Specimen: Blood from Arm, Right Updated: 01/24/22 0954     WBC 14 50 Thousand/uL      RBC 3 92 Million/uL      Hemoglobin 11 5 g/dL      Hematocrit 34 7 %      MCV 89 fL      MCH 29 3 pg      MCHC 33 1 g/dL      RDW 13 3 %      MPV 7 5 fL      Platelets 586 Thousands/uL      Neutrophils Relative 73 %      Lymphocytes Relative 17 %      Monocytes Relative 9 %      Eosinophils Relative 2 %      Basophils Relative 1 %      Neutrophils Absolute 10 60 Thousands/µL      Lymphocytes Absolute 2 40 Thousands/µL      Monocytes Absolute 1 20 Thousand/µL      Eosinophils Absolute 0 20 Thousand/µL      Basophils Absolute 0 10 Thousands/µL     High sensitivity CRP [[id number]] Collected: 01/24/22 0930    Lab Status:  In process Specimen: Blood from Arm, Right Updated: 01/24/22 0946                 MRI foot/forefoot toes left w wo contrast    (Results Pending)              Procedures  Procedures         ED Course                                             MDM  Number of Diagnoses or Management Options     Amount and/or Complexity of Data Reviewed  Clinical lab tests: ordered and reviewed  Tests in the radiology section of CPT®: reviewed        Disposition  Final diagnoses:   Osteomyelitis ([facility] [address] )     Time reflects when diagnosis was documented in both MDM as applicable and the Disposition within this note     Time User Action Codes Description Comment    1/24/2022  9:16 AM Juan Sosa Add [M86 9] Osteomyelitis ([address] )     1/24/2022 10:27 AM Jennyfer Tobias Add [E11 781,  H51 [id number]] Diabetic ulcer of left great toe [facility])       ED Disposition     ED Disposition Condition Date/Time Comment    Admit Stable Mon Jan 24, 2022  9:16 AM Case was discussed with CHRISSY and the patient's admission status was agreed to be Admission Status: inpatient status to the service of Dr Alexis Lucas   Follow-up Information    None         Current Discharge Medication List      CONTINUE these medications which have NOT CHANGED    Details   atenolol (TENORMIN) 100 mg tablet Take 1 tablet (100 mg total) by mouth daily  Qty: 90 tablet, Refills: 1    Associated Diagnoses: Essential hypertension      collagenase (SANTYL) ointment Apply topically daily  Qty: 15 g, Refills: 0    Comments: Wound measurement 1 5cmx1 7cmx0 8cm  Associated Diagnoses: Diabetic ulcer of toe of left foot associated with type 2 diabetes mellitus, with muscle involvement without evidence of necrosis (HCC)      hydrochlorothiazide (HYDRODIURIL) 25 mg tablet Take 1 tablet (25 mg total) by mouth daily  Qty: 30 tablet, Refills: 1    Associated Diagnoses: Essential hypertension      insulin degludec Rich Jordan FlexTouch) 200 units/mL CONCENTRATED U-200 injection pen Inject 55 Units under the skin daily at bedtime  Qty: 9 mL, Refills: 2    Associated Diagnoses: Diabetes mellitus of other type without complication, unspecified whether long term insulin use (HCC)      Insulin Pen Needle (PEN NEEDLES) 31G X 6 MM MISC Inject under the skin 3 (three) times a day  Qty: 300 each, Refills: 1    Associated Diagnoses: Essential hypertension; Sprain of right knee/leg, initial encounter; Diabetes mellitus of other type without complication, unspecified whether long term insulin use ([facility] );  Benign essential hypertension; Viral gastroenteritis; Eczema, unspecified type; Diabetes mellitus (HCC)      metFORMIN (GLUCOPHAGE-XR) 500 mg 24 hr tablet Take 1 tablet (500 mg total) by mouth 2 (two) times a day with meals  Qty: 180 tablet, Refills: 1    Associated Diagnoses: Viral gastroenteritis      Semaglutide,0 25 or 0 5MG/DOS, (Ozempic, 0 25 or 0 5 MG/DOSE,) 2 MG/1 5ML SOPN Inject 0 5 mg under the skin once a week  Qty: 4 mL, Refills: 2    Associated Diagnoses: Uncontrolled type 2 diabetes mellitus with hyperglycemia (HCC)      nystatin (MYCOSTATIN) cream Apply topically 2 (two) times a day  Qty: 30 g, Refills: 2    Associated Diagnoses: Eczema, unspecified type      triamcinolone (KENALOG) 0 1 % cream Apply topically 2 (two) times a day  Qty: 80 g, Refills: 2    Associated Diagnoses: Eczema, unspecified type             No discharge procedures on file      PDMP Review     None          ED Provider  Electronically Signed by           Nadia Al DO  01/24/22 6230

## 2022-01-24 NOTE — H&P
[facility]  H&P- Mariia Martinez 1975, 40 y o  male MRN: [id number]  Unit/Bed#: ED 09 Encounter: [id number]  Primary Care Provider: Kiran Zhao MD   Date and time admitted to hospital: 1/24/2022  9:08 AM    * Acute osteomyelitis of left foot [facility])  Assessment & Plan  Patient sent to ED after outpatient visit with Podiatry  X-ray revealed findings suggestive of osteo with acute pathological fracture    -admit to medicine  -MRI left foot  -vancomycin and Rocephin  -podiatry consult  -carb consistent diet, NPO at midnight  -ekg      Morbid obesity ([address] )  Assessment & Plan  -discussed diet and exercise    Diabetes mellitus ([facility] [address] )  Assessment & Plan  Lab Results   Component Value Date    HGBA1C 10 6 (A) 01/13/2022       No results for input(s): POCGLU in the last 72 hours  Blood Sugar Average: Last 72 hrs:     -hold home metformin  -insulin glargine 55 units q h s   -carb consistent diet  -sliding scale insulin with Accu-Cheks    Hypertension  Assessment & Plan  -continue home atenolol and HCTZ    VTE Pharmacologic Prophylaxis: VTE Score: 4 Moderate Risk (Score 3-4) - Pharmacological DVT Prophylaxis Ordered: heparin  Code Status: Level 1 - Full Code   Discussion with family: Patient declined call to   Anticipated Length of Stay: Patient will be admitted on an inpatient basis with an anticipated length of stay of greater than 2 midnights secondary to Acute osteomyelitis of left great toe  Total Time for Visit, including Counseling / Coordination of Care: 30 minutes Greater than 50% of this total time spent on direct patient counseling and coordination of care  Chief Complaint:  Osteomyelitis of left great toe    History of Present Illness:  Mariia Martinez is a 40 y o  male with a PMH of uncontrolled type 2 diabetes, morbid obesity, hypertension who presents with acute osteomyelitis left great toe  Patient was seen by Podiatry on outpatient basis  X-ray was obtained which revealed findings suggestive of acute osteomyelitis with potential pathological fracture  Patient was advised to head to ED  Lab work was obtained in ED patient admitted to Medicine  Other than chronic wound of left foot, patient with no active complaints at this time  Review of Systems:  Review of Systems   Constitutional: Negative  HENT: Negative  Eyes: Negative  Respiratory: Negative  Cardiovascular: Negative  Gastrointestinal: Negative  Genitourinary: Negative  Skin: Positive for rash and wound  Hematological: Negative  Psychiatric/Behavioral: Negative  Past Medical and Surgical History:   Past Medical History:   Diagnosis Date    Diabetes mellitus ([address] )     Hyperlipemia     Hypertension        Past Surgical History:   Procedure Laterality Date    CATARACT EXTRACTION         Meds/Allergies:  Prior to Admission medications    Medication Sig Start Date End Date Taking?  Authorizing Provider   atenolol (TENORMIN) 100 mg tablet Take 1 tablet (100 mg total) by mouth daily 1/14/22  Yes Miladys Blevins MD   collagenase (SANTYL) ointment Apply topically daily 1/19/22  Yes Katja Skelton DPM   hydrochlorothiazide (HYDRODIURIL) 25 mg tablet Take 1 tablet (25 mg total) by mouth daily 1/14/22  Yes Miladys Blevins MD   insulin degludec [employer] Burow FlexTouch) 200 units/mL CONCENTRATED U-200 injection pen Inject 55 Units under the skin daily at bedtime 1/14/22  Yes Miladys Blevins MD   Insulin Pen Needle (PEN NEEDLES) 31G X 6 MM MISC Inject under the skin 3 (three) times a day 6/2/20  Yes Miladys Blevins MD   metFORMIN (GLUCOPHAGE-XR) 500 mg 24 hr tablet Take 1 tablet (500 mg total) by mouth 2 (two) times a day with meals 6/2/20  Yes Miladys Blevins MD   Semaglutide,0 25 or 0 5MG/DOS, (Ozempic, 0 25 or 0 5 MG/DOSE,) 2 MG/1 5ML SOPN Inject 0 5 mg under the skin once a week 1/14/22  Yes Miladys Blevins MD   nystatin (MYCOSTATIN) cream Apply topically 2 (two) times a day  Patient not taking: Reported on 10/21/2021 6/2/20   Juan Penn MD   triamcinolone (KENALOG) 0 1 % cream Apply topically 2 (two) times a day  Patient not taking: Reported on 10/21/2021 6/2/20   Juan Penn MD   baclofen 10 mg tablet Take 1 tablet 3 times daily as needed for back pain  Patient not taking: Reported on 1/13/2022  10/21/21 1/24/22  Alice Neville PA-C   ciprofloxacin (CIPRO) 500 mg tablet Take 1 tablet (500 mg total) by mouth every 12 (twelve) hours for 7 days 1/19/22 1/24/22  Charlene Rosa DPM   furosemide (LASIX) 20 mg tablet Take 20 mg by mouth daily  Patient not taking: Reported on 1/13/2022 5/1/21 1/24/22  Historical Provider, MD   ibuprofen (MOTRIN) 600 mg tablet Take 1 tablet (600 mg total) by mouth every 6 (six) hours as needed for mild pain  Patient not taking: Reported on 11/18/2021  10/21/21 1/24/22  Alice Neville PA-C   naproxen (NAPROSYN) 500 mg tablet Take 1 tablet (500 mg total) by mouth 2 (two) times a day with meals  Patient not taking: Reported on 10/21/2021 8/11/20 1/24/22  Juan Penn MD     I have reviewed home medications with patient personally  Allergies: Allergies   Allergen Reactions    Penicillins Other (See Comments)     Tolerated Cefazolin previously in 2018 without a problem         Social History:  Marital Status:    Substance Use History:   Social History     Substance and Sexual Activity   Alcohol Use Not Currently     Social History     Tobacco Use   Smoking Status Former Smoker   Smokeless Tobacco Never Used   Tobacco Comment    [employer] says never smoker and no passive smoke exposure     Social History     Substance and Sexual Activity   Drug Use No       Family History:  Family History   Problem Relation Age of Onset    Diabetes Mother     Breast cancer Mother     Heart disease Father     Prostate cancer Father        Physical Exam:     Vitals:   Blood Pressure: 162/81 (01/24/22 0913)  Pulse: 80 (01/24/22 0913)  Temperature: 98 5 °F (36 9 °C) (01/24/22 0913)  Temp Source: Oral (01/24/22 0913)  Respirations: 18 (01/24/22 0913)  Weight - Scale: (!) 148 kg (325 lb 2 9 oz) (01/24/22 0913)  SpO2: 98 % (01/24/22 0913)    Physical Exam  Constitutional:       General: He is not in acute distress  Appearance: He is obese  He is not ill-appearing  Cardiovascular:      Rate and Rhythm: Normal rate and regular rhythm  Pulses: Normal pulses  Heart sounds: Normal heart sounds  Pulmonary:      Effort: Pulmonary effort is normal       Breath sounds: Normal breath sounds  Abdominal:      General: Abdomen is flat  Bowel sounds are normal       Palpations: Abdomen is soft  Musculoskeletal:         General: Normal range of motion  Cervical back: Normal range of motion  Skin:     Comments: Ulcer left great toe with surrounding erythema   Neurological:      General: No focal deficit present  Mental Status: He is alert and oriented to person, place, and time  Mental status is at baseline  Psychiatric:         Mood and Affect: Mood normal           Additional Data:     Lab Results:  Results from last 7 days   Lab Units 01/24/22  0930   WBC Thousand/uL 14 50*   HEMOGLOBIN g/dL 11 5*   HEMATOCRIT % 34 7*   PLATELETS Thousands/uL 456*   NEUTROS PCT % 73*   LYMPHS PCT % 17*   MONOS PCT % 9   EOS PCT % 2     Results from last 7 days   Lab Units 01/24/22  0930   SODIUM mmol/L 134*   POTASSIUM mmol/L 5 1*   CHLORIDE mmol/L 103   CO2 mmol/L 24   BUN mg/dL 39*   CREATININE mg/dL 1 50   ANION GAP mmol/L 7   CALCIUM mg/dL 9 1   ALBUMIN g/dL 4 0   TOTAL BILIRUBIN mg/dL 0 47   ALK PHOS U/L 98   ALT U/L 13   AST U/L 17   GLUCOSE RANDOM mg/dL 189*                       Imaging: No pertinent imaging reviewed  MRI foot/forefoot toes left w wo contrast    (Results Pending)       EKG and Other Studies Reviewed on Admission:   · EKG: No EKG obtained      ** Please Note: This note has been constructed using a voice recognition system   **

## 2022-01-24 NOTE — ASSESSMENT & PLAN NOTE
Patient sent to ED after outpatient visit with Podiatry    X-ray revealed findings suggestive of osteo with acute pathological fracture  MRI revealed: 1st toe distal phalanx osteomyelitis with adjacent sinus tract     -vancomycin and Rocephin  -podiatry consult  -carb consistent diet, NPO at midnight  -follow-up blood cultures  -plan for OR 1/26

## 2022-01-24 NOTE — ASSESSMENT & PLAN NOTE
Lab Results   Component Value Date    HGBA1C 10 6 (A) 01/13/2022       Recent Labs     01/24/22  1118 01/24/22  1620 01/24/22 2035 01/25/22  0528   POCGLU 173* 207* 166* 128       Blood Sugar Average: Last 72 hrs:     -hold home metformin  -insulin glargine 35 units q h s    -carb consistent diet  -sliding scale insulin with Accu-Cheks

## 2022-01-24 NOTE — LETTER
[address] & ORTHOP[facility]IC [facility] SURGICAL UNIT  [facility] [id number] [phone]  Dept: [phone]    February 1, 2022     Patient: Bob Argueta   YOB: 1975   Date of Visit: 1/24/2022       To Whom it May Concern:    Karishma Kelly is under my professional care  He was seen in the hospital from 1/24/2022   to 02/01/22  He may return to work on 3/7/2022 without limitations  If you have any questions or concerns, please don't hesitate to call           Sincerely,          Fatoumata Martin, DO

## 2022-01-24 NOTE — PROGRESS NOTES
Patient ID: Kiran Sánchez is a 40 y o  male Date of Birth 1975     Chief Complaint  Chief Complaint   Patient presents with    Follow Up Wound Care Visit     left great toe wound       Allergies  Penicillins    Assessment:    No problem-specific Assessment & Plan notes found for this encounter  Diagnoses and all orders for this visit:    Diabetic ulcer of toe of left foot associated with type 2 diabetes mellitus, with muscle involvement without evidence of necrosis (HCC)  -     Wound cleansing and dressings; Future    Diabetic polyneuropathy associated with type 2 diabetes mellitus ([facility] [address] )  -     Wound cleansing and dressings; Future    Localized edema  -     Wound cleansing and dressings; Future    Cellulitis of left foot  -     Wound cleansing and dressings; Future    Acute osteomyelitis of left ankle or foot (HCC)  -     Wound cleansing and dressings; Future                  Plan:     - pt eval and managed    - Number and complexity of problems addressed: 1 or more chronic illness with exacerbation, progression as shown    - Amount/complexity of data reviewed and analyzed:    Category 1: prior patient notes were analyzed today before evaluating and managing patient  All PMH were discussed with pt today  - Risk of complications: moderate risk of morbidity from additional testing or treatment involved with this patient, which includes but not limited to: possible limb loss    - discussed anatomy, condition, treatment plan and options  They were instructed on proper foot care  This is total time spent today involving both face-to-face time and non face-to-face time   This time spent includes reviewing their past medical history, performing a medically appropriate examination and evaluation of the patient, counseling and educating the patient, ordering medication, documenting all findings in EMR, and independently interpreting results and communicating results with patietn and discussing their condition and treatment options, risks, and potential complications  I have discussed the findings of this examination with the patient  The discussion included a complete verbal explanation of the examination results, diagnosis and planned treatment(s)  A schedule for future care needs was explained  The patient has verbalized the understanding of these instructions at this time  If any questions should arise after returning home I have encouraged the patient to feel free to call the office  - xrays of the L foot, DP, lateral, and medial oblique, interpreted by me today  There are erosive changes of the distal phalanx of the L hallux, with possible pathologic fracture noted    - betadine and DSD applied today  - I d/w pt results of his xrays  - pt is at risk of at least a partial hallux amputation, if not more  - I spoke with Dr Cynthia Chicas at the [facility] ED  Pt being sent to the ED for admission, IV antibiotics, MRI, and possible amputation  - I will see pt while he is in house  - pt is at risk of toe loss or worse  pt understands this    Wound 01/19/22 Toe (Comment  which one) Left (Active)   Wound Image Images linked 01/24/22 0831   Wound Description Pink;Slough 01/24/22 0844   Felicity-wound Assessment Swelling;Callus; Maceration 01/24/22 0844   Wound Length (cm) 1 7 cm 01/24/22 0844   Wound Width (cm) 1 5 cm 01/24/22 0844   Wound Depth (cm) 0 6 cm 01/24/22 0844   Wound Surface Area (cm^2) 2 55 cm^2 01/24/22 0844   Wound Volume (cm^3) 1 53 cm^3 01/24/22 0844   Calculated Wound Volume (cm^3) 1 53 cm^3 01/24/22 0844   Change in Wound Size % 0 01/24/22 0844   Drainage Amount Moderate 01/24/22 0844   Drainage Description Bloody 01/24/22 0844   Non-staged Wound Description Full thickness 01/24/22 0844   Treatments Irrigation with NSS 01/24/22 0844       Wound 01/19/22 Toe (Comment  which one) Left (Active)   Date First Assessed/Time First Assessed: 01/19/22 1303   Location: (c) Toe (Comment  which one)  Wound Location Orientation: Left  Wound Description (Comments): Bar[place] 3       [REMOVED] Other Ulcers 09/26/18 Other (Comment) Inner;Right Necrotic Ulceration ,Erythema (Removed)   Resolved Date: 01/19/22  Date First Assessed/Time First Assessed: 09/26/18 3013   Location: (c) Other (Comment)  Orientation: Inner;Right  Wound Description (Comments): Necrotic Ulceration ,Erythema  Dressing Status: Open to air       Subjective:      38 year old male presents today for evaluation and management of L big toe wound  Pt taking the Clindamycin  Did not receive ciprofloxacin as pharmacy told him it was on "back order"  Pt in surgical shoe  Denies any pain to the digit  Unsure if the wound is getting better or not  Pt did get his xrays  States there is redness and drainage to the area  No pain secondary to neuropathy  The following portions of the patient's history were reviewed and updated as appropriate: allergies, current medications, past family history, past medical history, past social history, past surgical history and problem list     Review of Systems   Constitutional: Negative for chills and fever  Gastrointestinal: Negative for diarrhea, nausea and vomiting  All other systems reviewed and are negative  Objective:       Wound 01/19/22 Toe (Comment  which one) Left (Active)   Wound Image Images linked 01/24/22 0831   Wound Description Pink;Slough 01/24/22 0844   Felicity-wound Assessment Swelling;Callus; Maceration 01/24/22 0844   Wound Length (cm) 1 7 cm 01/24/22 0844   Wound Width (cm) 1 5 cm 01/24/22 0844   Wound Depth (cm) 0 6 cm 01/24/22 0844   Wound Surface Area (cm^2) 2 55 cm^2 01/24/22 0844   Wound Volume (cm^3) 1 53 cm^3 01/24/22 0844   Calculated Wound Volume (cm^3) 1 53 cm^3 01/24/22 0844   Change in Wound Size % 0 01/24/22 0844   Drainage Amount Moderate 01/24/22 0844   Drainage Description Bloody 01/24/22 0844   Non-staged Wound Description Full thickness 01/24/22 0844   Treatments Irrigation with NSS 01/24/22 0844       /82   Pulse 78   Temp (!) 100 7 °F (38 2 °C)   Resp 18     Physical Exam  Constitutional:       Appearance: Normal appearance  He is obese  Cardiovascular:      Pulses:           Dorsalis pedis pulses are 2+ on the right side and 2+ on the left side  Posterior tibial pulses are 2+ on the right side and 2+ on the left side  Musculoskeletal:        Feet:    Feet:      Right foot:      Protective Sensation: 8 sites tested  0 sites sensed  Toenail Condition: Right toenails are abnormally thick  Left foot:      Protective Sensation: 8 sites tested  0 sites sensed  Skin integrity: Ulcer present  Toenail Condition: Left toenails are abnormally thick  Neurological:      Mental Status: He is alert  Wound Instructions:  Orders Placed This Encounter   Procedures    Wound cleansing and dressings         Left great toe:      Patient to go to the ER as per Dr Priscila Peña your hands with soap and water  Remove old dressing, discard into plastic bag and place in trash  Cleanse the wound with soap and water prior to applying a clean dressing  Do not use tissue or cotton balls  Do not scrub the wound  Pat dry using gauze  Shower NO   Apply santyl, nickel thick to the wound bed  Cover with 4x4 gauze  Secure with rolled gauze  Change dressing every day   Wear surgical shoe at all times when ambulating  NO regular shoes or walking barefoot  Dr Beltran Liu has ordered a different antibiotic for you to start today  Finish the antibotic that you are currently taking until finished     Follow up with the wound center after hospitalization        Treatment in the wound center today: Cleansed with normal saline and applied dry dressing,     Standing Status:   Future     Standing Expiration Date:   1/24/2023        Diagnosis ICD-10-CM Associated Orders   1   Diabetic ulcer of toe of left foot associated with type 2 diabetes mellitus, with muscle involvement without evidence of necrosis ([facility] )  E11 621 Wound cleansing and dressings    L97 525    2  Diabetic polyneuropathy associated with type 2 diabetes mellitus ([facility])  E11 42 Wound cleansing and dressings   3  Localized edema  R60 0 Wound cleansing and dressings   4  Cellulitis of left foot  L03 116 Wound cleansing and dressings   5   Acute osteomyelitis of left ankle or foot ([facility])  M86 172 Wound cleansing and dressings

## 2022-01-25 LAB
ALBUMIN SERPL BCP-MCNC: 3.3 G/DL (ref 3–5.2)
ALP SERPL-CCNC: 77 U/L (ref 43–122)
ALT SERPL W P-5'-P-CCNC: 9 U/L
ANION GAP SERPL CALCULATED.3IONS-SCNC: 5 MMOL/L (ref 5–14)
AST SERPL W P-5'-P-CCNC: 22 U/L (ref 17–59)
BASOPHILS # BLD AUTO: 0.1 THOUSANDS/ΜL (ref 0–0.1)
BASOPHILS NFR BLD AUTO: 1 % (ref 0–1)
BILIRUB SERPL-MCNC: 0.52 MG/DL
BUN SERPL-MCNC: 28 MG/DL (ref 5–25)
CALCIUM ALBUM COR SERPL-MCNC: 9.1 MG/DL (ref 8.3–10.1)
CALCIUM SERPL-MCNC: 8.5 MG/DL (ref 8.4–10.2)
CHLORIDE SERPL-SCNC: 106 MMOL/L (ref 97–108)
CO2 SERPL-SCNC: 23 MMOL/L (ref 22–30)
CREAT SERPL-MCNC: 1.13 MG/DL (ref 0.7–1.5)
EOSINOPHIL # BLD AUTO: 0.3 THOUSAND/ΜL (ref 0–0.4)
EOSINOPHIL NFR BLD AUTO: 3 % (ref 0–6)
ERYTHROCYTE [DISTWIDTH] IN BLOOD BY AUTOMATED COUNT: 13.4 %
GFR SERPL CREATININE-BSD FRML MDRD: 78 ML/MIN/1.73SQ M
GLUCOSE SERPL-MCNC: 120 MG/DL (ref 70–99)
GLUCOSE SERPL-MCNC: 128 MG/DL (ref 65–140)
GLUCOSE SERPL-MCNC: 187 MG/DL (ref 65–140)
GLUCOSE SERPL-MCNC: 205 MG/DL (ref 65–140)
GLUCOSE SERPL-MCNC: 241 MG/DL (ref 65–140)
HCT VFR BLD AUTO: 30.8 % (ref 41–53)
HGB BLD-MCNC: 10.7 G/DL (ref 13.5–17.5)
LYMPHOCYTES # BLD AUTO: 3.4 THOUSANDS/ΜL (ref 0.5–4)
LYMPHOCYTES NFR BLD AUTO: 33 % (ref 25–45)
MCH RBC QN AUTO: 30.8 PG (ref 26–34)
MCHC RBC AUTO-ENTMCNC: 34.8 G/DL (ref 31–36)
MCV RBC AUTO: 89 FL (ref 80–100)
MONOCYTES # BLD AUTO: 0.9 THOUSAND/ΜL (ref 0.2–0.9)
MONOCYTES NFR BLD AUTO: 9 % (ref 1–10)
NEUTROPHILS # BLD AUTO: 5.6 THOUSANDS/ΜL (ref 1.8–7.8)
NEUTS SEG NFR BLD AUTO: 55 % (ref 45–65)
PLATELET # BLD AUTO: 275 THOUSANDS/UL (ref 150–450)
PMV BLD AUTO: 8.2 FL (ref 8.9–12.7)
POTASSIUM SERPL-SCNC: 4.3 MMOL/L (ref 3.6–5)
PROT SERPL-MCNC: 7.6 G/DL (ref 5.9–8.4)
RBC # BLD AUTO: 3.48 MILLION/UL (ref 4.5–5.9)
SODIUM SERPL-SCNC: 134 MMOL/L (ref 137–147)
WBC # BLD AUTO: 10.2 THOUSAND/UL (ref 4.5–11)

## 2022-01-25 PROCEDURE — 80053 COMPREHEN METABOLIC PANEL: CPT | Performed by: STUDENT IN AN ORGANIZED HEALTH CARE EDUCATION/TRAINING PROGRAM

## 2022-01-25 PROCEDURE — 82948 REAGENT STRIP/BLOOD GLUCOSE: CPT

## 2022-01-25 PROCEDURE — 85025 COMPLETE CBC W/AUTO DIFF WBC: CPT | Performed by: STUDENT IN AN ORGANIZED HEALTH CARE EDUCATION/TRAINING PROGRAM

## 2022-01-25 PROCEDURE — 99232 SBSQ HOSP IP/OBS MODERATE 35: CPT | Performed by: STUDENT IN AN ORGANIZED HEALTH CARE EDUCATION/TRAINING PROGRAM

## 2022-01-25 RX ORDER — INSULIN GLARGINE 100 [IU]/ML
35 INJECTION, SOLUTION SUBCUTANEOUS
Status: DISCONTINUED | OUTPATIENT
Start: 2022-01-25 | End: 2022-02-01 | Stop reason: HOSPADM

## 2022-01-25 RX ORDER — DEXTROSE AND SODIUM CHLORIDE 5; .45 G/100ML; G/100ML
100 INJECTION, SOLUTION INTRAVENOUS CONTINUOUS
Status: DISCONTINUED | OUTPATIENT
Start: 2022-01-26 | End: 2022-01-26

## 2022-01-25 RX ADMIN — CEFTRIAXONE 2000 MG: 2 INJECTION, SOLUTION INTRAVENOUS at 10:28

## 2022-01-25 RX ADMIN — HEPARIN SODIUM 5000 UNITS: 5000 INJECTION INTRAVENOUS; SUBCUTANEOUS at 15:01

## 2022-01-25 RX ADMIN — INSULIN LISPRO 2 UNITS: 100 INJECTION, SOLUTION INTRAVENOUS; SUBCUTANEOUS at 17:01

## 2022-01-25 RX ADMIN — VANCOMYCIN HYDROCHLORIDE 2000 MG: 1 INJECTION, POWDER, LYOPHILIZED, FOR SOLUTION INTRAVENOUS at 11:29

## 2022-01-25 RX ADMIN — HYDROCHLOROTHIAZIDE 25 MG: 25 TABLET ORAL at 08:58

## 2022-01-25 RX ADMIN — INSULIN GLARGINE 35 UNITS: 100 INJECTION, SOLUTION SUBCUTANEOUS at 21:35

## 2022-01-25 RX ADMIN — ATENOLOL 100 MG: 50 TABLET ORAL at 08:58

## 2022-01-25 RX ADMIN — INSULIN LISPRO 4 UNITS: 100 INJECTION, SOLUTION INTRAVENOUS; SUBCUTANEOUS at 12:40

## 2022-01-25 RX ADMIN — VANCOMYCIN HYDROCHLORIDE 2000 MG: 1 INJECTION, POWDER, LYOPHILIZED, FOR SOLUTION INTRAVENOUS at 23:11

## 2022-01-25 RX ADMIN — HEPARIN SODIUM 5000 UNITS: 5000 INJECTION INTRAVENOUS; SUBCUTANEOUS at 21:35

## 2022-01-25 NOTE — PLAN OF CARE
Problem: PAIN - ADULT  Goal: Verbalizes/displays adequate comfort level or baseline comfort level  Description: Interventions:  - Encourage patient to monitor pain and request assistance  - Assess pain using appropriate pain scale  - Administer analgesics based on type and severity of pain and evaluate response  - Implement non-pharmacological measures as appropriate and evaluate response  - Consider cultural and social influences on pain and pain management  - Notify physician/advanced practitioner if interventions unsuccessful or patient reports new pain  Outcome: Progressing     Problem: INFECTION - ADULT  Goal: Absence or prevention of progression during hospitalization  Description: INTERVENTIONS:  - Assess and monitor for signs and symptoms of infection  - Monitor lab/diagnostic results  - Monitor all insertion sites, i e  indwelling lines, tubes, and drains  - Monitor endotracheal if appropriate and nasal secretions for changes in amount and color  - [place] appropriate cooling/warming therapies per order  - Administer medications as ordered  - Instruct and encourage patient and family to use good hand hygiene technique  - Identify and instruct in appropriate isolation precautions for identified infection/condition  Outcome: Progressing     Problem: Knowledge Deficit  Goal: Patient/family/caregiver demonstrates understanding of disease process, treatment plan, medications, and discharge instructions  Description: Complete learning assessment and assess knowledge base    Interventions:  - Provide teaching at level of understanding  - Provide teaching via preferred learning methods  Outcome: Progressing     Problem: DISCHARGE PLANNING  Goal: Discharge to home or other facility with appropriate resources  Description: INTERVENTIONS:  - Identify barriers to discharge w/patient and caregiver  - Arrange for needed discharge resources and transportation as appropriate  - Identify discharge learning needs (meds, wound care, etc )  - Arrange for interpretive services to assist at discharge as needed  - Refer to Case Management Department for coordinating discharge planning if the patient needs post-hospital services based on physician/advanced practitioner order or complex needs related to functional status, cognitive ability, or social support system  Outcome: Progressing

## 2022-01-25 NOTE — PROGRESS NOTES
[facility]  Progress Note - Tami Bustos 1975, 40 y o  male MRN: [id number]  Unit/Bed#: 7T [facility] 717-01 Encounter: [id number]  Primary Care Provider: Seven Proctor MD   Date and time admitted to hospital: 1/24/2022  9:08 AM    * Acute osteomyelitis of left foot [facility])  Assessment & Plan  Patient sent to ED after outpatient visit with Podiatry  X-ray revealed findings suggestive of osteo with acute pathological fracture  MRI revealed: 1st toe distal phalanx osteomyelitis with adjacent sinus tract     -vancomycin and Rocephin  -podiatry consult  -carb consistent diet, NPO at midnight  -follow-up blood cultures  -plan for OR 1/26      Morbid obesity ([address] )  Assessment & Plan  -discussed diet and exercise    Diabetes mellitus [facility])  Assessment & Plan  Lab Results   Component Value Date    HGBA1C 10 6 (A) 01/13/2022       Recent Labs     01/24/22  1118 01/24/22  1620 01/24/22  2035 01/25/22  0528   POCGLU 173* 207* 166* 128       Blood Sugar Average: Last 72 hrs:     -hold home metformin  -insulin glargine 35 units q h s    -carb consistent diet  -sliding scale insulin with Accu-Cheks    Hypertension  Assessment & Plan  -continue home atenolol and HCTZ      VTE Pharmacologic Prophylaxis: VTE Score: 4 Moderate Risk (Score 3-4) - Pharmacological DVT Prophylaxis Ordered: heparin  Patient Centered Rounds: I performed bedside rounds with nursing staff today  Discussions with Specialists or Other Care Team Provider:  Podiatry    Education and Discussions with Family / Patient:  Patient, all questions answered    Time Spent for Care: 45 minutes  More than 50% of total time spent on counseling and coordination of care as described above      Current Length of Stay: 1 day(s)  Current Patient Status: Inpatient   Certification Statement: The patient will continue to require additional inpatient hospital stay due to Osteomyelitis requiring IV antibiotics and surgery planned for   Discharge Plan: Anticipate discharge in 48-72 hrs to discharge location to be determined pending rehab evaluations  Code Status: Level 1 - Full Code    Subjective:   Patient seen examined bedside  Patient reports no pain at this time  Patient resting in bed no apparent distress  Patient has no complaints at this time  Objective:     Vitals:   Temp (24hrs), Av 8 °F (36 6 °C), Min:97 8 °F (36 6 °C), Max:97 9 °F (36 6 °C)    Temp:  [97 8 °F (36 6 °C)-97 9 °F (36 6 °C)] 97 8 °F (36 6 °C)  HR:  [75-77] 75  Resp:  [18] 18  BP: (144-167)/(81-84) 167/81  SpO2:  [98 %-99 %] 98 %  Body mass index is 49 44 kg/m²  Input and Output Summary (last 24 hours): Intake/Output Summary (Last 24 hours) at 2022 0918  Last data filed at 2022 1700  Gross per 24 hour   Intake 480 ml   Output --   Net 480 ml       Physical Exam:   Physical Exam  Constitutional:       General: He is not in acute distress  Appearance: He is obese  He is not ill-appearing  Cardiovascular:      Rate and Rhythm: Normal rate and regular rhythm  Pulses: Normal pulses  Heart sounds: Normal heart sounds  Pulmonary:      Effort: Pulmonary effort is normal       Breath sounds: Normal breath sounds  Abdominal:      General: Abdomen is flat  Bowel sounds are normal       Palpations: Abdomen is soft  Musculoskeletal:      Cervical back: Normal range of motion  Skin:     Comments: Ulcer left great toe   Neurological:      General: No focal deficit present  Mental Status: He is alert and oriented to person, place, and time  Mental status is at baseline  Psychiatric:         Mood and Affect: Mood normal          Behavior: Behavior normal          Thought Content:  Thought content normal          Judgment: Judgment normal           Additional Data:     Labs:  Results from last 7 days   Lab Units 22  0456   WBC Thousand/uL 10 20   HEMOGLOBIN g/dL 10 7*   HEMATOCRIT % 30 8*   PLATELETS Thousands/uL 275 NEUTROS PCT % 55   LYMPHS PCT % 33   MONOS PCT % 9   EOS PCT % 3     Results from last 7 days   Lab Units 01/25/22  0456   SODIUM mmol/L 134*   POTASSIUM mmol/L 4 3   CHLORIDE mmol/L 106   CO2 mmol/L 23   BUN mg/dL 28*   CREATININE mg/dL 1 13   ANION GAP mmol/L 5   CALCIUM mg/dL 8 5   ALBUMIN g/dL 3 3   TOTAL BILIRUBIN mg/dL 0 52   ALK PHOS U/L 77   ALT U/L 9   AST U/L 22   GLUCOSE RANDOM mg/dL 120*         Results from last 7 days   Lab Units 01/25/22  0528 01/24/22  2035 01/24/22  1620 01/24/22  1118   POC GLUCOSE mg/dl 128 166* 207* 173*               Lines/Drains:  Invasive Devices  Report    Peripheral Intravenous Line            Peripheral IV 01/24/22 Right Antecubital <1 day                      Imaging: No pertinent imaging reviewed  Recent Cultures (last 7 days):   Results from last 7 days   Lab Units 01/24/22  0934 01/24/22  0930   BLOOD CULTURE   --  Received in Microbiology Lab  Culture in Progress  GRAM STAIN RESULT  Gram positive cocci in clusters*  --        Last 24 Hours Medication List:   Current Facility-Administered Medications   Medication Dose Route Frequency Provider Last Rate    acetaminophen  650 mg Oral Q6H PRN Jaison Alvarez DO      atenolol  100 mg Oral Daily Jaison Alvarez DO      cefTRIAXone  2,000 mg Intravenous Q24H Jaison Alvarez DO 2,000 mg (01/24/22 1414)    heparin (porcine)  5,000 Units Subcutaneous [facility] Jaison Alvarez DO      hydrochlorothiazide  25 mg Oral Daily Jaison Alvarez DO      insulin glargine  35 Units Subcutaneous HS Jaison Alvarez DO      insulin lispro  2-12 Units Subcutaneous TID AC Jaison Alvarez DO      vancomycin  20 mg/kg (Adjusted) Intravenous Q12H Jaison Alvarez DO 2,000 mg (01/24/22 2331)        Today, Patient Was Seen By: Jaison Alvarez DO    **Please Note: This note may have been constructed using a voice recognition system  **

## 2022-01-25 NOTE — UTILIZATION REVIEW
Initial Clinical Review    Admission: Date/Time/Statement:   Admission Orders (From admission, onward)     Ordered        01/24/22 0917  Inpatient Admission  Once                      Orders Placed This Encounter   Procedures    Inpatient Admission     Standing Status:   Standing     Number of Occurrences:   1     Order Specific Question:   Level of Care     Answer:   Med Surg [16]     Order Specific Question:   Estimated length of stay     Answer:   More than 2 Midnights     Order Specific Question:   Certification     Answer:   I certify that inpatient services are medically necessary for this patient for a duration of greater than two midnights  See H&P and MD Progress Notes for additional information about the patient's course of treatment  ED Arrival Information     Expected Arrival Acuity    - 1/24/2022 08:48 Urgent         Means of arrival Escorted by Service Admission type    Walk-In Self Hospitalist Urgent         Arrival complaint    open wound on big toe        Chief Complaint   Patient presents with    Wound Infection     left big toe infection; since after [place]-under wound care for 1 5 weeks; today sent here due to infection-taking oral antibotics since 1/15 and doesn't seem to be better; Blood sugar last night 190 as per patinet  Initial Presentation: 40 y o  male who presented himself from the wound care center to [facility]  Inpatient admission for evaluation and treatment of osteomyelitis of L foot  PMHx: Diabetes mellitus, Hyperlipemia, and Hypertension  Presented w/ poor healing wound of L hallux  Pain increased w/ walking and some improvement w/ rest  Trial of outpatient ABX unsuccessful  On exam, obese, open wound on L hallux w/ surrounding erythema  XR suggests osteomyelitis  Plan IV ABX, MRI, SSI w/ meals, 25 units glargine qHS, accuchecks ACHS, continue home antihypertensives  Podiatry consulted            Date: 01/25/22   Day 2: MRI revealed osteomyelitis  Plan: IV ABX, NPO after midnight for OR tomorrow, follow blood cultures, SSI w/ meals, incrase glargine to 35 units, accuchecks ACHS, continue home antihypertensives  Podiatry Consult: Pt w/ acute osteomyelitis of L big toe, diabetic ulcer w/ muscle involvement, diabetic neuropathy, L foot cellulitis and edema  On exam, cap refill > 3 seconds all digits, thin/shiny skin w/ pigmentary changes to b/l LE, nail thickening, L hallux wound that probes to periosteum  Plan: surgical amputation tomorrow, NPO after midnight, IV ABX, surgical shoe to be used post-operatively  ED Triage Vitals [01/24/22 0913]   Temperature Pulse Respirations Blood Pressure SpO2   98 5 °F (36 9 °C) 80 18 162/81 98 %      Temp Source Heart Rate Source Patient Position - Orthostatic VS BP Location FiO2 (%)   Oral Monitor Sitting Left arm --      Pain Score       No Pain          Wt Readings from Last 1 Encounters:   01/24/22 (!) 148 kg (325 lb 2 9 oz)     Additional Vital Signs:  Date/Time Temp Pulse Resp BP MAP (mmHg) SpO2 O2 Device   01/25/22 0727 97 8 °F (36 6 °C) 75 18 167/81 104 98 % None (Room air)   01/24/22 2316 97 8 °F (36 6 °C) 77 18 144/84 -- 99 % None (Room air)   01/24/22 1603 97 9 °F (36 6 °C) 75 18 159/83 107 98 % None (Room air)     Pertinent Labs/Diagnostic Test Results:   1/24 - MRI L foot  1st toe distal phalanx osteomyelitis with adjacent sinus tract  Evidence of mild 5th metatarsal stress response  No fracture  Mild, nonspecific fluid in the 1st-2nd and 2nd-3rd web spaces       1/24 - EKG  Normal sinus rhythm    Results from last 7 days   Lab Units 01/24/22  0939   SARS-COV-2  Negative     Results from last 7 days   Lab Units 01/25/22  0456 01/24/22  0930   WBC Thousand/uL 10 20 14 50*   HEMOGLOBIN g/dL 10 7* 11 5*   HEMATOCRIT % 30 8* 34 7*   PLATELETS Thousands/uL 275 456*   NEUTROS ABS Thousands/µL 5 60 10 60*     Results from last 7 days   Lab Units 01/25/22  0456 01/24/22  0930   SODIUM mmol/L 134* 134*   POTASSIUM mmol/L 4 3 5 1*   CHLORIDE mmol/L 106 103   CO2 mmol/L 23 24   ANION GAP mmol/L 5 7   BUN mg/dL 28* 39*   CREATININE mg/dL 1 13 1 50   EGFR ml/min/1 73sq m 78 55   CALCIUM mg/dL 8 5 9 1     Results from last 7 days   Lab Units 01/25/22  0456 01/24/22  0930   AST U/L 22 17   ALT U/L 9 13   ALK PHOS U/L 77 98   TOTAL PROTEIN g/dL 7 6 8 4   ALBUMIN g/dL 3 3 4 0   TOTAL BILIRUBIN mg/dL 0 52 0 47     Results from last 7 days   Lab Units 01/25/22  0528 01/24/22  2035 01/24/22  1620 01/24/22  1118   POC GLUCOSE mg/dl 128 166* 207* 173*     Results from last 7 days   Lab Units 01/25/22  0456 01/24/22  0930   GLUCOSE RANDOM mg/dL 120* 189*     Results from last 7 days   Lab Units 01/24/22  0930   SED RATE mm/hour 66*     Results from last 7 days   Lab Units 01/24/22  0939   INFLUENZA A PCR  Negative   INFLUENZA B PCR  Negative   RSV PCR  Negative     Results from last 7 days   Lab Units 01/24/22  0934 01/24/22  0930   GRAM STAIN RESULT  Gram positive cocci in clusters* Gram positive cocci in clusters*         Past Medical History:   Diagnosis Date    Diabetes mellitus ([place] [address] )     Hyperlipemia     Hypertension      Present on Admission:   Diabetes mellitus ([place] [address] )   Hypertension   Morbid obesity ([place] [address] )      Admitting Diagnosis: Osteomyelitis ([place] [address] ) [M86 9]  Diabetic ulcer of left great toe ([place] [address] ) [D71 512, L97 529]  Age/Sex: 40 y o  male  Admission Orders:  Consistent Carbohydrate Diet  SCDs      Scheduled Medications:  atenolol, 100 mg, Oral, Daily  cefTRIAXone, 2,000 mg, Intravenous, Q24H  heparin (porcine), 5,000 Units, Subcutaneous, Q8H STEFFI  hydrochlorothiazide, 25 mg, Oral, Daily  insulin glargine, 35 Units, Subcutaneous, HS  insulin lispro, 2-12 Units, Subcutaneous, TID AC  vancomycin, 20 mg/kg (Adjusted), Intravenous, Q12H    Continuous IV Infusions:  [START ON 1/26/2022] dextrose 5 % and sodium chloride 0 45 %, 100 mL/hr, Intravenous, Continuous    PRN Meds:  acetaminophen, 650 mg, Oral, Q6H PRN        IP CONSULT TO PODIATRY  IP CONSULT TO PHARMACY    Network Utilization Review Department  ATTENTION: Please call with any questions or concerns to [phone] and carefully listen to the prompts so that you are directed to the right person  All voicemails are confidential   Mabel Freire all requests for admission clinical reviews, approved or denied determinations and any other requests to dedicated fax number below belonging to the campus where the patient is receiving treatment   List of dedicated fax numbers for the Facilities:  [address] DENIALS (Administrative/Medical Necessity) [phone]   [address] (Maternity/NICU/Pediatrics) [phone]   [address]  [address] [facility] [place] [address] [id number] [place] [address] [address]ay[address] Patel [address] [facility] [phone]

## 2022-01-25 NOTE — PROGRESS NOTES
Vancomycin IV Pharmacy-to-Dose Consultation    Angeles Santillan is a 40 y o  male with osteomyelitis who is currently receiving Vancomycin IV with management by the Pharmacy Consult service  Assessment/Plan:  The patient was reviewed  Renal function is stable and no signs or symptoms of nephrotoxicity and/or infusion reactions were documented in the chart  Based on todays assessment (serum creatinine= 1 13), continue current vancomycin (day # 2) dosing of 2000 mg IV every 12 hours, with a plan for trough to be drawn at 1100 on 1/26/22  We will continue to follow the patients culture results and clinical progress daily      Queta Bird, Pharmacist

## 2022-01-25 NOTE — PLAN OF CARE
Problem: Potential for Falls  Goal: Patient will remain free of falls  Description: INTERVENTIONS:  - Educate patient/family on patient safety including physical limitations  - Instruct patient to call for assistance with activity   - Consult OT/PT to assist with strengthening/mobility   - Keep Call bell within reach  - Keep bed low and locked with side rails adjusted as appropriate  - Keep care items and personal belongings within reach  - Initiate and maintain comfort rounds  - Make Fall Risk Sign visible to staff    - Apply yellow socks and bracelet for high fall risk patients  - Consider moving patient to room near nurses station  Outcome: Progressing     Problem: PAIN - ADULT  Goal: Verbalizes/displays adequate comfort level or baseline comfort level  Description: Interventions:  - Encourage patient to monitor pain and request assistance  - Assess pain using appropriate pain scale  - Administer analgesics based on type and severity of pain and evaluate response  - Implement non-pharmacological measures as appropriate and evaluate response  - Consider cultural and social influences on pain and pain management  - Notify physician/advanced practitioner if interventions unsuccessful or patient reports new pain  Outcome: Progressing     Problem: INFECTION - ADULT  Goal: Absence or prevention of progression during hospitalization  Description: INTERVENTIONS:  - Assess and monitor for signs and symptoms of infection  - Monitor lab/diagnostic results  - Monitor all insertion sites, i e  indwelling lines, tubes, and drains  - Monitor endotracheal if appropriate and nasal secretions for changes in amount and color  - [place] appropriate cooling/warming therapies per order  - Administer medications as ordered  - Instruct and encourage patient and family to use good hand hygiene technique  - Identify and instruct in appropriate isolation precautions for identified infection/condition  Outcome: Progressing  Goal: Absence of fever/infection during neutropenic period  Description: INTERVENTIONS:  - Monitor WBC    Outcome: Progressing     Problem: SAFETY ADULT  Goal: Patient will remain free of falls  Description: INTERVENTIONS:  - Educate patient/family on patient safety including physical limitations  - Instruct patient to call for assistance with activity   - Consult OT/PT to assist with strengthening/mobility   - Keep Call bell within reach  - Keep bed low and locked with side rails adjusted as appropriate  - Keep care items and personal belongings within reach  - Initiate and maintain comfort rounds  - Make Fall Risk Sign visible to staff    - Apply yellow socks and bracelet for high fall risk patients  - Consider moving patient to room near nurses station  Outcome: Progressing  Goal: Maintain or return to baseline ADL function  Description: INTERVENTIONS:  -  Assess patient's ability to carry out ADLs; assess patient's baseline for ADL function and identify physical deficits which impact ability to perform ADLs (bathing, care of mouth/teeth, toileting, grooming, dressing, etc )  - Assess/evaluate cause of self-care deficits   - Assess range of motion  - Assess patient's mobility; develop plan if impaired  - Assess patient's need for assistive devices and provide as appropriate  - Encourage maximum independence but intervene and supervise when necessary  - Involve family in performance of ADLs  - Assess for home care needs following discharge   - Consider OT consult to assist with ADL evaluation and planning for discharge  - Provide patient education as appropriate  Outcome: Progressing  Goal: Maintains/Returns to pre admission functional level  Description: INTERVENTIONS:  - Perform BMAT or MOVE assessment daily    - Set and communicate daily mobility goal to care team and patient/family/caregiver     - Collaborate with rehabilitation services on mobility goals if consulted    - Out of bed for toileting  - Record patient progress and toleration of activity level   Outcome: Progressing     Problem: DISCHARGE PLANNING  Goal: Discharge to home or other facility with appropriate resources  Description: INTERVENTIONS:  - Identify barriers to discharge w/patient and caregiver  - Arrange for needed discharge resources and transportation as appropriate  - Identify discharge learning needs (meds, wound care, etc )  - Arrange for interpretive services to assist at discharge as needed  - Refer to Case Management Department for coordinating discharge planning if the patient needs post-hospital services based on physician/advanced practitioner order or complex needs related to functional status, cognitive ability, or social support system  Outcome: Progressing     Problem: Knowledge Deficit  Goal: Patient/family/caregiver demonstrates understanding of disease process, treatment plan, medications, and discharge instructions  Description: Complete learning assessment and assess knowledge base  Interventions:  - Provide teaching at level of understanding  - Provide teaching via preferred learning methods  Outcome: Progressing     Problem: Nutrition/Hydration-ADULT  Goal: Nutrient/Hydration intake appropriate for improving, restoring or maintaining nutritional needs  Description: Monitor and assess patient's nutrition/hydration status for malnutrition  Collaborate with interdisciplinary team and initiate plan and interventions as ordered  Monitor patient's weight and dietary intake as ordered or per policy  Utilize nutrition screening tool and intervene as necessary  Determine patient's food preferences and provide high-protein, high-caloric foods as appropriate       INTERVENTIONS:  - Monitor oral intake, urinary output, labs, and treatment plans  - Assess nutrition and hydration status and recommend course of action  - Evaluate amount of meals eaten  - Assist patient with eating if necessary   - Allow adequate time for meals  - Recommend/ encourage appropriate diets, oral nutritional supplements, and vitamin/mineral supplements  - Order, calculate, and assess calorie counts as needed  - Recommend, monitor, and adjust tube feedings and TPN/PPN based on assessed needs  - Assess need for intravenous fluids  - Provide specific nutrition/hydration education as appropriate  - Include patient/family/caregiver in decisions related to nutrition  Outcome: Progressing

## 2022-01-25 NOTE — CONSULTS
Consult Note- Podiatry   Domenica Mu 40 y o  male MRN: [id number]  Unit/Bed#: 7T [facility] 717-01 Encounter: [id number]    Assessment/Plan     Assessment:  1  Acute osteomyelitis of the L big toe  2  Diabetic ulcer with muscle involvement  3  Diabetic neuropathy  4  Cellulitis L foot  5  Edeam L      Plan:  - -pt eval and managed    - Number and complexity of problems addressed:  1 undiagnosed new problem with uncertain prognosis   as shown    - Amount/complexity of data reviewed and analyzed:     Category 1: prior patient notes were analyzed today before evaluating and managing patient  All PMH were discussed with pt today  - Risk of complications: moderate risk of morbidity from additional testing or treatment involved with this patient, which includes but not limited to:    - discussed anatomy, condition, treatment plan and options  They were instructed on proper foot care  The patient was seen today for greater than total of  45-59 minutes     This is total time spent today involving both face-to-face time and non face-to-face time  This time spent includes  reviewing their past medical history  , performing a medically appropriate examination and evaluation of the patient, counseling and educating the patient,  documenting all findings in EMR, and independently interpreting results and communicating results with  patient     and discussing their condition and treatment options, risks, and potential complications  I have discussed the findings of this examination with the patient  The discussion included a complete verbal explanation of the examination results, diagnosis and planned treatment(s)  A schedule for future care needs was explained  The patient has verbalized the understanding of these instructions at this time  If any questions should arise after returning home I have encouraged the patient to feel free to call the office      - xrays and MRI d/w pt in great detail  - pt for surgical amputation tomorrow with me  - consent form signed and in chart  - NPO p MN placed for patient  - all questions and concerns addressed with pt today  - I will order surgical shoe in order to utilize post-operatively  - continue with IV antibiotics per Primary team  - will continue to follow  thank you for the consult  History of Present Illness     HPI:  Robert Ansari is a 40 y o  male who presents with L big toe wound  I first saw patient in wound care center 1/19/2022  I had patient follow up with me yesterday  xrays were positive for bone involvement  Patient admitted and MRi was obtained  Shows bone infection  Pt without pain to the foot  No other complaints  Inpatient consult to Podiatry  Consult performed by: Gladys Boyd DPM  Consult ordered by: Conrado Barone DO        Review of Systems   Constitutional: Negative  HENT: Negative  Eyes: Negative  Respiratory: Negative  Cardiovascular: Negative  Gastrointestinal: Negative  Musculoskeletal: Negative   Skin: L big toe wound   Neurological: Negative          Historical Information   Past Medical History:   Diagnosis Date    Diabetes mellitus ([address] )     Hyperlipemia     Hypertension      Past Surgical History:   Procedure Laterality Date    CATARACT EXTRACTION       Social History   Social History     Substance and Sexual Activity   Alcohol Use Not Currently     Social History     Substance and Sexual Activity   Drug Use No     Social History     Tobacco Use   Smoking Status Former Smoker   Smokeless Tobacco Never Used   Tobacco Comment    [employer] says never smoker and no passive smoke exposure     Family History:   Family History   Problem Relation Age of Onset    Diabetes Mother     Breast cancer Mother     Heart disease Father     Prostate cancer Father        Meds/Allergies   Medications Prior to Admission   Medication    atenolol (TENORMIN) 100 mg tablet    collagenase (SANTYL) ointment    hydrochlorothiazide (HYDRODIURIL) 25 mg tablet    insulin degludec Slaughtermarlin Witt FlexTouch) 200 units/mL CONCENTRATED U-200 injection pen    Insulin Pen Needle (PEN NEEDLES) 31G X 6 MM MISC    metFORMIN (GLUCOPHAGE-XR) 500 mg 24 hr tablet    Semaglutide,0 25 or 0 5MG/DOS, (Ozempic, 0 25 or 0 5 MG/DOSE,) 2 MG/1 5ML SOPN    nystatin (MYCOSTATIN) cream    triamcinolone (KENALOG) 0 1 % cream     Allergies   Allergen Reactions    Penicillins Other (See Comments)     Tolerated Cefazolin previously in 2018 without a problem         Objective   First Vitals:   Blood Pressure: 162/81 (01/24/22 0913)  Pulse: 80 (01/24/22 0913)  Temperature: 98 5 °F (36 9 °C) (01/24/22 0913)  Temp Source: Oral (01/24/22 0913)  Respirations: 18 (01/24/22 0913)  Height: 5' 8" (172 7 cm) (01/24/22 0913)  Weight - Scale: (!) 148 kg (325 lb 2 9 oz) (01/24/22 0913)  SpO2: 98 % (01/24/22 0913)    Current Vitals:   Blood Pressure: 167/81 (01/25/22 0727)  Pulse: 75 (01/25/22 0727)  Temperature: 97 8 °F (36 6 °C) (01/25/22 0727)  Temp Source: Temporal (01/25/22 0727)  Respirations: 18 (01/25/22 0727)  Height: 5' 8" (172 7 cm) (01/24/22 0913)  Weight - Scale: (!) 148 kg (325 lb 2 9 oz) (01/24/22 0913)  SpO2: 98 % (01/25/22 0727)    /81 (BP Location: Left arm)   Pulse 75   Temp 97 8 °F (36 6 °C) (Temporal)   Resp 18   Ht 5' 8" (1 727 m)   Wt (!) 148 kg (325 lb 2 9 oz)   SpO2 98%   BMI 49 44 kg/m²     General Appearance:    Alert, cooperative, no distress   Head:    Normocephalic, without obvious abnormality, atraumatic   Eyes:    PERRL, conjunctiva/corneas clear, EOM's intact            Nose:   Moist mucous membranes, no drainage or sinus tenderness   Throat:   No tenderness, no exudates   Neck:   Supple, symmetrical, trachea midline, no JVD   Back:     Symmetric, no CVA tenderness   Lungs:     Clear to auscultation bilaterally, respirations unlabored   Chest wall:    No tenderness or deformity   Heart:    Regular rate and rhythm, S1 and S2 normal, no murmur, rub   or gallop Abdomen:     Soft, non-tender, bowel sounds active all four quadrants,     no masses, no organomegaly     Extremities:   MMT is 4/5 to all compartments of the LE, +1/4 edema B/L, Digital ROM is intact,    Pulses:   R DP is +1/4, R PT is +1/4, L DP is +1/4, L PT is +1/4, CFT< 3sec to all digits  Thin/shiny skin noted to the B/L LE, pigmentary changes to B/L LE  Absent digital hair growth b/l  Nail thickening b/l  Skin:   L hallux wound, measuring 1 7cmx1 6cmx0 6cm, probe to periosteum, erythema, no active drainage, no malodor, fibrotic wound bed, no pain        Neurologic:   CNII-XII intact  Normal strength, sensation and reflexes       Throughout  Gross sensation is intact   Protective sensation is absent       Lab Results:   Admission on 01/24/2022   Component Date Value    WBC 01/24/2022 14 50*    RBC 01/24/2022 3 92*    Hemoglobin 01/24/2022 11 5*    Hematocrit 01/24/2022 34 7*    MCV 01/24/2022 89     MCH 01/24/2022 29 3     MCHC 01/24/2022 33 1     RDW 01/24/2022 13 3     MPV 01/24/2022 7 5*    Platelets 77/89/3327 456*    Neutrophils Relative 01/24/2022 73*    Lymphocytes Relative 01/24/2022 17*    Monocytes Relative 01/24/2022 9     Eosinophils Relative 01/24/2022 2     Basophils Relative 01/24/2022 1     Neutrophils Absolute 01/24/2022 10 60*    Lymphocytes Absolute 01/24/2022 2 40     Monocytes Absolute 01/24/2022 1 20*    Eosinophils Absolute 01/24/2022 0 20     Basophils Absolute 01/24/2022 0 10     Sodium 01/24/2022 134*    Potassium 01/24/2022 5 1*    Chloride 01/24/2022 103     CO2 01/24/2022 24     ANION GAP 01/24/2022 7     BUN 01/24/2022 39*    Creatinine 01/24/2022 1 50     Glucose 01/24/2022 189*    Calcium 01/24/2022 9 1     AST 01/24/2022 17     ALT 01/24/2022 13     Alkaline Phosphatase 01/24/2022 98     Total Protein 01/24/2022 8 4     Albumin 01/24/2022 4 0     Total Bilirubin 01/24/2022 0 47     eGFR 01/24/2022 55     Gram Stain Result 01/24/2022 Gram positive cocci in clusters*    Gram Stain Result 01/24/2022 Gram positive cocci in clusters*    Sed Rate 01/24/2022 66*    CRP, High Sensitivity 01/24/2022 83 50     SARS-CoV-2 01/24/2022 Negative     INFLUENZA A PCR 01/24/2022 Negative     INFLUENZA B PCR 01/24/2022 Negative     RSV PCR 01/24/2022 Negative     POC Glucose 01/24/2022 173*    POC Glucose 01/24/2022 207*    POC Glucose 01/24/2022 166*    Ventricular Rate 01/24/2022 77     Atrial Rate 01/24/2022 77     W[place] Interval 01/24/2022 176     QRSD Interval 01/24/2022 88     QT Interval 01/24/2022 348     QTC Interval 01/24/2022 393     P Axis 01/24/2022 34     QRS Axis 01/24/2022 54     T Wave Axis 01/24/2022 16     Ventricular Rate 01/24/2022 78     Atrial Rate 01/24/2022 78     W[place] Interval 01/24/2022 176     QRSD Interval 01/24/2022 88     QT Interval 01/24/2022 348     QTC Interval 01/24/2022 396     P Axis 01/24/2022 37     QRS Axis 01/24/2022 51     T Wave Axis 01/24/2022 15     Sodium 01/25/2022 134*    Potassium 01/25/2022 4 3     Chloride 01/25/2022 106     CO2 01/25/2022 23     ANION GAP 01/25/2022 5     BUN 01/25/2022 28*    Creatinine 01/25/2022 1 13     Glucose 01/25/2022 120*    Calcium 01/25/2022 8 5     Corrected Calcium 01/25/2022 9 1     AST 01/25/2022 22     ALT 01/25/2022 9     Alkaline Phosphatase 01/25/2022 77     Total Protein 01/25/2022 7 6     Albumin 01/25/2022 3 3     Total Bilirubin 01/25/2022 0 52     eGFR 01/25/2022 78     WBC 01/25/2022 10 20     RBC 01/25/2022 3 48*    Hemoglobin 01/25/2022 10 7*    Hematocrit 01/25/2022 30 8*    MCV 01/25/2022 89     MCH 01/25/2022 30 8     MCHC 01/25/2022 34 8     RDW 01/25/2022 13 4     MPV 01/25/2022 8 2*    Platelets 56/53/3995 275     Neutrophils Relative 01/25/2022 55     Lymphocytes Relative 01/25/2022 33     Monocytes Relative 01/25/2022 9     Eosinophils Relative 01/25/2022 3     Basophils Relative 01/25/2022 1     Neutrophils Absolute 01/25/2022 5 60     Lymphocytes Absolute 01/25/2022 3 40     Monocytes Absolute 01/25/2022 0 90     Eosinophils Absolute 01/25/2022 0 30     Basophils Absolute 01/25/2022 0 10     POC Glucose 01/25/2022 128     POC Glucose 01/25/2022 205*     Imaging: I have personally reviewed pertinent films in PACS  EKG, Pathology, and Other Studies: I have personally reviewed pertinent reports        Code Status: Level 1 - Full Code  Advance Directive and Living Will:      Power of :    POLST:

## 2022-01-26 ENCOUNTER — ANESTHESIA (INPATIENT)
Dept: PERIOP | Facility: HOSPITAL | Age: 47
DRG: 617 | End: 2022-01-26
Payer: COMMERCIAL

## 2022-01-26 ENCOUNTER — APPOINTMENT (INPATIENT)
Dept: RADIOLOGY | Facility: HOSPITAL | Age: 47
DRG: 617 | End: 2022-01-26
Payer: COMMERCIAL

## 2022-01-26 ENCOUNTER — ANESTHESIA EVENT (INPATIENT)
Dept: PERIOP | Facility: HOSPITAL | Age: 47
DRG: 617 | End: 2022-01-26
Payer: COMMERCIAL

## 2022-01-26 LAB
ANION GAP SERPL CALCULATED.3IONS-SCNC: 4 MMOL/L (ref 5–14)
BUN SERPL-MCNC: 21 MG/DL (ref 5–25)
CALCIUM SERPL-MCNC: 8.8 MG/DL (ref 8.4–10.2)
CHLORIDE SERPL-SCNC: 104 MMOL/L (ref 97–108)
CO2 SERPL-SCNC: 28 MMOL/L (ref 22–30)
CREAT SERPL-MCNC: 1.16 MG/DL (ref 0.7–1.5)
ERYTHROCYTE [DISTWIDTH] IN BLOOD BY AUTOMATED COUNT: 13 %
GFR SERPL CREATININE-BSD FRML MDRD: 75 ML/MIN/1.73SQ M
GLUCOSE SERPL-MCNC: 128 MG/DL (ref 65–140)
GLUCOSE SERPL-MCNC: 137 MG/DL (ref 65–140)
GLUCOSE SERPL-MCNC: 156 MG/DL (ref 65–140)
GLUCOSE SERPL-MCNC: 170 MG/DL (ref 70–99)
GLUCOSE SERPL-MCNC: 201 MG/DL (ref 65–140)
HCT VFR BLD AUTO: 30.5 % (ref 41–53)
HGB BLD-MCNC: 10.3 G/DL (ref 13.5–17.5)
MCH RBC QN AUTO: 29.6 PG (ref 26–34)
MCHC RBC AUTO-ENTMCNC: 33.7 G/DL (ref 31–36)
MCV RBC AUTO: 88 FL (ref 80–100)
PLATELET # BLD AUTO: 379 THOUSANDS/UL (ref 150–450)
PMV BLD AUTO: 7.2 FL (ref 8.9–12.7)
POTASSIUM SERPL-SCNC: 5 MMOL/L (ref 3.6–5)
RBC # BLD AUTO: 3.48 MILLION/UL (ref 4.5–5.9)
SODIUM SERPL-SCNC: 136 MMOL/L (ref 137–147)
VANCOMYCIN TROUGH SERPL-MCNC: 17.4 UG/ML (ref 10–20)
WBC # BLD AUTO: 9.8 THOUSAND/UL (ref 4.5–11)

## 2022-01-26 PROCEDURE — 88304 TISSUE EXAM BY PATHOLOGIST: CPT | Performed by: PATHOLOGY

## 2022-01-26 PROCEDURE — 73630 X-RAY EXAM OF FOOT: CPT

## 2022-01-26 PROCEDURE — 88311 DECALCIFY TISSUE: CPT | Performed by: PATHOLOGY

## 2022-01-26 PROCEDURE — 87075 CULTR BACTERIA EXCEPT BLOOD: CPT | Performed by: PODIATRIST

## 2022-01-26 PROCEDURE — 87205 SMEAR GRAM STAIN: CPT | Performed by: PODIATRIST

## 2022-01-26 PROCEDURE — 0Y6Q0Z3 DETACHMENT AT LEFT 1ST TOE, LOW, OPEN APPROACH: ICD-10-PCS | Performed by: PODIATRIST

## 2022-01-26 PROCEDURE — 99232 SBSQ HOSP IP/OBS MODERATE 35: CPT | Performed by: STUDENT IN AN ORGANIZED HEALTH CARE EDUCATION/TRAINING PROGRAM

## 2022-01-26 PROCEDURE — 87070 CULTURE OTHR SPECIMN AEROBIC: CPT | Performed by: PODIATRIST

## 2022-01-26 PROCEDURE — 82948 REAGENT STRIP/BLOOD GLUCOSE: CPT

## 2022-01-26 PROCEDURE — 80048 BASIC METABOLIC PNL TOTAL CA: CPT | Performed by: STUDENT IN AN ORGANIZED HEALTH CARE EDUCATION/TRAINING PROGRAM

## 2022-01-26 PROCEDURE — 85027 COMPLETE CBC AUTOMATED: CPT | Performed by: STUDENT IN AN ORGANIZED HEALTH CARE EDUCATION/TRAINING PROGRAM

## 2022-01-26 PROCEDURE — 80202 ASSAY OF VANCOMYCIN: CPT | Performed by: STUDENT IN AN ORGANIZED HEALTH CARE EDUCATION/TRAINING PROGRAM

## 2022-01-26 PROCEDURE — 87186 SC STD MICRODIL/AGAR DIL: CPT | Performed by: PODIATRIST

## 2022-01-26 PROCEDURE — 99255 IP/OBS CONSLTJ NEW/EST HI 80: CPT | Performed by: INTERNAL MEDICINE

## 2022-01-26 RX ORDER — FENTANYL CITRATE 50 UG/ML
INJECTION, SOLUTION INTRAMUSCULAR; INTRAVENOUS AS NEEDED
Status: DISCONTINUED | OUTPATIENT
Start: 2022-01-26 | End: 2022-01-26

## 2022-01-26 RX ORDER — PROPOFOL 10 MG/ML
INJECTION, EMULSION INTRAVENOUS AS NEEDED
Status: DISCONTINUED | OUTPATIENT
Start: 2022-01-26 | End: 2022-01-26

## 2022-01-26 RX ORDER — MIDAZOLAM HYDROCHLORIDE 2 MG/2ML
INJECTION, SOLUTION INTRAMUSCULAR; INTRAVENOUS AS NEEDED
Status: DISCONTINUED | OUTPATIENT
Start: 2022-01-26 | End: 2022-01-26

## 2022-01-26 RX ORDER — CEFAZOLIN SODIUM 2 G/50ML
2000 SOLUTION INTRAVENOUS EVERY 8 HOURS
Status: DISCONTINUED | OUTPATIENT
Start: 2022-01-26 | End: 2022-02-01 | Stop reason: HOSPADM

## 2022-01-26 RX ORDER — FENTANYL CITRATE/PF 50 MCG/ML
25 SYRINGE (ML) INJECTION
Status: DISCONTINUED | OUTPATIENT
Start: 2022-01-26 | End: 2022-01-26 | Stop reason: HOSPADM

## 2022-01-26 RX ORDER — PROPOFOL 10 MG/ML
INJECTION, EMULSION INTRAVENOUS CONTINUOUS PRN
Status: DISCONTINUED | OUTPATIENT
Start: 2022-01-26 | End: 2022-01-26

## 2022-01-26 RX ORDER — OXYCODONE HYDROCHLORIDE 5 MG/1
5 TABLET ORAL EVERY 4 HOURS PRN
Status: DISCONTINUED | OUTPATIENT
Start: 2022-01-26 | End: 2022-02-01 | Stop reason: HOSPADM

## 2022-01-26 RX ORDER — MAGNESIUM HYDROXIDE 1200 MG/15ML
LIQUID ORAL AS NEEDED
Status: DISCONTINUED | OUTPATIENT
Start: 2022-01-26 | End: 2022-01-26 | Stop reason: HOSPADM

## 2022-01-26 RX ORDER — SODIUM CHLORIDE, SODIUM LACTATE, POTASSIUM CHLORIDE, CALCIUM CHLORIDE 600; 310; 30; 20 MG/100ML; MG/100ML; MG/100ML; MG/100ML
INJECTION, SOLUTION INTRAVENOUS CONTINUOUS PRN
Status: DISCONTINUED | OUTPATIENT
Start: 2022-01-26 | End: 2022-01-26

## 2022-01-26 RX ORDER — ONDANSETRON 2 MG/ML
4 INJECTION INTRAMUSCULAR; INTRAVENOUS ONCE AS NEEDED
Status: DISCONTINUED | OUTPATIENT
Start: 2022-01-26 | End: 2022-01-26 | Stop reason: HOSPADM

## 2022-01-26 RX ORDER — KETAMINE HYDROCHLORIDE 50 MG/ML
INJECTION, SOLUTION, CONCENTRATE INTRAMUSCULAR; INTRAVENOUS AS NEEDED
Status: DISCONTINUED | OUTPATIENT
Start: 2022-01-26 | End: 2022-01-26

## 2022-01-26 RX ORDER — ONDANSETRON 2 MG/ML
INJECTION INTRAMUSCULAR; INTRAVENOUS AS NEEDED
Status: DISCONTINUED | OUTPATIENT
Start: 2022-01-26 | End: 2022-01-26

## 2022-01-26 RX ADMIN — CEFAZOLIN SODIUM 2000 MG: 2 SOLUTION INTRAVENOUS at 20:21

## 2022-01-26 RX ADMIN — DEXTROSE AND SODIUM CHLORIDE 100 ML/HR: 5; .45 INJECTION, SOLUTION INTRAVENOUS at 05:12

## 2022-01-26 RX ADMIN — SODIUM CHLORIDE, SODIUM LACTATE, POTASSIUM CHLORIDE, AND CALCIUM CHLORIDE: .6; .31; .03; .02 INJECTION, SOLUTION INTRAVENOUS at 15:09

## 2022-01-26 RX ADMIN — VANCOMYCIN HYDROCHLORIDE 2000 MG: 1 INJECTION, POWDER, LYOPHILIZED, FOR SOLUTION INTRAVENOUS at 23:59

## 2022-01-26 RX ADMIN — HYDROCHLOROTHIAZIDE 25 MG: 25 TABLET ORAL at 09:31

## 2022-01-26 RX ADMIN — CEFTRIAXONE 2000 MG: 2 INJECTION, SOLUTION INTRAVENOUS at 09:31

## 2022-01-26 RX ADMIN — KETAMINE HYDROCHLORIDE 50 MG: 50 INJECTION, SOLUTION INTRAMUSCULAR; INTRAVENOUS at 15:26

## 2022-01-26 RX ADMIN — MIDAZOLAM 2 MG: 1 INJECTION INTRAMUSCULAR; INTRAVENOUS at 15:22

## 2022-01-26 RX ADMIN — FENTANYL CITRATE 25 MCG: 50 INJECTION, SOLUTION INTRAMUSCULAR; INTRAVENOUS at 15:28

## 2022-01-26 RX ADMIN — FENTANYL CITRATE 50 MCG: 50 INJECTION, SOLUTION INTRAMUSCULAR; INTRAVENOUS at 15:25

## 2022-01-26 RX ADMIN — PROPOFOL 30 MG: 10 INJECTION, EMULSION INTRAVENOUS at 15:31

## 2022-01-26 RX ADMIN — ONDANSETRON 4 MG: 2 INJECTION INTRAMUSCULAR; INTRAVENOUS at 15:34

## 2022-01-26 RX ADMIN — INSULIN GLARGINE 35 UNITS: 100 INJECTION, SOLUTION SUBCUTANEOUS at 21:37

## 2022-01-26 RX ADMIN — ATENOLOL 100 MG: 50 TABLET ORAL at 09:31

## 2022-01-26 RX ADMIN — VANCOMYCIN HYDROCHLORIDE 2000 MG: 1 INJECTION, POWDER, LYOPHILIZED, FOR SOLUTION INTRAVENOUS at 11:46

## 2022-01-26 RX ADMIN — PROPOFOL 50 MCG/KG/MIN: 10 INJECTION, EMULSION INTRAVENOUS at 15:36

## 2022-01-26 RX ADMIN — HEPARIN SODIUM 5000 UNITS: 5000 INJECTION INTRAVENOUS; SUBCUTANEOUS at 21:37

## 2022-01-26 RX ADMIN — FENTANYL CITRATE 25 MCG: 50 INJECTION, SOLUTION INTRAMUSCULAR; INTRAVENOUS at 15:31

## 2022-01-26 NOTE — OP NOTE
OPERATIVE REPORT - Podiatry  PATIENT NAME: Alvin Villanueva    :  1975  MRN: [id number]  Pt Location:  OR ROOM 10    SURGERY DATE: 2022    Surgeon(s) and Role:     * Miguel Rivas DPM - Primary     * Elaine Lopez DPM - Assisting    Pre-op Diagnosis:  Acute osteomyelitis of left foot ([address] ) [M86 172]    Post-Op Diagnosis Codes:     * Acute osteomyelitis of left foot ([address] ) [M86 172]    Procedure(s) (LRB):  PARTIAL LEFT HALLUX AMPUTATION (Left)    Specimen(s):  ID Type Source Tests Collected by Time Destination   1 : left distal phalynx of hallux great toe Tissue Toe, Left TISSUE EXAM Miguel Rivas DPM 2022 1542    A : proximal segment of hallux of left great toe Tissue Toe, Left ANAEROBIC CULTURE AND GRAM STAIN, CULTURE, TISSUE AND GRAM STAIN Miguel Rivas DPM 2022 1550        Estimated Blood Loss:   Minimal    Drains:  * No LDAs found *    Anesthesia Type:   IV Sedation with Anesthesia with 10 ml of 1% Lidocaine and 0 5% Bupivacaine in a 1:1 mixture    Hemostasis:  -Esmarch used as a local tourniquet to the hallux  -manual compression  -atraumatic technique    Materials:  * No implants in log *  -3-0 Vicryl  -3-0 nylon    Operative Findings:  Consistent with diagnosis    Complications:   None    Procedure and Technique:     Under mild sedation, the patient was brought into the operating room and remained on their stretcher in the supine position  IV sedation was achieved by anesthesia team and a universal timeout was performed where all parties are in agreement of correct patient, correct procedure and correct site  A pneumatic tourniquet was then placed over the patient's left lower extremity with ample padding  A hallux block was performed consisting of 10 ml of 1% Lidocaine and 0 5% Bupivacaine in a 1:1 mixture  The foot was then prepped and draped in the usual aseptic manner  An esmarch bandage was used to exsangunate the toe in was then clamped at the base of the toe      Attention was then directed to the patient's left foot  A surgical marker was utilized to draw out a fishmouth type incision around the patient's hallux IPJ  A surgical blade was then utilized to make a full-thickness incision in accordance with the drawn out incision line  The distal phalanx was then carefully disarticulated at the hallux IPJ  Soft tissue structures from surrounding the joint were freed utilizing a surgical blade, the distal phalanx was then passed off the field for pathological and microbiological examination  The proximal phalanx head was then examined and was found to be white in color as well as hard when touched with instrumentation  These are signs of healthy bone  Sagittal saw was then utilized to make a through and through cut of the distal aspect of the proximal phalanx, the capital fragment was passed off the field and sent for pathological examination as a clean margin  Incision site was then examined and all nonviable, devitalized, poorly vascularized, necrotic, fibrotic, and otherwise unhealthy tissues were sharply excised the incision site utilizing a surgical blade  The incision site was then irrigated with a bulb syringe and normal sterile saline  Subcutaneous tissues were closed utilizing 3-0 Vicryl  Skin was then closed utilizing 3-0 nylon in simple interrupted technique  The foot was then cleansed and dried the incision site was dressed with Betadine-soaked Adaptic, 4 x 4 gauze, Kerlix, Coban  The Esmarch tourniquet was then released after approximately 30 minutes and normal hyperemic response was noted to all digits  The patient tolerated the procedure and anesthesia well without immediate complications and transferred to PACU with vital signs stable  As with many limb salvage procedures, we contemplate the possibility of performing further stages to this procedure   Procedures may include debridements, delayed closure, plastic surgery techniques, or more proximal amputations  This procedure may be considered part of a multi-staged limb salvage treatment plan  Dr Regulo Rodriguez was present during the entire procedure and participated in all key aspects  Alfredo Howard DPM  DATE: January 26, 2022  TIME: 4:35 PM      Portions of the record may have been created with voice recognition software  Occasional wrong word or "sound a like" substitutions may have occurred due to the inherent limitations of voice recognition software  Read the chart carefully and recognize, using context, where substitutions have occurred

## 2022-01-26 NOTE — ASSESSMENT & PLAN NOTE
Patient sent to ED after outpatient visit with Podiatry    X-ray revealed findings suggestive of osteo with acute pathological fracture  MRI revealed: 1st toe distal phalanx osteomyelitis with adjacent sinus tract     -vancomycin and Rocephin  -podiatry consult  -NPO  -follow-up blood cultures  -plan for OR 1/26

## 2022-01-26 NOTE — CONSULTS
Consultation - Infectious Disease   Erick Pizano 40 y o  male MRN: [id number]  Unit/Bed#: 7T [facility] 717-01 Encounter: [id number]      IMPRESSION & RECOMMENDATIONS:   Impression/Recommendations:  1  Staph aureus bacteremia  Secondary to #2  No other clear explanation  No intravascular prosthetic devices  No MRSA history     -continue IV vancomycin for now with dosing recommendations per pharmacy   -discontinue ceftriaxone  -add IV cefazolin 2 g q 8 hours  -follow up final susceptibilities  -check repeat blood cultures x2 sets in the a m   -check 2D echo  -will need course of IV antibiotic, likely 4 weeks, if repeat blood cultures and 2D echo are negative  2  Left foot cellulitis, with acute osteomyelitis of left hallux  MRI shows 1st toe distal phalanx osteomyelitis with adjacent sinus tract  Podiatry planning amputation     -antibiotic plan as above  -plan for amputation noted  Will follow-up operative findings   -serial foot exams    3  Chronic left hallux diabetic ulceration  Complicated by #2     -antibiotic plan as above  -daily local wound care and dressing change  -podiatry follow-up    4  Uncontrolled diabetes mellitus, with DPN and elevated hemoglobin A1c of 10 6  Risk factor for infection  Blood sugar control per Internal Medicine Service  5  Morbid obesity  This is also risk factor for infection  Recommend ongoing weight loss measures/dietary modifications  6  Penicillin allergy  Patient has previously tolerated IV cefazolin and Keflex without difficulty  Antibiotics:  Vancomycin/ceftriaxone 3    I discussed above plan with Dr Olivia Del Angel from Internal Medicine Service  I personally reviewed prior hospitalization records  Thank you for this consultation  We will follow along with you          HISTORY OF PRESENT ILLNESS:  Reason for Consult:  Staph bacteremia    HPI: Erick Pizano is a 40 y o  male with diabetes mellitus, morbid obesity who presented to the ER on 01/24/2022 with left hallux wound  He was recently seen in [address] [place] [address] on 2022 an x-ray was obtained which was concerning for osteomyelitis  Temperature was a 100 7° on presentation with leukocytosis of 14 5  Patient was started on empiric vancomycin and ceftriaxone  MRI of the left foot was concerning for 1st toe distal phalanx osteomyelitis with adjacent sinus tract  Patient was seen by Podiatry with plan for hallux amputation  Now admission blood cultures show Staph aureus  Patient feels okay with no fevers, chills or other systemic complaints at this time  He is awaiting surgery  No intravascular prosthetic devices  REVIEW OF SYSTEMS:  A complete system-based review of systems is otherwise negative  PAST MEDICAL HISTORY:  Past Medical History:   Diagnosis Date    Diabetes mellitus ([address] )     Hyperlipemia     Hypertension      Past Surgical History:   Procedure Laterality Date    CATARACT EXTRACTION         FAMILY HISTORY:  Non-contributory    SOCIAL HISTORY:  Social History     Substance and Sexual Activity   Alcohol Use Not Currently     Social History     Substance and Sexual Activity   Drug Use No     Social History     Tobacco Use   Smoking Status Former Smoker   Smokeless Tobacco Never Used   Tobacco Comment    [employer] says never smoker and no passive smoke exposure       ALLERGIES:  Allergies   Allergen Reactions    Penicillins Other (See Comments)     Tolerated Cefazolin previously in 2018 without a problem  MEDICATIONS:  All current active medications have been reviewed      PHYSICAL EXAM:  Vitals:  Temp:  [96 6 °F (35 9 °C)-97 6 °F (36 4 °C)] 97 °F (36 1 °C)  HR:  [70-74] 71  Resp:  [18] 18  BP: (147-166)/(72-89) 166/89  SpO2:  [98 %-99 %] 99 %  Temp (24hrs), Av 1 °F (36 2 °C), Min:96 6 °F (35 9 °C), Max:97 6 °F (36 4 °C)  Current: Temperature: (!) 97 °F (36 1 °C)     Physical Exam:  General:  Well-nourished, well-developed, in no acute distress, nontoxic  Eyes:  Conjunctive clear with no hemorrhages or effusions  Oropharynx:  No ulcers, no lesions  Neck:  Supple, no lymphadenopathy  Lungs:  Nonlabored respirations  Abdomen:  Soft, non-tender, non-distended, morbid obesity  Extremities:  No peripheral cyanosis, clubbing, or edema  Skin:  Media images were personally reviewed  Large left hallux ulceration with surrounding edema  Neurological:  Moves all four extremities spontaneously    LABS, IMAGING, & OTHER STUDIES:  Lab Results:  I have personally reviewed pertinent labs  Results from last 7 days   Lab Units 01/26/22  0446 01/25/22  0456 01/24/22  0930   POTASSIUM mmol/L 5 0 4 3 5 1*   CHLORIDE mmol/L 104 106 103   CO2 mmol/L 28 23 24   BUN mg/dL 21 28* 39*   CREATININE mg/dL 1 16 1 13 1 50   EGFR ml/min/1 73sq m 75 78 55   CALCIUM mg/dL 8 8 8 5 9 1   AST U/L  --  22 17   ALT U/L  --  9 13   ALK PHOS U/L  --  77 98     Results from last 7 days   Lab Units 01/26/22  0446 01/25/22  0456 01/24/22  0930   WBC Thousand/uL 9 80 10 20 14 50*   HEMOGLOBIN g/dL 10 3* 10 7* 11 5*   PLATELETS Thousands/uL 379 275 456*     Results from last 7 days   Lab Units 01/24/22  0934 01/24/22  0930   BLOOD CULTURE  Staphylococcus aureus* Staphylococcus aureus*   GRAM STAIN RESULT  Gram positive cocci in clusters* Gram positive cocci in clusters*       Imaging Studies:   I have personally reviewed pertinent imaging study reports and images in PACS  MRI left foot shows 1st toe distal phalanx osteomyelitis with adjacent sinus tract  EKG, Pathology, and Other Studies:   I have personally reviewed pertinent reports

## 2022-01-26 NOTE — ANESTHESIA POSTPROCEDURE EVALUATION
Post-Op Assessment Note    CV Status:  Stable  Pain Score: 0    Pain management: adequate     Mental Status:  Alert and awake   Hydration Status:  Euvolemic   PONV Controlled:  Controlled   Airway Patency:  Patent      Post Op Vitals Reviewed: Yes      Staff: CRNA         No complications documented      /67 (01/26/22 1631)    Temp (!) 97 °F (36 1 °C) (01/26/22 1631)    Pulse 62 (01/26/22 1631)   Resp 16 (01/26/22 1631)    SpO2 97 % (01/26/22 1631)

## 2022-01-26 NOTE — ASSESSMENT & PLAN NOTE
Lab Results   Component Value Date    HGBA1C 10 6 (A) 01/13/2022       Recent Labs     01/25/22  1121 01/25/22  1540 01/25/22 2001 01/26/22  0532   POCGLU 205* 187* 241* 128       Blood Sugar Average: Last 72 hrs:     -hold home metformin  -insulin glargine 35 units q h s    -carb consistent diet  -sliding scale insulin with Accu-Cheks

## 2022-01-26 NOTE — PROGRESS NOTES
[facility]  Progress Note - Bob Argueta 1975, 40 y o  male MRN: [id number]  Unit/Bed#: 7T [facility] 717-01 Encounter: [id number]  Primary Care Provider: Yanira Thompson MD   Date and time admitted to hospital: 1/24/2022  9:08 AM    * Acute osteomyelitis of left foot [facility])  Assessment & Plan  Patient sent to ED after outpatient visit with Podiatry  X-ray revealed findings suggestive of osteo with acute pathological fracture  MRI revealed: 1st toe distal phalanx osteomyelitis with adjacent sinus tract     -vancomycin and Rocephin  -podiatry consult  -NPO  -follow-up blood cultures  -plan for OR 1/26      Morbid obesity ([address] )  Assessment & Plan  -discussed diet and exercise    Diabetes mellitus [facility])  Assessment & Plan  Lab Results   Component Value Date    HGBA1C 10 6 (A) 01/13/2022       Recent Labs     01/25/22  1121 01/25/22  1540 01/25/22 2001 01/26/22  0532   POCGLU 205* 187* 241* 128       Blood Sugar Average: Last 72 hrs:     -hold home metformin  -insulin glargine 35 units q h s    -carb consistent diet  -sliding scale insulin with Accu-Cheks    Hypertension  Assessment & Plan  -continue home atenolol and HCTZ          VTE Pharmacologic Prophylaxis: VTE Score: 4 Moderate Risk (Score 3-4) - Pharmacological DVT Prophylaxis Ordered: heparin  Patient Centered Rounds: I performed bedside rounds with nursing staff today  Discussions with Specialists or Other Care Team Provider:     Education and Discussions with Family / Patient:  Patient, all questions answered    Time Spent for Care: 30 minutes  More than 50% of total time spent on counseling and coordination of care as described above  Current Length of Stay: 2 day(s)  Current Patient Status: Inpatient   Certification Statement: The patient will continue to require additional inpatient hospital stay due to Pending OR procedure today  Discharge Plan: Anticipate discharge in 48-72 hrs to rehab facility      Code Status: Level 1 - Full Code    Subjective:   Patient seen examined bedside  Patient resting comfortably bed no apparent distress  No acute events overnight  Objective:     Vitals:   Temp (24hrs), Av 1 °F (36 2 °C), Min:96 6 °F (35 9 °C), Max:97 6 °F (36 4 °C)    Temp:  [96 6 °F (35 9 °C)-97 6 °F (36 4 °C)] 96 6 °F (35 9 °C)  HR:  [70-74] 70  Resp:  [18] 18  BP: (147-156)/(72-77) 156/77  SpO2:  [98 %] 98 %  Body mass index is 49 44 kg/m²  Input and Output Summary (last 24 hours): Intake/Output Summary (Last 24 hours) at 2022 0842  Last data filed at 2022 0446  Gross per 24 hour   Intake 840 ml   Output --   Net 840 ml       Physical Exam:   Physical Exam  Constitutional:       Appearance: He is obese  Cardiovascular:      Rate and Rhythm: Normal rate and regular rhythm  Pulses: Normal pulses  Pulmonary:      Effort: Pulmonary effort is normal       Breath sounds: Normal breath sounds  Abdominal:      General: Abdomen is flat  Bowel sounds are normal       Palpations: Abdomen is soft  Musculoskeletal:         General: Normal range of motion  Cervical back: Normal range of motion  Skin:     General: Skin is warm and dry  Neurological:      General: No focal deficit present  Mental Status: He is alert and oriented to person, place, and time  Mental status is at baseline  Psychiatric:         Mood and Affect: Mood normal          Behavior: Behavior normal          Thought Content: Thought content normal          Judgment: Judgment normal           Additional Data:     Labs:  Results from last 7 days   Lab Units 22  0446 22  0456 22  0456   WBC Thousand/uL 9 80   < > 10 20   HEMOGLOBIN g/dL 10 3*   < > 10 7*   HEMATOCRIT % 30 5*   < > 30 8*   PLATELETS Thousands/uL 379   < > 275   NEUTROS PCT %  --   --  55   LYMPHS PCT %  --   --  33   MONOS PCT %  --   --  9   EOS PCT %  --   --  3    < > = values in this interval not displayed       Results from last 7 days   Lab Units 01/26/22  0446 01/25/22  0456 01/25/22  0456   SODIUM mmol/L 136*   < > 134*   POTASSIUM mmol/L 5 0   < > 4 3   CHLORIDE mmol/L 104   < > 106   CO2 mmol/L 28   < > 23   BUN mg/dL 21   < > 28*   CREATININE mg/dL 1 16   < > 1 13   ANION GAP mmol/L 4*   < > 5   CALCIUM mg/dL 8 8   < > 8 5   ALBUMIN g/dL  --   --  3 3   TOTAL BILIRUBIN mg/dL  --   --  0 52   ALK PHOS U/L  --   --  77   ALT U/L  --   --  9   AST U/L  --   --  22   GLUCOSE RANDOM mg/dL 170*   < > 120*    < > = values in this interval not displayed  Results from last 7 days   Lab Units 01/26/22  0532 01/25/22 2001 01/25/22  1540 01/25/22  1121 01/25/22  0528 01/24/22  2035 01/24/22  1620 01/24/22  1118   POC GLUCOSE mg/dl 128 241* 187* 205* 128 166* 207* 173*               Lines/Drains:  Invasive Devices  Report    Peripheral Intravenous Line            Peripheral IV 01/24/22 Right Antecubital 1 day                      Imaging: No pertinent imaging reviewed      Recent Cultures (last 7 days):   Results from last 7 days   Lab Units 01/24/22  0934 01/24/22  0930   GRAM STAIN RESULT  Gram positive cocci in clusters* Gram positive cocci in clusters*       Last 24 Hours Medication List:   Current Facility-Administered Medications   Medication Dose Route Frequency Provider Last Rate    acetaminophen  650 mg Oral Q6H PRN [place] Prows, DO      atenolol  100 mg Oral Daily [place] Prows, DO      cefTRIAXone  2,000 mg Intravenous Q24H [place] Prows, DO 2,000 mg (01/25/22 1028)    dextrose 5 % and sodium chloride 0 45 %  100 mL/hr Intravenous Continuous Bert Kerr  mL/hr (01/26/22 0512)    heparin (porcine)  5,000 Units Subcutaneous [facility] Prows, DO      hydrochlorothiazide  25 mg Oral Daily [place] Prows, DO      insulin glargine  35 Units Subcutaneous HS [place] Prows, DO      insulin lispro  2-12 Units Subcutaneous TID AC Gary Kim, DO      vancomycin  20 mg/kg (Adjusted) Intravenous Q12H Kelsey Hickman Christina Olivia DO 2,000 mg (01/25/22 2081)        Today, Patient Was Seen By: Caryle Shove, DO    **Please Note: This note may have been constructed using a voice recognition system  **

## 2022-01-26 NOTE — PLAN OF CARE
Problem: INFECTION - ADULT  Goal: Absence or prevention of progression during hospitalization  Description: INTERVENTIONS:  - Assess and monitor for signs and symptoms of infection  - Monitor lab/diagnostic results  - Monitor all insertion sites, i e  indwelling lines, tubes, and drains  - Monitor endotracheal if appropriate and nasal secretions for changes in amount and color  - [place] appropriate cooling/warming therapies per order  - Administer medications as ordered  - Instruct and encourage patient and family to use good hand hygiene technique  - Identify and instruct in appropriate isolation precautions for identified infection/condition  1/26/2022 0830 by Juan Ramon Drew RN  Outcome: Progressing  1/26/2022 0823 by Juan Ramon Drew RN  Outcome: Progressing     Problem: PAIN - ADULT  Goal: Verbalizes/displays adequate comfort level or baseline comfort level  Description: Interventions:  - Encourage patient to monitor pain and request assistance  - Assess pain using appropriate pain scale  - Administer analgesics based on type and severity of pain and evaluate response  - Implement non-pharmacological measures as appropriate and evaluate response  - Consider cultural and social influences on pain and pain management  - Notify physician/advanced practitioner if interventions unsuccessful or patient reports new pain  1/26/2022 0830 by Juan Ramon Drew RN  Outcome: Progressing  1/26/2022 0823 by Juan Ramon Drew RN  Outcome: Progressing     Problem: DISCHARGE PLANNING  Goal: Discharge to home or other facility with appropriate resources  Description: INTERVENTIONS:  - Identify barriers to discharge w/patient and caregiver  - Arrange for needed discharge resources and transportation as appropriate  - Identify discharge learning needs (meds, wound care, etc )  - Arrange for interpretive services to assist at discharge as needed  - Refer to Case Management Department for coordinating discharge planning if the patient needs post-hospital services based on physician/advanced practitioner order or complex needs related to functional status, cognitive ability, or social support system  1/26/2022 0830 by Golden Iglesias RN  Outcome: Progressing  1/26/2022 0823 by Golden Iglesias RN  Outcome: Progressing     Problem: Knowledge Deficit  Goal: Patient/family/caregiver demonstrates understanding of disease process, treatment plan, medications, and discharge instructions  Description: Complete learning assessment and assess knowledge base    Interventions:  - Provide teaching at level of understanding  - Provide teaching via preferred learning methods  1/26/2022 0830 by Golden Iglesias RN  Outcome: Progressing  1/26/2022 0823 by Golden Iglesias RN  Outcome: Progressing

## 2022-01-26 NOTE — PLAN OF CARE
Problem: PAIN - ADULT  Goal: Verbalizes/displays adequate comfort level or baseline comfort level  Description: Interventions:  - Encourage patient to monitor pain and request assistance  - Assess pain using appropriate pain scale  - Administer analgesics based on type and severity of pain and evaluate response  - Implement non-pharmacological measures as appropriate and evaluate response  - Consider cultural and social influences on pain and pain management  - Notify physician/advanced practitioner if interventions unsuccessful or patient reports new pain  Outcome: Progressing     Problem: INFECTION - ADULT  Goal: Absence or prevention of progression during hospitalization  Description: INTERVENTIONS:  - Assess and monitor for signs and symptoms of infection  - Monitor lab/diagnostic results  - Monitor all insertion sites, i e  indwelling lines, tubes, and drains  - Monitor endotracheal if appropriate and nasal secretions for changes in amount and color  - [place] appropriate cooling/warming therapies per order  - Administer medications as ordered  - Instruct and encourage patient and family to use good hand hygiene technique  - Identify and instruct in appropriate isolation precautions for identified infection/condition  Outcome: Progressing     Problem: DISCHARGE PLANNING  Goal: Discharge to home or other facility with appropriate resources  Description: INTERVENTIONS:  - Identify barriers to discharge w/patient and caregiver  - Arrange for needed discharge resources and transportation as appropriate  - Identify discharge learning needs (meds, wound care, etc )  - Arrange for interpretive services to assist at discharge as needed  - Refer to Case Management Department for coordinating discharge planning if the patient needs post-hospital services based on physician/advanced practitioner order or complex needs related to functional status, cognitive ability, or social support system  Outcome: Progressing Problem: Knowledge Deficit  Goal: Patient/family/caregiver demonstrates understanding of disease process, treatment plan, medications, and discharge instructions  Description: Complete learning assessment and assess knowledge base    Interventions:  - Provide teaching at level of understanding  - Provide teaching via preferred learning methods  Outcome: Progressing

## 2022-01-26 NOTE — PROGRESS NOTES
Vancomycin IV Pharmacy-to-Dose Consultation    Alvin Villanueva is a 40 y o  male who is currently receiving Vancomycin IV with management by the Pharmacy Consult service  Assessment/Plan:2  The patient was reviewed  Renal function is stable and no signs or symptoms of nephrotoxicity and/or infusion reactions were documented in the chart  Based on todays assessment, continue current vancomycin (day # 3) dosing of to be determined, with a plan for trough to be drawn at 11:00 on 01/28/22  We will continue to follow the patients culture results and clinical progress daily      Thanh Hickey, Pharmacist

## 2022-01-26 NOTE — ANESTHESIA PREPROCEDURE EVALUATION
Procedure:  AMPUTATION TOE (Left Toe)    Relevant Problems   CARDIO   (+) Acute congestive heart failure (HCC)   (+) Benign essential hypertension   (+) Chest pain in adult   (+) Hyperlipidemia   (+) Hypertension      GI/HEPATIC   (+) Chronic nonalcoholic liver disease      MUSCULOSKELETAL   (+) Acute right-sided low back pain without sciatica      PULMONARY   (+) Acute upper respiratory infection   (+) Obstructive sleep apnea syndrome   (+) SOB (shortness of breath)      Other   (+) Acute osteomyelitis of left foot (HCC)   (+) Cough with exposure to COVID-19 virus   (+) Diabetes mellitus (HCC)   (+) Diabetic ulcer of left great toe (HCC)   (+) Snoring   (+) Uncontrolled type 2 diabetes mellitus (HCC)        Physical Exam    Airway    Mallampati score: II  TM Distance: >3 FB  Neck ROM: full     Dental       Cardiovascular      Pulmonary      Other Findings        Anesthesia Plan  ASA Score- 4     Anesthesia Type- IV sedation with anesthesia with ASA Monitors  Additional Monitors:   Airway Plan:           Plan Factors-Exercise tolerance (METS): <4 METS  Chart reviewed  EKG reviewed  Existing labs reviewed  Patient summary reviewed  Patient is not a current smoker  Patient did not smoke on day of surgery  Induction- intravenous  Postoperative Plan- Plan for postoperative opioid use  Informed Consent- Anesthetic plan and risks discussed with patient  I personally reviewed this patient with the CRNA  Discussed and agreed on the Anesthesia Plan with the CRNA  Keyana Rodriguez

## 2022-01-26 NOTE — PLAN OF CARE
Problem: Potential for Falls  Goal: Patient will remain free of falls  Description: INTERVENTIONS:  - Educate patient/family on patient safety including physical limitations  - Instruct patient to call for assistance with activity   - Consult OT/PT to assist with strengthening/mobility   - Keep Call bell within reach  - Keep bed low and locked with side rails adjusted as appropriate  - Keep care items and personal belongings within reach  - Initiate and maintain comfort rounds  - Make Fall Risk Sign visible to staff    - Apply yellow socks and bracelet for high fall risk patients  - Consider moving patient to room near nurses station  Outcome: Progressing     Problem: PAIN - ADULT  Goal: Verbalizes/displays adequate comfort level or baseline comfort level  Description: Interventions:  - Encourage patient to monitor pain and request assistance  - Assess pain using appropriate pain scale  - Administer analgesics based on type and severity of pain and evaluate response  - Implement non-pharmacological measures as appropriate and evaluate response  - Consider cultural and social influences on pain and pain management  - Notify physician/advanced practitioner if interventions unsuccessful or patient reports new pain  Outcome: Progressing     Problem: INFECTION - ADULT  Goal: Absence or prevention of progression during hospitalization  Description: INTERVENTIONS:  - Assess and monitor for signs and symptoms of infection  - Monitor lab/diagnostic results  - Monitor all insertion sites, i e  indwelling lines, tubes, and drains  - Monitor endotracheal if appropriate and nasal secretions for changes in amount and color  - [place] appropriate cooling/warming therapies per order  - Administer medications as ordered  - Instruct and encourage patient and family to use good hand hygiene technique  - Identify and instruct in appropriate isolation precautions for identified infection/condition  Outcome: Progressing  Goal: Absence of fever/infection during neutropenic period  Description: INTERVENTIONS:  - Monitor WBC    Outcome: Progressing     Problem: SAFETY ADULT  Goal: Patient will remain free of falls  Description: INTERVENTIONS:  - Educate patient/family on patient safety including physical limitations  - Instruct patient to call for assistance with activity   - Consult OT/PT to assist with strengthening/mobility   - Keep Call bell within reach  - Keep bed low and locked with side rails adjusted as appropriate  - Keep care items and personal belongings within reach  - Initiate and maintain comfort rounds  - Make Fall Risk Sign visible to staff    - Apply yellow socks and bracelet for high fall risk patients  - Consider moving patient to room near nurses station  Outcome: Progressing  Goal: Maintain or return to baseline ADL function  Description: INTERVENTIONS:  -  Assess patient's ability to carry out ADLs; assess patient's baseline for ADL function and identify physical deficits which impact ability to perform ADLs (bathing, care of mouth/teeth, toileting, grooming, dressing, etc )  - Assess/evaluate cause of self-care deficits   - Assess range of motion  - Assess patient's mobility; develop plan if impaired  - Assess patient's need for assistive devices and provide as appropriate  - Encourage maximum independence but intervene and supervise when necessary  - Involve family in performance of ADLs  - Assess for home care needs following discharge   - Consider OT consult to assist with ADL evaluation and planning for discharge  - Provide patient education as appropriate  Outcome: Progressing  Goal: Maintains/Returns to pre admission functional level  Description: INTERVENTIONS:  - Perform BMAT or MOVE assessment daily    - Set and communicate daily mobility goal to care team and patient/family/caregiver     - Collaborate with rehabilitation services on mobility goals if consulted    - Out of bed for toileting  - Record patient progress and toleration of activity level   Outcome: Progressing     Problem: DISCHARGE PLANNING  Goal: Discharge to home or other facility with appropriate resources  Description: INTERVENTIONS:  - Identify barriers to discharge w/patient and caregiver  - Arrange for needed discharge resources and transportation as appropriate  - Identify discharge learning needs (meds, wound care, etc )  - Arrange for interpretive services to assist at discharge as needed  - Refer to Case Management Department for coordinating discharge planning if the patient needs post-hospital services based on physician/advanced practitioner order or complex needs related to functional status, cognitive ability, or social support system  Outcome: Progressing     Problem: Knowledge Deficit  Goal: Patient/family/caregiver demonstrates understanding of disease process, treatment plan, medications, and discharge instructions  Description: Complete learning assessment and assess knowledge base  Interventions:  - Provide teaching at level of understanding  - Provide teaching via preferred learning methods  Outcome: Progressing     Problem: Nutrition/Hydration-ADULT  Goal: Nutrient/Hydration intake appropriate for improving, restoring or maintaining nutritional needs  Description: Monitor and assess patient's nutrition/hydration status for malnutrition  Collaborate with interdisciplinary team and initiate plan and interventions as ordered  Monitor patient's weight and dietary intake as ordered or per policy  Utilize nutrition screening tool and intervene as necessary  Determine patient's food preferences and provide high-protein, high-caloric foods as appropriate       INTERVENTIONS:  - Monitor oral intake, urinary output, labs, and treatment plans  - Assess nutrition and hydration status and recommend course of action  - Evaluate amount of meals eaten  - Assist patient with eating if necessary   - Allow adequate time for meals  - Recommend/ encourage appropriate diets, oral nutritional supplements, and vitamin/mineral supplements  - Order, calculate, and assess calorie counts as needed  - Recommend, monitor, and adjust tube feedings and TPN/PPN based on assessed needs  - Assess need for intravenous fluids  - Provide specific nutrition/hydration education as appropriate  - Include patient/family/caregiver in decisions related to nutrition  Outcome: Progressing

## 2022-01-27 ENCOUNTER — APPOINTMENT (INPATIENT)
Dept: NON INVASIVE DIAGNOSTICS | Facility: HOSPITAL | Age: 47
DRG: 617 | End: 2022-01-27
Payer: COMMERCIAL

## 2022-01-27 ENCOUNTER — TELEPHONE (OUTPATIENT)
Dept: FAMILY MEDICINE CLINIC | Facility: CLINIC | Age: 47
End: 2022-01-27

## 2022-01-27 PROBLEM — B95.61 STAPHYLOCOCCUS AUREUS BACTEREMIA: Status: ACTIVE | Noted: 2022-01-27

## 2022-01-27 PROBLEM — R78.81 STAPHYLOCOCCUS AUREUS BACTEREMIA: Status: ACTIVE | Noted: 2022-01-27

## 2022-01-27 LAB
AORTIC ROOT: 3.5 CM
APICAL FOUR CHAMBER EJECTION FRACTION: 62 %
ASCENDING AORTA: 2.7 CM (ref 2.37–3.55)
E WAVE DECELERATION TIME: 226 MS
FRACTIONAL SHORTENING: 33 % (ref 28–44)
GLUCOSE SERPL-MCNC: 151 MG/DL (ref 65–140)
GLUCOSE SERPL-MCNC: 184 MG/DL (ref 65–140)
GLUCOSE SERPL-MCNC: 199 MG/DL (ref 65–140)
GLUCOSE SERPL-MCNC: 209 MG/DL (ref 65–140)
INTERVENTRICULAR SEPTUM IN DIASTOLE (PARASTERNAL SHORT AXIS VIEW): 0.8 CM (ref 0.6–1.13)
LAAS-AP2: 15.5 CM2
LAAS-AP4: 26 CM2
LEFT ATRIUM SIZE: 4.4 CM
LEFT INTERNAL DIMENSION IN SYSTOLE: 3.5 CM (ref 2.1–4)
LEFT VENTRICULAR INTERNAL DIMENSION IN DIASTOLE: 5.2 CM (ref 24.18–36.05)
LEFT VENTRICULAR POSTERIOR WALL IN END DIASTOLE: 0.8 CM (ref 0.59–1.12)
LEFT VENTRICULAR STROKE VOLUME: 140 ML
MV E'TISSUE VEL-LAT: 8 CM/S
MV E'TISSUE VEL-SEP: 8 CM/S
MV PEAK A VEL: 0.73 M/S
MV PEAK E VEL: 101 CM/S
RIGHT ATRIAL 2D VOLUME: 59 ML
RIGHT ATRIUM AREA SYSTOLE A4C: 19.4 CM2
RIGHT VENTRICLE ID DIMENSION: 4.6 CM
SL CV LEFT ATRIUM LENGTH A2C: 5 CM
SL CV LV EF: 62
SL CV PED ECHO LEFT VENTRICLE DIASTOLIC VOLUME (MOD BIPLANE) 2D: 191 ML
SL CV PED ECHO LEFT VENTRICLE SYSTOLIC VOLUME (MOD BIPLANE) 2D: 51 ML
TR MAX PG: 27 MMHG
TRICUSPID VALVE PEAK REGURGITATION VELOCITY: 2.58 M/S
Z-SCORE OF ASCENDING AORTA: -0.88
Z-SCORE OF INTERVENTRICULAR SEPTUM IN END DIASTOLE: -0.5
Z-SCORE OF LEFT VENTRICULAR DIMENSION IN END SYSTOLE: -17.36
Z-SCORE OF LEFT VENTRICULAR POSTERIOR WALL IN END DIASTOLE: -0.39

## 2022-01-27 PROCEDURE — 97163 PT EVAL HIGH COMPLEX 45 MIN: CPT

## 2022-01-27 PROCEDURE — 93306 TTE W/DOPPLER COMPLETE: CPT

## 2022-01-27 PROCEDURE — 99232 SBSQ HOSP IP/OBS MODERATE 35: CPT | Performed by: STUDENT IN AN ORGANIZED HEALTH CARE EDUCATION/TRAINING PROGRAM

## 2022-01-27 PROCEDURE — 87040 BLOOD CULTURE FOR BACTERIA: CPT | Performed by: STUDENT IN AN ORGANIZED HEALTH CARE EDUCATION/TRAINING PROGRAM

## 2022-01-27 PROCEDURE — 93306 TTE W/DOPPLER COMPLETE: CPT | Performed by: INTERNAL MEDICINE

## 2022-01-27 PROCEDURE — 82948 REAGENT STRIP/BLOOD GLUCOSE: CPT

## 2022-01-27 RX ADMIN — INSULIN LISPRO 2 UNITS: 100 INJECTION, SOLUTION INTRAVENOUS; SUBCUTANEOUS at 16:32

## 2022-01-27 RX ADMIN — ATENOLOL 100 MG: 50 TABLET ORAL at 08:53

## 2022-01-27 RX ADMIN — HEPARIN SODIUM 5000 UNITS: 5000 INJECTION INTRAVENOUS; SUBCUTANEOUS at 14:57

## 2022-01-27 RX ADMIN — INSULIN GLARGINE 35 UNITS: 100 INJECTION, SOLUTION SUBCUTANEOUS at 21:20

## 2022-01-27 RX ADMIN — CEFAZOLIN SODIUM 2000 MG: 2 SOLUTION INTRAVENOUS at 20:15

## 2022-01-27 RX ADMIN — PERFLUTREN 0.2 ML/MIN: 6.52 INJECTION, SUSPENSION INTRAVENOUS at 10:20

## 2022-01-27 RX ADMIN — CEFAZOLIN SODIUM 2000 MG: 2 SOLUTION INTRAVENOUS at 11:26

## 2022-01-27 RX ADMIN — INSULIN LISPRO 2 UNITS: 100 INJECTION, SOLUTION INTRAVENOUS; SUBCUTANEOUS at 06:36

## 2022-01-27 RX ADMIN — HEPARIN SODIUM 5000 UNITS: 5000 INJECTION INTRAVENOUS; SUBCUTANEOUS at 05:36

## 2022-01-27 RX ADMIN — INSULIN LISPRO 2 UNITS: 100 INJECTION, SOLUTION INTRAVENOUS; SUBCUTANEOUS at 11:33

## 2022-01-27 RX ADMIN — HYDROCHLOROTHIAZIDE 25 MG: 25 TABLET ORAL at 08:53

## 2022-01-27 RX ADMIN — CEFAZOLIN SODIUM 2000 MG: 2 SOLUTION INTRAVENOUS at 04:33

## 2022-01-27 RX ADMIN — HEPARIN SODIUM 5000 UNITS: 5000 INJECTION INTRAVENOUS; SUBCUTANEOUS at 21:20

## 2022-01-27 NOTE — PLAN OF CARE
Problem: PAIN - ADULT  Goal: Verbalizes/displays adequate comfort level or baseline comfort level  Description: Interventions:  - Encourage patient to monitor pain and request assistance  - Assess pain using appropriate pain scale  - Administer analgesics based on type and severity of pain and evaluate response  - Implement non-pharmacological measures as appropriate and evaluate response  - Consider cultural and social influences on pain and pain management  - Notify physician/advanced practitioner if interventions unsuccessful or patient reports new pain  Outcome: Progressing     Problem: INFECTION - ADULT  Goal: Absence or prevention of progression during hospitalization  Description: INTERVENTIONS:  - Assess and monitor for signs and symptoms of infection  - Monitor lab/diagnostic results  - Monitor all insertion sites, i e  indwelling lines, tubes, and drains  - Monitor endotracheal if appropriate and nasal secretions for changes in amount and color  - [place] appropriate cooling/warming therapies per order  - Administer medications as ordered  - Instruct and encourage patient and family to use good hand hygiene technique  - Identify and instruct in appropriate isolation precautions for identified infection/condition  Outcome: Progressing     Problem: Nutrition/Hydration-ADULT  Goal: Nutrient/Hydration intake appropriate for improving, restoring or maintaining nutritional needs  Description: Monitor and assess patient's nutrition/hydration status for malnutrition  Collaborate with interdisciplinary team and initiate plan and interventions as ordered  Monitor patient's weight and dietary intake as ordered or per policy  Utilize nutrition screening tool and intervene as necessary  Determine patient's food preferences and provide high-protein, high-caloric foods as appropriate       INTERVENTIONS:  - Monitor oral intake, urinary output, labs, and treatment plans  - Assess nutrition and hydration status and recommend course of action  - Evaluate amount of meals eaten  - Assist patient with eating if necessary   - Allow adequate time for meals  - Recommend/ encourage appropriate diets, oral nutritional supplements, and vitamin/mineral supplements  - Order, calculate, and assess calorie counts as needed  - Recommend, monitor, and adjust tube feedings and TPN/PPN based on assessed needs  - Assess need for intravenous fluids  - Provide specific nutrition/hydration education as appropriate  - Include patient/family/caregiver in decisions related to nutrition  Outcome: Progressing     Problem: Knowledge Deficit  Goal: Patient/family/caregiver demonstrates understanding of disease process, treatment plan, medications, and discharge instructions  Description: Complete learning assessment and assess knowledge base    Interventions:  - Provide teaching at level of understanding  - Provide teaching via preferred learning methods  Outcome: Progressing

## 2022-01-27 NOTE — PROGRESS NOTES
Progress Note - Wound   Tia [place] 40 y o  male MRN: [id number]  Unit/Bed#: 7T [facility] 717-01 Encounter: [id number]      Assessment:   1  Acute osteomyelitis of the L big toe   A  S/p partial L hallux amputation  2  Diabetic ulcer with muscle involvement  3  Diabetic neuropathy  4  Cellulitis L foot  5  Edema L   6  Staph aureus bacteremia    Plan:    -pt eval and managed    - Number and complexity of problems addressed:  1 stable illness  as shown    - Amount/complexity of data reviewed and analyzed:     Category 1: prior patient notes were analyzed today before evaluating and managing patient  All PMH were discussed with pt today  - Risk of complications: moderate risk of morbidity from additional testing or treatment involved with this patient, which includes but not limited to:    - discussed anatomy, condition, treatment plan and options  They were instructed on proper foot care  The patient was seen today for greater than total of  45-59 minutes     This is total time spent today involving both face-to-face time and non face-to-face time  This time spent includes  reviewing their past medical history  , performing a medically appropriate examination and evaluation of the patient, counseling and educating the patient,  documenting all findings in EMR, and independently interpreting results and communicating results with  patient     and discussing their condition and treatment options, risks, and potential complications  I have discussed the findings of this examination with the patient  The discussion included a complete verbal explanation of the examination results, diagnosis and planned treatment(s)  A schedule for future care needs was explained  The patient has verbalized the understanding of these instructions at this time  If any questions should arise after returning home I have encouraged the patient to feel free to call the office      - betadine/DSD/coban applied for pt today   To remain intact and untouched until pt sees me as outpt  - continue with forefoot offloading in forefoot offloading shoe  - continue with IV antibiotics  Pt will require IV antibiotics likely 4 weeks secondary to staph aureus bacteremia  - in regards to osteomyelitis, surgery yesterday was surgical cure, proximal phalanx appeared healthy and noninfected  - pt to follow up with me as outpt  Subjective:  Pt eval and managed today  No pain  Resting comfortably bedside  Had surgical intervention yesterday  Objective:    Vitals: Blood pressure 152/74, pulse 74, temperature (!) 97 °F (36 1 °C), temperature source Temporal, resp  rate 18, height 5' 8" (1 727 m), weight (!) 147 kg (325 lb), SpO2 96 %  ,Body mass index is 49 42 kg/m²  Physical Exam: DP and PT 2/4 b/l, CRT< 3 seconds, absent gross sensation    L distal hallux with sutures intact, skin well coapted, there is still erythema of surrounding soft tissue, no signs of dehiscence at this time  Lab, Imaging and other studies: I have personally reviewed pertinent reports  Wound 01/19/22 Toe (Comment  which one) Left (Active)   Wound Image   01/24/22 0831   Wound Description Pink;Slough 01/25/22 2130   Felicity-wound Assessment Swelling 01/25/22 2130   Wound Length (cm) 2 cm 01/24/22 1800   Wound Width (cm) 1 5 cm 01/24/22 1800   Wound Depth (cm) 0 6 cm 01/24/22 0844   Wound Surface Area (cm^2) 3 cm^2 01/24/22 1800   Wound Volume (cm^3) 1 53 cm^3 01/24/22 0844   Calculated Wound Volume (cm^3) 1 53 cm^3 01/24/22 0844   Change in Wound Size % 0 01/24/22 0844   Drainage Amount Small 01/26/22 2145   Drainage Description Yellow 01/26/22 2145   Non-staged Wound Description Full thickness 01/26/22 2145   Treatments Site care 01/24/22 1800   Dressing Dry dressing 01/26/22 2145   Wound packed? No 01/24/22 1800   Dressing Changed Changed 01/24/22 2200   Patient Tolerance Tolerated well 01/24/22 1800   Dressing Status Clean;Dry; Intact 01/26/22 2145       Wound 01/26/22 Foot Left (Active)   Wound Description NEY 01/26/22 2145   Felicity-wound Assessment Clean;Dry; Intact 01/26/22 2145   Wound Site Closure Unable to assess 01/26/22 2145   Drainage Amount None 01/26/22 2145   Treatments Elevated 01/26/22 2145   Dressing Pressure dressing 01/26/22 2145   Dressing Status Clean;Dry; Intact 01/26/22 2145

## 2022-01-27 NOTE — PROGRESS NOTES
[facility]  Progress Note - Bob Argueta 1975, 40 y o  male MRN: [id number]  Unit/Bed#: 7T [facility] 717-01 Encounter: [id number]  Primary Care Provider: Yanira Thompson MD   Date and time admitted to hospital: 1/24/2022  9:08 AM    * Staphylococcus aureus bacteremia  Assessment & Plan  Patient with 2 of 2 blood cultures positive for Staph aureus  Echo without signs of obvious vegetation    -id consulted, appreciate recommendations  -continue cefazolin  -repeat blood cultures today  -patient will need PICC placement once repeat cultures negative 48 hours    Acute osteomyelitis of left foot [facility])  Assessment & Plan  Patient sent to ED after outpatient visit with Podiatry  X-ray revealed findings suggestive of osteo with acute pathological fracture  MRI revealed: 1st toe distal phalanx osteomyelitis with adjacent sinus tract  -podiatry consult, appreciate recommendations  -patient underwent partial left hallux amputation  -wound management as per Podiatry, will follow-up patient next week  -continue cefazolin for Staph aureus bacteremia  -PT/OT        Morbid obesity ([address] )  Assessment & Plan  -discussed diet and exercise    Diabetes mellitus [facility])  Assessment & Plan  Lab Results   Component Value Date    HGBA1C 10 6 (A) 01/13/2022       Recent Labs     01/26/22  1716 01/26/22 2023 01/27/22  0634 01/27/22  1118   POCGLU 137 201* 151* 184*       Blood Sugar Average: Last 72 hrs:     -hold home metformin  -insulin glargine 35 units q h s    -carb consistent diet  -sliding scale insulin with Accu-Cheks    Hypertension  Assessment & Plan  -continue home atenolol and HCTZ        VTE Pharmacologic Prophylaxis: VTE Score: 4 Moderate Risk (Score 3-4) - Pharmacological DVT Prophylaxis Ordered: heparin  Patient Centered Rounds: I performed bedside rounds with nursing staff today    Discussions with Specialists or Other Care Team Provider:  Podiatry    Education and Discussions with Family / Patient:  Patient, all questions answered    Time Spent for Care: 30 minutes  More than 50% of total time spent on counseling and coordination of care as described above  Current Length of Stay: 3 day(s)  Current Patient Status: Inpatient   Certification Statement: The patient will continue to require additional inpatient hospital stay due to Staph aureus bacteremia requiring IV antibiotics  Discharge Plan: Anticipate discharge in >72 hrs to home  Code Status: Level 1 - Full Code    Subjective:   Patient seen examined the bedside  Patient no acute events overnight  Patient tolerated surgery well, no complaints at this time  Denies pain    Objective:     Vitals:   Temp (24hrs), Av 4 °F (36 3 °C), Min:96 9 °F (36 1 °C), Max:97 8 °F (36 6 °C)    Temp:  [96 9 °F (36 1 °C)-97 8 °F (36 6 °C)] 97 °F (36 1 °C)  HR:  [59-74] 74  Resp:  [13-19] 18  BP: (139-177)/(67-89) 152/74  SpO2:  [96 %-100 %] 96 %  Body mass index is 49 42 kg/m²  Input and Output Summary (last 24 hours): Intake/Output Summary (Last 24 hours) at 2022 1409  Last data filed at 2022 0948  Gross per 24 hour   Intake 640 ml   Output --   Net 640 ml       Physical Exam:   Physical Exam  Constitutional:       Appearance: He is obese  Cardiovascular:      Rate and Rhythm: Normal rate and regular rhythm  Pulses: Normal pulses  Heart sounds: Normal heart sounds  Pulmonary:      Effort: Pulmonary effort is normal       Breath sounds: Normal breath sounds  Abdominal:      General: Abdomen is flat  Bowel sounds are normal       Palpations: Abdomen is soft  Musculoskeletal:         General: Normal range of motion  Cervical back: Normal range of motion  Skin:     General: Skin is warm  Comments: Left foot wrapped   Neurological:      General: No focal deficit present  Mental Status: He is alert and oriented to person, place, and time  Mental status is at baseline     Psychiatric:         Mood and Affect: Mood normal           Additional Data:     Labs:  Results from last 7 days   Lab Units 01/26/22  0446 01/25/22  0456 01/25/22  0456   WBC Thousand/uL 9 80   < > 10 20   HEMOGLOBIN g/dL 10 3*   < > 10 7*   HEMATOCRIT % 30 5*   < > 30 8*   PLATELETS Thousands/uL 379   < > 275   NEUTROS PCT %  --   --  55   LYMPHS PCT %  --   --  33   MONOS PCT %  --   --  9   EOS PCT %  --   --  3    < > = values in this interval not displayed  Results from last 7 days   Lab Units 01/26/22  0446 01/25/22  0456 01/25/22  0456   SODIUM mmol/L 136*   < > 134*   POTASSIUM mmol/L 5 0   < > 4 3   CHLORIDE mmol/L 104   < > 106   CO2 mmol/L 28   < > 23   BUN mg/dL 21   < > 28*   CREATININE mg/dL 1 16   < > 1 13   ANION GAP mmol/L 4*   < > 5   CALCIUM mg/dL 8 8   < > 8 5   ALBUMIN g/dL  --   --  3 3   TOTAL BILIRUBIN mg/dL  --   --  0 52   ALK PHOS U/L  --   --  77   ALT U/L  --   --  9   AST U/L  --   --  22   GLUCOSE RANDOM mg/dL 170*   < > 120*    < > = values in this interval not displayed  Results from last 7 days   Lab Units 01/27/22  1118 01/27/22  0634 01/26/22  2023 01/26/22  1716 01/26/22  1112 01/26/22  0532 01/25/22 2001 01/25/22  1540 01/25/22  1121 01/25/22  0528 01/24/22  2035 01/24/22  1620   POC GLUCOSE mg/dl 184* 151* 201* 137 156* 128 241* 187* 205* 128 166* 207*               Lines/Drains:  Invasive Devices  Report    Peripheral Intravenous Line            Peripheral IV 01/24/22 Right Antecubital 3 days                      Imaging: No pertinent imaging reviewed      Recent Cultures (last 7 days):   Results from last 7 days   Lab Units 01/26/22  1550 01/24/22  0934 01/24/22  0930   BLOOD CULTURE   --  Staphylococcus aureus* Staphylococcus aureus*   GRAM STAIN RESULT  No Polys or Bacteria seen Gram positive cocci in clusters* Gram positive cocci in clusters*       Last 24 Hours Medication List:   Current Facility-Administered Medications   Medication Dose Route Frequency Provider Last Rate    acetaminophen  650 mg Oral Q6H PRN Cornelius Spring, DPM      atenolol  100 mg Oral Daily Cornelius Spring, DPM      cefazolin  2,000 mg Intravenous Q8H Shawn Terry, DPM 2,000 mg (01/27/22 1126)    heparin (porcine)  5,000 Units Subcutaneous [facility] Shawn Harrison, DPM      hydrochlorothiazide  25 mg Oral Daily Cornelius Spring, DPM      insulin glargine  35 Units Subcutaneous HS Shawn Terry, DPM      insulin lispro  2-12 Units Subcutaneous TID [facility] Cornelius Spring, DPM      oxyCODONE  5 mg Oral Q4H PRN Yoselin Briscoe DO          Today, Patient Was Seen By: Yoselin Briscoe DO    **Please Note: This note may have been constructed using a voice recognition system  **

## 2022-01-27 NOTE — ASSESSMENT & PLAN NOTE
Lab Results   Component Value Date    HGBA1C 10 6 (A) 01/13/2022       Recent Labs     01/26/22  1716 01/26/22 2023 01/27/22  0634 01/27/22  1118   POCGLU 137 201* 151* 184*       Blood Sugar Average: Last 72 hrs:     -hold home metformin  -insulin glargine 35 units q h s    -carb consistent diet  -sliding scale insulin with Accu-Cheks

## 2022-01-27 NOTE — ASSESSMENT & PLAN NOTE
Patient sent to ED after outpatient visit with Podiatry  X-ray revealed findings suggestive of osteo with acute pathological fracture  MRI revealed: 1st toe distal phalanx osteomyelitis with adjacent sinus tract        -podiatry consult, appreciate recommendations  -patient underwent partial left hallux amputation  -wound management as per Podiatry, will follow-up patient next week  -continue cefazolin for Staph aureus bacteremia  -PT/OT

## 2022-01-27 NOTE — PLAN OF CARE
Problem: Potential for Falls  Goal: Patient will remain free of falls  Description: INTERVENTIONS:  - Educate patient/family on patient safety including physical limitations  - Instruct patient to call for assistance with activity   - Consult OT/PT to assist with strengthening/mobility   - Keep Call bell within reach  - Keep bed low and locked with side rails adjusted as appropriate  - Keep care items and personal belongings within reach  - Initiate and maintain comfort rounds  - Make Fall Risk Sign visible to staff  - Apply yellow socks and bracelet for high fall risk patients  - Consider moving patient to room near nurses station  Outcome: Progressing     Problem: PAIN - ADULT  Goal: Verbalizes/displays adequate comfort level or baseline comfort level  Description: Interventions:  - Encourage patient to monitor pain and request assistance  - Assess pain using appropriate pain scale  - Administer analgesics based on type and severity of pain and evaluate response  - Implement non-pharmacological measures as appropriate and evaluate response  - Consider cultural and social influences on pain and pain management  - Notify physician/advanced practitioner if interventions unsuccessful or patient reports new pain  Outcome: Progressing     Problem: INFECTION - ADULT  Goal: Absence or prevention of progression during hospitalization  Description: INTERVENTIONS:  - Assess and monitor for signs and symptoms of infection  - Monitor lab/diagnostic results  - Monitor all insertion sites, i e  indwelling lines, tubes, and drains  - Monitor endotracheal if appropriate and nasal secretions for changes in amount and color  - [place] appropriate cooling/warming therapies per order  - Administer medications as ordered  - Instruct and encourage patient and family to use good hand hygiene technique  - Identify and instruct in appropriate isolation precautions for identified infection/condition  Outcome: Progressing  Goal: Absence of fever/infection during neutropenic period  Description: INTERVENTIONS:  - Monitor WBC    Outcome: Progressing     Problem: SAFETY ADULT  Goal: Patient will remain free of falls  Description: INTERVENTIONS:  - Educate patient/family on patient safety including physical limitations  - Instruct patient to call for assistance with activity   - Consult OT/PT to assist with strengthening/mobility   - Keep Call bell within reach  - Keep bed low and locked with side rails adjusted as appropriate  - Keep care items and personal belongings within reach  - Initiate and maintain comfort rounds  - Make Fall Risk Sign visible to staff  - Apply yellow socks and bracelet for high fall risk patients  - Consider moving patient to room near nurses station  Outcome: Progressing  Goal: Maintain or return to baseline ADL function  Description: INTERVENTIONS:  -  Assess patient's ability to carry out ADLs; assess patient's baseline for ADL function and identify physical deficits which impact ability to perform ADLs (bathing, care of mouth/teeth, toileting, grooming, dressing, etc )  - Assess/evaluate cause of self-care deficits   - Assess range of motion  - Assess patient's mobility; develop plan if impaired  - Assess patient's need for assistive devices and provide as appropriate  - Encourage maximum independence but intervene and supervise when necessary  - Involve family in performance of ADLs  - Assess for home care needs following discharge   - Consider OT consult to assist with ADL evaluation and planning for discharge  - Provide patient education as appropriate  Outcome: Progressing  Goal: Maintains/Returns to pre admission functional level  Description: INTERVENTIONS:  - Perform BMAT or MOVE assessment daily    - Set and communicate daily mobility goal to care team and patient/family/caregiver     - Collaborate with rehabilitation services on mobility goals if consulted  - Out of bed for toileting  - Record patient progress and toleration of activity level   Outcome: Progressing     Problem: DISCHARGE PLANNING  Goal: Discharge to home or other facility with appropriate resources  Description: INTERVENTIONS:  - Identify barriers to discharge w/patient and caregiver  - Arrange for needed discharge resources and transportation as appropriate  - Identify discharge learning needs (meds, wound care, etc )  - Arrange for interpretive services to assist at discharge as needed  - Refer to Case Management Department for coordinating discharge planning if the patient needs post-hospital services based on physician/advanced practitioner order or complex needs related to functional status, cognitive ability, or social support system  Outcome: Progressing     Problem: Knowledge Deficit  Goal: Patient/family/caregiver demonstrates understanding of disease process, treatment plan, medications, and discharge instructions  Description: Complete learning assessment and assess knowledge base  Interventions:  - Provide teaching at level of understanding  - Provide teaching via preferred learning methods  Outcome: Progressing     Problem: Nutrition/Hydration-ADULT  Goal: Nutrient/Hydration intake appropriate for improving, restoring or maintaining nutritional needs  Description: Monitor and assess patient's nutrition/hydration status for malnutrition  Collaborate with interdisciplinary team and initiate plan and interventions as ordered  Monitor patient's weight and dietary intake as ordered or per policy  Utilize nutrition screening tool and intervene as necessary  Determine patient's food preferences and provide high-protein, high-caloric foods as appropriate       INTERVENTIONS:  - Monitor oral intake, urinary output, labs, and treatment plans  - Assess nutrition and hydration status and recommend course of action  - Evaluate amount of meals eaten  - Assist patient with eating if necessary   - Allow adequate time for meals  - Recommend/ encourage appropriate diets, oral nutritional supplements, and vitamin/mineral supplements  - Order, calculate, and assess calorie counts as needed  - Recommend, monitor, and adjust tube feedings and TPN/PPN based on assessed needs  - Assess need for intravenous fluids  - Provide specific nutrition/hydration education as appropriate  - Include patient/family/caregiver in decisions related to nutrition  Outcome: Progressing

## 2022-01-27 NOTE — PHYSICAL THERAPY NOTE
Physical Therapy Evaluation    Patient's Name: Carlos Moser    Admitting Diagnosis  Osteomyelitis [facility]) [M86 9]  Diabetic ulcer of left great toe ([place] [address] ) [J82 [id number], [id number]]    Problem List  Patient Active Problem List   Diagnosis    Hypertension    Diabetes mellitus ([place] [address] )    Cellulitis and abscess of leg    Sepsis due to cellulitis ([place] [address] )    Morbid obesity ([place] [address] )    Constipation    Viral gastroenteritis    Gastroparesis    Benign essential hypertension    Chronic nonalcoholic liver disease    Hyperlipidemia    Obstructive sleep apnea syndrome    Uncontrolled type 2 diabetes mellitus ([place] [address] )    Routine adult health maintenance    Right ankle injury    At increased risk of exposure to COVID-19 virus    Chest pain in adult    SOB (shortness of breath)    COVID-19    Acute congestive heart failure (HCC)    Anasarca    Elevated d-dimer    Lab test positive for detection of COVID-19 virus    Acute right-sided low back pain without sciatica    Snoring    Cough with exposure to COVID-19 virus    Acute upper respiratory infection    Diabetic ulcer of left great toe (HCC)    Acute osteomyelitis of left foot ([place] [address] )       Past Medical History  Past Medical History:   Diagnosis Date    Diabetes mellitus ([place] [address] )     Hyperlipemia     Hypertension        Past Surgical History  Past Surgical History:   Procedure Laterality Date    CATARACT EXTRACTION      TOE AMPUTATION Left 1/26/2022    Procedure: PARTIAL LEFT HALLUX AMPUTATION;  Surgeon: Nina Rodriguez DPM;  Location: [address] [place] [address];  Service: Podiatry       Recent Imaging  XR foot left 3+ views   Final Result by Amadeo Melton MD (01/27 1439)      Interval hallux amputation at the level of the distal aspect of the proximal phalanx  No radiographic evidence of osteomyelitis        Workstation performed: [id number]         MRI foot/forefoot toes left w wo contrast   Final Result by Mary Wood MD (01/24 8243)      1st toe distal phalanx osteomyelitis with adjacent sinus tract  Evidence of mild 5th metatarsal stress response  No fracture  Other nonemergent findings above  Workstation performed: [id number]             Recent Vital Signs  Vitals:    01/26/22 2145 01/26/22 2253 01/27/22 0759 01/27/22 0948   BP: (!) 177/85 154/82 152/74 152/74   BP Location: Left arm Left arm Left arm    Pulse: 71 71 74 74   Resp: 18 18 18    Temp: (!) 97 1 °F (36 2 °C) (!) 97 °F (36 1 °C) (!) 97 °F (36 1 °C)    TempSrc: Temporal Temporal Temporal    SpO2: 99% 99% 96%    Weight:    (!) 147 kg (325 lb)   Height:    5' 8" (1 727 m)        01/27/22 1140   PT Last Visit   PT Visit Date 01/27/22   Note Type   Note type Evaluation   Pain Assessment   Pain Assessment Tool 0-10   Pain Score No Pain   Restrictions/Precautions   Weight Bearing Precautions Per Order PWB in forefoot offloading shoe   Other Precautions Fall Risk;Pain   Home Living   Type of [address] [place] [address] One level;Performs ADLs on one level; Able to live on main level with bedroom/bathroom   Bathroom Shower/Tub Tub/shower unit   Bathroom Toilet Standard   Bathroom Accessibility Accessible   Prior Function   Level of [place] Independent with ADLs and functional mobility   Receives Help From Family   ADL Assistance Independent   IADLs Independent   Falls in the last 6 months 0   Cognition   Overall Cognitive Status WFL   Arousal/Participation Alert   Orientation Level Oriented X4   Memory Within functional limits   Following Commands Follows all commands and directions without difficulty   RLE Assessment   RLE Assessment WFL   LLE Assessment   LLE Assessment WFL   Coordination   Movements are Fluid and Coordinated 1   Sensation X   Light Touch   RLE Light Touch Impaired   RLE Light Touch Comments decreased to absent distally   LLE Light Touch Impaired   LLE Light Touch Comments decreased to absent distally   Bed Mobility   Supine to Sit 6  Modified independent   Sit to Supine 6  Modified independent   Transfers   Sit to Stand 6  Modified independent   Stand to Sit 6  Modified independent   Ambulation/Elevation   Gait pattern Step through pattern;Decreased toe off;Decreased heel strike; Short stride;Decreased foot clearance   Gait Assistance 5  Supervision   Additional items Verbal cues   Assistive Device None   Distance 50ft   Balance   Static Sitting Good   Dynamic Sitting Fair +   Static Standing Fair   Dynamic Standing Fair -   Ambulatory Fair -   Endurance Deficit   Endurance Deficit Yes   Activity Tolerance   Activity Tolerance Patient tolerated treatment well   Medical Staff Made Aware spoke to CM   Nurse Made Aware spoke to RN   Assessment   Prognosis Good   Problem List Decreased strength;Decreased range of motion;Decreased endurance; Impaired balance;Decreased mobility; Decreased coordination;Decreased skin integrity;Obesity; Impaired sensation   Barriers to Discharge Inaccessible home environment;Decreased caregiver support   Goals   Patient Goals to go home   STG Expiration Date 02/06/22   Short Term Goal #1 see eval note   Plan   Treatment/Interventions ADL retraining;Functional transfer training;LE strengthening/ROM; Elevations; Therapeutic exercise; Endurance training;Patient/family training;Equipment eval/education; Bed mobility;Gait training;Spoke to nursing;Spoke to case management;OT   PT Frequency 2-3x/wk   Recommendation   PT Discharge Recommendation No rehabilitation needs   AM-PAC Basic Mobility Inpatient   Turning in Bed Without Bedrails 4   Lying on Back to Sitting on Edge of Flat Bed 4   Moving Bed to Chair 4   Standing Up From Chair 4   Walk in Room 3   Climb 3-5 Stairs 3   Basic Mobility Inpatient Raw Score 22   Basic Mobility Standardized Score 47 4   Highest Level Of Mobility   JH-HLM Goal 7: Walk 25 feet or more   JH-HLM Highest Level of Mobility 7: Walk 25 feet or more   JH-HLM Goal Achieved Yes   End of Consult   Patient Position at End of Consult Seated edge of bed; All needs within reach         ASSESSMENT                                                                                                                     Any Wu is a 40 y o  male admitted to [facility] on 1/24/2022 for Acute osteomyelitis of left foot ([facility] [address] )  Pt  has a past medical history of Diabetes mellitus ([facility] [address] ), Hyperlipemia, and Hypertension    PT was consulted and pt was seen on 1/27/2022 for mobility assessment and d/c planning  Pt presents supine in bed alert and agreeable to therapy  He is PWB using forefoot offloading shoe  Impairments limiting pt at this time include weakness, impaired balance, decreased endurance, new onset of impairment of functional mobility, decreased activity tolerance and decreased sensation  Pt is currently functioning at a modified independent assistance level for bed mobility, modified independent assistance level for transfers, supervision assistance x1 level for ambulation with no assistive device  The patient's AM-PAC Basic Mobility Inpatient Short Form Raw Score is 22  A Raw score of greater than 16 suggests the patient may benefit from discharge to home  Please also refer to the recommendation of the Physical Therapist for safe discharge planning  Goals                                                                                                                                    1) Bed mobility skills with independent to facilitate safe return to previous living environment 2) Functional transfers with independent to facilitate safe return to previous living environment  3) Ambulation with least restrictive AD independent without LOB and stable vitals for safe ambulation home/ community distances  4) Stair training up/down flight 3 step/s with appropriate rail/s  and independent for safe access to previous living environment  5) Improve balance grades to fair + to reduce risk of falls  6)Improve LE strength grades by 1 to increase independence w/ transfers and gait    7) PT for ongoing pt and family education; DME needs and D/C planning to promote highest level of function in least restrictive environment  Recommendations                                                                                                              Pt will benefit from continued skilled IP PT to address the above mentioned impairments in order to maximize recovery and increase functional independence when completing mobility and ADLs  See flow sheet for goals and POC       DME: None    Discharge Disposition:  Home with no needs      Vitaly Ryan PT, DPT

## 2022-01-27 NOTE — ASSESSMENT & PLAN NOTE
Patient with 2 of 2 blood cultures positive for Staph aureus  Echo without signs of obvious vegetation    -id consulted, appreciate recommendations  -continue cefazolin  -repeat blood cultures today  -patient will need PICC placement once repeat cultures negative 48 hours

## 2022-01-27 NOTE — PLAN OF CARE
Problem: PHYSICAL THERAPY ADULT  Goal: Performs mobility at highest level of function for planned discharge setting  See evaluation for individualized goals  Description: Treatment/Interventions: ADL retraining,Functional transfer training,LE strengthening/ROM,Elevations,Therapeutic exercise,Endurance training,Patient/family training,Equipment eval/education,Bed mobility,Gait training,Spoke to nursing,Spoke to case management,OT          See flowsheet documentation for full assessment, interventions and recommendations  Outcome: Progressing  Note: Prognosis: Good  Problem List: Decreased strength,Decreased range of motion,Decreased endurance,Impaired balance,Decreased mobility,Decreased coordination,Decreased skin integrity,Obesity,Impaired sensation     Barriers to Discharge: Inaccessible home environment,Decreased caregiver support        PT Discharge Recommendation: No rehabilitation needs          See flowsheet documentation for full assessment

## 2022-01-27 NOTE — PROGRESS NOTES
Vancomycin IV Pharmacy-to-Dose Consultation   Any Wu is a 40 y o  male who is currently receiving Vancomycin IV with management by the Pharmacy Consult service for the treatment of staph aureus bacteremia and osteomyelitis  Assessment/Plan:   The patient's chart was reviewed  Renal function is stable  There are no signs or symptoms of nephrotoxicity and/or infusion reactions documented  The following nephrotoxicity factors are present: hydrochlorothiazide  Based on todays assessment, will continue current vancomycin (Day # 4) dosing of vancomycin 2000 mg IV every 12 hours, with a plan for trough to be drawn at 11:00 on Friday, 1/28/22  We will continue to follow the patients clinical progress daily     [facility], [address]    [phone]

## 2022-01-27 NOTE — TELEPHONE ENCOUNTER
Patient has christine approved for Ozempic medication fr 12/21/21-01/19/2025 as per Case ID # [id number]

## 2022-01-28 LAB
ANION GAP SERPL CALCULATED.3IONS-SCNC: 4 MMOL/L (ref 5–14)
BACTERIA BLD CULT: ABNORMAL
BACTERIA SPEC ANAEROBE CULT: NORMAL
BACTERIA TISS AEROBE CULT: ABNORMAL
BUN SERPL-MCNC: 16 MG/DL (ref 5–25)
CALCIUM SERPL-MCNC: 8.7 MG/DL (ref 8.4–10.2)
CHLORIDE SERPL-SCNC: 102 MMOL/L (ref 97–108)
CO2 SERPL-SCNC: 30 MMOL/L (ref 22–30)
CREAT SERPL-MCNC: 1.16 MG/DL (ref 0.7–1.5)
ERYTHROCYTE [DISTWIDTH] IN BLOOD BY AUTOMATED COUNT: 13 %
GFR SERPL CREATININE-BSD FRML MDRD: 75 ML/MIN/1.73SQ M
GLUCOSE SERPL-MCNC: 158 MG/DL (ref 65–140)
GLUCOSE SERPL-MCNC: 180 MG/DL (ref 65–140)
GLUCOSE SERPL-MCNC: 190 MG/DL (ref 65–140)
GLUCOSE SERPL-MCNC: 196 MG/DL (ref 65–140)
GLUCOSE SERPL-MCNC: 203 MG/DL (ref 70–99)
GRAM STN SPEC: ABNORMAL
GRAM STN SPEC: ABNORMAL
HCT VFR BLD AUTO: 29.9 % (ref 41–53)
HGB BLD-MCNC: 10.3 G/DL (ref 13.5–17.5)
MCH RBC QN AUTO: 30.5 PG (ref 26–34)
MCHC RBC AUTO-ENTMCNC: 34.5 G/DL (ref 31–36)
MCV RBC AUTO: 89 FL (ref 80–100)
PLATELET # BLD AUTO: 177 THOUSANDS/UL (ref 150–450)
PMV BLD AUTO: 8.1 FL (ref 8.9–12.7)
POTASSIUM SERPL-SCNC: 4.2 MMOL/L (ref 3.6–5)
RBC # BLD AUTO: 3.38 MILLION/UL (ref 4.5–5.9)
SODIUM SERPL-SCNC: 136 MMOL/L (ref 137–147)
WBC # BLD AUTO: 9.7 THOUSAND/UL (ref 4.5–11)

## 2022-01-28 PROCEDURE — 80048 BASIC METABOLIC PNL TOTAL CA: CPT | Performed by: STUDENT IN AN ORGANIZED HEALTH CARE EDUCATION/TRAINING PROGRAM

## 2022-01-28 PROCEDURE — 99233 SBSQ HOSP IP/OBS HIGH 50: CPT | Performed by: INTERNAL MEDICINE

## 2022-01-28 PROCEDURE — 85027 COMPLETE CBC AUTOMATED: CPT | Performed by: STUDENT IN AN ORGANIZED HEALTH CARE EDUCATION/TRAINING PROGRAM

## 2022-01-28 PROCEDURE — 82948 REAGENT STRIP/BLOOD GLUCOSE: CPT

## 2022-01-28 PROCEDURE — 99232 SBSQ HOSP IP/OBS MODERATE 35: CPT | Performed by: STUDENT IN AN ORGANIZED HEALTH CARE EDUCATION/TRAINING PROGRAM

## 2022-01-28 RX ADMIN — HEPARIN SODIUM 5000 UNITS: 5000 INJECTION INTRAVENOUS; SUBCUTANEOUS at 06:13

## 2022-01-28 RX ADMIN — INSULIN LISPRO 2 UNITS: 100 INJECTION, SOLUTION INTRAVENOUS; SUBCUTANEOUS at 16:48

## 2022-01-28 RX ADMIN — HYDROCHLOROTHIAZIDE 25 MG: 25 TABLET ORAL at 09:31

## 2022-01-28 RX ADMIN — HEPARIN SODIUM 5000 UNITS: 5000 INJECTION INTRAVENOUS; SUBCUTANEOUS at 21:34

## 2022-01-28 RX ADMIN — INSULIN GLARGINE 35 UNITS: 100 INJECTION, SOLUTION SUBCUTANEOUS at 21:34

## 2022-01-28 RX ADMIN — CEFAZOLIN SODIUM 2000 MG: 2 SOLUTION INTRAVENOUS at 04:37

## 2022-01-28 RX ADMIN — ATENOLOL 100 MG: 50 TABLET ORAL at 09:31

## 2022-01-28 RX ADMIN — CEFAZOLIN SODIUM 2000 MG: 2 SOLUTION INTRAVENOUS at 20:01

## 2022-01-28 RX ADMIN — INSULIN LISPRO 2 UNITS: 100 INJECTION, SOLUTION INTRAVENOUS; SUBCUTANEOUS at 06:13

## 2022-01-28 RX ADMIN — INSULIN LISPRO 2 UNITS: 100 INJECTION, SOLUTION INTRAVENOUS; SUBCUTANEOUS at 12:39

## 2022-01-28 RX ADMIN — HEPARIN SODIUM 5000 UNITS: 5000 INJECTION INTRAVENOUS; SUBCUTANEOUS at 14:03

## 2022-01-28 RX ADMIN — CEFAZOLIN SODIUM 2000 MG: 2 SOLUTION INTRAVENOUS at 12:39

## 2022-01-28 NOTE — PLAN OF CARE
Problem: Potential for Falls  Goal: Patient will remain free of falls  Description: INTERVENTIONS:  - Educate patient/family on patient safety including physical limitations  - Instruct patient to call for assistance with activity   - Consult OT/PT to assist with strengthening/mobility   - Keep Call bell within reach  - Keep bed low and locked with side rails adjusted as appropriate  - Keep care items and personal belongings within reach  - Initiate and maintain comfort rounds  - Make Fall Risk Sign visible to staff  - Apply yellow socks and bracelet for high fall risk patients  - Consider moving patient to room near nurses station  Outcome: Progressing     Problem: PAIN - ADULT  Goal: Verbalizes/displays adequate comfort level or baseline comfort level  Description: Interventions:  - Encourage patient to monitor pain and request assistance  - Assess pain using appropriate pain scale  - Administer analgesics based on type and severity of pain and evaluate response  - Implement non-pharmacological measures as appropriate and evaluate response  - Consider cultural and social influences on pain and pain management  - Notify physician/advanced practitioner if interventions unsuccessful or patient reports new pain  Outcome: Progressing     Problem: INFECTION - ADULT  Goal: Absence or prevention of progression during hospitalization  Description: INTERVENTIONS:  - Assess and monitor for signs and symptoms of infection  - Monitor lab/diagnostic results  - Monitor all insertion sites, i e  indwelling lines, tubes, and drains  - Monitor endotracheal if appropriate and nasal secretions for changes in amount and color  - [place] appropriate cooling/warming therapies per order  - Administer medications as ordered  - Instruct and encourage patient and family to use good hand hygiene technique  - Identify and instruct in appropriate isolation precautions for identified infection/condition  Outcome: Progressing  Goal: Absence of fever/infection during neutropenic period  Description: INTERVENTIONS:  - Monitor WBC    Outcome: Progressing     Problem: SAFETY ADULT  Goal: Patient will remain free of falls  Description: INTERVENTIONS:  - Educate patient/family on patient safety including physical limitations  - Instruct patient to call for assistance with activity   - Consult OT/PT to assist with strengthening/mobility   - Keep Call bell within reach  - Keep bed low and locked with side rails adjusted as appropriate  - Keep care items and personal belongings within reach  - Initiate and maintain comfort rounds  - Make Fall Risk Sign visible to staff    - Apply yellow socks and bracelet for high fall risk patients  - Consider moving patient to room near nurses station  Outcome: Progressing  Goal: Maintain or return to baseline ADL function  Description: INTERVENTIONS:  -  Assess patient's ability to carry out ADLs; assess patient's baseline for ADL function and identify physical deficits which impact ability to perform ADLs (bathing, care of mouth/teeth, toileting, grooming, dressing, etc )  - Assess/evaluate cause of self-care deficits   - Assess range of motion  - Assess patient's mobility; develop plan if impaired  - Assess patient's need for assistive devices and provide as appropriate  - Encourage maximum independence but intervene and supervise when necessary  - Involve family in performance of ADLs  - Assess for home care needs following discharge   - Consider OT consult to assist with ADL evaluation and planning for discharge  - Provide patient education as appropriate  Outcome: Progressing  Goal: Maintains/Returns to pre admission functional level  Description: INTERVENTIONS:  - Perform BMAT or MOVE assessment daily    - Set and communicate daily mobility goal to care team and patient/family/caregiver     - Collaborate with rehabilitation services on mobility goals if consulted  - Out of bed for toileting  - Record patient progress and toleration of activity level   Outcome: Progressing     Problem: DISCHARGE PLANNING  Goal: Discharge to home or other facility with appropriate resources  Description: INTERVENTIONS:  - Identify barriers to discharge w/patient and caregiver  - Arrange for needed discharge resources and transportation as appropriate  - Identify discharge learning needs (meds, wound care, etc )  - Arrange for interpretive services to assist at discharge as needed  - Refer to Case Management Department for coordinating discharge planning if the patient needs post-hospital services based on physician/advanced practitioner order or complex needs related to functional status, cognitive ability, or social support system  Outcome: Progressing     Problem: Knowledge Deficit  Goal: Patient/family/caregiver demonstrates understanding of disease process, treatment plan, medications, and discharge instructions  Description: Complete learning assessment and assess knowledge base  Interventions:  - Provide teaching at level of understanding  - Provide teaching via preferred learning methods  Outcome: Progressing     Problem: Nutrition/Hydration-ADULT  Goal: Nutrient/Hydration intake appropriate for improving, restoring or maintaining nutritional needs  Description: Monitor and assess patient's nutrition/hydration status for malnutrition  Collaborate with interdisciplinary team and initiate plan and interventions as ordered  Monitor patient's weight and dietary intake as ordered or per policy  Utilize nutrition screening tool and intervene as necessary  Determine patient's food preferences and provide high-protein, high-caloric foods as appropriate       INTERVENTIONS:  - Monitor oral intake, urinary output, labs, and treatment plans  - Assess nutrition and hydration status and recommend course of action  - Evaluate amount of meals eaten  - Assist patient with eating if necessary   - Allow adequate time for meals  - Recommend/ encourage appropriate diets, oral nutritional supplements, and vitamin/mineral supplements  - Order, calculate, and assess calorie counts as needed  - Recommend, monitor, and adjust tube feedings and TPN/PPN based on assessed needs  - Assess need for intravenous fluids  - Provide specific nutrition/hydration education as appropriate  - Include patient/family/caregiver in decisions related to nutrition  Outcome: Progressing     Problem: MOBILITY - ADULT  Goal: Maintain or return to baseline ADL function  Description: INTERVENTIONS:  -  Assess patient's ability to carry out ADLs; assess patient's baseline for ADL function and identify physical deficits which impact ability to perform ADLs (bathing, care of mouth/teeth, toileting, grooming, dressing, etc )  - Assess/evaluate cause of self-care deficits   - Assess range of motion  - Assess patient's mobility; develop plan if impaired  - Assess patient's need for assistive devices and provide as appropriate  - Encourage maximum independence but intervene and supervise when necessary  - Involve family in performance of ADLs  - Assess for home care needs following discharge   - Consider OT consult to assist with ADL evaluation and planning for discharge  - Provide patient education as appropriate  Outcome: Progressing  Goal: Maintains/Returns to pre admission functional level  Description: INTERVENTIONS:  - Perform BMAT or MOVE assessment daily    - Set and communicate daily mobility goal to care team and patient/family/caregiver     - Collaborate with rehabilitation services on mobility goals if consulted  - Out of bed for toileting  - Record patient progress and toleration of activity level   Outcome: Progressing

## 2022-01-28 NOTE — PROGRESS NOTES
[facility]  Progress Note - Radha Bernabe 1975, 40 y o  male MRN: [id number]  Unit/Bed#: 7T [facility] 717-01 Encounter: [id number]  Primary Care Provider: Kaylene Tellez MD   Date and time admitted to hospital: 1/24/2022  9:08 AM    * Staphylococcus aureus bacteremia  Assessment & Plan  Patient with 2 of 2 blood cultures positive for Staph aureus  Echo without signs of obvious vegetation    -ID consulted, appreciate recommendations  -continue cefazolin  -repeat blood cultures obtained on 01/27  -patient will need PICC placement once repeat cultures negative 48 hours, will likely place PICC on Monday    Acute osteomyelitis of left foot [facility])  Assessment & Plan  Patient sent to ED after outpatient visit with Podiatry  X-ray revealed findings suggestive of osteo with acute pathological fracture  MRI revealed: 1st toe distal phalanx osteomyelitis with adjacent sinus tract  -podiatry consult, appreciate recommendations  -patient underwent partial left hallux amputation  -wound management as per Podiatry, will follow-up patient next week  -continue cefazolin for Staph aureus bacteremia  -PT/OT        Morbid obesity ([address] )  Assessment & Plan  -discussed diet and exercise    Diabetes mellitus [facility])  Assessment & Plan  Lab Results   Component Value Date    HGBA1C 10 6 (A) 01/13/2022       Recent Labs     01/27/22  1626 01/27/22 2026 01/28/22  0554 01/28/22  1110   POCGLU 199* 209* 190* 158*       Blood Sugar Average: Last 72 hrs:     -hold home metformin  -insulin glargine 35 units q h s    -carb consistent diet  -sliding scale insulin with Accu-Cheks    Hypertension  Assessment & Plan  -continue home atenolol and HCTZ          VTE Pharmacologic Prophylaxis: VTE Score: 4 Moderate Risk (Score 3-4) - Pharmacological DVT Prophylaxis Ordered: heparin  Patient Centered Rounds: I performed bedside rounds with nursing staff today    Discussions with Specialists or Other Care Team Provider: None    Education and Discussions with Family / Patient: Patient declined call to   Time Spent for Care: 45 minutes  More than 50% of total time spent on counseling and coordination of care as described above  Current Length of Stay: 4 day(s)  Current Patient Status: Inpatient   Certification Statement: The patient will continue to require additional inpatient hospital stay due to Further treatment for Staph aureus bacteremia requiring IV antibiotics  Discharge Plan:  Potentially discharged on  pending PICC placement, patient will need home antibiotics    Code Status: Level 1 - Full Code    Subjective:   Patient seen examined bedside  Patient resting comfortably bed no apparent distress  Patient with no complaints at this time  Denies pain  Objective:     Vitals:   Temp (24hrs), Av °F (36 1 °C), Min:96 5 °F (35 8 °C), Max:97 6 °F (36 4 °C)    Temp:  [96 5 °F (35 8 °C)-97 6 °F (36 4 °C)] 96 8 °F (36 °C)  HR:  [68-74] 68  Resp:  [17-20] 20  BP: (155-181)/(70-90) 164/79  SpO2:  [97 %-98 %] 98 %  Body mass index is 49 42 kg/m²  Input and Output Summary (last 24 hours): Intake/Output Summary (Last 24 hours) at 2022 1117  Last data filed at 2022 0900  Gross per 24 hour   Intake 840 ml   Output --   Net 840 ml       Physical Exam:   Physical Exam  Constitutional:       Appearance: He is obese  HENT:      Head: Normocephalic  Cardiovascular:      Rate and Rhythm: Normal rate and regular rhythm  Pulses: Normal pulses  Pulmonary:      Effort: Pulmonary effort is normal       Breath sounds: Normal breath sounds  Abdominal:      General: Abdomen is flat  Bowel sounds are normal       Palpations: Abdomen is soft  Musculoskeletal:         General: Normal range of motion  Skin:     Comments: Left foot wrapped in bandage   Neurological:      General: No focal deficit present  Mental Status: He is alert and oriented to person, place, and time   Mental status is at baseline  Psychiatric:         Mood and Affect: Mood normal          Behavior: Behavior normal          Thought Content: Thought content normal          Judgment: Judgment normal           Additional Data:     Labs:  Results from last 7 days   Lab Units 01/28/22 0433 01/26/22 0446 01/25/22  0456   WBC Thousand/uL 9 70   < > 10 20   HEMOGLOBIN g/dL 10 3*   < > 10 7*   HEMATOCRIT % 29 9*   < > 30 8*   PLATELETS Thousands/uL 177   < > 275   NEUTROS PCT %  --   --  55   LYMPHS PCT %  --   --  33   MONOS PCT %  --   --  9   EOS PCT %  --   --  3    < > = values in this interval not displayed  Results from last 7 days   Lab Units 01/28/22  0433 01/26/22  0446 01/25/22  0456   SODIUM mmol/L 136*   < > 134*   POTASSIUM mmol/L 4 2   < > 4 3   CHLORIDE mmol/L 102   < > 106   CO2 mmol/L 30   < > 23   BUN mg/dL 16   < > 28*   CREATININE mg/dL 1 16   < > 1 13   ANION GAP mmol/L 4*   < > 5   CALCIUM mg/dL 8 7   < > 8 5   ALBUMIN g/dL  --   --  3 3   TOTAL BILIRUBIN mg/dL  --   --  0 52   ALK PHOS U/L  --   --  77   ALT U/L  --   --  9   AST U/L  --   --  22   GLUCOSE RANDOM mg/dL 203*   < > 120*    < > = values in this interval not displayed  Results from last 7 days   Lab Units 01/28/22  1110 01/28/22  0554 01/27/22  2026 01/27/22  1626 01/27/22  1118 01/27/22  0634 01/26/22 2023 01/26/22  1716 01/26/22  1112 01/26/22  0532 01/25/22 2001 01/25/22  1540   POC GLUCOSE mg/dl 158* 190* 209* 199* 184* 151* 201* 137 156* 128 241* 187*               Lines/Drains:  Invasive Devices  Report    Peripheral Intravenous Line            Peripheral IV 01/28/22 Distal;Left;Upper;Ventral (anterior) Arm <1 day                      Imaging: No pertinent imaging reviewed  Recent Cultures (last 7 days):   Results from last 7 days   Lab Units 01/27/22  0455 01/27/22  0454 01/26/22  1550 01/24/22  0934 01/24/22  0930   BLOOD CULTURE  Received in Microbiology Lab  Culture in Progress  Received in Microbiology Lab   Culture in Progress  --  Staphylococcus aureus* Staphylococcus aureus*   GRAM STAIN RESULT   --   --  No Polys or Bacteria seen Gram positive cocci in clusters* Gram positive cocci in clusters*       Last 24 Hours Medication List:   Current Facility-Administered Medications   Medication Dose Route Frequency Provider Last Rate    acetaminophen  650 mg Oral Q6H PRN Renovo Carte, DPM      atenolol  100 mg Oral Daily To Carte, DPM      cefazolin  2,000 mg Intravenous Q8H Shawn Mishko, DPM 2,000 mg (01/28/22 0437)    heparin (porcine)  5,000 Units Subcutaneous [facility] Shawn Mishko, DPM      hydrochlorothiazide  25 mg Oral Daily To Carte, DPM      insulin glargine  35 Units Subcutaneous HS Shawn Mishko, DPM      insulin lispro  2-12 Units Subcutaneous TID AC Shawn Mishko, DPM      oxyCODONE  5 mg Oral Q4H PRN José Gutierrez DO          Today, Patient Was Seen By: José Gutierrez DO    **Please Note: This note may have been constructed using a voice recognition system  **

## 2022-01-28 NOTE — ASSESSMENT & PLAN NOTE
Patient with 2 of 2 blood cultures positive for Staph aureus  Echo without signs of obvious vegetation    -ID consulted, appreciate recommendations  -continue cefazolin  -repeat blood cultures obtained on 01/27  -patient will need PICC placement once repeat cultures negative 48 hours, will likely place PICC on Monday

## 2022-01-28 NOTE — OCCUPATIONAL THERAPY NOTE
Occupational Therapy         Patient Name: Kiran Sánchez  [id number]'O Date: 1/28/2022                OT orders received  Pt currently functioning Idris, good understanding of WB status and offloading shoe  Pt declines any further OT needs at this time

## 2022-01-28 NOTE — PROGRESS NOTES
Progress Note - Infectious Disease   Magda Pyo 40 y o  male MRN: [id number]  Unit/Bed#: 7T [facility] 717-01 Encounter: [id number]      IMPRESSION & RECOMMENDATIONS:   Impression/Recommendations:  1  MSSA bacteremia  Secondary to #2  No other clear explanation  No intravascular prosthetic devices  No MRSA history  Negative 2D echo      -discontinue vancomycin  -continue high-dose IV cefazolin  -follow-up repeat blood cultures to ensure clearance of bacteremia  -will need course of IV antibiotic, likely 4 weeks, if repeat blood cultures are negative  -okay for PICC line placement once blood cultures are negative at 72 hours     2  Left foot cellulitis, with acute osteomyelitis of left hallux  MRI shows 1st toe distal phalanx osteomyelitis with adjacent sinus tract  Now status post partial left hallux amputation with surgical cure achieved      -antibiotic plan as above  -podiatry follow-up ongoing  -serial foot exams     3  Chronic left hallux diabetic ulceration  Complicated by #2      -antibiotic plan as above  -daily local wound care and dressing change  -podiatry follow-up     4  Uncontrolled diabetes mellitus, with DPN and elevated hemoglobin A1c of 10 6  Risk factor for infection  Blood sugar control per Internal Medicine Service      5  Morbid obesity  This is also risk factor for infection  Recommend ongoing weight loss measures/dietary modifications      6  Penicillin allergy  Patient has previously tolerated IV cefazolin and Keflex without difficulty  Patient is tolerating IV cefazolin this admission without difficulty         Antibiotics:  Antibiotic 5  Vancomycin/cefazolin  POD2     I discussed above plan with Dr Kishore Powell from Internal Medicine Service  Will formally re-evaluate patient on 01/31  Please call us with any new questions in the interim  Subjective:  Patient underwent left partial hallux amputation  He is feeling well with no new complaints  No fevers or chills  Tolerating oral intake  Objective:  Vitals:  Temp:  [96 5 °F (35 8 °C)-97 6 °F (36 4 °C)] 96 8 °F (36 °C)  HR:  [68-74] 68  Resp:  [17-20] 20  BP: (155-181)/(70-90) 164/79  SpO2:  [97 %-98 %] 98 %  Temp (24hrs), Av °F (36 1 °C), Min:96 5 °F (35 8 °C), Max:97 6 °F (36 4 °C)  Current: Temperature: (!) 96 8 °F (36 °C)    Physical Exam:   General:  No acute distress  HEENT:  Atraumatic normocephalic  Psychiatric:  Awake and alert  Pulmonary:  Normal respiratory excursion without accessory muscle use  Abdomen:  Soft, nontender  Extremities:  No edema  Foot dressing intact  Skin:  No rashes  Neuro: Moves all extremities spontaneously    Lab Results:  I have personally reviewed pertinent labs  Results from last 7 days   Lab Units 22  0433 22  0446 22  0456 22  0930 22  0930   POTASSIUM mmol/L 4 2 5 0 4 3   < > 5 1*   CHLORIDE mmol/L 102 104 106   < > 103   CO2 mmol/L 30 28 23   < > 24   BUN mg/dL 16 21 28*   < > 39*   CREATININE mg/dL 1 16 1 16 1 13   < > 1 50   EGFR ml/min/1 73sq m 75 75 78   < > 55   CALCIUM mg/dL 8 7 8 8 8 5   < > 9 1   AST U/L  --   --  22  --  17   ALT U/L  --   --  9  --  13   ALK PHOS U/L  --   --  77  --  98    < > = values in this interval not displayed  Results from last 7 days   Lab Units 22  0433 22  0446 22  0456   WBC Thousand/uL 9 70 9 80 10 20   HEMOGLOBIN g/dL 10 3* 10 3* 10 7*   PLATELETS Thousands/uL 177 379 275     Results from last 7 days   Lab Units 22  0455 22  0454 22  1550 22  0934 22  0930   BLOOD CULTURE  Received in Microbiology Lab  Culture in Progress  Received in Microbiology Lab  Culture in Progress  --  Staphylococcus aureus* Staphylococcus aureus*   GRAM STAIN RESULT   --   --  No Polys or Bacteria seen Gram positive cocci in clusters* Gram positive cocci in clusters*       Imaging Studies:   I have personally reviewed pertinent imaging study reports and images in PACS      EKG, Pathology, and Other Studies:   I have personally reviewed pertinent reports  2D echo is negative  Operative report reviewed in detail

## 2022-01-28 NOTE — ASSESSMENT & PLAN NOTE
Lab Results   Component Value Date    HGBA1C 10 6 (A) 01/13/2022       Recent Labs     01/27/22  1626 01/27/22 2026 01/28/22  0554 01/28/22  1110   POCGLU 199* 209* 190* 158*       Blood Sugar Average: Last 72 hrs:     -hold home metformin  -insulin glargine 35 units q h s    -carb consistent diet  -sliding scale insulin with Accu-Cheks

## 2022-01-28 NOTE — PLAN OF CARE
Problem: PAIN - ADULT  Goal: Verbalizes/displays adequate comfort level or baseline comfort level  Description: Interventions:  - Encourage patient to monitor pain and request assistance  - Assess pain using appropriate pain scale  - Administer analgesics based on type and severity of pain and evaluate response  - Implement non-pharmacological measures as appropriate and evaluate response  - Consider cultural and social influences on pain and pain management  - Notify physician/advanced practitioner if interventions unsuccessful or patient reports new pain  Outcome: Progressing     Problem: INFECTION - ADULT  Goal: Absence or prevention of progression during hospitalization  Description: INTERVENTIONS:  - Assess and monitor for signs and symptoms of infection  - Monitor lab/diagnostic results  - Monitor all insertion sites, i e  indwelling lines, tubes, and drains  - Monitor endotracheal if appropriate and nasal secretions for changes in amount and color  - [place] appropriate cooling/warming therapies per order  - Administer medications as ordered  - Instruct and encourage patient and family to use good hand hygiene technique  - Identify and instruct in appropriate isolation precautions for identified infection/condition  Outcome: Progressing

## 2022-01-29 LAB
BACTERIA BLD CULT: ABNORMAL
GLUCOSE SERPL-MCNC: 147 MG/DL (ref 65–140)
GLUCOSE SERPL-MCNC: 192 MG/DL (ref 65–140)
GLUCOSE SERPL-MCNC: 198 MG/DL (ref 65–140)
GLUCOSE SERPL-MCNC: 220 MG/DL (ref 65–140)
GRAM STN SPEC: ABNORMAL

## 2022-01-29 PROCEDURE — 82948 REAGENT STRIP/BLOOD GLUCOSE: CPT

## 2022-01-29 PROCEDURE — 99232 SBSQ HOSP IP/OBS MODERATE 35: CPT | Performed by: STUDENT IN AN ORGANIZED HEALTH CARE EDUCATION/TRAINING PROGRAM

## 2022-01-29 RX ADMIN — HEPARIN SODIUM 5000 UNITS: 5000 INJECTION INTRAVENOUS; SUBCUTANEOUS at 21:56

## 2022-01-29 RX ADMIN — CEFAZOLIN SODIUM 2000 MG: 2 SOLUTION INTRAVENOUS at 19:24

## 2022-01-29 RX ADMIN — INSULIN LISPRO 2 UNITS: 100 INJECTION, SOLUTION INTRAVENOUS; SUBCUTANEOUS at 11:23

## 2022-01-29 RX ADMIN — INSULIN GLARGINE 35 UNITS: 100 INJECTION, SOLUTION SUBCUTANEOUS at 21:56

## 2022-01-29 RX ADMIN — ATENOLOL 100 MG: 50 TABLET ORAL at 09:55

## 2022-01-29 RX ADMIN — HEPARIN SODIUM 5000 UNITS: 5000 INJECTION INTRAVENOUS; SUBCUTANEOUS at 06:25

## 2022-01-29 RX ADMIN — CEFAZOLIN SODIUM 2000 MG: 2 SOLUTION INTRAVENOUS at 04:04

## 2022-01-29 RX ADMIN — CEFAZOLIN SODIUM 2000 MG: 2 SOLUTION INTRAVENOUS at 11:22

## 2022-01-29 RX ADMIN — HEPARIN SODIUM 5000 UNITS: 5000 INJECTION INTRAVENOUS; SUBCUTANEOUS at 15:00

## 2022-01-29 RX ADMIN — HYDROCHLOROTHIAZIDE 25 MG: 25 TABLET ORAL at 09:55

## 2022-01-29 RX ADMIN — INSULIN LISPRO 2 UNITS: 100 INJECTION, SOLUTION INTRAVENOUS; SUBCUTANEOUS at 15:29

## 2022-01-29 NOTE — ASSESSMENT & PLAN NOTE
Patient with 2 of 2 blood cultures positive for Staph aureus  Echo without signs of obvious vegetation    -ID consulted, appreciate recommendations  -continue cefazolin  -repeat blood cultures obtained on 01/27  -patient will need PICC placement once repeat cultures negative 72 hours, will likely place PICC on Monday

## 2022-01-29 NOTE — PLAN OF CARE
Problem: Potential for Falls  Goal: Patient will remain free of falls  Description: INTERVENTIONS:  - Educate patient/family on patient safety including physical limitations  - Instruct patient to call for assistance with activity   - Consult OT/PT to assist with strengthening/mobility   - Keep Call bell within reach  - Keep bed low and locked with side rails adjusted as appropriate  - Keep care items and personal belongings within reach  - Initiate and maintain comfort rounds  - Make Fall Risk Sign visible to staff    - Apply yellow socks and bracelet for high fall risk patients  - Consider moving patient to room near nurses station  Outcome: Progressing     Problem: PAIN - ADULT  Goal: Verbalizes/displays adequate comfort level or baseline comfort level  Description: Interventions:  - Encourage patient to monitor pain and request assistance  - Assess pain using appropriate pain scale  - Administer analgesics based on type and severity of pain and evaluate response  - Implement non-pharmacological measures as appropriate and evaluate response  - Consider cultural and social influences on pain and pain management  - Notify physician/advanced practitioner if interventions unsuccessful or patient reports new pain  Outcome: Progressing     Problem: INFECTION - ADULT  Goal: Absence or prevention of progression during hospitalization  Description: INTERVENTIONS:  - Assess and monitor for signs and symptoms of infection  - Monitor lab/diagnostic results  - Monitor all insertion sites, i e  indwelling lines, tubes, and drains  - Monitor endotracheal if appropriate and nasal secretions for changes in amount and color  - [place] appropriate cooling/warming therapies per order  - Administer medications as ordered  - Instruct and encourage patient and family to use good hand hygiene technique  - Identify and instruct in appropriate isolation precautions for identified infection/condition  Outcome: Progressing  Goal: Absence of fever/infection during neutropenic period  Description: INTERVENTIONS:  - Monitor WBC    Outcome: Progressing     Problem: SAFETY ADULT  Goal: Patient will remain free of falls  Description: INTERVENTIONS:  - Educate patient/family on patient safety including physical limitations  - Instruct patient to call for assistance with activity   - Consult OT/PT to assist with strengthening/mobility   - Keep Call bell within reach  - Keep bed low and locked with side rails adjusted as appropriate  - Keep care items and personal belongings within reach  - Initiate and maintain comfort rounds  - Make Fall Risk Sign visible to staff    - Apply yellow socks and bracelet for high fall risk patients  - Consider moving patient to room near nurses station  Outcome: Progressing  Goal: Maintain or return to baseline ADL function  Description: INTERVENTIONS:  -  Assess patient's ability to carry out ADLs; assess patient's baseline for ADL function and identify physical deficits which impact ability to perform ADLs (bathing, care of mouth/teeth, toileting, grooming, dressing, etc )  - Assess/evaluate cause of self-care deficits   - Assess range of motion  - Assess patient's mobility; develop plan if impaired  - Assess patient's need for assistive devices and provide as appropriate  - Encourage maximum independence but intervene and supervise when necessary  - Involve family in performance of ADLs  - Assess for home care needs following discharge   - Consider OT consult to assist with ADL evaluation and planning for discharge  - Provide patient education as appropriate  Outcome: Progressing  Goal: Maintains/Returns to pre admission functional level  Description: INTERVENTIONS:  - Perform BMAT or MOVE assessment daily    - Set and communicate daily mobility goal to care team and patient/family/caregiver     - Collaborate with rehabilitation services on mobility goals if consulted    - Record patient progress and toleration of activity level   Outcome: Progressing     Problem: DISCHARGE PLANNING  Goal: Discharge to home or other facility with appropriate resources  Description: INTERVENTIONS:  - Identify barriers to discharge w/patient and caregiver  - Arrange for needed discharge resources and transportation as appropriate  - Identify discharge learning needs (meds, wound care, etc )  - Arrange for interpretive services to assist at discharge as needed  - Refer to Case Management Department for coordinating discharge planning if the patient needs post-hospital services based on physician/advanced practitioner order or complex needs related to functional status, cognitive ability, or social support system  Outcome: Progressing     Problem: Knowledge Deficit  Goal: Patient/family/caregiver demonstrates understanding of disease process, treatment plan, medications, and discharge instructions  Description: Complete learning assessment and assess knowledge base  Interventions:  - Provide teaching at level of understanding  - Provide teaching via preferred learning methods  Outcome: Progressing     Problem: Nutrition/Hydration-ADULT  Goal: Nutrient/Hydration intake appropriate for improving, restoring or maintaining nutritional needs  Description: Monitor and assess patient's nutrition/hydration status for malnutrition  Collaborate with interdisciplinary team and initiate plan and interventions as ordered  Monitor patient's weight and dietary intake as ordered or per policy  Utilize nutrition screening tool and intervene as necessary  Determine patient's food preferences and provide high-protein, high-caloric foods as appropriate       INTERVENTIONS:  - Monitor oral intake, urinary output, labs, and treatment plans  - Assess nutrition and hydration status and recommend course of action  - Evaluate amount of meals eaten  - Assist patient with eating if necessary   - Allow adequate time for meals  - Recommend/ encourage appropriate diets, oral nutritional supplements, and vitamin/mineral supplements  - Order, calculate, and assess calorie counts as needed  - Recommend, monitor, and adjust tube feedings and TPN/PPN based on assessed needs  - Assess need for intravenous fluids  - Provide specific nutrition/hydration education as appropriate  - Include patient/family/caregiver in decisions related to nutrition  Outcome: Progressing     Problem: MOBILITY - ADULT  Goal: Maintain or return to baseline ADL function  Description: INTERVENTIONS:  -  Assess patient's ability to carry out ADLs; assess patient's baseline for ADL function and identify physical deficits which impact ability to perform ADLs (bathing, care of mouth/teeth, toileting, grooming, dressing, etc )  - Assess/evaluate cause of self-care deficits   - Assess range of motion  - Assess patient's mobility; develop plan if impaired  - Assess patient's need for assistive devices and provide as appropriate  - Encourage maximum independence but intervene and supervise when necessary  - Involve family in performance of ADLs  - Assess for home care needs following discharge   - Consider OT consult to assist with ADL evaluation and planning for discharge  - Provide patient education as appropriate  Outcome: Progressing  Goal: Maintains/Returns to pre admission functional level  Description: INTERVENTIONS:  - Perform BMAT or MOVE assessment daily    - Set and communicate daily mobility goal to care team and patient/family/caregiver     - Collaborate with rehabilitation services on mobility goals if consulted    - Out of bed for toileting  - Record patient progress and toleration of activity level   Outcome: Progressing

## 2022-01-29 NOTE — PROGRESS NOTES
[facility]  Progress Note - Tarsha Wilde 1975, 40 y o  male MRN: [id number]  Unit/Bed#: 7T [facility] 717-01 Encounter: [id number]  Primary Care Provider: Kole Zamora MD   Date and time admitted to hospital: 1/24/2022  9:08 AM    * Staphylococcus aureus bacteremia  Assessment & Plan  Patient with 2 of 2 blood cultures positive for Staph aureus  Echo without signs of obvious vegetation    -ID consulted, appreciate recommendations  -continue cefazolin  -repeat blood cultures obtained on 01/27  -patient will need PICC placement once repeat cultures negative 72 hours, will likely place PICC on Monday    Acute osteomyelitis of left foot [facility])  Assessment & Plan  Patient sent to ED after outpatient visit with Podiatry  X-ray revealed findings suggestive of osteo with acute pathological fracture  MRI revealed: 1st toe distal phalanx osteomyelitis with adjacent sinus tract  -podiatry consult, appreciate recommendations  -patient underwent partial left hallux amputation  -wound management as per Podiatry, will follow-up patient next week  -continue cefazolin for Staph aureus bacteremia  -PT/OT        Morbid obesity ([address] )  Assessment & Plan  -discussed diet and exercise    Diabetes mellitus [facility])  Assessment & Plan  Lab Results   Component Value Date    HGBA1C 10 6 (A) 01/13/2022       Recent Labs     01/28/22  1110 01/28/22  1552 01/28/22 2020 01/29/22  0625   POCGLU 158* 180* 196* 147*       Blood Sugar Average: Last 72 hrs:     -hold home metformin  -insulin glargine 35 units q h s    -carb consistent diet  -sliding scale insulin with Accu-Cheks    Hypertension  Assessment & Plan  -continue home atenolol and HCTZ          VTE Pharmacologic Prophylaxis: VTE Score: 4 Moderate Risk (Score 3-4) - Pharmacological DVT Prophylaxis Ordered: heparin  Patient Centered Rounds: I performed bedside rounds with nursing staff today    Discussions with Specialists or Other Care Team Provider: none    Education and Discussions with Family / Patient:  Discussed with patient all questions answered    Time Spent for Care: 45 minutes  More than 50% of total time spent on counseling and coordination of care as described above  Current Length of Stay: 5 day(s)  Current Patient Status: Inpatient   Certification Statement: The patient will continue to require additional inpatient hospital stay due to Patient will need PICC placement for long-term antibiotics on Monday  Discharge Plan: Anticipate discharge in 48-72 hrs to home  Code Status: Level 1 - Full Code    Subjective:   Patient seen examined at bedside  Patient with no acute events overnight  Patient states he feels fine  Denies pain    Objective:     Vitals:   Temp (24hrs), Av 2 °F (36 2 °C), Min:96 6 °F (35 9 °C), Max:97 9 °F (36 6 °C)    Temp:  [96 6 °F (35 9 °C)-97 9 °F (36 6 °C)] 96 6 °F (35 9 °C)  HR:  [64-67] 64  Resp:  [16-18] 18  BP: (138-167)/(74-95) 160/95  SpO2:  [97 %-100 %] 98 %  Body mass index is 49 42 kg/m²  Input and Output Summary (last 24 hours): Intake/Output Summary (Last 24 hours) at 2022 3239  Last data filed at 2022 1900  Gross per 24 hour   Intake 840 ml   Output 600 ml   Net 240 ml       Physical Exam:   Physical Exam  Constitutional:       General: He is not in acute distress  Appearance: He is obese  He is not ill-appearing  Cardiovascular:      Rate and Rhythm: Normal rate and regular rhythm  Pulses: Normal pulses  Heart sounds: Normal heart sounds  Pulmonary:      Effort: Pulmonary effort is normal       Breath sounds: Normal breath sounds  Abdominal:      General: Abdomen is flat  Bowel sounds are normal       Palpations: Abdomen is soft  Musculoskeletal:         General: Normal range of motion  Skin:     Comments: Left foot wrapped   Neurological:      General: No focal deficit present  Mental Status: He is alert and oriented to person, place, and time   Mental status is at baseline  Psychiatric:         Mood and Affect: Mood normal          Thought Content: Thought content normal          Judgment: Judgment normal           Additional Data:     Labs:  Results from last 7 days   Lab Units 01/28/22  0433 01/26/22 0446 01/25/22  0456   WBC Thousand/uL 9 70   < > 10 20   HEMOGLOBIN g/dL 10 3*   < > 10 7*   HEMATOCRIT % 29 9*   < > 30 8*   PLATELETS Thousands/uL 177   < > 275   NEUTROS PCT %  --   --  55   LYMPHS PCT %  --   --  33   MONOS PCT %  --   --  9   EOS PCT %  --   --  3    < > = values in this interval not displayed  Results from last 7 days   Lab Units 01/28/22  0433 01/26/22  0446 01/25/22  0456   SODIUM mmol/L 136*   < > 134*   POTASSIUM mmol/L 4 2   < > 4 3   CHLORIDE mmol/L 102   < > 106   CO2 mmol/L 30   < > 23   BUN mg/dL 16   < > 28*   CREATININE mg/dL 1 16   < > 1 13   ANION GAP mmol/L 4*   < > 5   CALCIUM mg/dL 8 7   < > 8 5   ALBUMIN g/dL  --   --  3 3   TOTAL BILIRUBIN mg/dL  --   --  0 52   ALK PHOS U/L  --   --  77   ALT U/L  --   --  9   AST U/L  --   --  22   GLUCOSE RANDOM mg/dL 203*   < > 120*    < > = values in this interval not displayed  Results from last 7 days   Lab Units 01/29/22  0625 01/28/22 2020 01/28/22  1552 01/28/22  1110 01/28/22  0554 01/27/22  2026 01/27/22  1626 01/27/22  1118 01/27/22  0634 01/26/22  2023 01/26/22  1716 01/26/22  1112   POC GLUCOSE mg/dl 147* 196* 180* 158* 190* 209* 199* 184* 151* 201* 137 156*               Lines/Drains:  Invasive Devices  Report    Peripheral Intravenous Line            Peripheral IV 01/28/22 Distal;Left;Upper;Ventral (anterior) Arm 1 day                      Imaging: No pertinent imaging reviewed  Recent Cultures (last 7 days):   Results from last 7 days   Lab Units 01/27/22  0455 01/27/22  0454 01/26/22  1550 01/24/22  0934 01/24/22  0930   BLOOD CULTURE  No Growth at 24 hrs   No Growth at 24 hrs   --  Staphylococcus aureus* Staphylococcus aureus*   GRAM STAIN RESULT   -- --  No Polys or Bacteria seen Gram positive cocci in clusters* Gram positive cocci in clusters*       Last 24 Hours Medication List:   Current Facility-Administered Medications   Medication Dose Route Frequency Provider Last Rate    acetaminophen  650 mg Oral Q6H PRN Chrystie Hard, DPM      atenolol  100 mg Oral Daily Chrystie Hard, DPM      cefazolin  2,000 mg Intravenous Q8H Shawn Mishko, DPM 2,000 mg (01/29/22 0404)    heparin (porcine)  5,000 Units Subcutaneous [facility] Shawn Mishko, DPM      hydrochlorothiazide  25 mg Oral Daily Chrystie Hard, DPM      insulin glargine  35 Units Subcutaneous HS Shawn Mishko, DPM      insulin lispro  2-12 Units Subcutaneous TID AC Shawn Mishko, DPM      oxyCODONE  5 mg Oral Q4H PRN Ted Hussein DO          Today, Patient Was Seen By: Ted Hussein DO    **Please Note: This note may have been constructed using a voice recognition system  **

## 2022-01-29 NOTE — ASSESSMENT & PLAN NOTE
Lab Results   Component Value Date    HGBA1C 10 6 (A) 01/13/2022       Recent Labs     01/28/22  1110 01/28/22  1552 01/28/22 2020 01/29/22  0625   POCGLU 158* 180* 196* 147*       Blood Sugar Average: Last 72 hrs:     -hold home metformin  -insulin glargine 35 units q h s    -carb consistent diet  -sliding scale insulin with Accu-Cheks

## 2022-01-29 NOTE — PLAN OF CARE
Problem: Potential for Falls  Goal: Patient will remain free of falls  Description: INTERVENTIONS:  - Educate patient/family on patient safety including physical limitations  - Instruct patient to call for assistance with activity   - Consult OT/PT to assist with strengthening/mobility   - Keep Call bell within reach  - Keep bed low and locked with side rails adjusted as appropriate  - Keep care items and personal belongings within reach  - Initiate and maintain comfort rounds  - Make Fall Risk Sign visible to staff  - Apply yellow socks and bracelet for high fall risk patients  - Consider moving patient to room near nurses station  Outcome: Progressing     Problem: PAIN - ADULT  Goal: Verbalizes/displays adequate comfort level or baseline comfort level  Description: Interventions:  - Encourage patient to monitor pain and request assistance  - Assess pain using appropriate pain scale  - Administer analgesics based on type and severity of pain and evaluate response  - Implement non-pharmacological measures as appropriate and evaluate response  - Consider cultural and social influences on pain and pain management  - Notify physician/advanced practitioner if interventions unsuccessful or patient reports new pain  Outcome: Progressing     Problem: INFECTION - ADULT  Goal: Absence or prevention of progression during hospitalization  Description: INTERVENTIONS:  - Assess and monitor for signs and symptoms of infection  - Monitor lab/diagnostic results  - Monitor all insertion sites, i e  indwelling lines, tubes, and drains  - Monitor endotracheal if appropriate and nasal secretions for changes in amount and color  - [place] appropriate cooling/warming therapies per order  - Administer medications as ordered  - Instruct and encourage patient and family to use good hand hygiene technique  - Identify and instruct in appropriate isolation precautions for identified infection/condition  Outcome: Progressing  Goal: Absence of fever/infection during neutropenic period  Description: INTERVENTIONS:  - Monitor WBC    Outcome: Progressing     Problem: SAFETY ADULT  Goal: Patient will remain free of falls  Description: INTERVENTIONS:  - Educate patient/family on patient safety including physical limitations  - Instruct patient to call for assistance with activity   - Consult OT/PT to assist with strengthening/mobility   - Keep Call bell within reach  - Keep bed low and locked with side rails adjusted as appropriate  - Keep care items and personal belongings within reach  - Initiate and maintain comfort rounds  - Make Fall Risk Sign visible to staff  - Apply yellow socks and bracelet for high fall risk patients  - Consider moving patient to room near nurses station  Outcome: Progressing  Goal: Maintain or return to baseline ADL function  Description: INTERVENTIONS:  -  Assess patient's ability to carry out ADLs; assess patient's baseline for ADL function and identify physical deficits which impact ability to perform ADLs (bathing, care of mouth/teeth, toileting, grooming, dressing, etc )  - Assess/evaluate cause of self-care deficits   - Assess range of motion  - Assess patient's mobility; develop plan if impaired  - Assess patient's need for assistive devices and provide as appropriate  - Encourage maximum independence but intervene and supervise when necessary  - Involve family in performance of ADLs  - Assess for home care needs following discharge   - Consider OT consult to assist with ADL evaluation and planning for discharge  - Provide patient education as appropriate  Outcome: Progressing  Goal: Maintains/Returns to pre admission functional level  Description: INTERVENTIONS:  - Perform BMAT or MOVE assessment daily    - Set and communicate daily mobility goal to care team and patient/family/caregiver     - Collaborate with rehabilitation services on mobility goals if consulted  - Out of bed for toileting  - Record patient progress and toleration of activity level   Outcome: Progressing     Problem: DISCHARGE PLANNING  Goal: Discharge to home or other facility with appropriate resources  Description: INTERVENTIONS:  - Identify barriers to discharge w/patient and caregiver  - Arrange for needed discharge resources and transportation as appropriate  - Identify discharge learning needs (meds, wound care, etc )  - Arrange for interpretive services to assist at discharge as needed  - Refer to Case Management Department for coordinating discharge planning if the patient needs post-hospital services based on physician/advanced practitioner order or complex needs related to functional status, cognitive ability, or social support system  Outcome: Progressing     Problem: Knowledge Deficit  Goal: Patient/family/caregiver demonstrates understanding of disease process, treatment plan, medications, and discharge instructions  Description: Complete learning assessment and assess knowledge base  Interventions:  - Provide teaching at level of understanding  - Provide teaching via preferred learning methods  Outcome: Progressing     Problem: Nutrition/Hydration-ADULT  Goal: Nutrient/Hydration intake appropriate for improving, restoring or maintaining nutritional needs  Description: Monitor and assess patient's nutrition/hydration status for malnutrition  Collaborate with interdisciplinary team and initiate plan and interventions as ordered  Monitor patient's weight and dietary intake as ordered or per policy  Utilize nutrition screening tool and intervene as necessary  Determine patient's food preferences and provide high-protein, high-caloric foods as appropriate       INTERVENTIONS:  - Monitor oral intake, urinary output, labs, and treatment plans  - Assess nutrition and hydration status and recommend course of action  - Evaluate amount of meals eaten  - Assist patient with eating if necessary   - Allow adequate time for meals  - Recommend/ encourage appropriate diets, oral nutritional supplements, and vitamin/mineral supplements  - Order, calculate, and assess calorie counts as needed  - Recommend, monitor, and adjust tube feedings and TPN/PPN based on assessed needs  - Assess need for intravenous fluids  - Provide specific nutrition/hydration education as appropriate  - Include patient/family/caregiver in decisions related to nutrition  Outcome: Progressing     Problem: MOBILITY - ADULT  Goal: Maintain or return to baseline ADL function  Description: INTERVENTIONS:  -  Assess patient's ability to carry out ADLs; assess patient's baseline for ADL function and identify physical deficits which impact ability to perform ADLs (bathing, care of mouth/teeth, toileting, grooming, dressing, etc )  - Assess/evaluate cause of self-care deficits   - Assess range of motion  - Assess patient's mobility; develop plan if impaired  - Assess patient's need for assistive devices and provide as appropriate  - Encourage maximum independence but intervene and supervise when necessary  - Involve family in performance of ADLs  - Assess for home care needs following discharge   - Consider OT consult to assist with ADL evaluation and planning for discharge  - Provide patient education as appropriate  Outcome: Progressing  Goal: Maintains/Returns to pre admission functional level  Description: INTERVENTIONS:  - Perform BMAT or MOVE assessment daily    - Set and communicate daily mobility goal to care team and patient/family/caregiver     - Collaborate with rehabilitation services on mobility goals if consulted  - Out of bed for toileting  - Record patient progress and toleration of activity level   Outcome: Progressing

## 2022-01-30 LAB
GLUCOSE SERPL-MCNC: 149 MG/DL (ref 65–140)
GLUCOSE SERPL-MCNC: 157 MG/DL (ref 65–140)
GLUCOSE SERPL-MCNC: 212 MG/DL (ref 65–140)
GLUCOSE SERPL-MCNC: 220 MG/DL (ref 65–140)

## 2022-01-30 PROCEDURE — 99232 SBSQ HOSP IP/OBS MODERATE 35: CPT | Performed by: STUDENT IN AN ORGANIZED HEALTH CARE EDUCATION/TRAINING PROGRAM

## 2022-01-30 PROCEDURE — 82948 REAGENT STRIP/BLOOD GLUCOSE: CPT

## 2022-01-30 RX ADMIN — CEFAZOLIN SODIUM 2000 MG: 2 SOLUTION INTRAVENOUS at 22:03

## 2022-01-30 RX ADMIN — INSULIN LISPRO 2 UNITS: 100 INJECTION, SOLUTION INTRAVENOUS; SUBCUTANEOUS at 12:17

## 2022-01-30 RX ADMIN — HEPARIN SODIUM 5000 UNITS: 5000 INJECTION INTRAVENOUS; SUBCUTANEOUS at 22:03

## 2022-01-30 RX ADMIN — HEPARIN SODIUM 5000 UNITS: 5000 INJECTION INTRAVENOUS; SUBCUTANEOUS at 06:26

## 2022-01-30 RX ADMIN — ATENOLOL 100 MG: 50 TABLET ORAL at 09:27

## 2022-01-30 RX ADMIN — CEFAZOLIN SODIUM 2000 MG: 2 SOLUTION INTRAVENOUS at 12:20

## 2022-01-30 RX ADMIN — HEPARIN SODIUM 5000 UNITS: 5000 INJECTION INTRAVENOUS; SUBCUTANEOUS at 13:52

## 2022-01-30 RX ADMIN — CEFAZOLIN SODIUM 2000 MG: 2 SOLUTION INTRAVENOUS at 03:24

## 2022-01-30 RX ADMIN — INSULIN LISPRO 4 UNITS: 100 INJECTION, SOLUTION INTRAVENOUS; SUBCUTANEOUS at 16:35

## 2022-01-30 RX ADMIN — INSULIN GLARGINE 35 UNITS: 100 INJECTION, SOLUTION SUBCUTANEOUS at 22:04

## 2022-01-30 RX ADMIN — HYDROCHLOROTHIAZIDE 25 MG: 25 TABLET ORAL at 09:27

## 2022-01-30 NOTE — PROGRESS NOTES
Progress Note - Wound   [place] Shade 40 y o  male MRN: [id number]  Unit/Bed#: 7T [facility] 717-01 Encounter: [id number]      Assessment:   1  Acute osteomyelitis of the L big toe   A  S/p partial L hallux amputation  2  Diabetic neuropathy  3  Cellulitis L foot  4  Edema L   5  Staph aureus bacteremia    Plan:    -pt eval and managed    - Number and complexity of problems addressed:  1 stable illness  as shown    - Amount/complexity of data reviewed and analyzed:     Category 1: prior patient notes were analyzed today before evaluating and managing patient  All PMH were discussed with pt today  - Risk of complications: moderate risk of morbidity from additional testing or treatment involved with this patient, which includes but not limited to:    - discussed anatomy, condition, treatment plan and options  They were instructed on proper foot care  The patient was seen today for greater than total of  45-59 minutes     This is total time spent today involving both face-to-face time and non face-to-face time  This time spent includes  reviewing their past medical history  , performing a medically appropriate examination and evaluation of the patient, counseling and educating the patient,  documenting all findings in EMR, and independently interpreting results and communicating results with  patient     and discussing their condition and treatment options, risks, and potential complications  I have discussed the findings of this examination with the patient  The discussion included a complete verbal explanation of the examination results, diagnosis and planned treatment(s)  A schedule for future care needs was explained  The patient has verbalized the understanding of these instructions at this time  If any questions should arise after returning home I have encouraged the patient to feel free to call the office      - betadine/DSD/coban applied for pt today   To remain intact and untouched until pt sees me as outpt  - pt doing well overall  - AWAIT PICC LINE  Pt will require IV antibiotics likely 4 weeks secondary to staph aureus bacteremia  - continue with forefoot offloading in forefoot offloading shoe  - continue with IV antibiotics  - I d/w Primary team in detail today  - pt to follow up with me as outpt  Subjective:  Pt eval and managed today  No pain  Resting comfortably bedside  Objective:    Vitals: Blood pressure 155/79, pulse 64, temperature 97 6 °F (36 4 °C), temperature source Temporal, resp  rate 18, height 5' 8" (1 727 m), weight (!) 147 kg (325 lb), SpO2 97 %  ,Body mass index is 49 42 kg/m²  Physical Exam: DP and PT 2/4 b/l, CRT< 3 seconds, absent gross sensation    L distal hallux with sutures intact, skin well coapted, there is resolved erythema of surrounding soft tissue, no signs of dehiscence at this time  Lab, Imaging and other studies: I have personally reviewed pertinent reports  Wound 01/19/22 Toe (Comment  which one) Left (Active)   Wound Image   01/24/22 0831   Wound Description Pink;Slough 01/30/22 1138   Felicity-wound Assessment Swelling 01/30/22 1138   Wound Length (cm) 2 cm 01/24/22 1800   Wound Width (cm) 1 5 cm 01/24/22 1800   Wound Depth (cm) 0 6 cm 01/24/22 0844   Wound Surface Area (cm^2) 3 cm^2 01/24/22 1800   Wound Volume (cm^3) 1 53 cm^3 01/24/22 0844   Calculated Wound Volume (cm^3) 1 53 cm^3 01/24/22 0844   Change in Wound Size % 0 01/24/22 0844   Drainage Amount Small 01/30/22 1138   Drainage Description Yellow 01/30/22 1138   Non-staged Wound Description Full thickness 01/30/22 1138   Treatments Site care 01/30/22 1138   Dressing Dry dressing 01/30/22 1138   Wound packed? No 01/30/22 1138   Dressing Changed Changed 01/24/22 2200   Patient Tolerance Tolerated well 01/24/22 1800   Dressing Status Clean;Dry; Intact 01/30/22 1138       Wound 01/26/22 Foot Left (Active)   Wound Description NEY 01/30/22 1138   Felicity-wound Assessment NEY 01/30/22 1138   Wound Site Closure Unable to assess 01/30/22 1138   Drainage Amount NEY 01/30/22 1138   Treatments Elevated 01/28/22 2030   Dressing Pressure dressing 01/30/22 1138   Dressing Status Clean;Dry; Intact 01/30/22 1138

## 2022-01-30 NOTE — PLAN OF CARE
Problem: Potential for Falls  Goal: Patient will remain free of falls  Description: INTERVENTIONS:  - Educate patient/family on patient safety including physical limitations  - Instruct patient to call for assistance with activity   - Consult OT/PT to assist with strengthening/mobility   - Keep Call bell within reach  - Keep bed low and locked with side rails adjusted as appropriate  - Keep care items and personal belongings within reach  - Initiate and maintain comfort rounds  - Make Fall Risk Sign visible to staff  - Offer Toileting   - Obtain necessary fall risk management equipment:   - Apply yellow socks and bracelet for high fall risk patients  - Consider moving patient to room near nurses station  Outcome: Progressing     Problem: PAIN - ADULT  Goal: Verbalizes/displays adequate comfort level or baseline comfort level  Description: Interventions:  - Encourage patient to monitor pain and request assistance  - Assess pain using appropriate pain scale  - Administer analgesics based on type and severity of pain and evaluate response  - Implement non-pharmacological measures as appropriate and evaluate response  - Consider cultural and social influences on pain and pain management  - Notify physician/advanced practitioner if interventions unsuccessful or patient reports new pain  Outcome: Progressing     Problem: INFECTION - ADULT  Goal: Absence or prevention of progression during hospitalization  Description: INTERVENTIONS:  - Assess and monitor for signs and symptoms of infection  - Monitor lab/diagnostic results  - Monitor all insertion sites, i e  indwelling lines, tubes, and drains  - Monitor endotracheal if appropriate and nasal secretions for changes in amount and color  - [place] appropriate cooling/warming therapies per order  - Administer medications as ordered  - Instruct and encourage patient and family to use good hand hygiene technique  - Identify and instruct in appropriate isolation precautions for identified infection/condition  Outcome: Progressing  Goal: Absence of fever/infection during neutropenic period  Description: INTERVENTIONS:  - Monitor WBC    Outcome: Progressing     Problem: SAFETY ADULT  Goal: Patient will remain free of falls  Description: INTERVENTIONS:  - Educate patient/family on patient safety including physical limitations  - Instruct patient to call for assistance with activity   - Consult OT/PT to assist with strengthening/mobility   - Keep Call bell within reach  - Keep bed low and locked with side rails adjusted as appropriate  - Keep care items and personal belongings within reach  - Initiate and maintain comfort rounds  - Make Fall Risk Sign visible to staff  - Offer Toileting   - Obtain necessary fall risk management equipment:   - Apply yellow socks and bracelet for high fall risk patients  - Consider moving patient to room near nurses station  Outcome: Progressing  Goal: Maintain or return to baseline ADL function  Description: INTERVENTIONS:  -  Assess patient's ability to carry out ADLs; assess patient's baseline for ADL function and identify physical deficits which impact ability to perform ADLs (bathing, care of mouth/teeth, toileting, grooming, dressing, etc )  - Assess/evaluate cause of self-care deficits   - Assess range of motion  - Assess patient's mobility; develop plan if impaired  - Assess patient's need for assistive devices and provide as appropriate  - Encourage maximum independence but intervene and supervise when necessary  - Involve family in performance of ADLs  - Assess for home care needs following discharge   - Consider OT consult to assist with ADL evaluation and planning for discharge  - Provide patient education as appropriate  Outcome: Progressing  Goal: Maintains/Returns to pre admission functional level  Description: INTERVENTIONS:  - Perform BMAT or MOVE assessment daily    - Set and communicate daily mobility goal to care team and patient/family/caregiver  - Collaborate with rehabilitation services on mobility goals if consulted  - Perform Range of Motion 2 times a day  - Reposition patient every 2 hours  - Dangle patient 2 times a day  - Stand patient 2 times a day  - Ambulate patient 2 times a day  - Out of bed to chair 2 times a day   - Out of bed for meals 2 times a day  - Out of bed for toileting  - Record patient progress and toleration of activity level   Outcome: Progressing     Problem: DISCHARGE PLANNING  Goal: Discharge to home or other facility with appropriate resources  Description: INTERVENTIONS:  - Identify barriers to discharge w/patient and caregiver  - Arrange for needed discharge resources and transportation as appropriate  - Identify discharge learning needs (meds, wound care, etc )  - Arrange for interpretive services to assist at discharge as needed  - Refer to Case Management Department for coordinating discharge planning if the patient needs post-hospital services based on physician/advanced practitioner order or complex needs related to functional status, cognitive ability, or social support system  Outcome: Progressing     Problem: Knowledge Deficit  Goal: Patient/family/caregiver demonstrates understanding of disease process, treatment plan, medications, and discharge instructions  Description: Complete learning assessment and assess knowledge base  Interventions:  - Provide teaching at level of understanding  - Provide teaching via preferred learning methods  Outcome: Progressing     Problem: Nutrition/Hydration-ADULT  Goal: Nutrient/Hydration intake appropriate for improving, restoring or maintaining nutritional needs  Description: Monitor and assess patient's nutrition/hydration status for malnutrition  Collaborate with interdisciplinary team and initiate plan and interventions as ordered  Monitor patient's weight and dietary intake as ordered or per policy   Utilize nutrition screening tool and intervene as necessary  Determine patient's food preferences and provide high-protein, high-caloric foods as appropriate  INTERVENTIONS:  - Monitor oral intake, urinary output, labs, and treatment plans  - Assess nutrition and hydration status and recommend course of action  - Evaluate amount of meals eaten  - Assist patient with eating if necessary   - Allow adequate time for meals  - Recommend/ encourage appropriate diets, oral nutritional supplements, and vitamin/mineral supplements  - Order, calculate, and assess calorie counts as needed  - Recommend, monitor, and adjust tube feedings and TPN/PPN based on assessed needs  - Assess need for intravenous fluids  - Provide specific nutrition/hydration education as appropriate  - Include patient/family/caregiver in decisions related to nutrition  Outcome: Progressing     Problem: MOBILITY - ADULT  Goal: Maintain or return to baseline ADL function  Description: INTERVENTIONS:  -  Assess patient's ability to carry out ADLs; assess patient's baseline for ADL function and identify physical deficits which impact ability to perform ADLs (bathing, care of mouth/teeth, toileting, grooming, dressing, etc )  - Assess/evaluate cause of self-care deficits   - Assess range of motion  - Assess patient's mobility; develop plan if impaired  - Assess patient's need for assistive devices and provide as appropriate  - Encourage maximum independence but intervene and supervise when necessary  - Involve family in performance of ADLs  - Assess for home care needs following discharge   - Consider OT consult to assist with ADL evaluation and planning for discharge  - Provide patient education as appropriate  Outcome: Progressing  Goal: Maintains/Returns to pre admission functional level  Description: INTERVENTIONS:  - Perform BMAT or MOVE assessment daily    - Set and communicate daily mobility goal to care team and patient/family/caregiver     - Collaborate with rehabilitation services on mobility goals if consulted  - Perform Range of Motion 2 times a day  - Reposition patient every 2 hours    - Dangle patient 2 times a day  - Stand patient 2 times a day  - Ambulate patient 2 times a day  - Out of bed to chair 2 times a day   - Out of bed for meals 2 times a day  - Out of bed for toileting  - Record patient progress and toleration of activity level   Outcome: Progressing

## 2022-01-30 NOTE — ASSESSMENT & PLAN NOTE
Patient sent to ED after outpatient visit with Podiatry  X-ray revealed findings suggestive of osteo with acute pathological fracture  MRI revealed: 1st toe distal phalanx osteomyelitis with adjacent sinus tract        -podiatry consult, appreciate recommendations  -patient underwent partial left hallux amputation  -wound management as per Podiatry, will follow-up patient next week  -continue cefazolin for Staph aureus bacteremia  -PT/OT recommending no rehab needs

## 2022-01-30 NOTE — ASSESSMENT & PLAN NOTE
Lab Results   Component Value Date    HGBA1C 10 6 (A) 01/13/2022       Recent Labs     01/29/22  1108 01/29/22  1527 01/29/22 2015 01/30/22  0603   POCGLU 192* 198* 220* 149*       Blood Sugar Average: Last 72 hrs:     -hold home metformin  -insulin glargine 35 units q h s    -carb consistent diet  -sliding scale insulin with Accu-Cheks

## 2022-01-30 NOTE — PROGRESS NOTES
[facility]  Progress Note - Rodrick Velazquez 1975, 40 y o  male MRN: [id number]  Unit/Bed#: 7T [facility] 717-01 Encounter: [id number]  Primary Care Provider: Andrew Agrawal MD   Date and time admitted to hospital: 1/24/2022  9:08 AM    * Staphylococcus aureus bacteremia  Assessment & Plan  Patient with 2 of 2 blood cultures positive for Staph aureus  Echo without signs of obvious vegetation    -ID consulted, appreciate recommendations  -continue cefazolin  -repeat blood cultures obtained on 01/27  -patient will need PICC placement once repeat cultures negative 72 hours, will likely place PICC on Monday  -if repeat blood cultures remain negative patient will need for 4 week course of antibiotics    Acute osteomyelitis of left foot [facility])  Assessment & Plan  Patient sent to ED after outpatient visit with Podiatry  X-ray revealed findings suggestive of osteo with acute pathological fracture  MRI revealed: 1st toe distal phalanx osteomyelitis with adjacent sinus tract  -podiatry consult, appreciate recommendations  -patient underwent partial left hallux amputation  -wound management as per Podiatry, will follow-up patient next week  -continue cefazolin for Staph aureus bacteremia  -PT/OT recommending no rehab needs        Morbid obesity ([address] )  Assessment & Plan  -discussed diet and exercise    Diabetes mellitus [facility])  Assessment & Plan  Lab Results   Component Value Date    HGBA1C 10 6 (A) 01/13/2022       Recent Labs     01/29/22  1108 01/29/22  1527 01/29/22 2015 01/30/22  0603   POCGLU 192* 198* 220* 149*       Blood Sugar Average: Last 72 hrs:     -hold home metformin  -insulin glargine 35 units q h s    -carb consistent diet  -sliding scale insulin with Accu-Cheks    Hypertension  Assessment & Plan  -continue home atenolol and HCTZ          VTE Pharmacologic Prophylaxis: VTE Score: 4 Moderate Risk (Score 3-4) - Pharmacological DVT Prophylaxis Ordered: heparin      Patient Centered Rounds: I performed bedside rounds with nursing staff today  Discussions with Specialists or Other Care Team Provider:     Education and Discussions with Family / Patient:  Discussed with patient, all questions answered    Time Spent for Care: 45 minutes  More than 50% of total time spent on counseling and coordination of care as described above  Current Length of Stay: 6 day(s)  Current Patient Status: Inpatient   Certification Statement: The patient will continue to require additional inpatient hospital stay due to Pending PICC placement for IV antibiotics, likely discharge on  for   Discharge Plan: Anticipate discharge in 24-48 hrs to home  Code Status: Level 1 - Full Code    Subjective:   Patient seen examined bedside  No acute events overnight  Patient resting comfortably in bed no apparent distress  Objective:     Vitals:   Temp (24hrs), Av °F (36 1 °C), Min:96 3 °F (35 7 °C), Max:97 6 °F (36 4 °C)    Temp:  [96 3 °F (35 7 °C)-97 6 °F (36 4 °C)] 97 6 °F (36 4 °C)  HR:  [60-64] 64  Resp:  [18-20] 18  BP: (155-171)/(79-91) 155/79  SpO2:  [95 %-97 %] 97 %  Body mass index is 49 42 kg/m²  Input and Output Summary (last 24 hours): Intake/Output Summary (Last 24 hours) at 2022 1001  Last data filed at 2022 8883  Gross per 24 hour   Intake 1720 ml   Output --   Net 1720 ml       Physical Exam:   Physical Exam  Constitutional:       General: He is not in acute distress  Appearance: He is obese  He is not ill-appearing  Cardiovascular:      Rate and Rhythm: Normal rate and regular rhythm  Pulses: Normal pulses  Heart sounds: Normal heart sounds  Pulmonary:      Effort: Pulmonary effort is normal       Breath sounds: Normal breath sounds  Abdominal:      General: Abdomen is flat  Bowel sounds are normal       Palpations: Abdomen is soft  Musculoskeletal:         General: Normal range of motion  Cervical back: Normal range of motion     Skin: General: Skin is warm  Comments: Left foot wrapped   Neurological:      General: No focal deficit present  Mental Status: He is alert and oriented to person, place, and time  Mental status is at baseline  Psychiatric:         Mood and Affect: Mood normal          Behavior: Behavior normal          Thought Content: Thought content normal          Judgment: Judgment normal           Additional Data:     Labs:  Results from last 7 days   Lab Units 01/28/22 0433 01/26/22 0446 01/25/22  0456   WBC Thousand/uL 9 70   < > 10 20   HEMOGLOBIN g/dL 10 3*   < > 10 7*   HEMATOCRIT % 29 9*   < > 30 8*   PLATELETS Thousands/uL 177   < > 275   NEUTROS PCT %  --   --  55   LYMPHS PCT %  --   --  33   MONOS PCT %  --   --  9   EOS PCT %  --   --  3    < > = values in this interval not displayed  Results from last 7 days   Lab Units 01/28/22 0433 01/26/22 0446 01/25/22  0456   SODIUM mmol/L 136*   < > 134*   POTASSIUM mmol/L 4 2   < > 4 3   CHLORIDE mmol/L 102   < > 106   CO2 mmol/L 30   < > 23   BUN mg/dL 16   < > 28*   CREATININE mg/dL 1 16   < > 1 13   ANION GAP mmol/L 4*   < > 5   CALCIUM mg/dL 8 7   < > 8 5   ALBUMIN g/dL  --   --  3 3   TOTAL BILIRUBIN mg/dL  --   --  0 52   ALK PHOS U/L  --   --  77   ALT U/L  --   --  9   AST U/L  --   --  22   GLUCOSE RANDOM mg/dL 203*   < > 120*    < > = values in this interval not displayed  Results from last 7 days   Lab Units 01/30/22  0603 01/29/22 2015 01/29/22  1527 01/29/22  1108 01/29/22  0625 01/28/22 2020 01/28/22  1552 01/28/22  1110 01/28/22  0554 01/27/22  2026 01/27/22  1626 01/27/22  1118   POC GLUCOSE mg/dl 149* 220* 198* 192* 147* 196* 180* 158* 190* 209* 199* 184*               Lines/Drains:  Invasive Devices  Report    Peripheral Intravenous Line            Peripheral IV 01/28/22 Distal;Left;Upper;Ventral (anterior) Arm 2 days                      Imaging: No pertinent imaging reviewed      Recent Cultures (last 7 days):   Results from last 7 days   Lab Units 01/27/22  0455 01/27/22  0454 01/26/22  1550 01/24/22  0934 01/24/22  0930   BLOOD CULTURE  No Growth at 48 hrs  No Growth at 48 hrs  --  Staphylococcus aureus* Staphylococcus aureus*   GRAM STAIN RESULT   --   --  No Polys or Bacteria seen Gram positive cocci in clusters* Gram positive cocci in clusters*       Last 24 Hours Medication List:   Current Facility-Administered Medications   Medication Dose Route Frequency Provider Last Rate    acetaminophen  650 mg Oral Q6H PRN Elaine Reddish, DPM      atenolol  100 mg Oral Daily Elaine Reddish, DPM      cefazolin  2,000 mg Intravenous Q8H Shawn Tomashko, DPM 2,000 mg (01/30/22 0324)    heparin (porcine)  5,000 Units Subcutaneous [facility] Shawn Mishko, DPM      hydrochlorothiazide  25 mg Oral Daily Elaine Savannah, DPM      insulin glargine  35 Units Subcutaneous HS Shawn Tomashko, DPM      insulin lispro  2-12 Units Subcutaneous TID AC Shawn Tomashko, DPM      oxyCODONE  5 mg Oral Q4H PRN Nilsa Aschoff, DO          Today, Patient Was Seen By: Nilsa Aschoff, DO    **Please Note: This note may have been constructed using a voice recognition system  **

## 2022-01-30 NOTE — ASSESSMENT & PLAN NOTE
Patient with 2 of 2 blood cultures positive for Staph aureus  Echo without signs of obvious vegetation    -ID consulted, appreciate recommendations  -continue cefazolin  -repeat blood cultures obtained on 01/27  -patient will need PICC placement once repeat cultures negative 72 hours, will likely place PICC on Monday  -if repeat blood cultures remain negative patient will need for 4 week course of antibiotics

## 2022-01-31 LAB
ALBUMIN SERPL BCP-MCNC: 3.5 G/DL (ref 3–5.2)
ALP SERPL-CCNC: 79 U/L (ref 43–122)
ALT SERPL W P-5'-P-CCNC: 13 U/L
ANION GAP SERPL CALCULATED.3IONS-SCNC: 4 MMOL/L (ref 5–14)
AST SERPL W P-5'-P-CCNC: 31 U/L (ref 17–59)
BILIRUB SERPL-MCNC: 0.26 MG/DL
BUN SERPL-MCNC: 23 MG/DL (ref 5–25)
CALCIUM SERPL-MCNC: 8.8 MG/DL (ref 8.4–10.2)
CHLORIDE SERPL-SCNC: 101 MMOL/L (ref 97–108)
CO2 SERPL-SCNC: 30 MMOL/L (ref 22–30)
CREAT SERPL-MCNC: 1.14 MG/DL (ref 0.7–1.5)
ERYTHROCYTE [DISTWIDTH] IN BLOOD BY AUTOMATED COUNT: 13 %
GFR SERPL CREATININE-BSD FRML MDRD: 77 ML/MIN/1.73SQ M
GLUCOSE SERPL-MCNC: 123 MG/DL (ref 65–140)
GLUCOSE SERPL-MCNC: 126 MG/DL (ref 65–140)
GLUCOSE SERPL-MCNC: 134 MG/DL (ref 70–99)
HCT VFR BLD AUTO: 30.8 % (ref 41–53)
HGB BLD-MCNC: 10.8 G/DL (ref 13.5–17.5)
MCH RBC QN AUTO: 30.7 PG (ref 26–34)
MCHC RBC AUTO-ENTMCNC: 35.2 G/DL (ref 31–36)
MCV RBC AUTO: 87 FL (ref 80–100)
PLATELET # BLD AUTO: 292 THOUSANDS/UL (ref 150–450)
PMV BLD AUTO: 8.2 FL (ref 8.9–12.7)
POTASSIUM SERPL-SCNC: 4.3 MMOL/L (ref 3.6–5)
PROT SERPL-MCNC: 7.7 G/DL (ref 5.9–8.4)
RBC # BLD AUTO: 3.54 MILLION/UL (ref 4.5–5.9)
SODIUM SERPL-SCNC: 135 MMOL/L (ref 137–147)
WBC # BLD AUTO: 9.5 THOUSAND/UL (ref 4.5–11)

## 2022-01-31 PROCEDURE — 85027 COMPLETE CBC AUTOMATED: CPT | Performed by: STUDENT IN AN ORGANIZED HEALTH CARE EDUCATION/TRAINING PROGRAM

## 2022-01-31 PROCEDURE — 82948 REAGENT STRIP/BLOOD GLUCOSE: CPT

## 2022-01-31 PROCEDURE — 99232 SBSQ HOSP IP/OBS MODERATE 35: CPT | Performed by: INTERNAL MEDICINE

## 2022-01-31 PROCEDURE — 99231 SBSQ HOSP IP/OBS SF/LOW 25: CPT | Performed by: STUDENT IN AN ORGANIZED HEALTH CARE EDUCATION/TRAINING PROGRAM

## 2022-01-31 PROCEDURE — 80053 COMPREHEN METABOLIC PANEL: CPT | Performed by: STUDENT IN AN ORGANIZED HEALTH CARE EDUCATION/TRAINING PROGRAM

## 2022-01-31 RX ADMIN — HEPARIN SODIUM 5000 UNITS: 5000 INJECTION INTRAVENOUS; SUBCUTANEOUS at 14:07

## 2022-01-31 RX ADMIN — HYDROCHLOROTHIAZIDE 25 MG: 25 TABLET ORAL at 08:19

## 2022-01-31 RX ADMIN — CEFAZOLIN SODIUM 2000 MG: 2 SOLUTION INTRAVENOUS at 13:36

## 2022-01-31 RX ADMIN — HEPARIN SODIUM 5000 UNITS: 5000 INJECTION INTRAVENOUS; SUBCUTANEOUS at 05:29

## 2022-01-31 RX ADMIN — CEFAZOLIN SODIUM 2000 MG: 2 SOLUTION INTRAVENOUS at 21:19

## 2022-01-31 RX ADMIN — CEFAZOLIN SODIUM 2000 MG: 2 SOLUTION INTRAVENOUS at 05:04

## 2022-01-31 RX ADMIN — HEPARIN SODIUM 5000 UNITS: 5000 INJECTION INTRAVENOUS; SUBCUTANEOUS at 21:23

## 2022-01-31 RX ADMIN — INSULIN GLARGINE 35 UNITS: 100 INJECTION, SOLUTION SUBCUTANEOUS at 21:23

## 2022-01-31 RX ADMIN — ATENOLOL 100 MG: 50 TABLET ORAL at 08:19

## 2022-01-31 NOTE — UTILIZATION REVIEW
Continued Stay Review    Date: 01/31/22                          Current Patient Class: IP  Current Level of Care: Med/Surg    HPI:46 y o  male initially admitted on 01/24/22 for osteomyelitis of L foot  Blood cultures drawn on admission revealed staphylococcus aureus bacteremia  Assessment/Plan: Dressing changed by podiatry on 01/30; dressing to remain in place until outpatient follow-up with podiatry next week  Repeat blood cultures negative x 72 hours  Plan: PICC placement tomorrow morning as that is the first available time slot, continue 4 week course of IV ABX, continue SSI w/ meals, accuchecks ACHS, lantus 35 units qHS, continue other home meds  Trend labs, replete electrolytes as needed      Vital Signs:   Date/Time Temp Pulse Resp BP MAP (mmHg) SpO2 O2 Device   01/31/22 0815 -- 68 -- 175/88 Abnormal  -- -- --   01/31/22 0712 97 6 °F (36 4 °C) 65 18 164/97 120 97 % None (Room air)   01/30/22 2257 97 4 °F (36 3 °C) 64 19 177/86 Abnormal  -- 97 % None (Room air)   01/30/22 1513 97 3 °F (36 3 °C) 73 20 151/75 91 97 % None (Room air)   01/30/22 0757 97 6 °F (36 4 °C) 64 18 155/79 103 97 % None (Room air)   01/29/22 2304 97 °F (36 1 °C) 60 19 171/80 Abnormal  -- 96 % None (Room air)   01/29/22 1509 96 3 °F (35 7 °C) 61 20 162/91 112 95 % None (Room air)   01/29/22 0950 -- -- -- 161/96 -- -- --   01/29/22 0742 96 6 °F (35 9 °C) 64 18 160/95 -- 98 % None (Room air)       Pertinent Labs/Diagnostic Results:   Results from last 7 days   Lab Units 01/31/22  0441 01/28/22  0433 01/26/22  0446 01/25/22  0456   WBC Thousand/uL 9 50 9 70 9 80 10 20   HEMOGLOBIN g/dL 10 8* 10 3* 10 3* 10 7*   HEMATOCRIT % 30 8* 29 9* 30 5* 30 8*   PLATELETS Thousands/uL 292 177 379 275   NEUTROS ABS Thousands/µL  --   --   --  5 60     Results from last 7 days   Lab Units 01/31/22  0441 01/28/22  0433 01/26/22  0446 01/25/22  0456   SODIUM mmol/L 135* 136* 136* 134*   POTASSIUM mmol/L 4 3 4 2 5 0 4 3   CHLORIDE mmol/L 101 102 104 106 CO2 mmol/L 30 30 28 23   ANION GAP mmol/L 4* 4* 4* 5   BUN mg/dL 23 16 21 28*   CREATININE mg/dL 1 14 1 16 1 16 1 13   EGFR ml/min/1 73sq m 77 75 75 78   CALCIUM mg/dL 8 8 8 7 8 8 8 5     Results from last 7 days   Lab Units 01/31/22  0441 01/25/22  0456   AST U/L 31 22   ALT U/L 13 9   ALK PHOS U/L 79 77   TOTAL PROTEIN g/dL 7 7 7 6   ALBUMIN g/dL 3 5 3 3   TOTAL BILIRUBIN mg/dL 0 26 0 52     Results from last 7 days   Lab Units 01/31/22  1105 01/31/22  0524 01/30/22 2002 01/30/22  1551 01/30/22  1117 01/30/22  0603 01/29/22 2015 01/29/22  1527 01/29/22  1108 01/29/22  0625 01/28/22 2020 01/28/22  1552   POC GLUCOSE mg/dl 126 123 220* 212* 157* 149* 220* 198* 192* 147* 196* 180*     Results from last 7 days   Lab Units 01/31/22  0441 01/28/22  0433 01/26/22  0446 01/25/22  0456   GLUCOSE RANDOM mg/dL 134* 203* 170* 120*     Results from last 7 days   Lab Units 01/27/22  0455 01/27/22  0454 01/26/22  1550   BLOOD CULTURE  No Growth After 4 Days  No Growth After 4 Days  --    GRAM STAIN RESULT   --   --  No Polys or Bacteria seen         Medications:   Scheduled Medications:  atenolol, 100 mg, Oral, Daily  cefazolin, 2,000 mg, Intravenous, Q8H  heparin (porcine), 5,000 Units, Subcutaneous, Q8H STEFFI  hydrochlorothiazide, 25 mg, Oral, Daily  insulin glargine, 35 Units, Subcutaneous, HS  insulin lispro, 2-12 Units, Subcutaneous, TID AC    Continuous IV Infusions: none     PRN Meds:  acetaminophen, 650 mg, Oral, Q6H PRN  oxyCODONE, 5 mg, Oral, Q4H PRN        Discharge Plan: D    Network Utilization Review Department  ATTENTION: Please call with any questions or concerns to [phone] and carefully listen to the prompts so that you are directed to the right person  All voicemails are confidential   Dao Aggarwal all requests for admission clinical reviews, approved or denied determinations and any other requests to dedicated fax number below belonging to the campus where the patient is receiving treatment   List of dedicated fax numbers for the Facilities:  [address] [place] [address] DENIALS (Administrative/Medical Necessity) [phone]   [address] (Maternity/NICU/Pediatrics) [phone]   [address]  [address] [facility] [place] [address] [address] [place] [address] Denia Catalan [address] [facility] [place] [address] [phone]

## 2022-01-31 NOTE — PLAN OF CARE
Problem: Potential for Falls  Goal: Patient will remain free of falls  Description: INTERVENTIONS:  - Educate patient/family on patient safety including physical limitations  - Instruct patient to call for assistance with activity   - Consult OT/PT to assist with strengthening/mobility   - Keep Call bell within reach  - Keep bed low and locked with side rails adjusted as appropriate  - Keep care items and personal belongings within reach  - Initiate and maintain comfort rounds  - Make Fall Risk Sign visible to staff  - Apply yellow socks and bracelet for high fall risk patients  - Consider moving patient to room near nurses station  Outcome: Progressing     Problem: PAIN - ADULT  Goal: Verbalizes/displays adequate comfort level or baseline comfort level  Description: Interventions:  - Encourage patient to monitor pain and request assistance  - Assess pain using appropriate pain scale  - Administer analgesics based on type and severity of pain and evaluate response  - Implement non-pharmacological measures as appropriate and evaluate response  - Consider cultural and social influences on pain and pain management  - Notify physician/advanced practitioner if interventions unsuccessful or patient reports new pain  Outcome: Progressing     Problem: INFECTION - ADULT  Goal: Absence or prevention of progression during hospitalization  Description: INTERVENTIONS:  - Assess and monitor for signs and symptoms of infection  - Monitor lab/diagnostic results  - Monitor all insertion sites, i e  indwelling lines, tubes, and drains  - Monitor endotracheal if appropriate and nasal secretions for changes in amount and color  - [place] appropriate cooling/warming therapies per order  - Administer medications as ordered  - Instruct and encourage patient and family to use good hand hygiene technique  - Identify and instruct in appropriate isolation precautions for identified infection/condition  Outcome: Progressing  Goal: Absence of fever/infection during neutropenic period  Description: INTERVENTIONS:  - Monitor WBC    Outcome: Progressing     Problem: SAFETY ADULT  Goal: Patient will remain free of falls  Description: INTERVENTIONS:  - Educate patient/family on patient safety including physical limitations  - Instruct patient to call for assistance with activity   - Consult OT/PT to assist with strengthening/mobility   - Keep Call bell within reach  - Keep bed low and locked with side rails adjusted as appropriate  - Keep care items and personal belongings within reach  - Initiate and maintain comfort rounds  - Make Fall Risk Sign visible to staff  - Apply yellow socks and bracelet for high fall risk patients  - Consider moving patient to room near nurses station  Outcome: Progressing  Goal: Maintain or return to baseline ADL function  Description: INTERVENTIONS:  -  Assess patient's ability to carry out ADLs; assess patient's baseline for ADL function and identify physical deficits which impact ability to perform ADLs (bathing, care of mouth/teeth, toileting, grooming, dressing, etc )  - Assess/evaluate cause of self-care deficits   - Assess range of motion  - Assess patient's mobility; develop plan if impaired  - Assess patient's need for assistive devices and provide as appropriate  - Encourage maximum independence but intervene and supervise when necessary  - Involve family in performance of ADLs  - Assess for home care needs following discharge   - Consider OT consult to assist with ADL evaluation and planning for discharge  - Provide patient education as appropriate  Outcome: Progressing  Goal: Maintains/Returns to pre admission functional level  Description: INTERVENTIONS:  - Perform BMAT or MOVE assessment daily    - Set and communicate daily mobility goal to care team and patient/family/caregiver     - Collaborate with rehabilitation services on mobility goals if consulted  - Out of bed for toileting  - Record patient progress and toleration of activity level   Outcome: Progressing     Problem: DISCHARGE PLANNING  Goal: Discharge to home or other facility with appropriate resources  Description: INTERVENTIONS:  - Identify barriers to discharge w/patient and caregiver  - Arrange for needed discharge resources and transportation as appropriate  - Identify discharge learning needs (meds, wound care, etc )  - Arrange for interpretive services to assist at discharge as needed  - Refer to Case Management Department for coordinating discharge planning if the patient needs post-hospital services based on physician/advanced practitioner order or complex needs related to functional status, cognitive ability, or social support system  Outcome: Progressing     Problem: Knowledge Deficit  Goal: Patient/family/caregiver demonstrates understanding of disease process, treatment plan, medications, and discharge instructions  Description: Complete learning assessment and assess knowledge base  Interventions:  - Provide teaching at level of understanding  - Provide teaching via preferred learning methods  Outcome: Progressing     Problem: Nutrition/Hydration-ADULT  Goal: Nutrient/Hydration intake appropriate for improving, restoring or maintaining nutritional needs  Description: Monitor and assess patient's nutrition/hydration status for malnutrition  Collaborate with interdisciplinary team and initiate plan and interventions as ordered  Monitor patient's weight and dietary intake as ordered or per policy  Utilize nutrition screening tool and intervene as necessary  Determine patient's food preferences and provide high-protein, high-caloric foods as appropriate       INTERVENTIONS:  - Monitor oral intake, urinary output, labs, and treatment plans  - Assess nutrition and hydration status and recommend course of action  - Evaluate amount of meals eaten  - Assist patient with eating if necessary   - Allow adequate time for meals  - Recommend/ encourage appropriate diets, oral nutritional supplements, and vitamin/mineral supplements  - Order, calculate, and assess calorie counts as needed  - Recommend, monitor, and adjust tube feedings and TPN/PPN based on assessed needs  - Assess need for intravenous fluids  - Provide specific nutrition/hydration education as appropriate  - Include patient/family/caregiver in decisions related to nutrition  Outcome: Progressing     Problem: MOBILITY - ADULT  Goal: Maintain or return to baseline ADL function  Description: INTERVENTIONS:  -  Assess patient's ability to carry out ADLs; assess patient's baseline for ADL function and identify physical deficits which impact ability to perform ADLs (bathing, care of mouth/teeth, toileting, grooming, dressing, etc )  - Assess/evaluate cause of self-care deficits   - Assess range of motion  - Assess patient's mobility; develop plan if impaired  - Assess patient's need for assistive devices and provide as appropriate  - Encourage maximum independence but intervene and supervise when necessary  - Involve family in performance of ADLs  - Assess for home care needs following discharge   - Consider OT consult to assist with ADL evaluation and planning for discharge  - Provide patient education as appropriate  Outcome: Progressing  Goal: Maintains/Returns to pre admission functional level  Description: INTERVENTIONS:  - Perform BMAT or MOVE assessment daily    - Set and communicate daily mobility goal to care team and patient/family/caregiver     - Collaborate with rehabilitation services on mobility goals if consulted  - Out of bed for toileting  - Record patient progress and toleration of activity level   Outcome: Progressing negative

## 2022-01-31 NOTE — PLAN OF CARE
Problem: Potential for Falls  Goal: Patient will remain free of falls  Description: INTERVENTIONS:  - Educate patient/family on patient safety including physical limitations  - Instruct patient to call for assistance with activity   - Consult OT/PT to assist with strengthening/mobility   - Keep Call bell within reach  - Keep bed low and locked with side rails adjusted as appropriate  - Keep care items and personal belongings within reach  - Initiate and maintain comfort rounds  - Make Fall Risk Sign visible to staff  - Apply yellow socks and bracelet for high fall risk patients  - Consider moving patient to room near nurses station  Outcome: Progressing     Problem: PAIN - ADULT  Goal: Verbalizes/displays adequate comfort level or baseline comfort level  Description: Interventions:  - Encourage patient to monitor pain and request assistance  - Assess pain using appropriate pain scale  - Administer analgesics based on type and severity of pain and evaluate response  - Implement non-pharmacological measures as appropriate and evaluate response  - Consider cultural and social influences on pain and pain management  - Notify physician/advanced practitioner if interventions unsuccessful or patient reports new pain  Outcome: Progressing     Problem: INFECTION - ADULT  Goal: Absence or prevention of progression during hospitalization  Description: INTERVENTIONS:  - Assess and monitor for signs and symptoms of infection  - Monitor lab/diagnostic results  - Monitor all insertion sites, i e  indwelling lines, tubes, and drains  - Monitor endotracheal if appropriate and nasal secretions for changes in amount and color  - [place] appropriate cooling/warming therapies per order  - Administer medications as ordered  - Instruct and encourage patient and family to use good hand hygiene technique  - Identify and instruct in appropriate isolation precautions for identified infection/condition  Outcome: Progressing  Goal: Absence of fever/infection during neutropenic period  Description: INTERVENTIONS:  - Monitor WBC    Outcome: Progressing     Problem: SAFETY ADULT  Goal: Patient will remain free of falls  Description: INTERVENTIONS:  - Educate patient/family on patient safety including physical limitations  - Instruct patient to call for assistance with activity   - Consult OT/PT to assist with strengthening/mobility   - Keep Call bell within reach  - Keep bed low and locked with side rails adjusted as appropriate  - Keep care items and personal belongings within reach  - Initiate and maintain comfort rounds  - Make Fall Risk Sign visible to staff  - Apply yellow socks and bracelet for high fall risk patients  - Consider moving patient to room near nurses station  Outcome: Progressing  Goal: Maintain or return to baseline ADL function  Description: INTERVENTIONS:  -  Assess patient's ability to carry out ADLs; assess patient's baseline for ADL function and identify physical deficits which impact ability to perform ADLs (bathing, care of mouth/teeth, toileting, grooming, dressing, etc )  - Assess/evaluate cause of self-care deficits   - Assess range of motion  - Assess patient's mobility; develop plan if impaired  - Assess patient's need for assistive devices and provide as appropriate  - Encourage maximum independence but intervene and supervise when necessary  - Involve family in performance of ADLs  - Assess for home care needs following discharge   - Consider OT consult to assist with ADL evaluation and planning for discharge  - Provide patient education as appropriate  Outcome: Progressing  Goal: Maintains/Returns to pre admission functional level  Description: INTERVENTIONS:  - Perform BMAT or MOVE assessment daily    - Set and communicate daily mobility goal to care team and patient/family/caregiver     - Collaborate with rehabilitation services on mobility goals if consulted  - Out of bed for toileting  - Record patient progress and toleration of activity level   Outcome: Progressing     Problem: DISCHARGE PLANNING  Goal: Discharge to home or other facility with appropriate resources  Description: INTERVENTIONS:  - Identify barriers to discharge w/patient and caregiver  - Arrange for needed discharge resources and transportation as appropriate  - Identify discharge learning needs (meds, wound care, etc )  - Arrange for interpretive services to assist at discharge as needed  - Refer to Case Management Department for coordinating discharge planning if the patient needs post-hospital services based on physician/advanced practitioner order or complex needs related to functional status, cognitive ability, or social support system  Outcome: Progressing     Problem: Knowledge Deficit  Goal: Patient/family/caregiver demonstrates understanding of disease process, treatment plan, medications, and discharge instructions  Description: Complete learning assessment and assess knowledge base  Interventions:  - Provide teaching at level of understanding  - Provide teaching via preferred learning methods  Outcome: Progressing     Problem: Nutrition/Hydration-ADULT  Goal: Nutrient/Hydration intake appropriate for improving, restoring or maintaining nutritional needs  Description: Monitor and assess patient's nutrition/hydration status for malnutrition  Collaborate with interdisciplinary team and initiate plan and interventions as ordered  Monitor patient's weight and dietary intake as ordered or per policy  Utilize nutrition screening tool and intervene as necessary  Determine patient's food preferences and provide high-protein, high-caloric foods as appropriate       INTERVENTIONS:  - Monitor oral intake, urinary output, labs, and treatment plans  - Assess nutrition and hydration status and recommend course of action  - Evaluate amount of meals eaten  - Assist patient with eating if necessary   - Allow adequate time for meals  - Recommend/ encourage appropriate diets, oral nutritional supplements, and vitamin/mineral supplements  - Order, calculate, and assess calorie counts as needed  - Recommend, monitor, and adjust tube feedings and TPN/PPN based on assessed needs  - Assess need for intravenous fluids  - Provide specific nutrition/hydration education as appropriate  - Include patient/family/caregiver in decisions related to nutrition  Outcome: Progressing     Problem: MOBILITY - ADULT  Goal: Maintain or return to baseline ADL function  Description: INTERVENTIONS:  -  Assess patient's ability to carry out ADLs; assess patient's baseline for ADL function and identify physical deficits which impact ability to perform ADLs (bathing, care of mouth/teeth, toileting, grooming, dressing, etc )  - Assess/evaluate cause of self-care deficits   - Assess range of motion  - Assess patient's mobility; develop plan if impaired  - Assess patient's need for assistive devices and provide as appropriate  - Encourage maximum independence but intervene and supervise when necessary  - Involve family in performance of ADLs  - Assess for home care needs following discharge   - Consider OT consult to assist with ADL evaluation and planning for discharge  - Provide patient education as appropriate  Outcome: Progressing  Goal: Maintains/Returns to pre admission functional level  Description: INTERVENTIONS:  - Perform BMAT or MOVE assessment daily    - Set and communicate daily mobility goal to care team and patient/family/caregiver     - Collaborate with rehabilitation services on mobility goals if consulted  - Out of bed for toileting  - Record patient progress and toleration of activity level   Outcome: Progressing

## 2022-01-31 NOTE — PROGRESS NOTES
[facility]  Progress Note - Magda Cain 1975, 40 y o  male MRN: [id number]  Unit/Bed#: 7T [facility] 717-01 Encounter: [id number]  Primary Care Provider: Nyla Phillips MD   Date and time admitted to hospital: 1/24/2022  9:08 AM    * Staphylococcus aureus bacteremia  Assessment & Plan  Patient with 2 of 2 blood cultures positive for Staph aureus  Echo without signs of obvious vegetation    -ID consulted, appreciate recommendations  -continue cefazolin  -repeat blood cultures obtained on 01/27  -patient will need PICC placement once repeat cultures negative 72 hours  -will place PICC line today as blood cultures are negative times 72 hours  -if repeat blood cultures remain negative patient will need 4 week course of antibiotics    Acute osteomyelitis of left foot [facility])  Assessment & Plan  Patient sent to ED after outpatient visit with Podiatry  X-ray revealed findings suggestive of osteo with acute pathological fracture  MRI revealed: 1st toe distal phalanx osteomyelitis with adjacent sinus tract        -podiatry consult, appreciate recommendations  -patient underwent partial left hallux amputation  -wound management as per Podiatry, will follow-up patient next week  -continue cefazolin for Staph aureus bacteremia  -PT/OT recommending no rehab needs        Morbid obesity ([address] )  Assessment & Plan  -discussed diet and exercise    Diabetes mellitus [facility])  Assessment & Plan  Lab Results   Component Value Date    HGBA1C 10 6 (A) 01/13/2022       Recent Labs     01/30/22  1117 01/30/22  1551 01/30/22 2002 01/31/22  0524   POCGLU 157* 212* 220* 123       Blood Sugar Average: Last 72 hrs:     -hold home metformin  -insulin glargine 35 units q h s    -carb consistent diet  -sliding scale insulin with Accu-Cheks    Hypertension  Assessment & Plan  -continue home atenolol and HCTZ      VTE Pharmacologic Prophylaxis:   Pharmacologic: Heparin  Mechanical VTE Prophylaxis in Place: Yes    Patient Centered Rounds: I have performed bedside rounds with nursing staff today  Discussions with Specialists or Other Care Team Provider:  Case Management, nursing, Infectious Diseases    Education and Discussions with Family / Patient:  Spoke with patient ; states he will call and update wife today    Time Spent for Care: 45 minutes  More than 50% of total time spent on counseling and coordination of care as described above  Current Length of Stay: 7 day(s)    Current Patient Status: Inpatient   Certification Statement: The patient will continue to require additional inpatient hospital stay due to PICC line placement today ; likely discharge home tomorrow with a 4 week course of IV antibiotics    Discharge Plan:  Home likely tomorrow pending PICC line placement today    Code Status: Level 1 - Full Code      Subjective:   Patient seen and examined at bedside  No acute events overnight  Denies chest pain, SOB, diaphoresis, nausea/vomiting/diarrhea, fevers/chills  Objective:     Vitals:   Temp (24hrs), Av 4 °F (36 3 °C), Min:97 3 °F (36 3 °C), Max:97 6 °F (36 4 °C)    Temp:  [97 3 °F (36 3 °C)-97 6 °F (36 4 °C)] 97 6 °F (36 4 °C)  HR:  [64-73] 68  Resp:  [18-20] 18  BP: (151-177)/(75-97) 175/88  SpO2:  [97 %] 97 %  Body mass index is 49 42 kg/m²  Input and Output Summary (last 24 hours): Intake/Output Summary (Last 24 hours) at 2022 0956  Last data filed at 2022 0440  Gross per 24 hour   Intake 980 ml   Output --   Net 980 ml       Physical Exam:     Physical Exam  Vitals and nursing note reviewed  Constitutional:       Appearance: He is well-developed  HENT:      Head: Normocephalic and atraumatic  Eyes:      Conjunctiva/sclera: Conjunctivae normal    Cardiovascular:      Rate and Rhythm: Normal rate and regular rhythm  Heart sounds: No murmur heard  Pulmonary:      Effort: Pulmonary effort is normal  No respiratory distress        Breath sounds: Normal breath sounds  Abdominal:      Palpations: Abdomen is soft  Tenderness: There is no abdominal tenderness  Musculoskeletal:      Cervical back: Neck supple  Comments: L hallux amputation   Skin:     General: Skin is warm and dry  Neurological:      Mental Status: He is alert  Additional Data:     Labs: I have reviewed pertinent results     Results from last 7 days   Lab Units 01/31/22  0441 01/26/22  0446 01/25/22  0456   WBC Thousand/uL 9 50   < > 10 20   HEMOGLOBIN g/dL 10 8*   < > 10 7*   HEMATOCRIT % 30 8*   < > 30 8*   PLATELETS Thousands/uL 292   < > 275   NEUTROS PCT %  --   --  55   LYMPHS PCT %  --   --  33   MONOS PCT %  --   --  9   EOS PCT %  --   --  3    < > = values in this interval not displayed  Results from last 7 days   Lab Units 01/31/22  0441   SODIUM mmol/L 135*   POTASSIUM mmol/L 4 3   CHLORIDE mmol/L 101   CO2 mmol/L 30   BUN mg/dL 23   CREATININE mg/dL 1 14   ANION GAP mmol/L 4*   CALCIUM mg/dL 8 8   ALBUMIN g/dL 3 5   TOTAL BILIRUBIN mg/dL 0 26   ALK PHOS U/L 79   ALT U/L 13   AST U/L 31   GLUCOSE RANDOM mg/dL 134*         Results from last 7 days   Lab Units 01/31/22  0524 01/30/22 2002 01/30/22  1551 01/30/22  1117 01/30/22  0603 01/29/22 2015 01/29/22  1527 01/29/22  1108 01/29/22  0625 01/28/22 2020 01/28/22  1552 01/28/22  1110   POC GLUCOSE mg/dl 123 220* 212* 157* 149* 220* 198* 192* 147* 196* 180* 158*                 Imaging: I have reviewed pertinent imaging       Recent Cultures (last 7 days):     Results from last 7 days   Lab Units 01/27/22  0455 01/27/22  0454 01/26/22  1550   BLOOD CULTURE  No Growth at 72 hrs   No Growth at 72 hrs   --    GRAM STAIN RESULT   --   --  No Polys or Bacteria seen       Last 24 Hours Medication List:   Current Facility-Administered Medications   Medication Dose Route Frequency Provider Last Rate    acetaminophen  650 mg Oral Q6H PRN Sujatha TRISHA Lyman      atenolol  100 mg Oral Daily Sujatha TRISHA Lyman      cefazolin  2,000 mg Intravenous Q8H Shawn Terry, DPM 2,000 mg (01/31/22 5564)    heparin (porcine)  5,000 Units Subcutaneous [facility] Shawn Mishko, DPM      hydrochlorothiazide  25 mg Oral Daily [facility], DPM      insulin glargine  35 Units Subcutaneous HS [facility], DPM      insulin lispro  2-12 Units Subcutaneous TID AC [facility], DPM      oxyCODONE  5 mg Oral Q4H PRN Jakob Berry DO          Today, Patient Was Seen By: Corinne Pedraza DO    ** Please Note: Dictation voice to text software may have been used in the creation of this document   **

## 2022-01-31 NOTE — ASSESSMENT & PLAN NOTE
Patient with 2 of 2 blood cultures positive for Staph aureus  Echo without signs of obvious vegetation    -ID consulted, appreciate recommendations  -continue cefazolin  -repeat blood cultures obtained on 01/27  -patient will need PICC placement once repeat cultures negative 72 hours  -will place PICC line today as blood cultures are negative times 72 hours  -if repeat blood cultures remain negative patient will need 4 week course of antibiotics

## 2022-01-31 NOTE — ASSESSMENT & PLAN NOTE
Lab Results   Component Value Date    HGBA1C 10 6 (A) 01/13/2022       Recent Labs     01/30/22  1117 01/30/22  1551 01/30/22 2002 01/31/22  0524   POCGLU 157* 212* 220* 123       Blood Sugar Average: Last 72 hrs:     -hold home metformin  -insulin glargine 35 units q h s    -carb consistent diet  -sliding scale insulin with Accu-Cheks

## 2022-01-31 NOTE — PROGRESS NOTES
Progress Note - Infectious Disease   Alvin Villanueva 40 y o  male MRN: [id number]  Unit/Bed#: 7T [facility] 717-01 Encounter: [id number]      IMPRESSION & RECOMMENDATIONS:     1  MSSA bacteremia  Secondary to #2  No other clear explanation  No intravascular prosthetic devices  Negative 2D echo    -continue IV cefazolin 2g q8hr for a 4 week course from negative cultures, through 22   -check weekly CBC with diff and Cr while on IV antibiotics   -okay to place PICC   -will arrange ID follow up 2 weeks     2  Left foot cellulitis, with acute osteomyelitis of left hallux  MRI shows 1st toe distal phalanx osteomyelitis with adjacent sinus tract  Now status post partial left hallux amputation 22 with surgical cure achieved     -antibiotic plan as above   -podiatry follow-up ongoing   -serial foot exams     3  Chronic left hallux diabetic ulceration  Complicated by #2     -antibiotic plan as above   -daily local wound care and dressing change   -podiatry follow-up     4  Uncontrolled diabetes mellitus, with DPN and elevated hemoglobin A1c of 10 6  Risk factor for infection  Blood sugar control per Internal Medicine Service      5  Morbid obesity  This is also risk factor for infection  Recommend ongoing weight loss measures/dietary modifications      6  Penicillin allergy  Patient has previously tolerated IV cefazolin and Keflex without difficulty    -Patient is tolerating IV cefazolin this admission without difficulty     Plan discussed with [facility] provider Dr Nanette Bland  ID will follow      Antibiotics:  Cefazolin D6    Subjective:  Patient feeling well, no acute complaints  Pain in left foot controlled  Denies fever, chills, abdominal pain, diarrhea, rash  Plan for PICC placement tomorrow      Objective:  Vitals:  Temp:  [97 4 °F (36 3 °C)-98 °F (36 7 °C)] 98 °F (36 7 °C)  HR:  [63-68] 63  Resp:  [18-19] 18  BP: (159-177)/(86-97) 159/93  SpO2:  [97 %] 97 %  Temp (24hrs), Av 7 °F (36 5 °C), Min:97 4 °F (36 3 °C), Max:98 °F (36 7 °C)  Current: Temperature: 98 °F (36 7 °C)    Physical Exam:   General:  No acute distress  HEENT:  Atraumatic normocephalic  Psychiatric:  Awake and alert  Pulmonary:  Normal respiratory excursion without accessory muscle use  Abdomen:  Soft, nontender  Extremities:  No edema  Foot dressing intact  Skin:  No rashes  Neuro: Moves all extremities spontaneously    Lab Results:  I have personally reviewed pertinent labs  Results from last 7 days   Lab Units 01/31/22  0441 01/28/22  0433 01/26/22  0446 01/25/22  0456 01/25/22  0456   POTASSIUM mmol/L 4 3 4 2 5 0   < > 4 3   CHLORIDE mmol/L 101 102 104   < > 106   CO2 mmol/L 30 30 28   < > 23   BUN mg/dL 23 16 21   < > 28*   CREATININE mg/dL 1 14 1 16 1 16   < > 1 13   EGFR ml/min/1 73sq m 77 75 75   < > 78   CALCIUM mg/dL 8 8 8 7 8 8   < > 8 5   AST U/L 31  --   --   --  22   ALT U/L 13  --   --   --  9   ALK PHOS U/L 79  --   --   --  77    < > = values in this interval not displayed  Results from last 7 days   Lab Units 01/31/22  0441 01/28/22  0433 01/26/22  0446   WBC Thousand/uL 9 50 9 70 9 80   HEMOGLOBIN g/dL 10 8* 10 3* 10 3*   PLATELETS Thousands/uL 292 177 379     Results from last 7 days   Lab Units 01/27/22  0455 01/27/22  0454 01/26/22  1550   BLOOD CULTURE  No Growth After 4 Days  No Growth After 4 Days  --    GRAM STAIN RESULT   --   --  No Polys or Bacteria seen       Imaging Studies:   I have personally reviewed pertinent imaging study reports and images in PACS  EKG, Pathology, and Other Studies:   I have personally reviewed pertinent reports  2D echo is negative

## 2022-02-01 ENCOUNTER — APPOINTMENT (INPATIENT)
Dept: INTERVENTIONAL RADIOLOGY/VASCULAR | Facility: HOSPITAL | Age: 47
DRG: 617 | End: 2022-02-01
Attending: STUDENT IN AN ORGANIZED HEALTH CARE EDUCATION/TRAINING PROGRAM
Payer: COMMERCIAL

## 2022-02-01 ENCOUNTER — APPOINTMENT (INPATIENT)
Dept: RADIOLOGY | Facility: HOSPITAL | Age: 47
DRG: 617 | End: 2022-02-01
Attending: INTERNAL MEDICINE
Payer: COMMERCIAL

## 2022-02-01 VITALS
BODY MASS INDEX: 47.74 KG/M2 | HEIGHT: 68 IN | TEMPERATURE: 97.6 F | SYSTOLIC BLOOD PRESSURE: 141 MMHG | WEIGHT: 315 LBS | DIASTOLIC BLOOD PRESSURE: 68 MMHG | RESPIRATION RATE: 18 BRPM | OXYGEN SATURATION: 96 % | HEART RATE: 63 BPM

## 2022-02-01 LAB
ANION GAP SERPL CALCULATED.3IONS-SCNC: 3 MMOL/L (ref 5–14)
BACTERIA BLD CULT: NORMAL
BACTERIA BLD CULT: NORMAL
BASOPHILS # BLD AUTO: 0.1 THOUSANDS/ΜL (ref 0–0.1)
BASOPHILS NFR BLD AUTO: 1 % (ref 0–1)
BUN SERPL-MCNC: 21 MG/DL (ref 5–25)
CALCIUM SERPL-MCNC: 8.8 MG/DL (ref 8.4–10.2)
CHLORIDE SERPL-SCNC: 102 MMOL/L (ref 97–108)
CO2 SERPL-SCNC: 31 MMOL/L (ref 22–30)
CREAT SERPL-MCNC: 1.21 MG/DL (ref 0.7–1.5)
EOSINOPHIL # BLD AUTO: 0.4 THOUSAND/ΜL (ref 0–0.4)
EOSINOPHIL NFR BLD AUTO: 4 % (ref 0–6)
ERYTHROCYTE [DISTWIDTH] IN BLOOD BY AUTOMATED COUNT: 13.3 %
GFR SERPL CREATININE-BSD FRML MDRD: 71 ML/MIN/1.73SQ M
GLUCOSE SERPL-MCNC: 116 MG/DL (ref 65–140)
GLUCOSE SERPL-MCNC: 140 MG/DL (ref 65–140)
GLUCOSE SERPL-MCNC: 140 MG/DL (ref 70–99)
GLUCOSE SERPL-MCNC: 192 MG/DL (ref 65–140)
HCT VFR BLD AUTO: 31.7 % (ref 41–53)
HGB BLD-MCNC: 10.7 G/DL (ref 13.5–17.5)
LYMPHOCYTES # BLD AUTO: 3.1 THOUSANDS/ΜL (ref 0.5–4)
LYMPHOCYTES NFR BLD AUTO: 33 % (ref 25–45)
MAGNESIUM SERPL-MCNC: 1.9 MG/DL (ref 1.6–2.3)
MCH RBC QN AUTO: 29.6 PG (ref 26–34)
MCHC RBC AUTO-ENTMCNC: 33.7 G/DL (ref 31–36)
MCV RBC AUTO: 88 FL (ref 80–100)
MONOCYTES # BLD AUTO: 0.9 THOUSAND/ΜL (ref 0.2–0.9)
MONOCYTES NFR BLD AUTO: 9 % (ref 1–10)
NEUTROPHILS # BLD AUTO: 5 THOUSANDS/ΜL (ref 1.8–7.8)
NEUTS SEG NFR BLD AUTO: 53 % (ref 45–65)
PHOSPHATE SERPL-MCNC: 4.2 MG/DL (ref 2.5–4.8)
PLATELET # BLD AUTO: 273 THOUSANDS/UL (ref 150–450)
PMV BLD AUTO: 7.7 FL (ref 8.9–12.7)
POTASSIUM SERPL-SCNC: 4.4 MMOL/L (ref 3.6–5)
RBC # BLD AUTO: 3.62 MILLION/UL (ref 4.5–5.9)
SODIUM SERPL-SCNC: 136 MMOL/L (ref 137–147)
WBC # BLD AUTO: 9.5 THOUSAND/UL (ref 4.5–11)

## 2022-02-01 PROCEDURE — 02HV33Z INSERTION OF INFUSION DEVICE INTO SUPERIOR VENA CAVA, PERCUTANEOUS APPROACH: ICD-10-PCS | Performed by: STUDENT IN AN ORGANIZED HEALTH CARE EDUCATION/TRAINING PROGRAM

## 2022-02-01 PROCEDURE — 99239 HOSP IP/OBS DSCHRG MGMT >30: CPT | Performed by: INTERNAL MEDICINE

## 2022-02-01 PROCEDURE — 84100 ASSAY OF PHOSPHORUS: CPT | Performed by: INTERNAL MEDICINE

## 2022-02-01 PROCEDURE — 82948 REAGENT STRIP/BLOOD GLUCOSE: CPT

## 2022-02-01 PROCEDURE — 71045 X-RAY EXAM CHEST 1 VIEW: CPT

## 2022-02-01 PROCEDURE — 85025 COMPLETE CBC W/AUTO DIFF WBC: CPT | Performed by: INTERNAL MEDICINE

## 2022-02-01 PROCEDURE — 80048 BASIC METABOLIC PNL TOTAL CA: CPT | Performed by: INTERNAL MEDICINE

## 2022-02-01 PROCEDURE — 76937 US GUIDE VASCULAR ACCESS: CPT

## 2022-02-01 PROCEDURE — 83735 ASSAY OF MAGNESIUM: CPT | Performed by: INTERNAL MEDICINE

## 2022-02-01 PROCEDURE — 99232 SBSQ HOSP IP/OBS MODERATE 35: CPT | Performed by: STUDENT IN AN ORGANIZED HEALTH CARE EDUCATION/TRAINING PROGRAM

## 2022-02-01 RX ORDER — CEFAZOLIN SODIUM 2 G/50ML
2000 SOLUTION INTRAVENOUS EVERY 8 HOURS
Qty: 3300 ML | Refills: 0
Start: 2022-02-01 | End: 2022-02-23

## 2022-02-01 RX ADMIN — ATENOLOL 100 MG: 50 TABLET ORAL at 08:25

## 2022-02-01 RX ADMIN — CEFAZOLIN SODIUM 2000 MG: 2 SOLUTION INTRAVENOUS at 04:53

## 2022-02-01 RX ADMIN — HEPARIN SODIUM 5000 UNITS: 5000 INJECTION INTRAVENOUS; SUBCUTANEOUS at 05:31

## 2022-02-01 RX ADMIN — HYDROCHLOROTHIAZIDE 25 MG: 25 TABLET ORAL at 08:25

## 2022-02-01 RX ADMIN — CEFAZOLIN SODIUM 2000 MG: 2 SOLUTION INTRAVENOUS at 14:08

## 2022-02-01 RX ADMIN — INSULIN LISPRO 2 UNITS: 100 INJECTION, SOLUTION INTRAVENOUS; SUBCUTANEOUS at 16:58

## 2022-02-01 NOTE — DISCHARGE INSTRUCTIONS
How to Care for Your PICC (Peripherally Inserted Central Catheter)   WHAT YOU NEED TO KNOW:   A PICC can stay in place for several weeks or months  You will need to care for the PICC, and for the skin around the catheter site  Proper care is important to prevent damage to the catheter, and to prevent infections  Change the bandage and injection caps every 3 to 7 days or as directed  Change the bandage any time it becomes wet, dirty, or moves out of place  DISCHARGE INSTRUCTIONS:   Call your local emergency number (911 in the [address] [place] [address],[address]) for any of the following:   · You feel lightheaded, short of breath, or have chest pain  · You have trouble breathing  · You cough up blood  Seek care immediately if:   · Blood soaks through your bandage  · Your arm or leg feels warm, tender, and painful  It may look swollen and red  · You have trouble moving your arm  · Your catheter falls out  Call your doctor if:   · You have a fever or swelling, redness, pain, or pus where the catheter was inserted  · You see a tear in the tubing of your catheter  · You see fluid leaking from the insertion site  · You have questions or concerns about your condition or care  Prevent an infection:  The area around your catheter may get infected, or you may get an infection in your bloodstream  A bloodstream infection is called a central line-associated bloodstream infection (CLABSI)  A CLABSI is caused by bacteria getting into your bloodstream through your catheter  This can lead to severe illness  The following are ways you can help prevent a CLABSI:  · Wash your hands often  Use soap or an alcohol-based hand rub to clean your hands  Clean your hands before and after you touch the catheter or the catheter site  Remind anyone who cares for your catheter to wash their hands  · Limit contact with the catheter  Do not touch or handle your catheter unless you need to care for it   Do not pull, push on, or move the catheter when you clean your skin or change the dressing  Wear clean medical gloves when you touch your catheter or change dressings  · Keep the area covered and dry  Keep a sterile dressing over the catheter site  Wrap the insertion site with plastic and seal it with medical tape before you bathe  Take showers instead of baths  Do not swim or soak in a hot tub  How to change the dressing and clean the area:  Change the dressing every 3 to 7 days, or as directed  Change the dressing any time it becomes wet, dirty, or moves out of place  Always change the bandage in a clean area that is free of dust  Check your skin every day for signs of infection, such as pain, redness, swelling, and oozing  · Wash your hands  Use soap and water or an alcohol-based hand rub  · Put on clean medical gloves and a mask   each folded glove by its cuff and put it on one at a time  Touch only the cuff when you put a glove on  Do not touch the outside of the gloves with your bare hands  Do not let them touch anything  If someone is helping you, that person also needs to wear a mask and gloves  · Carefully remove the clear bandage  [place] your line from the area holding it in place  Use rubbing alcohol or saline to remove the tape, if needed  Remove your gloves and throw them away  Wash your hands again, and put on new clean medical gloves  · Open the bandage kit  The kit will contain a sterile disposable pad  Carefully unfold the corners of the pad and lay it out on a clean surface  Empty the contents of the bandage kit away from you onto the pad  · Open the cleaning pads from the kit  Use a cleaning pad to scrub the area where the catheter is inserted into your skin  Scrub the area as directed  Start at the insertion site and clean outward from it in circles  Let the area dry  Do not  blow on your skin to help it dry  · Use a new cleaning pad to scrub the tubing that comes out of your skin    Use a downward motion to clean the tubing  Do not go up and down the tubing  You might get germs into the tubing when you go back up  · Secure the line  Place the pad that fits around the insertion site as directed  Place the tape bandage under your catheter line to secure it to your skin  Snap the line in place  Apply a new dressing as directed  If the dressing is clear, make sure you can see the insertion site  How to care for the caps and tubing:  Change the caps every 3 to 7 days, or as directed  · Wash your hands  Use soap and water or an alcohol-based hand gel  · Twist the caps to remove them from the end of each port  With an alcohol pad, scrub the end of each port in a twisting motion for 30 seconds  Place a new cap on the end of each port  · Place protective caps over the injection caps, if directed  The caps will protect your catheter from infection when it is not being used  Follow up with your doctor as directed:  Write down your questions so you remember to ask them during your visits  © Copyright [employer] 2021 Information is for End User's use only and may not be sold, redistributed or otherwise used for commercial purposes  All illustrations and images included in CareNotes® are the copyrighted property of A D A M , Inc  or [facility] Brenda Zazueta   The above information is an  only  It is not intended as medical advice for individual conditions or treatments  Talk to your doctor, nurse or pharmacist before following any medical regimen to see if it is safe and effective for you

## 2022-02-01 NOTE — PROCEDURES
PICC Line Insertion    Date/Time: 2/1/2022 1:24 PM  Performed by: Janis Grigsby RN  Authorized by: Demarco Aldridge DO     Patient location:  Bedside  Other Assisting Provider: No    Consent:     Consent obtained:  Written    Consent given by:  Patient  Universal protocol:     Procedure explained and questions answered to patient or proxy's satisfaction: yes      Relevant documents present and verified: yes      Test results available and properly labeled: yes      Site/side marked: yes      Immediately prior to procedure, a time out was called: yes      Patient identity confirmed:  Verbally with patient, arm band and hospital-assigned identification number  Pre-procedure details:     Hand hygiene: Hand hygiene performed prior to insertion      Sterile barrier technique: All elements of maximal sterile technique followed      Skin preparation:  ChloraPrep    Skin preparation agent: Skin preparation agent completely dried prior to procedure    Indications:     PICC line indications: long term antibiotics    Anesthesia (see MAR for exact dosages):      Anesthesia method:  Local infiltration    Local anesthetic:  Lidocaine 1% w/o epi  Procedure details:     Location:  Basilic    Vessel type: vein      Laterality:  Right    Approach: percutaneous technique used      Patient position:  Flat    Procedural supplies:  Single lumen    Catheter size:  4 Fr    Ultrasound guidance: yes      Ultrasound image availability:  Not saved    Sterile ultrasound techniques: Sterile gel and sterile probe covers were used      Number of attempts:  2    Successful placement: yes      Vessel of catheter tip end:  Chest Xray needed to confirm placement    Total catheter length (cm):  49    Catheter out on skin (cm):  0    Arm circumference:  45  Post-procedure details:     Post-procedure:  Dressing applied and securement device placed    Assessment:  Blood return through all ports and free fluid flow    Post-procedure complications: none Patient tolerance of procedure:   Tolerated well, no immediate complications

## 2022-02-01 NOTE — ASSESSMENT & PLAN NOTE
Lab Results   Component Value Date    HGBA1C 10 6 (A) 01/13/2022       Recent Labs     01/30/22 2002 01/31/22  0524 01/31/22  1105 02/01/22 0527   POCGLU 220* 123 126 140       Blood Sugar Average: Last 72 hrs:     -hold home metformin  -insulin glargine 35 units q h s    -carb consistent diet  -sliding scale insulin with Accu-Cheks

## 2022-02-01 NOTE — ASSESSMENT & PLAN NOTE
Patient sent to ED after outpatient visit with Podiatry  X-ray revealed findings suggestive of osteo with acute pathological fracture  MRI revealed: 1st toe distal phalanx osteomyelitis with adjacent sinus tract      -Podiatry consult, appreciate recommendations  -Patient underwent partial left hallux amputation  -Wound management as per Podiatry  -Continue Cefazolin for Staph aureus bacteremia as above  -PT/OT recommending no rehab needs

## 2022-02-01 NOTE — PLAN OF CARE
Problem: Potential for Falls  Goal: Patient will remain free of falls  Description: INTERVENTIONS:  - Educate patient/family on patient safety including physical limitations  - Instruct patient to call for assistance with activity   - Consult OT/PT to assist with strengthening/mobility   - Keep Call bell within reach  - Keep bed low and locked with side rails adjusted as appropriate  - Keep care items and personal belongings within reach  - Initiate and maintain comfort rounds  - Make Fall Risk Sign visible to staff  - Apply yellow socks and bracelet for high fall risk patients  - Consider moving patient to room near nurses station  Outcome: Progressing     Problem: PAIN - ADULT  Goal: Verbalizes/displays adequate comfort level or baseline comfort level  Description: Interventions:  - Encourage patient to monitor pain and request assistance  - Assess pain using appropriate pain scale  - Administer analgesics based on type and severity of pain and evaluate response  - Implement non-pharmacological measures as appropriate and evaluate response  - Consider cultural and social influences on pain and pain management  - Notify physician/advanced practitioner if interventions unsuccessful or patient reports new pain  Outcome: Progressing     Problem: INFECTION - ADULT  Goal: Absence or prevention of progression during hospitalization  Description: INTERVENTIONS:  - Assess and monitor for signs and symptoms of infection  - Monitor lab/diagnostic results  - Monitor all insertion sites, i e  indwelling lines, tubes, and drains  - Monitor endotracheal if appropriate and nasal secretions for changes in amount and color  - Cornell appropriate cooling/warming therapies per order  - Administer medications as ordered  - Instruct and encourage patient and family to use good hand hygiene technique  - Identify and instruct in appropriate isolation precautions for identified infection/condition  Outcome: Progressing  Goal: Absence of fever/infection during neutropenic period  Description: INTERVENTIONS:  - Monitor WBC    Outcome: Progressing     Problem: SAFETY ADULT  Goal: Patient will remain free of falls  Description: INTERVENTIONS:  - Educate patient/family on patient safety including physical limitations  - Instruct patient to call for assistance with activity   - Consult OT/PT to assist with strengthening/mobility   - Keep Call bell within reach  - Keep bed low and locked with side rails adjusted as appropriate  - Keep care items and personal belongings within reach  - Initiate and maintain comfort rounds  - Make Fall Risk Sign visible to staff  - Apply yellow socks and bracelet for high fall risk patients  - Consider moving patient to room near nurses station  Outcome: Progressing  Goal: Maintain or return to baseline ADL function  Description: INTERVENTIONS:  - Educate patient/family on patient safety including physical limitations  - Instruct patient to call for assistance with activity   - Consult OT/PT to assist with strengthening/mobility   - Keep Call bell within reach  - Keep bed low and locked with side rails adjusted as appropriate  - Keep care items and personal belongings within reach  - Initiate and maintain comfort rounds  - Make Fall Risk Sign visible to staff  - Apply yellow socks and bracelet for high fall risk patients  - Consider moving patient to room near nurses station  Outcome: Progressing  Goal: Maintains/Returns to pre admission functional level  Description: INTERVENTIONS:  -  Assess patient's ability to carry out ADLs; assess patient's baseline for ADL function and identify physical deficits which impact ability to perform ADLs (bathing, care of mouth/teeth, toileting, grooming, dressing, etc )  - Assess/evaluate cause of self-care deficits   - Assess range of motion  - Assess patient's mobility; develop plan if impaired  - Assess patient's need for assistive devices and provide as appropriate  - Encourage maximum independence but intervene and supervise when necessary  - Involve family in performance of ADLs  - Assess for home care needs following discharge   - Consider OT consult to assist with ADL evaluation and planning for discharge  - Provide patient education as appropriate  Outcome: Progressing     Problem: DISCHARGE PLANNING  Goal: Discharge to home or other facility with appropriate resources  Description: INTERVENTIONS:  - Identify barriers to discharge w/patient and caregiver  - Arrange for needed discharge resources and transportation as appropriate  - Identify discharge learning needs (meds, wound care, etc )  - Arrange for interpretive services to assist at discharge as needed  - Refer to Case Management Department for coordinating discharge planning if the patient needs post-hospital services based on physician/advanced practitioner order or complex needs related to functional status, cognitive ability, or social support system  Outcome: Progressing     Problem: Knowledge Deficit  Goal: Patient/family/caregiver demonstrates understanding of disease process, treatment plan, medications, and discharge instructions  Description: Complete learning assessment and assess knowledge base  Interventions:  - Provide teaching at level of understanding  - Provide teaching via preferred learning methods  Outcome: Progressing     Problem: Nutrition/Hydration-ADULT  Goal: Nutrient/Hydration intake appropriate for improving, restoring or maintaining nutritional needs  Description: Monitor and assess patient's nutrition/hydration status for malnutrition  Collaborate with interdisciplinary team and initiate plan and interventions as ordered  Monitor patient's weight and dietary intake as ordered or per policy  Utilize nutrition screening tool and intervene as necessary  Determine patient's food preferences and provide high-protein, high-caloric foods as appropriate       INTERVENTIONS:  - Monitor oral intake, urinary output, labs, and treatment plans  - Assess nutrition and hydration status and recommend course of action  - Evaluate amount of meals eaten  - Assist patient with eating if necessary   - Allow adequate time for meals  - Recommend/ encourage appropriate diets, oral nutritional supplements, and vitamin/mineral supplements  - Order, calculate, and assess calorie counts as needed  - Recommend, monitor, and adjust tube feedings and TPN/PPN based on assessed needs  - Assess need for intravenous fluids  - Provide specific nutrition/hydration education as appropriate  - Include patient/family/caregiver in decisions related to nutrition  Outcome: Progressing     Problem: MOBILITY - ADULT  Goal: Maintain or return to baseline ADL function  Description: INTERVENTIONS:  - Educate patient/family on patient safety including physical limitations  - Instruct patient to call for assistance with activity   - Consult OT/PT to assist with strengthening/mobility   - Keep Call bell within reach  - Keep bed low and locked with side rails adjusted as appropriate  - Keep care items and personal belongings within reach  - Initiate and maintain comfort rounds  - Make Fall Risk Sign visible to staff  - Apply yellow socks and bracelet for high fall risk patients  - Consider moving patient to room near nurses station  Outcome: Progressing  Goal: Maintains/Returns to pre admission functional level  Description: INTERVENTIONS:  -  Assess patient's ability to carry out ADLs; assess patient's baseline for ADL function and identify physical deficits which impact ability to perform ADLs (bathing, care of mouth/teeth, toileting, grooming, dressing, etc )  - Assess/evaluate cause of self-care deficits   - Assess range of motion  - Assess patient's mobility; develop plan if impaired  - Assess patient's need for assistive devices and provide as appropriate  - Encourage maximum independence but intervene and supervise when necessary  - Involve family in performance of ADLs  - Assess for home care needs following discharge   - Consider OT consult to assist with ADL evaluation and planning for discharge  - Provide patient education as appropriate  Outcome: Progressing

## 2022-02-01 NOTE — PROGRESS NOTES
[facility]  Progress Note - Sammy Paez 1975, 40 y o  male MRN: [id number]  Unit/Bed#: 7T [facility] 717-01 Encounter: [id number]  Primary Care Provider: Narcisa Lewis MD   Date and time admitted to hospital: 1/24/2022  9:08 AM    * Staphylococcus aureus bacteremia  Assessment & Plan  Patient with 2 of 2 blood cultures positive for Staph aureus  Echo without signs of obvious vegetation  Repeat blood cultures negative at 72 hours    -ID consulted, appreciate recommendations  -Continue Cefazolin ; anticipate 4 week course of IV antibiotics  -Will place PICC line today as blood cultures are negative times 72 hours  -IR consulted and plan for PICC line placement later today    Acute osteomyelitis of left foot [facility])  Assessment & Plan  Patient sent to ED after outpatient visit with Podiatry  X-ray revealed findings suggestive of osteo with acute pathological fracture  MRI revealed: 1st toe distal phalanx osteomyelitis with adjacent sinus tract      -Podiatry consult, appreciate recommendations  -Patient underwent partial left hallux amputation  -Wound management as per Podiatry  -Continue Cefazolin for Staph aureus bacteremia as above  -PT/OT recommending no rehab needs      Morbid obesity ([address] )  Assessment & Plan  -discussed diet and exercise    Diabetes mellitus [facility])  Assessment & Plan  Lab Results   Component Value Date    HGBA1C 10 6 (A) 01/13/2022       Recent Labs     01/30/22 2002 01/31/22  0524 01/31/22  1105 02/01/22  0527   POCGLU 220* 123 126 140       Blood Sugar Average: Last 72 hrs:     -hold home metformin  -insulin glargine 35 units q h s    -carb consistent diet  -sliding scale insulin with Accu-Cheks    Hypertension  Assessment & Plan  -continue home atenolol and HCTZ    VTE Pharmacologic Prophylaxis:   Pharmacologic: Heparin  Mechanical VTE Prophylaxis in Place: Yes    Patient Centered Rounds: I have performed bedside rounds with nursing staff today     Discussions with Specialists or Other Care Team Provider:  Case Management, nursing, Infectious Diseases, Interventional Radiology    Education and Discussions with Family / Patient:  Spoke with patient ; states he will call and update wife today    Time Spent for Care: 45 minutes  More than 50% of total time spent on counseling and coordination of care as described above  Current Length of Stay: 8 day(s)    Current Patient Status: Inpatient   Certification Statement: The patient will continue to require additional inpatient hospital stay due to PICC line placement today ; likely discharge home tomorrow with a 4 week course of IV antibiotics    Discharge Plan:  Home likely tomorrow pending PICC line placement today    Code Status: Level 1 - Full Code      Subjective:   Patient seen and examined at bedside  No acute events overnight  Denies chest pain, SOB, diaphoresis, nausea/vomiting/diarrhea, fevers/chills  Objective:     Vitals:   Temp (24hrs), Av 8 °F (36 6 °C), Min:97 6 °F (36 4 °C), Max:98 °F (36 7 °C)    Temp:  [97 6 °F (36 4 °C)-98 °F (36 7 °C)] 97 6 °F (36 4 °C)  HR:  [61-67] 66  Resp:  [18-20] 18  BP: (159-168)/(75-96) 163/75  SpO2:  [97 %-99 %] 99 %  Body mass index is 49 42 kg/m²  Input and Output Summary (last 24 hours): Intake/Output Summary (Last 24 hours) at 2022 0859  Last data filed at 2022 1700  Gross per 24 hour   Intake 1200 ml   Output --   Net 1200 ml       Physical Exam:     Physical Exam  Vitals and nursing note reviewed  Constitutional:       Appearance: He is well-developed  HENT:      Head: Normocephalic and atraumatic  Eyes:      Conjunctiva/sclera: Conjunctivae normal    Cardiovascular:      Rate and Rhythm: Normal rate and regular rhythm  Heart sounds: No murmur heard  Pulmonary:      Effort: Pulmonary effort is normal  No respiratory distress  Breath sounds: Normal breath sounds     Abdominal:      Palpations: Abdomen is soft       Tenderness: There is no abdominal tenderness  Musculoskeletal:      Cervical back: Neck supple  Comments: L hallux amputation   Skin:     General: Skin is warm and dry  Neurological:      Mental Status: He is alert  Additional Data:     Labs: I have reviewed pertinent results     Results from last 7 days   Lab Units 02/01/22  0435   WBC Thousand/uL 9 50   HEMOGLOBIN g/dL 10 7*   HEMATOCRIT % 31 7*   PLATELETS Thousands/uL 273   NEUTROS PCT % 53   LYMPHS PCT % 33   MONOS PCT % 9   EOS PCT % 4     Results from last 7 days   Lab Units 02/01/22  0435 01/31/22  0441 01/31/22  0441   SODIUM mmol/L 136*   < > 135*   POTASSIUM mmol/L 4 4   < > 4 3   CHLORIDE mmol/L 102   < > 101   CO2 mmol/L 31*   < > 30   BUN mg/dL 21   < > 23   CREATININE mg/dL 1 21   < > 1 14   ANION GAP mmol/L 3*   < > 4*   CALCIUM mg/dL 8 8   < > 8 8   ALBUMIN g/dL  --   --  3 5   TOTAL BILIRUBIN mg/dL  --   --  0 26   ALK PHOS U/L  --   --  79   ALT U/L  --   --  13   AST U/L  --   --  31   GLUCOSE RANDOM mg/dL 140*   < > 134*    < > = values in this interval not displayed  Results from last 7 days   Lab Units 02/01/22  0527 01/31/22  1105 01/31/22  0524 01/30/22 2002 01/30/22  1551 01/30/22  1117 01/30/22  0603 01/29/22 2015 01/29/22  1527 01/29/22  1108 01/29/22  0625 01/28/22  2020   POC GLUCOSE mg/dl 140 126 123 220* 212* 157* 149* 220* 198* 192* 147* 196*                 Imaging: I have reviewed pertinent imaging       Recent Cultures (last 7 days):     Results from last 7 days   Lab Units 01/27/22  0455 01/27/22  0454 01/26/22  1550   BLOOD CULTURE  No Growth After 4 Days  No Growth After 4 Days    --    GRAM STAIN RESULT   --   --  No Polys or Bacteria seen       Last 24 Hours Medication List:   Current Facility-Administered Medications   Medication Dose Route Frequency Provider Last Rate    acetaminophen  650 mg Oral Q6H PRN Best Pulse, DPM      atenolol  100 mg Oral Daily Best Pulse, DPM      cefazolin  2,000 mg Intravenous Q8H Shawn Mishko, DPM 2,000 mg (02/01/22 0453)    heparin (porcine)  5,000 Units Subcutaneous [facility] Shawn Mishko, DPM      hydrochlorothiazide  25 mg Oral Daily Chyrl Height, DPM      insulin glargine  35 Units Subcutaneous HS Shawn Mishko, DPM      insulin lispro  2-12 Units Subcutaneous TID AC Chyrl Height, DPM      oxyCODONE  5 mg Oral Q4H PRN Jessica Garsia DO          Today, Patient Was Seen By: Martyn Lesch, DO    ** Please Note: Dictation voice to text software may have been used in the creation of this document   **

## 2022-02-01 NOTE — DISCHARGE SUMMARY
[facility]  Discharge- Jayesh Brown 1975, 40 y o  male MRN: [id number]  Unit/Bed#: 7T [facility] 717-01 Encounter: [id number]  Primary Care Provider: Robert Nunes MD   Date and time admitted to hospital: 1/24/2022  9:08 AM    * Staphylococcus aureus bacteremia  Assessment & Plan  Patient with 2 of 2 blood cultures positive for Staph aureus  Echo without signs of obvious vegetation  Repeat blood cultures negative at 72 hours    -ID consulted, appreciate recommendations  -Continue Cefazolin ; anticipate 4 week course of IV antibiotics  -Will place PICC line today as blood cultures are negative times 72 hours  -IR consulted and plan for PICC line placement later today    Acute osteomyelitis of left foot [facility])  Assessment & Plan  Patient sent to ED after outpatient visit with Podiatry  X-ray revealed findings suggestive of osteo with acute pathological fracture  MRI revealed: 1st toe distal phalanx osteomyelitis with adjacent sinus tract      -Podiatry consult, appreciate recommendations  -Patient underwent partial left hallux amputation  -Wound management as per Podiatry  -Continue Cefazolin for Staph aureus bacteremia as above  -PT/OT recommending no rehab needs      Morbid obesity ([address] )  Assessment & Plan  -discussed diet and exercise    Diabetes mellitus [facility])  Assessment & Plan  Lab Results   Component Value Date    HGBA1C 10 6 (A) 01/13/2022       Recent Labs     01/30/22 2002 01/31/22  0524 01/31/22  1105 02/01/22  0527   POCGLU 220* 123 126 140       Blood Sugar Average: Last 72 hrs:     -hold home metformin  -insulin glargine 35 units q h s    -carb consistent diet  -sliding scale insulin with Accu-Cheks    Hypertension  Assessment & Plan  -continue home atenolol and HCTZ      Discharging Physician / Practitioner: Lan Quinn DO  PCP: Robert Nunes MD  Admission Date:   Admission Orders (From admission, onward)     Ordered        01/24/22 0917  Inpatient Admission Once                      Discharge Date: 02/01/22    Medical Problems             Resolved Problems  Date Reviewed: 2/1/2022    None                Consultations During Hospital Stay:  Infectious Diseases, Interventional Radiology    Procedures Performed:  PICC line placement    Significant Findings / Test Results:     XR foot left 3+ views    Result Date: 1/27/2022  Impression: Interval hallux amputation at the level of the distal aspect of the proximal phalanx  No radiographic evidence of osteomyelitis  Workstation performed: [id number]     MRI foot/forefoot toes left w wo contrast    Result Date: 1/24/2022  Impression: 1st toe distal phalanx osteomyelitis with adjacent sinus tract  Evidence of mild 5th metatarsal stress response  No fracture  Other nonemergent findings above  Workstation performed: [id number]       Incidental Findings:  Not applicable     Test Results Pending at Discharge (will require follow up): Not applicable     Outpatient Tests Requested:  Not applicable    Complications:  Not applicable    Reason for Admission:     Hospital Course:     Tiny Fung is a 40 y o  male with a PMH of uncontrolled type 2 diabetes, morbid obesity, hypertension who presents with acute osteomyelitis left great toe  Patient was seen by Podiatry on outpatient basis  X-ray was obtained which revealed findings suggestive of acute osteomyelitis with potential pathological fracture  Patient was advised to head to ED  Lab work was obtained in ED patient admitted to Medicine  Other than chronic wound of left foot, patient with no active complaints at this time  The patient was found to have left foot osteomyelitis and underwent partial left hallux amputation on 01/26/2022  Blood cultures were found to be positive for MSSA  Infectious Disease was consulted and recommended a 4 week course from negative blood cultures of IV cefazolin 2 g every 8 hours  PICC line was placed on 02/01/2022    Home healthcare was initiated by Case Management for IV antibiotic administration  The patient was strongly encouraged to follow-up with his primary care physician within 7-14 days  He was also encouraged to follow-up with Infectious Diseases in 2 weeks  He was encouraged to return to the ED if he has to experience fevers/chills  Condition at Discharge: stable     Discharge Day Visit / Exam:     Subjective:  Patient seen and examined at bedside  No acute events overnight  Denies chest pain, SOB, diaphoresis, nausea/vomiting/diarrhea, fevers/chills  Vitals: Blood Pressure: 141/68 (02/01/22 1525)  Pulse: 63 (02/01/22 1525)  Temperature: 97 6 °F (36 4 °C) (02/01/22 1525)  Temp Source: Temporal (02/01/22 1525)  Respirations: 18 (02/01/22 1525)  Height: 5' 8" (172 7 cm) (01/27/22 0948)  Weight - Scale: (!) 147 kg (325 lb) (01/27/22 0948)  SpO2: 96 % (02/01/22 1525)     Exam:   Physical Exam  Vitals and nursing note reviewed  Constitutional:       Appearance: He is well-developed  HENT:      Head: Normocephalic and atraumatic  Eyes:      Conjunctiva/sclera: Conjunctivae normal    Cardiovascular:      Rate and Rhythm: Normal rate and regular rhythm  Heart sounds: No murmur heard  Pulmonary:      Effort: Pulmonary effort is normal  No respiratory distress  Breath sounds: Normal breath sounds  Abdominal:      Palpations: Abdomen is soft  Tenderness: There is no abdominal tenderness  Musculoskeletal:      Cervical back: Neck supple  Skin:     General: Skin is warm and dry  Neurological:      Mental Status: He is alert  Discussion with Family:  Spoke with patient    Discharge instructions/Information to patient and family:   See after visit summary for information provided to patient and family  Provisions for Follow-Up Care:  See after visit summary for information related to follow-up care and any pertinent home health orders        Disposition:     Home with VNA Services (Reminder: Complete face to face encounter)    For Discharges to [facility]:   · Not Applicable to this Patient - Not Applicable to this Patient    Planned Readmission:  Not applicable     Discharge Statement:  I spent 45 minutes discharging the patient  This time was spent on the day of discharge  I had direct contact with the patient on the day of discharge  Greater than 50% of the total time was spent examining patient, answering all patient questions, arranging and discussing plan of care with patient as well as directly providing post-discharge instructions  Additional time then spent on discharge activities  Discharge Medications:  See after visit summary for reconciled discharge medications provided to patient and family        ** Please Note: This note has been constructed using a voice recognition system **

## 2022-02-01 NOTE — CASE MANAGEMENT
Case Management Discharge Planning Note    Patient name Jaret Montesinos  Location 7T U 717/7T [facility] 421-77 MRN [id number]  : 1975 Date 2022       Current Admission Date: 2022  Current Admission Diagnosis:Staphylococcus aureus bacteremia   Patient Active Problem List    Diagnosis Date Noted    Staphylococcus aureus bacteremia 2022    Acute osteomyelitis of left foot ([facility] ) 2022    Diabetic ulcer of left great toe ([facility] ) 2022    Acute upper respiratory infection 2022    Cough with exposure to COVID-19 virus 2021    Acute right-sided low back pain without sciatica 10/21/2021    Snoring 10/21/2021    Acute congestive heart failure ([facility] ) 2021    Elevated d-dimer 2021    Lab test positive for detection of COVID-19 virus 2021    Anasarca 2021    COVID-19 2021    At increased risk of exposure to COVID-19 virus 10/12/2020    Right ankle injury 2020    Chest pain in adult 2020    SOB (shortness of breath) 2020    Routine adult health maintenance 2019    Viral gastroenteritis 2019    Constipation 2018    Hypertension 2018    Diabetes mellitus ([facility] ) 2018    Cellulitis and abscess of leg 2018    Sepsis due to cellulitis ([facility] ) 2018    Gastroparesis 2014    Hyperlipidemia 2014    Obstructive sleep apnea syndrome 2014    Morbid obesity ([facility] ) 2014    Benign essential hypertension 2014    Chronic nonalcoholic liver disease     Uncontrolled type 2 diabetes mellitus ([place] [address] ) 2014      LOS (days): 8  Geometric Mean LOS (GMLOS) (days): 5 80  Days to GMLOS:-2 4     OBJECTIVE:  Risk of Unplanned Readmission Score: 12         Current admission status: Inpatient   Preferred Pharmacy:   [facility]/pharmacy #[id number] [id number]  Phone: [phone] Fax: [phone]    [facility] Provider: Kassidy Roblero MD    Primary Insurance: BLUE CROSS  Secondary Insurance:     DISCHARGE DETAILS: CM was notified at morning rounds that pt is medically cleared to be discharged today  Pt will be returning back to his previous environment   CM was notified that pt will need 3-4 weeks of Iv Abx: CM sent referral to [facility] [address] [place] [address] and Surrounding Areas [phone]  This infusion services  is covered by [employer]  : Elaine Schneider Ph: [phone] accepted the pt for IV ABx; Nurse visit will start from tomorrow  CM spoke with the pt; pt is agreeable for iv abx nurse visit  Pt requested for Dr note for [facility]; CM conveyed the message to the Dr Bustos stated he will provide pt with a work note for [facility] leave     Transportation: Spouse;   CM department will continue to follow through pt's D/C

## 2022-02-01 NOTE — PLAN OF CARE
Problem: Potential for Falls  Goal: Patient will remain free of falls  Description: INTERVENTIONS:  - Educate patient/family on patient safety including physical limitations  - Instruct patient to call for assistance with activity   - Consult OT/PT to assist with strengthening/mobility   - Keep Call bell within reach  - Keep bed low and locked with side rails adjusted as appropriate  - Keep care items and personal belongings within reach  - Initiate and maintain comfort rounds  - Make Fall Risk Sign visible to staff  - Apply yellow socks and bracelet for high fall risk patients  - Consider moving patient to room near nurses station  Outcome: Progressing     Problem: PAIN - ADULT  Goal: Verbalizes/displays adequate comfort level or baseline comfort level  Description: Interventions:  - Encourage patient to monitor pain and request assistance  - Assess pain using appropriate pain scale  - Administer analgesics based on type and severity of pain and evaluate response  - Implement non-pharmacological measures as appropriate and evaluate response  - Consider cultural and social influences on pain and pain management  - Notify physician/advanced practitioner if interventions unsuccessful or patient reports new pain  Outcome: Progressing     Problem: INFECTION - ADULT  Goal: Absence or prevention of progression during hospitalization  Description: INTERVENTIONS:  - Assess and monitor for signs and symptoms of infection  - Monitor lab/diagnostic results  - Monitor all insertion sites, i e  indwelling lines, tubes, and drains  - Monitor endotracheal if appropriate and nasal secretions for changes in amount and color  - [place] appropriate cooling/warming therapies per order  - Administer medications as ordered  - Instruct and encourage patient and family to use good hand hygiene technique  - Identify and instruct in appropriate isolation precautions for identified infection/condition  Outcome: Progressing  Goal: Absence of fever/infection during neutropenic period  Description: INTERVENTIONS:  - Monitor WBC    Outcome: Progressing     Problem: SAFETY ADULT  Goal: Patient will remain free of falls  Description: INTERVENTIONS:  - Educate patient/family on patient safety including physical limitations  - Instruct patient to call for assistance with activity   - Consult OT/PT to assist with strengthening/mobility   - Keep Call bell within reach  - Keep bed low and locked with side rails adjusted as appropriate  - Keep care items and personal belongings within reach  - Initiate and maintain comfort rounds  - Make Fall Risk Sign visible to staff  - Apply yellow socks and bracelet for high fall risk patients  - Consider moving patient to room near nurses station  Outcome: Progressing  Goal: Maintain or return to baseline ADL function  Description: INTERVENTIONS:  -  Assess patient's ability to carry out ADLs; assess patient's baseline for ADL function and identify physical deficits which impact ability to perform ADLs (bathing, care of mouth/teeth, toileting, grooming, dressing, etc )  - Assess/evaluate cause of self-care deficits   - Assess range of motion  - Assess patient's mobility; develop plan if impaired  - Assess patient's need for assistive devices and provide as appropriate  - Encourage maximum independence but intervene and supervise when necessary  - Involve family in performance of ADLs  - Assess for home care needs following discharge   - Consider OT consult to assist with ADL evaluation and planning for discharge  - Provide patient education as appropriate  Outcome: Progressing  Goal: Maintains/Returns to pre admission functional level  Description: INTERVENTIONS:  - Perform BMAT or MOVE assessment daily    - Set and communicate daily mobility goal to care team and patient/family/caregiver     - Collaborate with rehabilitation services on mobility goals if consulted  - Out of bed for toileting  - Record patient progress and toleration of activity level   Outcome: Progressing     Problem: DISCHARGE PLANNING  Goal: Discharge to home or other facility with appropriate resources  Description: INTERVENTIONS:  - Identify barriers to discharge w/patient and caregiver  - Arrange for needed discharge resources and transportation as appropriate  - Identify discharge learning needs (meds, wound care, etc )  - Arrange for interpretive services to assist at discharge as needed  - Refer to Case Management Department for coordinating discharge planning if the patient needs post-hospital services based on physician/advanced practitioner order or complex needs related to functional status, cognitive ability, or social support system  Outcome: Progressing     Problem: Knowledge Deficit  Goal: Patient/family/caregiver demonstrates understanding of disease process, treatment plan, medications, and discharge instructions  Description: Complete learning assessment and assess knowledge base  Interventions:  - Provide teaching at level of understanding  - Provide teaching via preferred learning methods  Outcome: Progressing     Problem: Nutrition/Hydration-ADULT  Goal: Nutrient/Hydration intake appropriate for improving, restoring or maintaining nutritional needs  Description: Monitor and assess patient's nutrition/hydration status for malnutrition  Collaborate with interdisciplinary team and initiate plan and interventions as ordered  Monitor patient's weight and dietary intake as ordered or per policy  Utilize nutrition screening tool and intervene as necessary  Determine patient's food preferences and provide high-protein, high-caloric foods as appropriate       INTERVENTIONS:  - Monitor oral intake, urinary output, labs, and treatment plans  - Assess nutrition and hydration status and recommend course of action  - Evaluate amount of meals eaten  - Assist patient with eating if necessary   - Allow adequate time for meals  - Recommend/ encourage appropriate diets, oral nutritional supplements, and vitamin/mineral supplements  - Order, calculate, and assess calorie counts as needed  - Recommend, monitor, and adjust tube feedings and TPN/PPN based on assessed needs  - Assess need for intravenous fluids  - Provide specific nutrition/hydration education as appropriate  - Include patient/family/caregiver in decisions related to nutrition  Outcome: Progressing     Problem: MOBILITY - ADULT  Goal: Maintain or return to baseline ADL function  Description: INTERVENTIONS:  -  Assess patient's ability to carry out ADLs; assess patient's baseline for ADL function and identify physical deficits which impact ability to perform ADLs (bathing, care of mouth/teeth, toileting, grooming, dressing, etc )  - Assess/evaluate cause of self-care deficits   - Assess range of motion  - Assess patient's mobility; develop plan if impaired  - Assess patient's need for assistive devices and provide as appropriate  - Encourage maximum independence but intervene and supervise when necessary  - Involve family in performance of ADLs  - Assess for home care needs following discharge   - Consider OT consult to assist with ADL evaluation and planning for discharge  - Provide patient education as appropriate  Outcome: Progressing  Goal: Maintains/Returns to pre admission functional level  Description: INTERVENTIONS:  - Perform BMAT or MOVE assessment daily    - Set and communicate daily mobility goal to care team and patient/family/caregiver     - Collaborate with rehabilitation services on mobility goals if consulted  - Out of bed for toileting  - Record patient progress and toleration of activity level   Outcome: Progressing

## 2022-02-01 NOTE — NURSING NOTE
All belongings returned to pt  IV removed  PICC line in place, c/d/i  Education and paperwork provided to pt  Pt verbally acknowledges understanding

## 2022-02-01 NOTE — PROGRESS NOTES
Progress Note - Infectious Disease   Swapna Garcia 40 y o  male MRN: [id number]  Unit/Bed#: 7T [facility] 717-01 Encounter: [id number]      IMPRESSION & RECOMMENDATIONS:     1  MSSA bacteremia  Secondary to #2  No other clear explanation  No intravascular prosthetic devices  Negative 2D echo    -continue IV cefazolin 2g q8hr for a 4 week course from negative cultures, through 2/23/22   -check weekly CBC with diff and Cr while on IV antibiotics   -okay to place PICC   -ID follow up scheduled 2/15/22     2  Left foot cellulitis, with acute osteomyelitis of left hallux  MRI shows 1st toe distal phalanx osteomyelitis with adjacent sinus tract  Now status post partial left hallux amputation 1/26/22 with surgical cure achieved     -antibiotic plan as above   -podiatry follow-up ongoing   -serial foot exams     3  Chronic left hallux diabetic ulceration  Complicated by #2     -antibiotic plan as above   -daily local wound care and dressing change   -podiatry follow-up     4  Uncontrolled diabetes mellitus, with DPN and elevated hemoglobin A1c of 10 6  Risk factor for infection  Blood sugar control per Internal Medicine Service      5  Morbid obesity  This is also risk factor for infection  Recommend ongoing weight loss measures/dietary modifications      6  Penicillin allergy  Patient has previously tolerated IV cefazolin and Keflex without difficulty    -Patient is tolerating IV cefazolin this admission without difficulty     Plan discussed with [facility] provider Dr Anabel Reyes  ID will follow inpatient, but okay for discharge once home antibiotics have been arranged      Antibiotics:  Cefazolin D7    Subjective:  Patient feeling well, no acute complaints  Pain in left foot controlled  Denies fever, chills, abdominal pain, diarrhea, rash  Plan for PICC placement today      Objective:  Vitals:  Temp:  [97 6 °F (36 4 °C)-98 °F (36 7 °C)] 97 6 °F (36 4 °C)  HR:  [61-67] 66  Resp:  [18-20] 18  BP: (159-168)/(75-96) 163/75  SpO2: [97 %-99 %] 99 %  Temp (24hrs), Av 8 °F (36 6 °C), Min:97 6 °F (36 4 °C), Max:98 °F (36 7 °C)  Current: Temperature: 97 6 °F (36 4 °C)    Physical Exam:   General:  No acute distress  HEENT:  Atraumatic normocephalic  Psychiatric:  Awake and alert  Pulmonary:  Normal respiratory excursion without accessory muscle use  Abdomen:  Soft, nontender  Extremities:  No edema, left foot dressing intact, wearing surgical shoe  Skin:  No rashes  Neuro: Moves all extremities spontaneously    Lab Results:  I have personally reviewed pertinent labs  Results from last 7 days   Lab Units 22  0435 22  0441 22  0433   POTASSIUM mmol/L 4 4 4 3 4 2   CHLORIDE mmol/L 102 101 102   CO2 mmol/L 31* 30 30   BUN mg/dL 21 23 16   CREATININE mg/dL 1 21 1 14 1 16   EGFR ml/min/1 73sq m 71 77 75   CALCIUM mg/dL 8 8 8 8 8 7   AST U/L  --  31  --    ALT U/L  --  13  --    ALK PHOS U/L  --  79  --      Results from last 7 days   Lab Units 22  0435 22  0441 22  0433   WBC Thousand/uL 9 50 9 50 9 70   HEMOGLOBIN g/dL 10 7* 10 8* 10 3*   PLATELETS Thousands/uL 273 292 177     Results from last 7 days   Lab Units 22  0455 22  0454 22  1550   BLOOD CULTURE  No Growth After 4 Days  No Growth After 4 Days  --    GRAM STAIN RESULT   --   --  No Polys or Bacteria seen       Imaging Studies:   I have personally reviewed pertinent imaging study reports and images in PACS  EKG, Pathology, and Other Studies:   I have personally reviewed pertinent reports  2D echo is negative

## 2022-02-01 NOTE — ASSESSMENT & PLAN NOTE
Patient with 2 of 2 blood cultures positive for Staph aureus  Echo without signs of obvious vegetation  Repeat blood cultures negative at 72 hours    -ID consulted, appreciate recommendations  -Continue Cefazolin ; anticipate 4 week course of IV antibiotics  -Will place PICC line today as blood cultures are negative times 72 hours  -IR consulted and plan for PICC line placement later today

## 2022-02-02 ENCOUNTER — TRANSITIONAL CARE MANAGEMENT (OUTPATIENT)
Dept: FAMILY MEDICINE CLINIC | Facility: CLINIC | Age: 47
End: 2022-02-02

## 2022-02-02 DIAGNOSIS — L02.419 CELLULITIS AND ABSCESS OF LEG: Primary | ICD-10-CM

## 2022-02-02 DIAGNOSIS — M86.172 ACUTE OSTEOMYELITIS OF LEFT FOOT (HCC): ICD-10-CM

## 2022-02-02 DIAGNOSIS — L03.119 CELLULITIS AND ABSCESS OF LEG: Primary | ICD-10-CM

## 2022-02-02 NOTE — UTILIZATION REVIEW
Notification of Discharge   This is a Notification of Discharge from our facility [address]  Please be advised that this patient has been discharge from our facility  Below you will find the admission and discharge date and time including the patients disposition  UTILIZATION REVIEW CONTACT:  Ben Blanco  Utilization   Network Utilization Review Department  Phone: [phone] x carefully listen to the prompts  All voicemails are confidential   Email: [email or web address]     PHYSICIAN ADVISORY SERVICES:  FOR [id number] REVIEW - MEDICAL NECESSITY DENIAL  Phone: [phone]  Fax: [phone]  Email: [email or web address]     PRESENTATION DATE: 1/24/2022  9:08 AM  OBERVATION ADMISSION DATE:   INPATIENT ADMISSION DATE: 1/24/22  9:17 AM   DISCHARGE DATE: 2/1/2022  6:45 PM  DISPOSITION: Home/Self Care Home/Self Care      IMPORTANT INFORMATION:  Send all requests for admission clinical reviews, approved or denied determinations and any other requests to dedicated fax number below belonging to the campus where the patient is receiving treatment   List of dedicated fax numbers:  [address] DENIALS (Administrative/Medical Necessity) [phone]   [address] (Maternity/NICU/Pediatrics) [phone]   Maryan Banner MD [facility] [phone]   [address] [place] [address] [phone]   [address] [place] [address] [phone]   2000 [facility] [address] [place] [address],[address] [phone]   [facility]  [phone]   2205 [facility]  [address] [phone]

## 2022-02-14 PROBLEM — Z45.2 PICC (PERIPHERALLY INSERTED CENTRAL CATHETER) IN PLACE: Status: ACTIVE | Noted: 2022-02-14

## 2022-02-14 PROBLEM — Z88.0 H/O PENICILLIN-TYPE ANTIBIOTIC ALLERGY: Status: ACTIVE | Noted: 2022-02-14

## 2022-02-14 NOTE — PROGRESS NOTES
Outpatient Progress Note - [facility] Infectious Disease   Sammy Paez 40 y o  male MRN: [id number]    Impression/Plan:  1  MSSA bacteremia  Secondary to L foot cellulitis with underlying hallux osteomyelitis  Patient with no intravascular devices  He did have clear blood cultures while inpatient on 1/27/2022  TTE without valvular vegetation  Patient has a PICC and has been undergoing a 4 week course of IV cefazolin which he has been tolerating without difficulty  He had mild increase in WBC but renal function remains stable  Patient should continue his antibiotic treatment course as planned through 2/23/2022  He will complete CBCD and creatinine again next week before completing treatment  He will need to complete surveillance blood cultures 2 weeks after finishing his antibiotic treatment, to be drawn after 3/9/2022   -continue IV cefazolin through 2/23/2022  -continue weekly CBCD and creatinine while on IV antibiotic treatment  -PICC to be removed after final dose of IV antibiotic on 2/23/2022  -complete surveillance blood cultures 2 weeks after finishing IV antibiotics, draw after 3/9/2022  -return to the outpatient infectious disease office for follow up as needed    2  L hallux osteomyelitis  Secondary to non healing wound in the setting of uncontrolled type 2 diabetes mellitus  He is now s/p partial hallux amputation for surgical cure  He was seen in the wound management center earlier today and his sutures were removed  -continue follow up in the outpatient wound management center    3  Chronic L great toe ulcer  This was source of patient's osteomyelitis and bacteremia above  He is now s/p partial hallux amputation for surgical cure on the wound  4  Uncontrolled type 2 diabetes mellitus with long term insulin use  Patient's last HbA1c was 10 6% on 1/13/2022  Elevated blood glucose is risk factor for wounds and infection   Recommend tight glycemic control   -continue outpatient follow up with PCP for blood glucose management    5  Morbid obesity  BMI = 49 42  This is risk factor for wounds and infection  -recommend weight loss  6  PCN allergy  Patient has tolerated cephalosporins without difficulty  -monitor patient for adverse medication reactions    7  PICC present  Home nursing agency to continue routine exams and care of PICC  PICC to be discontinued after final dose of IV antibiotic on 2/23/2022   -home agency to continue routine exams and care of PICC  -PICC to be discontinued after final dose of IV antibiotic on 2/23/2022    Above plan was discussed in detail with patient in the exam room  Antibiotics:  IV cefazolin    Subjective:  Patient presents for follow up of MSSA bacteremia caused by L foot cellulitis and hallux osteomyelitis  He has a PICC in place and has been undergoing a 4-week course of IV cefazolin  Patient has been tolerating antibiotics without difficulty  He has no concerns with his PICC line  He has no fever, chills, sweats, shakes; no nausea, vomiting, or diarrhea; no cough, shortness of breath, or chest pain  Patient reports he is neuropathic and therefore cannot detect any pain in his foot  Patient reports his wound management team removed his sutures this morning and all he has left is a bandaid over the site  He's noticed no ongoing swelling or redness, just dry skin  No new symptoms  Objective:  Vitals:  Temp 97 5  HR 75  RR 20  /90    Physical Exam:   General Appearance:  Alert, interactive, nontoxic, in no acute distress  He appears comfortable sitting in the exam chair  Anxious, reporting white coat syndrome  Throat: Oropharynx moist without lesions  Lungs:   Clear to auscultation bilaterally; no wheezes, rhonchi or rales; respirations unlabored on room air  Heart:  RRR; no murmur, rub or gallop  Extremities: No clubbing or cyanosis, no edema   L great toe with Band-Aid in place and surrounding dry skin, no swelling or erythema, no active drainage/bleeding, no warmth to touch or pain during palpation  Skin: No new rashes or lesions noted on exposed skin  Labs, Imaging, & Other studies:   All pertinent labs and imaging studies were personally reviewed by me including scanned lab work from [employer] collected on 2/7/2022

## 2022-02-14 NOTE — PATIENT INSTRUCTIONS
Continue IV Cefazolin through 2/23/2022  Continue weekly CBCD and creatinine while on IV antibiotic  I will notify you with any abnormal results  Removed PICC after final dose of IV antibiotic on 2/23/2022  Complete surveillance blood cultures 2 weeks after finishing IV antibiotic treatment, draw after 3/9/2022  Return to the outpatient infectious disease office as needed for follow up

## 2022-02-15 ENCOUNTER — OFFICE VISIT (OUTPATIENT)
Dept: INFECTIOUS DISEASES | Facility: CLINIC | Age: 47
End: 2022-02-15
Payer: COMMERCIAL

## 2022-02-15 VITALS
OXYGEN SATURATION: 98 % | HEART RATE: 75 BPM | TEMPERATURE: 97.5 F | SYSTOLIC BLOOD PRESSURE: 178 MMHG | WEIGHT: 315 LBS | RESPIRATION RATE: 20 BRPM | BODY MASS INDEX: 51.21 KG/M2 | DIASTOLIC BLOOD PRESSURE: 90 MMHG

## 2022-02-15 DIAGNOSIS — Z45.2 PICC (PERIPHERALLY INSERTED CENTRAL CATHETER) IN PLACE: ICD-10-CM

## 2022-02-15 DIAGNOSIS — M86.172 ACUTE OSTEOMYELITIS OF LEFT FOOT (HCC): ICD-10-CM

## 2022-02-15 DIAGNOSIS — E66.01 MORBID OBESITY (HCC): ICD-10-CM

## 2022-02-15 DIAGNOSIS — R78.81 STAPHYLOCOCCUS AUREUS BACTEREMIA: Primary | ICD-10-CM

## 2022-02-15 DIAGNOSIS — Z88.0 H/O PENICILLIN-TYPE ANTIBIOTIC ALLERGY: ICD-10-CM

## 2022-02-15 DIAGNOSIS — L97.529 DIABETIC ULCER OF LEFT GREAT TOE (HCC): ICD-10-CM

## 2022-02-15 DIAGNOSIS — B95.61 STAPHYLOCOCCUS AUREUS BACTEREMIA: Primary | ICD-10-CM

## 2022-02-15 DIAGNOSIS — E11.621 DIABETIC ULCER OF LEFT GREAT TOE (HCC): ICD-10-CM

## 2022-02-15 PROCEDURE — 3080F DIAST BP >= 90 MM HG: CPT | Performed by: NURSE PRACTITIONER

## 2022-02-15 PROCEDURE — 99213 OFFICE O/P EST LOW 20 MIN: CPT | Performed by: NURSE PRACTITIONER

## 2022-02-15 PROCEDURE — 3077F SYST BP >= 140 MM HG: CPT | Performed by: NURSE PRACTITIONER

## 2022-02-15 PROCEDURE — 1111F DSCHRG MED/CURRENT MED MERGE: CPT | Performed by: NURSE PRACTITIONER

## 2022-03-03 ENCOUNTER — OFFICE VISIT (OUTPATIENT)
Dept: FAMILY MEDICINE CLINIC | Facility: CLINIC | Age: 47
End: 2022-03-03
Payer: COMMERCIAL

## 2022-03-03 ENCOUNTER — TELEPHONE (OUTPATIENT)
Dept: BARIATRICS | Facility: CLINIC | Age: 47
End: 2022-03-03

## 2022-03-03 VITALS
OXYGEN SATURATION: 99 % | BODY MASS INDEX: 47.74 KG/M2 | DIASTOLIC BLOOD PRESSURE: 110 MMHG | HEIGHT: 68 IN | HEART RATE: 78 BPM | WEIGHT: 315 LBS | SYSTOLIC BLOOD PRESSURE: 170 MMHG | TEMPERATURE: 96.2 F

## 2022-03-03 DIAGNOSIS — I10 ESSENTIAL HYPERTENSION: Primary | ICD-10-CM

## 2022-03-03 DIAGNOSIS — G89.29 OTHER CHRONIC PAIN: ICD-10-CM

## 2022-03-03 DIAGNOSIS — E11.65 UNCONTROLLED TYPE 2 DIABETES MELLITUS WITH HYPERGLYCEMIA (HCC): ICD-10-CM

## 2022-03-03 DIAGNOSIS — E66.01 MORBID OBESITY (HCC): ICD-10-CM

## 2022-03-03 PROCEDURE — 99214 OFFICE O/P EST MOD 30 MIN: CPT | Performed by: FAMILY MEDICINE

## 2022-03-03 PROCEDURE — 1111F DSCHRG MED/CURRENT MED MERGE: CPT | Performed by: FAMILY MEDICINE

## 2022-03-03 RX ORDER — GABAPENTIN 300 MG/1
300 CAPSULE ORAL 3 TIMES DAILY
Qty: 90 CAPSULE | Refills: 1 | Status: SHIPPED | OUTPATIENT
Start: 2022-03-03 | End: 2022-03-08 | Stop reason: SDUPTHER

## 2022-03-03 RX ORDER — LOSARTAN POTASSIUM AND HYDROCHLOROTHIAZIDE 25; 100 MG/1; MG/1
1 TABLET ORAL DAILY
Qty: 30 TABLET | Refills: 1 | Status: SHIPPED | OUTPATIENT
Start: 2022-03-03 | End: 2022-03-25

## 2022-03-03 RX ORDER — SEMAGLUTIDE 1.34 MG/ML
1 INJECTION, SOLUTION SUBCUTANEOUS WEEKLY
Qty: 6 ML | Refills: 2 | Status: SHIPPED | OUTPATIENT
Start: 2022-03-03

## 2022-03-03 NOTE — PATIENT INSTRUCTIONS
10% - bad control"> 10% - bad control,Hemoglobin A1c (HbA1c) greater than 10% indicating poor diabetic control,Haemoglobin A1c greater than 10% indicating poor diabetic control">   Diabetes Mellitus Type 2 in Adults, Ambulatory Care   GENERAL INFORMATION:   Diabetes mellitus type 2  is a disease that affects how your body uses glucose (sugar)  Insulin helps move sugar out of the blood so it can be used for energy  Normally, when the blood sugar level increases, the pancreas makes more insulin  Type 2 diabetes develops because either the body cannot make enough insulin, or it cannot use the insulin correctly  After many years, your pancreas may stop making insulin  Common symptoms include the following:   · More hunger or thirst than usual     · Frequent urination     · Weight loss without trying     · Blurred vision  Seek immediate care for the following symptoms:   · Severe abdominal pain, or pain that spreads to your back  You may also be vomiting  · Trouble staying awake or focusing    · Shaking or sweating    · Blurred or double vision    · Breath has a fruity, sweet smell    · Breathing is deep and labored, or rapid and shallow    · Heartbeat is fast and weak  Treatment for diabetes mellitus type 2  includes keeping your blood sugar at a normal level  You must eat the right foods, and exercise regularly  You may also need medicine if you cannot control your blood sugar level with nutrition and exercise  Manage diabetes mellitus type 2:   · Check your blood sugar level  You will be taught how to check a small drop of blood in a glucose monitor  Ask your healthcare provider when and how often to check during the day  Ask your healthcare provider what your blood sugar levels should be when you check them  · Keep track of carbohydrates (sugar and starchy foods)  Your blood sugar level can get too high if you eat too many carbohydrates   Your dietitian will help you plan meals and snacks that have the right amount of carbohydrates  · Eat low-fat foods  Some examples are skinless chicken and low-fat milk  · Eat less sodium (salt)  Some examples of high-sodium foods to limit are soy sauce, potato chips, and soup  Do not add salt to food you cook  Limit your use of table salt  · Eat high-fiber foods  Foods that are a good source of fiber include vegetables, whole grain bread, and beans  · Limit alcohol  Alcohol affects your blood sugar level and can make it harder to manage your diabetes  Women should limit alcohol to 1 drink a day  Men should limit alcohol to 2 drinks a day  A drink of alcohol is 12 ounces of beer, 5 ounces of wine, or 1½ ounces of liquor  · Get regular exercise  Exercise can help keep your blood sugar level steady, decrease your risk of heart disease, and help you lose weight  Exercise for at least 30 minutes, 5 days a week  Include muscle strengthening activities 2 days each week  Work with your healthcare provider to create an exercise plan  · Check your feet each day  for injuries or open sores  Ask your healthcare provider for activities you can do if you have an open sore  · Quit smoking  If you smoke, it is never too late to quit  Smoking can worsen the problems that may occur with diabetes  Ask your healthcare provider for information about how to stop smoking if you are having trouble quitting  · Ask about your weight:  Ask healthcare providers if you need to lose weight, and how much to lose  Ask them to help you with a weight loss program  Even a 10 to 15 pound weight loss can help you manage your blood sugar level  · Carry medical alert identification  Wear medical alert jewelry or carry a card that says you have diabetes  Ask your healthcare provider where to get these items  · Ask about vaccines  Diabetes puts you at risk of serious illness if you get the flu, pneumonia, or hepatitis   Ask your healthcare provider if you should get a flu, pneumonia, or hepatitis B vaccine, and when to get the vaccine  Follow up with your healthcare provider as directed:  Write down your questions so you remember to ask them during your visits  CARE AGREEMENT:   You have the right to help plan your care  Learn about your health condition and how it may be treated  Discuss treatment options with your caregivers to decide what care you want to receive  You always have the right to refuse treatment  The above information is an  only  It is not intended as medical advice for individual conditions or treatments  Talk to your doctor, nurse or pharmacist before following any medical regimen to see if it is safe and effective for you  © 2014 [address] is for End User's use only and may not be sold, redistributed or otherwise used for commercial purposes  All illustrations and images included in CareNotes® are the copyrighted property of [employer]  or Jo[employer] Meyer

## 2022-03-03 NOTE — LETTER
March 3, 2022     Patient: Roberto Bolanos   YOB: 1975   Date of Visit: 3/3/2022       To Whom it May Concern:    Ara Margaux is under my professional care  He was seen in my office on 3/3/2022  He may not return to work until re-evaluated in 1 month  If you have any questions or concerns, please don't hesitate to call           Sincerely,          Miladys Blevins MD        CC: No Recipients

## 2022-03-06 NOTE — PROGRESS NOTES
Assessment/Plan:    16-year-old male with:  Hypertension uncontrolled type 2 diabetes with chronic pain discussed workup and treatment options with risks and benefits will adjust medications patient requests referral to Bariatric surgery for evaluation S issues willing begin trial of gabapentin discussed supportive care return parameters follow-up for him in 2 months    No problem-specific Assessment & Plan notes found for this encounter  Diagnoses and all orders for this visit:    Essential hypertension  -     losartan-hydrochlorothiazide (HYZAAR) 100-25 MG per tablet; Take 1 tablet by mouth daily    Uncontrolled type 2 diabetes mellitus with hyperglycemia (HCC)  -     semaglutide, 1 mg/dose, (Ozempic, 1 MG/DOSE,) 4 MG/3ML SOPN injection pen; Inject 0 75 mL (1 mg total) under the skin once a week  -     Ambulatory Referral to Endocrinology; Future  -     Ambulatory referral to Diabetic Education; Future    Morbid obesity ([address] )  -     Ambulatory Referral to Bariatric Surgery; Future    Other chronic pain  -     gabapentin (Neurontin) 300 mg capsule; Take 1 capsule (300 mg total) by mouth 3 (three) times a day          Subjective:     Chief Complaint   Patient presents with    Follow-up     body pain    Physical Exam     annual physical        Patient ID: Alesia Dwyer is a 40 y o  male  Patient is a 16-year-old male who presents for follow-up on hypertension uncontrolled type 2 diabetes along with chronic pain no fevers chills nausea vomiting tolerating p o  intake no other complaints at this time      The following portions of the patient's history were reviewed and updated as appropriate: allergies, current medications, past family history, past medical history, past social history, past surgical history and problem list     Review of Systems   Constitutional: Negative  HENT: Negative  Eyes: Negative  Respiratory: Negative  Cardiovascular: Negative  Gastrointestinal: Negative  Endocrine: Negative  Genitourinary: Negative  Musculoskeletal: Negative  Allergic/Immunologic: Negative  Neurological: Negative  Hematological: Negative  Psychiatric/Behavioral: Negative  All other systems reviewed and are negative  Objective:      BP (!) 170/110 (BP Location: Left arm, Patient Position: Sitting, Cuff Size: Large)   Pulse 78   Temp (!) 96 2 °F (35 7 °C) (Tympanic)   Ht 5' 8" (1 727 m)   Wt (!) 157 kg (345 lb 9 6 oz)   SpO2 99%   BMI 52 55 kg/m²          Physical Exam  Constitutional:       Appearance: He is well-developed  HENT:      Head: Atraumatic  Right Ear: External ear normal       Left Ear: External ear normal    Eyes:      Conjunctiva/sclera: Conjunctivae normal       Pupils: Pupils are equal, round, and reactive to light  Cardiovascular:      Rate and Rhythm: Normal rate and regular rhythm  Heart sounds: Normal heart sounds  Pulmonary:      Effort: Pulmonary effort is normal  No respiratory distress  Breath sounds: Normal breath sounds  Abdominal:      General: Bowel sounds are normal  There is no distension  Palpations: Abdomen is soft  Tenderness: There is no abdominal tenderness  There is no guarding or rebound  Musculoskeletal:         General: Normal range of motion  Cervical back: Normal range of motion  Skin:     General: Skin is warm and dry  Neurological:      Mental Status: He is alert and oriented to person, place, and time  Cranial Nerves: No cranial nerve deficit  Psychiatric:         Behavior: Behavior normal          Thought Content: Thought content normal          Judgment: Judgment normal          BMI Counseling: Body mass index is 52 55 kg/m²  The BMI is above normal  Nutrition recommendations include encouraging healthy choices of fruits and vegetables  Exercise recommendations include moderate physical activity 150 minutes/week   Rationale for BMI follow-up plan is due to patient being overweight or obese  Depression Screening and Follow-up Plan: Patient was screened for depression during today's encounter  They screened negative with a PHQ-2 score of 0  No Exposure

## 2022-03-07 ENCOUNTER — TELEPHONE (OUTPATIENT)
Dept: FAMILY MEDICINE CLINIC | Facility: CLINIC | Age: 47
End: 2022-03-07

## 2022-03-08 ENCOUNTER — OFFICE VISIT (OUTPATIENT)
Dept: FAMILY MEDICINE CLINIC | Facility: CLINIC | Age: 47
End: 2022-03-08
Payer: COMMERCIAL

## 2022-03-08 VITALS
SYSTOLIC BLOOD PRESSURE: 160 MMHG | BODY MASS INDEX: 47.74 KG/M2 | WEIGHT: 315 LBS | TEMPERATURE: 98.2 F | HEART RATE: 86 BPM | DIASTOLIC BLOOD PRESSURE: 110 MMHG | OXYGEN SATURATION: 98 % | HEIGHT: 68 IN

## 2022-03-08 DIAGNOSIS — E11.42 DIABETIC POLYNEUROPATHY ASSOCIATED WITH TYPE 2 DIABETES MELLITUS (HCC): ICD-10-CM

## 2022-03-08 DIAGNOSIS — I10 ESSENTIAL HYPERTENSION: Primary | ICD-10-CM

## 2022-03-08 DIAGNOSIS — E66.01 MORBID OBESITY (HCC): ICD-10-CM

## 2022-03-08 DIAGNOSIS — M54.50 ACUTE RIGHT-SIDED LOW BACK PAIN WITHOUT SCIATICA: ICD-10-CM

## 2022-03-08 DIAGNOSIS — G89.29 OTHER CHRONIC PAIN: ICD-10-CM

## 2022-03-08 DIAGNOSIS — E11.65 UNCONTROLLED TYPE 2 DIABETES MELLITUS WITH HYPERGLYCEMIA (HCC): ICD-10-CM

## 2022-03-08 PROBLEM — E11.40 DIABETIC NEUROPATHY (HCC): Status: ACTIVE | Noted: 2022-03-08

## 2022-03-08 PROCEDURE — 3725F SCREEN DEPRESSION PERFORMED: CPT | Performed by: FAMILY MEDICINE

## 2022-03-08 PROCEDURE — 99214 OFFICE O/P EST MOD 30 MIN: CPT | Performed by: FAMILY MEDICINE

## 2022-03-08 RX ORDER — GABAPENTIN 300 MG/1
600 CAPSULE ORAL 3 TIMES DAILY
Qty: 180 CAPSULE | Refills: 1 | Status: SHIPPED | OUTPATIENT
Start: 2022-03-08 | End: 2022-05-04

## 2022-03-08 RX ORDER — AMLODIPINE BESYLATE 10 MG/1
10 TABLET ORAL
Qty: 30 TABLET | Refills: 1 | Status: SHIPPED | OUTPATIENT
Start: 2022-03-08 | End: 2022-03-30

## 2022-03-09 NOTE — PROGRESS NOTES
Assessment/Plan:    15-year-old male with:  chronic pain especially low back pain hypertension morbid obesity along with type 2 diabetes with diabetic polyneuropathy will continue current medications will add amlodipine will increase gabapentin dose discussed supportive care return parameters otherwise follow-up in 2 weeks and will refer to a complex care management    No problem-specific Assessment & Plan notes found for this encounter  Diagnoses and all orders for this visit:    Essential hypertension  -     amLODIPine (NORVASC) 10 mg tablet; Take 1 tablet (10 mg total) by mouth daily at bedtime  -     Ambulatory Referral to Complex Care Management Program; Future    Other chronic pain  -     gabapentin (Neurontin) 300 mg capsule; Take 2 capsules (600 mg total) by mouth 3 (three) times a day  -     Ambulatory Referral to Physical Therapy; Future  -     Ambulatory Referral to Complex Care Management Program; Future    Acute right-sided low back pain without sciatica  -     Ambulatory Referral to Complex Care Management Program; Future    Diabetic polyneuropathy associated with type 2 diabetes mellitus ([facility] [address] )  -     Ambulatory Referral to Physical Therapy; Future  -     Ambulatory Referral to Complex Care Management Program; Future    Morbid obesity [facility])  -     Ambulatory Referral to Complex Care Management Program; Future          Subjective:     Chief Complaint   Patient presents with    Follow-up     fill out disability papers and want to discuss physical therapy        Patient ID: Mariia Martinez is a 40 y o  male      Patient is a 15-year-old male who presents for follow-up on chronic pain especially low back pain hypertension morbid obesity along with type 2 diabetes with diabetic polyneuropathy he admits that he is still having significant pain and did not notice much benefit from the gabapentin no fevers chills nausea vomiting tolerating pending other complaints at this time      The following portions of the patient's history were reviewed and updated as appropriate: allergies, current medications, past family history, past medical history, past social history, past surgical history and problem list     Review of Systems   Constitutional: Negative  HENT: Negative  Eyes: Negative  Respiratory: Negative  Cardiovascular: Negative  Gastrointestinal: Negative  Endocrine: Negative  Genitourinary: Negative  Musculoskeletal: Positive for arthralgias, back pain and myalgias  Allergic/Immunologic: Negative  Neurological: Negative  Hematological: Negative  Psychiatric/Behavioral: Negative  All other systems reviewed and are negative  Objective:      BP (!) 160/110 (BP Location: Left arm, Patient Position: Sitting, Cuff Size: Large)   Pulse 86   Temp 98 2 °F (36 8 °C) (Tympanic)   Ht 5' 8" (1 727 m)   Wt (!) 154 kg (339 lb 12 8 oz)   SpO2 98%   BMI 51 67 kg/m²          Physical Exam  Constitutional:       Appearance: He is well-developed  HENT:      Head: Atraumatic  Right Ear: External ear normal       Left Ear: External ear normal    Eyes:      Conjunctiva/sclera: Conjunctivae normal       Pupils: Pupils are equal, round, and reactive to light  Cardiovascular:      Rate and Rhythm: Normal rate and regular rhythm  Heart sounds: Normal heart sounds  Pulmonary:      Effort: Pulmonary effort is normal  No respiratory distress  Breath sounds: Normal breath sounds  Abdominal:      General: There is no distension  Palpations: Abdomen is soft  Tenderness: There is no abdominal tenderness  There is no guarding or rebound  Musculoskeletal:         General: Normal range of motion  Cervical back: Normal range of motion  Skin:     General: Skin is warm and dry  Neurological:      Mental Status: He is alert and oriented to person, place, and time  Cranial Nerves: No cranial nerve deficit     Psychiatric:         Behavior: Behavior normal  Thought Content:  Thought content normal          Judgment: Judgment normal

## 2022-03-09 NOTE — PATIENT INSTRUCTIONS
10% - bad control"> 10% - bad control,Hemoglobin A1c (HbA1c) greater than 10% indicating poor diabetic control,Haemoglobin A1c greater than 10% indicating poor diabetic control">   Diabetes Mellitus Type 2 in Adults, Ambulatory Care   GENERAL INFORMATION:   Diabetes mellitus type 2  is a disease that affects how your body uses glucose (sugar)  Insulin helps move sugar out of the blood so it can be used for energy  Normally, when the blood sugar level increases, the pancreas makes more insulin  Type 2 diabetes develops because either the body cannot make enough insulin, or it cannot use the insulin correctly  After many years, your pancreas may stop making insulin  Common symptoms include the following:   · More hunger or thirst than usual     · Frequent urination     · Weight loss without trying     · Blurred vision  Seek immediate care for the following symptoms:   · Severe abdominal pain, or pain that spreads to your back  You may also be vomiting  · Trouble staying awake or focusing    · Shaking or sweating    · Blurred or double vision    · Breath has a fruity, sweet smell    · Breathing is deep and labored, or rapid and shallow    · Heartbeat is fast and weak  Treatment for diabetes mellitus type 2  includes keeping your blood sugar at a normal level  You must eat the right foods, and exercise regularly  You may also need medicine if you cannot control your blood sugar level with nutrition and exercise  Manage diabetes mellitus type 2:   · Check your blood sugar level  You will be taught how to check a small drop of blood in a glucose monitor  Ask your healthcare provider when and how often to check during the day  Ask your healthcare provider what your blood sugar levels should be when you check them  · Keep track of carbohydrates (sugar and starchy foods)  Your blood sugar level can get too high if you eat too many carbohydrates   Your dietitian will help you plan meals and snacks that have the right amount of carbohydrates  · Eat low-fat foods  Some examples are skinless chicken and low-fat milk  · Eat less sodium (salt)  Some examples of high-sodium foods to limit are soy sauce, potato chips, and soup  Do not add salt to food you cook  Limit your use of table salt  · Eat high-fiber foods  Foods that are a good source of fiber include vegetables, whole grain bread, and beans  · Limit alcohol  Alcohol affects your blood sugar level and can make it harder to manage your diabetes  Women should limit alcohol to 1 drink a day  Men should limit alcohol to 2 drinks a day  A drink of alcohol is 12 ounces of beer, 5 ounces of wine, or 1½ ounces of liquor  · Get regular exercise  Exercise can help keep your blood sugar level steady, decrease your risk of heart disease, and help you lose weight  Exercise for at least 30 minutes, 5 days a week  Include muscle strengthening activities 2 days each week  Work with your healthcare provider to create an exercise plan  · Check your feet each day  for injuries or open sores  Ask your healthcare provider for activities you can do if you have an open sore  · Quit smoking  If you smoke, it is never too late to quit  Smoking can worsen the problems that may occur with diabetes  Ask your healthcare provider for information about how to stop smoking if you are having trouble quitting  · Ask about your weight:  Ask healthcare providers if you need to lose weight, and how much to lose  Ask them to help you with a weight loss program  Even a 10 to 15 pound weight loss can help you manage your blood sugar level  · Carry medical alert identification  Wear medical alert jewelry or carry a card that says you have diabetes  Ask your healthcare provider where to get these items  · Ask about vaccines  Diabetes puts you at risk of serious illness if you get the flu, pneumonia, or hepatitis   Ask your healthcare provider if you should get a flu, pneumonia, or hepatitis B vaccine, and when to get the vaccine  Follow up with your healthcare provider as directed:  Write down your questions so you remember to ask them during your visits  CARE AGREEMENT:   You have the right to help plan your care  Learn about your health condition and how it may be treated  Discuss treatment options with your caregivers to decide what care you want to receive  You always have the right to refuse treatment  The above information is an  only  It is not intended as medical advice for individual conditions or treatments  Talk to your doctor, nurse or pharmacist before following any medical regimen to see if it is safe and effective for you  © 2014 [address] is for End User's use only and may not be sold, redistributed or otherwise used for commercial purposes  All illustrations and images included in CareNotes® are the copyrighted property of [employer] M [employer]  or Joao Meyer

## 2022-03-15 ENCOUNTER — PATIENT OUTREACH (OUTPATIENT)
Dept: FAMILY MEDICINE CLINIC | Facility: CLINIC | Age: 47
End: 2022-03-15

## 2022-03-17 ENCOUNTER — TELEPHONE (OUTPATIENT)
Dept: BARIATRICS | Facility: CLINIC | Age: 47
End: 2022-03-17

## 2022-03-22 ENCOUNTER — TELEMEDICINE (OUTPATIENT)
Dept: FAMILY MEDICINE CLINIC | Facility: CLINIC | Age: 47
End: 2022-03-22
Payer: COMMERCIAL

## 2022-03-22 DIAGNOSIS — E11.65 UNCONTROLLED TYPE 2 DIABETES MELLITUS WITH HYPERGLYCEMIA (HCC): Primary | ICD-10-CM

## 2022-03-22 DIAGNOSIS — A08.4 VIRAL GASTROENTERITIS: ICD-10-CM

## 2022-03-22 PROCEDURE — 1036F TOBACCO NON-USER: CPT | Performed by: FAMILY MEDICINE

## 2022-03-22 PROCEDURE — 99213 OFFICE O/P EST LOW 20 MIN: CPT | Performed by: FAMILY MEDICINE

## 2022-03-22 RX ORDER — ONDANSETRON 4 MG/1
4 TABLET, ORALLY DISINTEGRATING ORAL EVERY 8 HOURS PRN
Qty: 30 TABLET | Refills: 1 | Status: SHIPPED | OUTPATIENT
Start: 2022-03-22

## 2022-03-22 RX ORDER — METFORMIN HYDROCHLORIDE 500 MG/1
500 TABLET, EXTENDED RELEASE ORAL 2 TIMES DAILY WITH MEALS
Qty: 180 TABLET | Refills: 1 | Status: SHIPPED | OUTPATIENT
Start: 2022-03-22

## 2022-03-23 ENCOUNTER — PATIENT OUTREACH (OUTPATIENT)
Dept: FAMILY MEDICINE CLINIC | Facility: CLINIC | Age: 47
End: 2022-03-23

## 2022-03-23 NOTE — PATIENT INSTRUCTIONS
10% - bad control"> 10% - bad control,Hemoglobin A1c (HbA1c) greater than 10% indicating poor diabetic control,Haemoglobin A1c greater than 10% indicating poor diabetic control">   Diabetes Mellitus Type 2 in Adults, Ambulatory Care   GENERAL INFORMATION:   Diabetes mellitus type 2  is a disease that affects how your body uses glucose (sugar)  Insulin helps move sugar out of the blood so it can be used for energy  Normally, when the blood sugar level increases, the pancreas makes more insulin  Type 2 diabetes develops because either the body cannot make enough insulin, or it cannot use the insulin correctly  After many years, your pancreas may stop making insulin  Common symptoms include the following:   · More hunger or thirst than usual     · Frequent urination     · Weight loss without trying     · Blurred vision  Seek immediate care for the following symptoms:   · Severe abdominal pain, or pain that spreads to your back  You may also be vomiting  · Trouble staying awake or focusing    · Shaking or sweating    · Blurred or double vision    · Breath has a fruity, sweet smell    · Breathing is deep and labored, or rapid and shallow    · Heartbeat is fast and weak  Treatment for diabetes mellitus type 2  includes keeping your blood sugar at a normal level  You must eat the right foods, and exercise regularly  You may also need medicine if you cannot control your blood sugar level with nutrition and exercise  Manage diabetes mellitus type 2:   · Check your blood sugar level  You will be taught how to check a small drop of blood in a glucose monitor  Ask your healthcare provider when and how often to check during the day  Ask your healthcare provider what your blood sugar levels should be when you check them  · Keep track of carbohydrates (sugar and starchy foods)  Your blood sugar level can get too high if you eat too many carbohydrates   Your dietitian will help you plan meals and snacks that have the right amount of carbohydrates  · Eat low-fat foods  Some examples are skinless chicken and low-fat milk  · Eat less sodium (salt)  Some examples of high-sodium foods to limit are soy sauce, potato chips, and soup  Do not add salt to food you cook  Limit your use of table salt  · Eat high-fiber foods  Foods that are a good source of fiber include vegetables, whole grain bread, and beans  · Limit alcohol  Alcohol affects your blood sugar level and can make it harder to manage your diabetes  Women should limit alcohol to 1 drink a day  Men should limit alcohol to 2 drinks a day  A drink of alcohol is 12 ounces of beer, 5 ounces of wine, or 1½ ounces of liquor  · Get regular exercise  Exercise can help keep your blood sugar level steady, decrease your risk of heart disease, and help you lose weight  Exercise for at least 30 minutes, 5 days a week  Include muscle strengthening activities 2 days each week  Work with your healthcare provider to create an exercise plan  · Check your feet each day  for injuries or open sores  Ask your healthcare provider for activities you can do if you have an open sore  · Quit smoking  If you smoke, it is never too late to quit  Smoking can worsen the problems that may occur with diabetes  Ask your healthcare provider for information about how to stop smoking if you are having trouble quitting  · Ask about your weight:  Ask healthcare providers if you need to lose weight, and how much to lose  Ask them to help you with a weight loss program  Even a 10 to 15 pound weight loss can help you manage your blood sugar level  · Carry medical alert identification  Wear medical alert jewelry or carry a card that says you have diabetes  Ask your healthcare provider where to get these items  · Ask about vaccines  Diabetes puts you at risk of serious illness if you get the flu, pneumonia, or hepatitis   Ask your healthcare provider if you should get a flu, pneumonia, or hepatitis B vaccine, and when to get the vaccine  Follow up with your healthcare provider as directed:  Write down your questions so you remember to ask them during your visits  CARE AGREEMENT:   You have the right to help plan your care  Learn about your health condition and how it may be treated  Discuss treatment options with your caregivers to decide what care you want to receive  You always have the right to refuse treatment  The above information is an  only  It is not intended as medical advice for individual conditions or treatments  Talk to your doctor, nurse or pharmacist before following any medical regimen to see if it is safe and effective for you  © 2014 [address] is for End User's use only and may not be sold, redistributed or otherwise used for commercial purposes  All illustrations and images included in CareNotes® are the copyrighted property of [employer]  or Joao Meyer

## 2022-03-23 NOTE — PROGRESS NOTES
Virtual Regular Visit    Verification of patient location:    Patient is located in the following state in which I hold an active license [place]    Assessment/Plan:    Problem List Items Addressed This Visit        Digestive    Viral gastroenteritis    Relevant Medications    metFORMIN (GLUCOPHAGE-XR) 500 mg 24 hr tablet       Endocrine    Uncontrolled type 2 diabetes mellitus ([address] ) - Primary    Relevant Medications    ondansetron (Zofran ODT) 4 mg disintegrating tablet    metFORMIN (GLUCOPHAGE-XR) 500 mg 24 hr tablet    Other Relevant Orders    Ambulatory referral to Diabetic Education        30-year-old male with:  Gastroenteritis and diabetic will give Zofran 0 DT to use as needed continue current medications supportive care supportive care return parameters encouraged ample liquids with electrolytes advised to call back if not improving worsening       Reason for visit is   Chief Complaint   Patient presents with    Vomiting    Feeling ill    Virtual Regular Visit        Encounter provider Kaylene Tellez MD    Provider located at [address] [place] [address] , [address]  [phone]      Recent Visits  Date Type Provider Dept   03/22/22 Telemedicine Kaylene Tellez MD Pg  Primary Care [facility]   Showing recent visits within past 7 days and meeting all other requirements  Future Appointments  No visits were found meeting these conditions  Showing future appointments within next 150 days and meeting all other requirements       The patient was identified by name and date of birth  Radha Bernabe was informed that this is a telemedicine visit and that the visit is being conducted through [address] and patient was informed this is a secure, HIPAA-complaint platform  He agrees to proceed     My office door was closed  No one else was in the room    He acknowledged consent and understanding of privacy and security of the video platform  The patient has agreed to participate and understands they can discontinue the visit at any time  Patient is aware this is a billable service  Subjective  Evonne Bucio is a 40 y o  male        Patient is a 29-year-old male who presents via virtual visit for stomach but he admits he is having some nausea cramping no fevers or chills tolerating p o  intake no dizziness lightheadedness or near-syncope       Past Medical History:   Diagnosis Date    Diabetes mellitus ([address] )     Hyperlipemia     Hypertension        Past Surgical History:   Procedure Laterality Date    CATARACT EXTRACTION      TOE AMPUTATION Left 1/26/2022    Procedure: PARTIAL LEFT HALLUX AMPUTATION;  Surgeon: Jamal Cruz DPM;  Location: [facility] MAIN OR;  Service: Podiatry       Current Outpatient Medications   Medication Sig Dispense Refill    amLODIPine (NORVASC) 10 mg tablet Take 1 tablet (10 mg total) by mouth daily at bedtime 30 tablet 1    atenolol (TENORMIN) 100 mg tablet Take 1 tablet (100 mg total) by mouth daily 90 tablet 1    collagenase (SANTYL) ointment Apply topically daily (Patient not taking: Reported on 2/15/2022 ) 15 g 0    gabapentin (Neurontin) 300 mg capsule Take 2 capsules (600 mg total) by mouth 3 (three) times a day 180 capsule 1    insulin degludec (Tresiba FlexTouch) 200 units/mL CONCENTRATED U-200 injection pen Inject 55 Units under the skin daily at bedtime 9 mL 2    Insulin Pen Needle (PEN NEEDLES) 31G X 6 MM MISC Inject under the skin 3 (three) times a day 300 each 1    losartan-hydrochlorothiazide (HYZAAR) 100-25 MG per tablet Take 1 tablet by mouth daily 30 tablet 1    metFORMIN (GLUCOPHAGE-XR) 500 mg 24 hr tablet Take 1 tablet (500 mg total) by mouth 2 (two) times a day with meals 180 tablet 1    ondansetron (Zofran ODT) 4 mg disintegrating tablet Take 1 tablet (4 mg total) by mouth every 8 (eight) hours as needed for nausea or vomiting 30 tablet 1    semaglutide, 1 mg/dose, (Ozempic, 1 MG/DOSE,) 4 MG/3ML SOPN injection pen Inject 0 75 mL (1 mg total) under the skin once a week 6 mL 2     No current facility-administered medications for this visit  Allergies   Allergen Reactions    Penicillins Other (See Comments)     Tolerated Cefazolin previously in 2018 without a problem  Review of Systems   Constitutional: Negative  HENT: Negative  Eyes: Negative  Respiratory: Negative  Cardiovascular: Negative  Gastrointestinal: Positive for abdominal pain, diarrhea and nausea  Endocrine: Negative  Genitourinary: Negative  Musculoskeletal: Negative  Allergic/Immunologic: Negative  Neurological: Negative  Hematological: Negative  Psychiatric/Behavioral: Negative  All other systems reviewed and are negative  Video Exam    There were no vitals filed for this visit  Physical Exam  Constitutional:       Appearance: He is well-developed  HENT:      Head: Atraumatic  Right Ear: External ear normal       Left Ear: External ear normal    Eyes:      Conjunctiva/sclera: Conjunctivae normal       Pupils: Pupils are equal, round, and reactive to light  Pulmonary:      Effort: Pulmonary effort is normal  No respiratory distress  Abdominal:      General: There is no distension  Musculoskeletal:         General: Normal range of motion  Cervical back: Normal range of motion  Skin:     General: Skin is warm and dry  Neurological:      Mental Status: He is alert and oriented to person, place, and time  Cranial Nerves: No cranial nerve deficit  Psychiatric:         Behavior: Behavior normal          Thought Content: Thought content normal          Judgment: Judgment normal           I spent 10 minutes directly with the patient during this visit    VIRTUAL VISIT Trino Gee verbally agrees to participate in [employer]   Pt is aware that Virtual Care Services could be limited without vital signs or the ability to perform a full hands-on physical Best Bradshaw understands he or the provider may request at any time to terminate the video visit and request the patient to seek care or treatment in person

## 2022-03-24 ENCOUNTER — TELEPHONE (OUTPATIENT)
Dept: BARIATRICS | Facility: CLINIC | Age: 47
End: 2022-03-24

## 2022-03-25 DIAGNOSIS — I10 ESSENTIAL HYPERTENSION: ICD-10-CM

## 2022-03-25 RX ORDER — LOSARTAN POTASSIUM AND HYDROCHLOROTHIAZIDE 25; 100 MG/1; MG/1
TABLET ORAL
Qty: 30 TABLET | Refills: 1 | Status: SHIPPED | OUTPATIENT
Start: 2022-03-25 | End: 2022-04-26

## 2022-03-28 ENCOUNTER — TELEPHONE (OUTPATIENT)
Dept: BARIATRICS | Facility: CLINIC | Age: 47
End: 2022-03-28

## 2022-03-28 NOTE — TELEPHONE ENCOUNTER
Patient called in expressing he is interested with Weight Management  Online link sent to patient's email  He will call back to reschedule

## 2022-03-30 ENCOUNTER — OFFICE VISIT (OUTPATIENT)
Dept: FAMILY MEDICINE CLINIC | Facility: CLINIC | Age: 47
End: 2022-03-30
Payer: COMMERCIAL

## 2022-03-30 VITALS
OXYGEN SATURATION: 98 % | BODY MASS INDEX: 47.74 KG/M2 | TEMPERATURE: 49.3 F | HEART RATE: 87 BPM | WEIGHT: 315 LBS | HEIGHT: 68 IN | DIASTOLIC BLOOD PRESSURE: 80 MMHG | SYSTOLIC BLOOD PRESSURE: 132 MMHG

## 2022-03-30 DIAGNOSIS — E11.65 UNCONTROLLED TYPE 2 DIABETES MELLITUS WITH HYPERGLYCEMIA (HCC): Primary | ICD-10-CM

## 2022-03-30 DIAGNOSIS — G89.29 OTHER CHRONIC PAIN: ICD-10-CM

## 2022-03-30 DIAGNOSIS — I10 ESSENTIAL HYPERTENSION: ICD-10-CM

## 2022-03-30 LAB — SL AMB POCT HEMOGLOBIN AIC: 6.9 (ref ?–6.5)

## 2022-03-30 PROCEDURE — 3044F HG A1C LEVEL LT 7.0%: CPT | Performed by: FAMILY MEDICINE

## 2022-03-30 PROCEDURE — 3008F BODY MASS INDEX DOCD: CPT | Performed by: FAMILY MEDICINE

## 2022-03-30 PROCEDURE — 3725F SCREEN DEPRESSION PERFORMED: CPT | Performed by: FAMILY MEDICINE

## 2022-03-30 PROCEDURE — 99213 OFFICE O/P EST LOW 20 MIN: CPT | Performed by: FAMILY MEDICINE

## 2022-03-30 PROCEDURE — 83036 HEMOGLOBIN GLYCOSYLATED A1C: CPT | Performed by: FAMILY MEDICINE

## 2022-03-30 RX ORDER — AMLODIPINE BESYLATE 10 MG/1
TABLET ORAL
Qty: 30 TABLET | Refills: 1 | Status: SHIPPED | OUTPATIENT
Start: 2022-03-30 | End: 2022-04-26

## 2022-03-30 RX ORDER — DULOXETIN HYDROCHLORIDE 30 MG/1
30 CAPSULE, DELAYED RELEASE ORAL DAILY
Qty: 30 CAPSULE | Refills: 1 | Status: SHIPPED | OUTPATIENT
Start: 2022-03-30 | End: 2022-04-26

## 2022-03-30 NOTE — LETTER
March 30, 2022     Patient: Forest Dunn   YOB: 1975   Date of Visit: 3/30/2022       To Whom it May Concern:    Venus Ring is under my professional care  He was seen in my office on 3/30/2022  He may not return to work until re-assessed in 1 month  If you have any questions or concerns, please don't hesitate to call           Sincerely,          Deanna Meraz MD        CC: No Recipients

## 2022-03-31 NOTE — PATIENT INSTRUCTIONS
10% - bad control"> 10% - bad control,Hemoglobin A1c (HbA1c) greater than 10% indicating poor diabetic control,Haemoglobin A1c greater than 10% indicating poor diabetic control">   Diabetes Mellitus Type 2 in Adults, Ambulatory Care   GENERAL INFORMATION:   Diabetes mellitus type 2  is a disease that affects how your body uses glucose (sugar)  Insulin helps move sugar out of the blood so it can be used for energy  Normally, when the blood sugar level increases, the pancreas makes more insulin  Type 2 diabetes develops because either the body cannot make enough insulin, or it cannot use the insulin correctly  After many years, your pancreas may stop making insulin  Common symptoms include the following:   · More hunger or thirst than usual     · Frequent urination     · Weight loss without trying     · Blurred vision  Seek immediate care for the following symptoms:   · Severe abdominal pain, or pain that spreads to your back  You may also be vomiting  · Trouble staying awake or focusing    · Shaking or sweating    · Blurred or double vision    · Breath has a fruity, sweet smell    · Breathing is deep and labored, or rapid and shallow    · Heartbeat is fast and weak  Treatment for diabetes mellitus type 2  includes keeping your blood sugar at a normal level  You must eat the right foods, and exercise regularly  You may also need medicine if you cannot control your blood sugar level with nutrition and exercise  Manage diabetes mellitus type 2:   · Check your blood sugar level  You will be taught how to check a small drop of blood in a glucose monitor  Ask your healthcare provider when and how often to check during the day  Ask your healthcare provider what your blood sugar levels should be when you check them  · Keep track of carbohydrates (sugar and starchy foods)  Your blood sugar level can get too high if you eat too many carbohydrates   Your dietitian will help you plan meals and snacks that have the right amount of carbohydrates  · Eat low-fat foods  Some examples are skinless chicken and low-fat milk  · Eat less sodium (salt)  Some examples of high-sodium foods to limit are soy sauce, potato chips, and soup  Do not add salt to food you cook  Limit your use of table salt  · Eat high-fiber foods  Foods that are a good source of fiber include vegetables, whole grain bread, and beans  · Limit alcohol  Alcohol affects your blood sugar level and can make it harder to manage your diabetes  Women should limit alcohol to 1 drink a day  Men should limit alcohol to 2 drinks a day  A drink of alcohol is 12 ounces of beer, 5 ounces of wine, or 1½ ounces of liquor  · Get regular exercise  Exercise can help keep your blood sugar level steady, decrease your risk of heart disease, and help you lose weight  Exercise for at least 30 minutes, 5 days a week  Include muscle strengthening activities 2 days each week  Work with your healthcare provider to create an exercise plan  · Check your feet each day  for injuries or open sores  Ask your healthcare provider for activities you can do if you have an open sore  · Quit smoking  If you smoke, it is never too late to quit  Smoking can worsen the problems that may occur with diabetes  Ask your healthcare provider for information about how to stop smoking if you are having trouble quitting  · Ask about your weight:  Ask healthcare providers if you need to lose weight, and how much to lose  Ask them to help you with a weight loss program  Even a 10 to 15 pound weight loss can help you manage your blood sugar level  · Carry medical alert identification  Wear medical alert jewelry or carry a card that says you have diabetes  Ask your healthcare provider where to get these items  · Ask about vaccines  Diabetes puts you at risk of serious illness if you get the flu, pneumonia, or hepatitis   Ask your healthcare provider if you should get a flu, pneumonia, or hepatitis B vaccine, and when to get the vaccine  Follow up with your healthcare provider as directed:  Write down your questions so you remember to ask them during your visits  CARE AGREEMENT:   You have the right to help plan your care  Learn about your health condition and how it may be treated  Discuss treatment options with your caregivers to decide what care you want to receive  You always have the right to refuse treatment  The above information is an  only  It is not intended as medical advice for individual conditions or treatments  Talk to your doctor, nurse or pharmacist before following any medical regimen to see if it is safe and effective for you  © 2014 [address] is for End User's use only and may not be sold, redistributed or otherwise used for commercial purposes  All illustrations and images included in CareNotes® are the copyrighted property of A D A M , Inc  or Joao Meyer

## 2022-04-04 NOTE — PROGRESS NOTES
Assessment/Plan:    67-year-old male with:  Type 2 diabetes along with chronic pain discussed workup and treatment options with risks benefits will begin trial of Cymbalta discussed supportive care return parameters follow-up in 1 month    No problem-specific Assessment & Plan notes found for this encounter  Diagnoses and all orders for this visit:    Uncontrolled type 2 diabetes mellitus with hyperglycemia (HCC)  -     POCT hemoglobin A1c    Other chronic pain  -     DULoxetine (Cymbalta) 30 mg delayed release capsule; Take 1 capsule (30 mg total) by mouth daily          Subjective:     Chief Complaint   Patient presents with    Follow-up     reassement for pain management  Patient ID: Natalia Clark is a 40 y o  male  Patient is a 67-year-old male who presents for follow-up on diabetes with chronic pain he admits that the gabapentin has not helped would like to try something else no fevers chills nausea vomiting      The following portions of the patient's history were reviewed and updated as appropriate: allergies, current medications, past family history, past medical history, past social history, past surgical history and problem list     Review of Systems   Constitutional: Negative  HENT: Negative  Eyes: Negative  Respiratory: Negative  Cardiovascular: Negative  Gastrointestinal: Negative  Endocrine: Negative  Genitourinary: Negative  Musculoskeletal: Positive for arthralgias and myalgias  Allergic/Immunologic: Negative  Neurological: Negative  Hematological: Negative  Psychiatric/Behavioral: Negative  All other systems reviewed and are negative          Objective:      /80 (BP Location: Left arm, Patient Position: Sitting, Cuff Size: Standard)   Pulse 87   Temp (!) 49 3 °F (9 6 °C) (Tympanic)   Ht 5' 8" (1 727 m)   Wt (!) 151 kg (332 lb 6 4 oz)   SpO2 98%   BMI 50 54 kg/m²          Physical Exam  Constitutional:       Appearance: He is well-developed  HENT:      Head: Atraumatic  Right Ear: External ear normal       Left Ear: External ear normal    Eyes:      Conjunctiva/sclera: Conjunctivae normal       Pupils: Pupils are equal, round, and reactive to light  Cardiovascular:      Rate and Rhythm: Normal rate and regular rhythm  Heart sounds: Normal heart sounds  Pulmonary:      Effort: Pulmonary effort is normal  No respiratory distress  Breath sounds: Normal breath sounds  Abdominal:      General: There is no distension  Palpations: Abdomen is soft  Tenderness: There is no abdominal tenderness  There is no guarding or rebound  Musculoskeletal:         General: Normal range of motion  Cervical back: Normal range of motion  Skin:     General: Skin is warm and dry  Neurological:      Mental Status: He is alert and oriented to person, place, and time  Cranial Nerves: No cranial nerve deficit  Psychiatric:         Behavior: Behavior normal          Thought Content: Thought content normal          Judgment: Judgment normal          BMI Counseling: Body mass index is 50 54 kg/m²  The BMI is above normal  Nutrition recommendations include encouraging healthy choices of fruits and vegetables  Exercise recommendations include moderate physical activity 150 minutes/week  Rationale for BMI follow-up plan is due to patient being overweight or obese  Depression Screening and Follow-up Plan: Patient was screened for depression during today's encounter  They screened negative with a PHQ-2 score of 0

## 2022-04-06 ENCOUNTER — PATIENT OUTREACH (OUTPATIENT)
Dept: FAMILY MEDICINE CLINIC | Facility: CLINIC | Age: 47
End: 2022-04-06

## 2022-04-06 NOTE — LETTER
Date: 04/06/22    Dear Shayla Loving,   My name is Levy Dixon; I am a registered nurse care manager working with [employer]   I have not been able to reach you and would like to set a time that I can talk with you over the phone  My work is to help patients that have complex medical conditions get the care they need  This includes patients who may have been in the hospital or emergency room  I have enclosed my contact information below for you  Please call me with any questions you may have  I look forward to hearing from you    Sincerely,  Cristal Hayes RN  [phone]  Outpatient Care Manager

## 2022-04-20 ENCOUNTER — EVALUATION (OUTPATIENT)
Dept: PHYSICAL THERAPY | Facility: CLINIC | Age: 47
End: 2022-04-20
Payer: COMMERCIAL

## 2022-04-20 DIAGNOSIS — G89.29 CHRONIC LOW BACK PAIN, UNSPECIFIED BACK PAIN LATERALITY, UNSPECIFIED WHETHER SCIATICA PRESENT: Primary | ICD-10-CM

## 2022-04-20 DIAGNOSIS — M54.50 CHRONIC LOW BACK PAIN, UNSPECIFIED BACK PAIN LATERALITY, UNSPECIFIED WHETHER SCIATICA PRESENT: Primary | ICD-10-CM

## 2022-04-20 PROCEDURE — 97110 THERAPEUTIC EXERCISES: CPT | Performed by: PHYSICAL THERAPIST

## 2022-04-20 PROCEDURE — 97162 PT EVAL MOD COMPLEX 30 MIN: CPT | Performed by: PHYSICAL THERAPIST

## 2022-04-20 PROCEDURE — 97112 NEUROMUSCULAR REEDUCATION: CPT | Performed by: PHYSICAL THERAPIST

## 2022-04-20 NOTE — PROGRESS NOTES
PT Evaluation     Today's date: 2022  Patient name: Theresa Sumner  : 1975  MRN: [id number]  Referring provider: Juan Antonio Veras MD  Dx:   Encounter Diagnosis     ICD-10-CM    1  Chronic low back pain, unspecified back pain laterality, unspecified whether sciatica present  M54 50     G89 29                   Assessment  Assessment details: Patient is a 40 y o  male who presents to physical therapy with physician diagnosis of Chronic low back pain, unspecified back pain laterality, unspecified whether sciatica present  (primary encounter diagnosis)  Patient would benefit from skilled physical therapy intervention to address his impairments and to maximize function  Thank you for your referral and please feel free to contact me at [phone]  Understanding of Dx/Px/POC: good   Prognosis: good    Goals  STG 4 weeks  Decrease pain by 50%  Improve ROM by 50%    LTG 8 weeks  Able to sit, walk and stand >60 min  Return to work        Subjective Evaluation    History of Present Illness  Mechanism of injury: Patient presents with complaints of chronic lower back and LE pain  His left big toe was amputated on 2022 secondary to having a diabetic ulcer  He was walking with a cam boot x4 weeks and believes that this contributed to his back pain  He presents ambulating with a SPC  He reports not having feeling in his distal LE's due to neuropathy  LBP is 7/10 when standing or walking >10 minutes  Pain also increases with prolonged sitting for 30 min  He is taking Cymbalta and denies any improvements      Will be reevaluated by his PCP on  for RTW status  Occupation - [employer]   [employer] since 2021     Pain  Current pain ratin  At best pain ratin  At worst pain ratin  Quality: dull ache  Progression: no change      Diagnostic Tests  No diagnostic tests performed  Patient Goals  Patient goals for therapy: decreased pain, increased motion, increased strength, independence with ADLs/IADLs, return to work and improved balance          Objective     General Comments:      Lumbar Comments  Gait: ambulation with SPC in RLE  DLS with SPC WNL  Feet together balance WNL  LQS - decreased sensation b/l LE BK, reflexes WNL b/l, MMT gross 4/5 b/l  (-) Slump test b/l  Lumbar AROM flexion 50% with pain, extension 25%             Precautions: DM2       Manuals 4/20                                                                Neuro Re-Ed                          Power vibe stance 5x1 min                                                                             Ther Ex                          Seated lumbar flexion 10x10"            LTR with pillow squeeze 10x10"            Hip flexor stretch at EOT 2 min each                                                                Ther Activity                                       Gait Training                                       Modalities

## 2022-04-21 ENCOUNTER — HOSPITAL ENCOUNTER (OUTPATIENT)
Dept: RADIOLOGY | Facility: HOSPITAL | Age: 47
Discharge: HOME | End: 2022-04-21
Attending: PODIATRIST
Payer: COMMERCIAL

## 2022-04-21 DIAGNOSIS — M79.675 PAIN IN LEFT TOE(S): ICD-10-CM

## 2022-04-21 PROCEDURE — 73630 X-RAY EXAM OF FOOT: CPT

## 2022-04-22 ENCOUNTER — OFFICE VISIT (OUTPATIENT)
Dept: PHYSICAL THERAPY | Facility: CLINIC | Age: 47
End: 2022-04-22
Payer: COMMERCIAL

## 2022-04-22 DIAGNOSIS — M54.50 CHRONIC LOW BACK PAIN, UNSPECIFIED BACK PAIN LATERALITY, UNSPECIFIED WHETHER SCIATICA PRESENT: Primary | ICD-10-CM

## 2022-04-22 DIAGNOSIS — G89.29 CHRONIC LOW BACK PAIN, UNSPECIFIED BACK PAIN LATERALITY, UNSPECIFIED WHETHER SCIATICA PRESENT: Primary | ICD-10-CM

## 2022-04-22 PROCEDURE — 97110 THERAPEUTIC EXERCISES: CPT

## 2022-04-22 PROCEDURE — 97112 NEUROMUSCULAR REEDUCATION: CPT

## 2022-04-22 NOTE — PROGRESS NOTES
Daily Note     Today's date: 2022  Patient name: Kris Alejo  : 1975  MRN: [id number]  Referring provider: Sheyla Alcantara MD  Dx:   Encounter Diagnosis     ICD-10-CM    1  Chronic low back pain, unspecified back pain laterality, unspecified whether sciatica present  M54 50     G89 29                   Subjective:  Pt reports feeling a little looser after 1st visit  Pt performing home exercise with good tolerance  Pt c/o increase right LE pain this AM         Objective: See treatment diary below  Assessment: Tolerated treatment well  Minimal pain reported with LTR but able to perform in shortened ROM without increase pain  Good response to power vibe with decrease LE tightness reported post treatment  Patient would benefit from continued PT      Plan: Continue per plan of care        Precautions: DM2       Manuals                                                                Neuro Re-Ed                          Power vibe stance 5x1 min GH                                                                            Ther Ex                          Seated lumbar flexion 10x10" [address]           LTR with pillow squeeze 10x10" [address]           Hip flexor stretch at EOT 2 min each [address]                                                               Ther Activity                                       Gait Training                                       Modalities

## 2022-04-25 ENCOUNTER — OFFICE VISIT (OUTPATIENT)
Dept: PHYSICAL THERAPY | Facility: CLINIC | Age: 47
End: 2022-04-25
Payer: COMMERCIAL

## 2022-04-25 DIAGNOSIS — M54.50 CHRONIC LOW BACK PAIN, UNSPECIFIED BACK PAIN LATERALITY, UNSPECIFIED WHETHER SCIATICA PRESENT: Primary | ICD-10-CM

## 2022-04-25 DIAGNOSIS — G89.29 CHRONIC LOW BACK PAIN, UNSPECIFIED BACK PAIN LATERALITY, UNSPECIFIED WHETHER SCIATICA PRESENT: Primary | ICD-10-CM

## 2022-04-25 PROCEDURE — 97112 NEUROMUSCULAR REEDUCATION: CPT

## 2022-04-25 PROCEDURE — 97110 THERAPEUTIC EXERCISES: CPT

## 2022-04-25 NOTE — PROGRESS NOTES
Daily Note     Today's date: 2022  Patient name: Radha Casper  : 1975  MRN: [id number]  Referring provider: June Carballo MD  Dx:   Encounter Diagnosis     ICD-10-CM    1  Chronic low back pain, unspecified back pain laterality, unspecified whether sciatica present  M54 50     G89 29                   Subjective:  Pt reports feeling about the same with no change since last visit  Pt cont's to c/o right sided low back and LE pain (anterior thigh) mostly with standing/walking stating pain level 4-5/10  Pt compliant with HEP  Objective: See treatment diary below  Assessment: Tolerated treatment well  Progressed exercise as noted with verbal/tactile cues provided for proper technique  Pt able to perform exercise with minimal c/o pain  Cont's to respond well to power vibe with decrease LE tightness reported post treatment  Patient would benefit from continued PT      Plan: Continue per plan of care        Precautions: DM2       Manuals                                                               Neuro Re-Ed                          Power vibe stance 5x1 min GH 1 min x8          PPT   10"x  10 with tc's          Supine active HS elongation   10"x3 b/l with tc's                                                 Ther Ex                          Seated lumbar flexion 10x10" [address]          LTR with pillow squeeze 10x10" [address] [facility]          Hip flexor stretch at EOT 2 min each [address] [facility] [address] [facility]                                                              Ther Activity                                       Gait Training                                       Modalities

## 2022-04-28 ENCOUNTER — APPOINTMENT (OUTPATIENT)
Dept: PHYSICAL THERAPY | Facility: CLINIC | Age: 47
End: 2022-04-28
Payer: COMMERCIAL

## 2022-04-29 ENCOUNTER — OFFICE VISIT (OUTPATIENT)
Dept: PHYSICAL THERAPY | Facility: CLINIC | Age: 47
End: 2022-04-29
Payer: COMMERCIAL

## 2022-04-29 DIAGNOSIS — G89.29 CHRONIC LOW BACK PAIN, UNSPECIFIED BACK PAIN LATERALITY, UNSPECIFIED WHETHER SCIATICA PRESENT: Primary | ICD-10-CM

## 2022-04-29 DIAGNOSIS — M54.50 CHRONIC LOW BACK PAIN, UNSPECIFIED BACK PAIN LATERALITY, UNSPECIFIED WHETHER SCIATICA PRESENT: Primary | ICD-10-CM

## 2022-04-29 PROCEDURE — 97110 THERAPEUTIC EXERCISES: CPT

## 2022-04-29 PROCEDURE — 97112 NEUROMUSCULAR REEDUCATION: CPT

## 2022-04-29 NOTE — PROGRESS NOTES
Daily Note     Today's date: 2022  Patient name: Jonathan Batista  : 1975  MRN: [id number]  Referring provider: Lianna Aquino MD  Dx:   Encounter Diagnosis     ICD-10-CM    1  Chronic low back pain, unspecified back pain laterality, unspecified whether sciatica present  M54 50     G89 29                   Subjective:  Pt reports feeling about the same with low back and right LE pain  Pt compliant with HEP  Objective: See treatment diary below  Assessment: Tolerated treatment well  Pt able to perform exercise with minimal c/o pain  Cont's to respond well to power vibe with decrease tightness reported post treatment  Patient would benefit from continued PT      Plan: Continue per plan of care  Pt has follow up appt with MD 22         Precautions: DM2       Manuals                                                              Neuro Re-Ed                          Power vibe stance 5x1 min GH 1 min x8 1 min x12         PPT   10"x  10 with tc's 10"x  10         Supine active HS elongation   10"x3 b/l with tc's 10"x5 ea                                                Ther Ex                          Seated lumbar flexion 10x10" [address]         LTR with pillow squeeze 10x10" [address]         Hip flexor stretch at EOT 2 min each [address]                                                             Ther Activity                                       Gait Training                                       Modalities

## 2022-05-02 ENCOUNTER — OFFICE VISIT (OUTPATIENT)
Dept: PHYSICAL THERAPY | Facility: CLINIC | Age: 47
End: 2022-05-02
Payer: COMMERCIAL

## 2022-05-02 DIAGNOSIS — M54.50 CHRONIC LOW BACK PAIN, UNSPECIFIED BACK PAIN LATERALITY, UNSPECIFIED WHETHER SCIATICA PRESENT: Primary | ICD-10-CM

## 2022-05-02 DIAGNOSIS — G89.29 CHRONIC LOW BACK PAIN, UNSPECIFIED BACK PAIN LATERALITY, UNSPECIFIED WHETHER SCIATICA PRESENT: Primary | ICD-10-CM

## 2022-05-02 PROCEDURE — 97110 THERAPEUTIC EXERCISES: CPT

## 2022-05-02 PROCEDURE — 97112 NEUROMUSCULAR REEDUCATION: CPT

## 2022-05-02 NOTE — PROGRESS NOTES
Daily Note     Today's date: 2022  Patient name: Mykel Lux  : 1975  MRN: [id number]  Referring provider: Kevin Barnett MD  Dx:   Encounter Diagnosis     ICD-10-CM    1  Chronic low back pain, unspecified back pain laterality, unspecified whether sciatica present  M54 50     G89 29                   Subjective:  Pt c/o increase stiffness/soreness overall the past 2 days without any known reason  Pt states pain level 6-7/10 pre treatment  Objective: See treatment diary below  Assessment:  Fair tolerance to exercise with c/o soreness  Gait, mobility and transfers antalgic  Patient would benefit from continued PT      Plan: Continue per plan of care  Pt has follow up appt with MD 22         Precautions: DM2       Manuals                                                             Neuro Re-Ed                          Power vibe stance 5x1 min GH 1 min x8 1 min x12 1 min x5        PPT   10"x  10 with tc's 10"x  10 GH        Supine active HS elongation   10"x3 b/l with tc's 10"x5 ea Held today                                               Ther Ex                          Seated lumbar flexion 10x10" [facility]        LTR with pillow squeeze 10x10" [facility]        Hip flexor stretch at EOT 2 min each [facility]                                                            Ther Activity                                       Gait Training                                       Modalities

## 2022-05-04 ENCOUNTER — OFFICE VISIT (OUTPATIENT)
Dept: FAMILY MEDICINE CLINIC | Facility: CLINIC | Age: 47
End: 2022-05-04
Payer: COMMERCIAL

## 2022-05-04 VITALS
HEIGHT: 68 IN | OXYGEN SATURATION: 99 % | BODY MASS INDEX: 47.74 KG/M2 | DIASTOLIC BLOOD PRESSURE: 86 MMHG | SYSTOLIC BLOOD PRESSURE: 118 MMHG | WEIGHT: 315 LBS | HEART RATE: 75 BPM | TEMPERATURE: 98.2 F

## 2022-05-04 DIAGNOSIS — E11.42 DIABETIC POLYNEUROPATHY ASSOCIATED WITH TYPE 2 DIABETES MELLITUS (HCC): ICD-10-CM

## 2022-05-04 DIAGNOSIS — E13.9 DIABETES MELLITUS OF OTHER TYPE WITHOUT COMPLICATION, UNSPECIFIED WHETHER LONG TERM INSULIN USE (HCC): ICD-10-CM

## 2022-05-04 DIAGNOSIS — Z12.11 COLON CANCER SCREENING: Primary | ICD-10-CM

## 2022-05-04 PROCEDURE — 3074F SYST BP LT 130 MM HG: CPT | Performed by: FAMILY MEDICINE

## 2022-05-04 PROCEDURE — 99213 OFFICE O/P EST LOW 20 MIN: CPT | Performed by: FAMILY MEDICINE

## 2022-05-04 PROCEDURE — 3079F DIAST BP 80-89 MM HG: CPT | Performed by: FAMILY MEDICINE

## 2022-05-04 PROCEDURE — 1036F TOBACCO NON-USER: CPT | Performed by: FAMILY MEDICINE

## 2022-05-04 PROCEDURE — 3725F SCREEN DEPRESSION PERFORMED: CPT | Performed by: FAMILY MEDICINE

## 2022-05-04 PROCEDURE — 3008F BODY MASS INDEX DOCD: CPT | Performed by: FAMILY MEDICINE

## 2022-05-04 RX ORDER — CEFDINIR 300 MG/1
CAPSULE ORAL
COMMUNITY
Start: 2022-04-21 | End: 2022-05-04

## 2022-05-04 RX ORDER — DULOXETIN HYDROCHLORIDE 60 MG/1
60 CAPSULE, DELAYED RELEASE ORAL DAILY
Qty: 30 CAPSULE | Refills: 1 | Status: SHIPPED | OUTPATIENT
Start: 2022-05-04 | End: 2022-05-26

## 2022-05-05 ENCOUNTER — DOCUMENTATION (OUTPATIENT)
Dept: FAMILY MEDICINE CLINIC | Facility: CLINIC | Age: 47
End: 2022-05-05

## 2022-05-05 ENCOUNTER — OFFICE VISIT (OUTPATIENT)
Dept: PHYSICAL THERAPY | Facility: CLINIC | Age: 47
End: 2022-05-05
Payer: COMMERCIAL

## 2022-05-05 ENCOUNTER — TELEPHONE (OUTPATIENT)
Dept: FAMILY MEDICINE CLINIC | Facility: CLINIC | Age: 47
End: 2022-05-05

## 2022-05-05 DIAGNOSIS — G89.29 CHRONIC LOW BACK PAIN, UNSPECIFIED BACK PAIN LATERALITY, UNSPECIFIED WHETHER SCIATICA PRESENT: Primary | ICD-10-CM

## 2022-05-05 DIAGNOSIS — M54.50 CHRONIC LOW BACK PAIN, UNSPECIFIED BACK PAIN LATERALITY, UNSPECIFIED WHETHER SCIATICA PRESENT: Primary | ICD-10-CM

## 2022-05-05 PROCEDURE — 97014 ELECTRIC STIMULATION THERAPY: CPT

## 2022-05-05 PROCEDURE — G0283 ELEC STIM OTHER THAN WOUND: HCPCS

## 2022-05-05 PROCEDURE — 97112 NEUROMUSCULAR REEDUCATION: CPT

## 2022-05-05 PROCEDURE — 97110 THERAPEUTIC EXERCISES: CPT

## 2022-05-05 NOTE — LETTER
May 5, 2022     Patient: Ivana Rodriguez  YOB: 1975  Date of Visit: 5/5/2022      To Whom it May Concern:    Randy Aguilar is under my professional care  Musa Maurice was seen in my office on 5/5/2022  Musa Maurice may return to work on 6/8/22  If you have any questions or concerns, please don't hesitate to call           Sincerely,          Silverio Randolph MD      CC: No Recipients

## 2022-05-05 NOTE — PROGRESS NOTES
Daily Note     Today's date: 2022  Patient name: Kylie Roper  : 1975  MRN: [id number]  Referring provider: Trini Gandhi MD  Dx:   Encounter Diagnosis     ICD-10-CM    1  Chronic low back pain, unspecified back pain laterality, unspecified whether sciatica present  M54 50     G89 29                   Subjective:  Pt reports having MD appt yesterday and has orders to continue PT with follow up appt 22  Pt cont's to report some relief after therapy but only for a few hours  Objective: See treatment diary below  Assessment:   Pt tolerated treatment fair  Progressed core stabilization/strengthening exercise as noted with verbal/tactile cues provided for proper technique  Gait, mobility and transfers remain antalgic  Added H-wave for pain relief today and will assess response next visit  Patient would benefit from continued PT      Plan: Continue per plan of care          Precautions: DM2       Manuals                                                            Neuro Re-Ed                          Power vibe stance 5x1 min GH 1 min x8 1 min x12 1 min x5 1 min x5       PPT   10"x  10 with tc's 10"x  10 GH 10"x  10       Supine active HS elongation   10"x3 b/l with tc's 10"x5 ea Held today 10"x5 each       abd bracing with march      x10       Low bridge      5"x5 with tc's                    Ther Ex                          Seated lumbar flexion 10x10" [facility]       LTR with pillow squeeze 10x10" [facility]       Hip flexor stretch at EOT 2 min each [facility]                                                           Ther Activity                                       Gait Training                                       Modalities             H-wave (low setting low back and b/l LE)       10'

## 2022-05-06 ENCOUNTER — APPOINTMENT (OUTPATIENT)
Dept: PHYSICAL THERAPY | Facility: CLINIC | Age: 47
End: 2022-05-06
Payer: COMMERCIAL

## 2022-05-06 NOTE — PATIENT INSTRUCTIONS
10% - bad control"> 10% - bad control,Hemoglobin A1c (HbA1c) greater than 10% indicating poor diabetic control,Haemoglobin A1c greater than 10% indicating poor diabetic control">   Diabetes Mellitus Type 2 in Adults, Ambulatory Care   GENERAL INFORMATION:   Diabetes mellitus type 2  is a disease that affects how your body uses glucose (sugar)  Insulin helps move sugar out of the blood so it can be used for energy  Normally, when the blood sugar level increases, the pancreas makes more insulin  Type 2 diabetes develops because either the body cannot make enough insulin, or it cannot use the insulin correctly  After many years, your pancreas may stop making insulin  Common symptoms include the following:   · More hunger or thirst than usual     · Frequent urination     · Weight loss without trying     · Blurred vision  Seek immediate care for the following symptoms:   · Severe abdominal pain, or pain that spreads to your back  You may also be vomiting  · Trouble staying awake or focusing    · Shaking or sweating    · Blurred or double vision    · Breath has a fruity, sweet smell    · Breathing is deep and labored, or rapid and shallow    · Heartbeat is fast and weak  Treatment for diabetes mellitus type 2  includes keeping your blood sugar at a normal level  You must eat the right foods, and exercise regularly  You may also need medicine if you cannot control your blood sugar level with nutrition and exercise  Manage diabetes mellitus type 2:   · Check your blood sugar level  You will be taught how to check a small drop of blood in a glucose monitor  Ask your healthcare provider when and how often to check during the day  Ask your healthcare provider what your blood sugar levels should be when you check them  · Keep track of carbohydrates (sugar and starchy foods)  Your blood sugar level can get too high if you eat too many carbohydrates   Your dietitian will help you plan meals and snacks that have the right amount of carbohydrates  · Eat low-fat foods  Some examples are skinless chicken and low-fat milk  · Eat less sodium (salt)  Some examples of high-sodium foods to limit are soy sauce, potato chips, and soup  Do not add salt to food you cook  Limit your use of table salt  · Eat high-fiber foods  Foods that are a good source of fiber include vegetables, whole grain bread, and beans  · Limit alcohol  Alcohol affects your blood sugar level and can make it harder to manage your diabetes  Women should limit alcohol to 1 drink a day  Men should limit alcohol to 2 drinks a day  A drink of alcohol is 12 ounces of beer, 5 ounces of wine, or 1½ ounces of liquor  · Get regular exercise  Exercise can help keep your blood sugar level steady, decrease your risk of heart disease, and help you lose weight  Exercise for at least 30 minutes, 5 days a week  Include muscle strengthening activities 2 days each week  Work with your healthcare provider to create an exercise plan  · Check your feet each day  for injuries or open sores  Ask your healthcare provider for activities you can do if you have an open sore  · Quit smoking  If you smoke, it is never too late to quit  Smoking can worsen the problems that may occur with diabetes  Ask your healthcare provider for information about how to stop smoking if you are having trouble quitting  · Ask about your weight:  Ask healthcare providers if you need to lose weight, and how much to lose  Ask them to help you with a weight loss program  Even a 10 to 15 pound weight loss can help you manage your blood sugar level  · Carry medical alert identification  Wear medical alert jewelry or carry a card that says you have diabetes  Ask your healthcare provider where to get these items  · Ask about vaccines  Diabetes puts you at risk of serious illness if you get the flu, pneumonia, or hepatitis   Ask your healthcare provider if you should get a flu, pneumonia, or hepatitis B vaccine, and when to get the vaccine  Follow up with your healthcare provider as directed:  Write down your questions so you remember to ask them during your visits  CARE AGREEMENT:   You have the right to help plan your care  Learn about your health condition and how it may be treated  Discuss treatment options with your caregivers to decide what care you want to receive  You always have the right to refuse treatment  The above information is an  only  It is not intended as medical advice for individual conditions or treatments  Talk to your doctor, nurse or pharmacist before following any medical regimen to see if it is safe and effective for you  © 2014 [address] is for End User's use only and may not be sold, redistributed or otherwise used for commercial purposes  All illustrations and images included in CareNotes® are the copyrighted property of A D A M , Inc  or Joao Meyer

## 2022-05-06 NOTE — PROGRESS NOTES
Assessment/Plan:    52-year-old male with:  Type 2 diabetes with diabetic polyneuropathy will titrate up Cymbalta will continue medications otherwise discussed supportive care return parameters patient remain off work to the degree of his pain will reassess in 1 month    No problem-specific Assessment & Plan notes found for this encounter  Diagnoses and all orders for this visit:    Colon cancer screening  -     Cologuard    Diabetes mellitus of other type without complication, unspecified whether long term insulin use ([facility] [address] )  -     Ambulatory Referral to Ophthalmology; Future  -     DULoxetine (CYMBALTA) 60 mg delayed release capsule; Take 1 capsule (60 mg total) by mouth daily    Diabetic polyneuropathy associated with type 2 diabetes mellitus ([address] )    Other orders  -     Discontinue: cefdinir (OMNICEF) 300 mg capsule; TAKE 1 CAPSULE BY MOUTH EVERY 12 HOURS FOR 7 DAYS          Subjective:     Chief Complaint   Patient presents with    Follow-up     patient is still in alot of pain         Patient ID: Jeff Blakely is a 40 y o  male  Patient is a 52-year-old male who presents for follow-up on type 2 diabetes with diabetic polyneuropathy he admits that he is still in a lot of pains slight improvement with Cymbalta would like to try higher dose no fevers chills nausea vomiting no other complaints at this time      The following portions of the patient's history were reviewed and updated as appropriate: allergies, current medications, past family history, past medical history, past social history, past surgical history and problem list     Review of Systems   Constitutional: Negative  HENT: Negative  Eyes: Negative  Respiratory: Negative  Cardiovascular: Negative  Gastrointestinal: Negative  Endocrine: Negative  Genitourinary: Negative  Musculoskeletal: Positive for arthralgias and myalgias  Allergic/Immunologic: Negative  Neurological: Negative  Hematological: Negative  Psychiatric/Behavioral: Negative  All other systems reviewed and are negative  Objective:      /86 (BP Location: Left arm, Patient Position: Sitting, Cuff Size: Extra-Large)   Pulse 75   Temp 98 2 °F (36 8 °C)   Ht 5' 8" (1 727 m)   Wt (!) 145 kg (320 lb)   SpO2 99%   BMI 48 66 kg/m²          Physical Exam  Constitutional:       Appearance: He is well-developed  HENT:      Head: Atraumatic  Right Ear: External ear normal       Left Ear: External ear normal    Eyes:      Conjunctiva/sclera: Conjunctivae normal       Pupils: Pupils are equal, round, and reactive to light  Cardiovascular:      Rate and Rhythm: Normal rate and regular rhythm  Heart sounds: Normal heart sounds  Pulmonary:      Effort: Pulmonary effort is normal  No respiratory distress  Breath sounds: Normal breath sounds  Abdominal:      General: Bowel sounds are normal  There is no distension  Palpations: Abdomen is soft  Tenderness: There is no abdominal tenderness  There is no guarding or rebound  Musculoskeletal:         General: Normal range of motion  Cervical back: Normal range of motion  Skin:     General: Skin is warm and dry  Neurological:      Mental Status: He is alert and oriented to person, place, and time  Cranial Nerves: No cranial nerve deficit  Psychiatric:         Behavior: Behavior normal          Thought Content:  Thought content normal          Judgment: Judgment normal

## 2022-05-09 ENCOUNTER — APPOINTMENT (OUTPATIENT)
Dept: PHYSICAL THERAPY | Facility: CLINIC | Age: 47
End: 2022-05-09
Payer: COMMERCIAL

## 2022-05-10 ENCOUNTER — OFFICE VISIT (OUTPATIENT)
Dept: PHYSICAL THERAPY | Facility: CLINIC | Age: 47
End: 2022-05-10
Payer: COMMERCIAL

## 2022-05-10 DIAGNOSIS — M54.50 CHRONIC LOW BACK PAIN, UNSPECIFIED BACK PAIN LATERALITY, UNSPECIFIED WHETHER SCIATICA PRESENT: Primary | ICD-10-CM

## 2022-05-10 DIAGNOSIS — G89.29 CHRONIC LOW BACK PAIN, UNSPECIFIED BACK PAIN LATERALITY, UNSPECIFIED WHETHER SCIATICA PRESENT: Primary | ICD-10-CM

## 2022-05-10 PROCEDURE — 97112 NEUROMUSCULAR REEDUCATION: CPT

## 2022-05-10 PROCEDURE — 97110 THERAPEUTIC EXERCISES: CPT

## 2022-05-10 NOTE — PROGRESS NOTES
Daily Note     Today's date: 5/10/2022  Patient name: Ilan El  : 1975  MRN: [id number]  Referring provider: Sona Scott MD  Dx:   Encounter Diagnosis     ICD-10-CM    1  Chronic low back pain, unspecified back pain laterality, unspecified whether sciatica present  M54 50     G89 29                   Subjective:  Pt cont's to report only temporary relief after therapy for 1-2 hours  Pt states pain level 7/10 low back pre treatment but more centralized  Objective: See treatment diary below  Assessment:  Pt tolerated treatment fair  Pt able to perform exercise with minimal c/o pain and increased reps as noted  Cont'd c/o pain/difficulty with right LE active hamstring elongation  No change in symptoms with H-wave last visit  Pt cont's to report temporary relief with decrease tightness after power vibe  Transfers and mobility remain antalgic  Patient would benefit from continued PT      Plan: Continue per plan of care  Next follow up appt with MD 22       Precautions: DM2       Manuals 4/20 4/22 4/25 4/29 5/2 5/5 5/10                                                          Neuro Re-Ed                          Power vibe stance 5x1 min GH 1 min x8 1 min x12 1 min x5 1 min x5 1 min x6      PPT   10"x  10 with tc's 10"x  10 GH 10"x  10 GH      Supine active HS elongation   10"x3 b/l with tc's 10"x5 ea Held today 10"x5 each 10"x5 L 10"x4 R      abd bracing with march      x10 2x10      Low bridge      5"x5 with tc's 10"x8                   Ther Ex                          Seated lumbar flexion 10x10" [address]      LTR with pillow squeeze 10x10" [address]      Hip flexor stretch at EOT 2 min each [address]                                                          Ther Activity                                       Gait Training                                       Modalities             H-wave (low setting low back and b/l LE)       10'  np

## 2022-05-12 ENCOUNTER — OFFICE VISIT (OUTPATIENT)
Dept: PHYSICAL THERAPY | Facility: CLINIC | Age: 47
End: 2022-05-12
Payer: COMMERCIAL

## 2022-05-12 DIAGNOSIS — M54.50 CHRONIC LOW BACK PAIN, UNSPECIFIED BACK PAIN LATERALITY, UNSPECIFIED WHETHER SCIATICA PRESENT: Primary | ICD-10-CM

## 2022-05-12 DIAGNOSIS — G89.29 CHRONIC LOW BACK PAIN, UNSPECIFIED BACK PAIN LATERALITY, UNSPECIFIED WHETHER SCIATICA PRESENT: Primary | ICD-10-CM

## 2022-05-12 PROCEDURE — 97110 THERAPEUTIC EXERCISES: CPT

## 2022-05-12 PROCEDURE — 97112 NEUROMUSCULAR REEDUCATION: CPT

## 2022-05-12 NOTE — PROGRESS NOTES
Daily Note     Today's date: 2022  Patient name: Kaleb Dyson  : 1975  MRN: [id number]  Referring provider: Lara Galeano MD  Dx:   Encounter Diagnosis     ICD-10-CM    1  Chronic low back pain, unspecified back pain laterality, unspecified whether sciatica present  M54 50     G89 29                   Subjective:  Pt cont's to report temporary relief after therapy for 1-2 hours  Pt states pain level 5/10 low back pre treatment and right LE pain a little less  Objective: See treatment diary below  Assessment:  Pt tolerated treatment well  Pt able to perform exercise with minimal c/o pain and increased reps as noted  Pt cont's to report temporary relief with decrease tightness after power vibe  Transfers and mobility remain antalgic  Patient would benefit from continued PT      Plan: Continue per plan of care  Next follow up appt with MD 22       Precautions: DM2       Manuals 4/20 4/22 4/25 4/29 5/2 5/5 5/10 5/12                                                         Neuro Re-Ed                          Power vibe stance 5x1 min GH 1 min x8 1 min x12 1 min x5 1 min x5 1 min x6 1 min x7     PPT   10"x  10 with tc's 10"x  10 GH 10"x  10 GH GH     Supine active HS elongation   10"x3 b/l with tc's 10"x5 ea Held today 10"x5 each 10"x5 L 10"x4 R 10"x5 each     abd bracing with march      x10 2x10 2x10     Low bridge      5"x5 with tc's 10"x8 10"x  10                  Ther Ex                          Seated lumbar flexion 10x10" [address]     LTR with pillow squeeze 10x10" [address]     Hip flexor stretch at EOT 2 min each [address]                                                         Ther Activity                                       Gait Training                                       Modalities             H-wave (low setting low back and b/l LE)       10'  np

## 2022-05-13 ENCOUNTER — OFFICE VISIT (OUTPATIENT)
Dept: PHYSICAL THERAPY | Facility: CLINIC | Age: 47
End: 2022-05-13
Payer: COMMERCIAL

## 2022-05-13 DIAGNOSIS — M54.50 CHRONIC LOW BACK PAIN, UNSPECIFIED BACK PAIN LATERALITY, UNSPECIFIED WHETHER SCIATICA PRESENT: Primary | ICD-10-CM

## 2022-05-13 DIAGNOSIS — G89.29 CHRONIC LOW BACK PAIN, UNSPECIFIED BACK PAIN LATERALITY, UNSPECIFIED WHETHER SCIATICA PRESENT: Primary | ICD-10-CM

## 2022-05-13 PROCEDURE — 97112 NEUROMUSCULAR REEDUCATION: CPT

## 2022-05-13 PROCEDURE — 97110 THERAPEUTIC EXERCISES: CPT

## 2022-05-13 NOTE — PROGRESS NOTES
Daily Note     Today's date: 2022  Patient name: Castillo Roblero  : 1975  MRN: [id number]  Referring provider: Alexus An MD  Dx:   Encounter Diagnosis     ICD-10-CM    1  Chronic low back pain, unspecified back pain laterality, unspecified whether sciatica present  M54 50     G89 29                   Subjective:  Pt cont's to report feeling a little better after therapy for a couple hours  Pt states pain level 6/10 low back and right LE this AM         Objective: See treatment diary below  Assessment:  Pt tolerated treatment well  Good tolerance to exercise and added [employer] gym squats with minimal c/o pain  Pt cont's to report temporary relief with decrease tightness after treatment  Patient would benefit from continued PT      Plan: Continue per plan of care  Next follow up appt with MD 22       Precautions: DM2       Manuals 4/20 4/22 4/25 4/29 5/2 5/5 5/10 5/12 5/13                                                        Neuro Re-Ed                          Power vibe stance 5x1 min GH 1 min x8 1 min x12 1 min x5 1 min x5 1 min x6 1 min x7 1 min x6    PPT   10"x  10 with tc's 10"x  10 GH 10"x  10 [facility] GH GH    Supine active HS elongation   10"x3 b/l with tc's 10"x5 ea Held today 10"x5 each 10"x5 L 10"x4 R 10"x5 each GH    abd bracing with march      x10 2x10 2x10 GH    Low bridge      5"x5 with tc's 10"x8 10"x  10 GH    [employer]or gym double leg squat         40% x5 min    Ther Ex                          Seated lumbar flexion 10x10" [facility]    LTR with pillow squeeze 10x10" [facility]    Hip flexor stretch at EOT 2 min each [facility]                                                        Ther Activity                                       Gait Training                                       Modalities             H-wave (low setting low back and b/l LE)       10'  np

## 2022-05-17 ENCOUNTER — OFFICE VISIT (OUTPATIENT)
Dept: PHYSICAL THERAPY | Facility: CLINIC | Age: 47
End: 2022-05-17
Payer: COMMERCIAL

## 2022-05-17 DIAGNOSIS — G89.29 CHRONIC LOW BACK PAIN, UNSPECIFIED BACK PAIN LATERALITY, UNSPECIFIED WHETHER SCIATICA PRESENT: Primary | ICD-10-CM

## 2022-05-17 DIAGNOSIS — M54.50 CHRONIC LOW BACK PAIN, UNSPECIFIED BACK PAIN LATERALITY, UNSPECIFIED WHETHER SCIATICA PRESENT: Primary | ICD-10-CM

## 2022-05-17 PROCEDURE — 97112 NEUROMUSCULAR REEDUCATION: CPT

## 2022-05-17 PROCEDURE — 97110 THERAPEUTIC EXERCISES: CPT

## 2022-05-17 NOTE — PROGRESS NOTES
Daily Note     Today's date: 2022  Patient name: Stacia Stauffer  : 1975  MRN: [id number]  Referring provider: Tameka Dawkins MD  Dx:   Encounter Diagnosis     ICD-10-CM    1  Chronic low back pain, unspecified back pain laterality, unspecified whether sciatica present  M54 50     G89 29                   Subjective:  Pt c/o increase low back and right LE pain today stating pain level 7/10 pre treatment  Pt cont's to report temporary relief after therapy for a couple hours  Objective: See treatment diary below  Assessment:  Pt tolerated treatment fair  Gait, mobility and transfers slow and antalgic  Pt able to perform exercise as noted with minimal c/o pain  Patient would benefit from continued PT      Plan: Continue per plan of care  Next follow up appt with MD 22       Precautions: DM2       Manuals 4/20 4/22 4/25 4/29 5/2 5/5 5/10 5/12 5/13 5/17                                                       Neuro Re-Ed                          Power vibe stance 5x1 min GH 1 min x8 1 min x12 1 min x5 1 min x5 1 min x6 1 min x7 1 min x6 1 min x5   PPT   10"x  10 with tc's 10"x  10 GH 10"x  10 [address] GH [address] GH   Supine active HS elongation   10"x3 b/l with tc's 10"x5 ea Held today 10"x5 each 10"x5 L 10"x4 R 10"x5 each [address] Held today pain on R   abd bracing with march      x10 2x10 2x10 [address] GH   Low bridge      5"x5 with tc's 10"x8 10"x  10 [address]   [employer] gym double leg squat         40% x5 min GH   Ther Ex                          Seated lumbar flexion 10x10" [address]   LTR with pillow squeeze 10x10" [address]   Hip flexor stretch at EOT 2 min each [address]                                                       Ther Activity                                       Gait Training                                       Modalities             H-wave (low setting low back and b/l LE)       10'  np

## 2022-05-19 ENCOUNTER — APPOINTMENT (OUTPATIENT)
Dept: PHYSICAL THERAPY | Facility: CLINIC | Age: 47
End: 2022-05-19
Payer: COMMERCIAL

## 2022-05-19 LAB — COLOGUARD RESULT REPORTABLE: NORMAL

## 2022-05-20 ENCOUNTER — APPOINTMENT (OUTPATIENT)
Dept: PHYSICAL THERAPY | Facility: CLINIC | Age: 47
End: 2022-05-20
Payer: COMMERCIAL

## 2022-05-24 ENCOUNTER — APPOINTMENT (OUTPATIENT)
Dept: PHYSICAL THERAPY | Facility: CLINIC | Age: 47
End: 2022-05-24
Payer: COMMERCIAL

## 2022-05-26 ENCOUNTER — APPOINTMENT (OUTPATIENT)
Dept: PHYSICAL THERAPY | Facility: CLINIC | Age: 47
End: 2022-05-26
Payer: COMMERCIAL

## 2022-05-27 ENCOUNTER — APPOINTMENT (OUTPATIENT)
Dept: PHYSICAL THERAPY | Facility: CLINIC | Age: 47
End: 2022-05-27
Payer: COMMERCIAL

## 2022-06-02 ENCOUNTER — APPOINTMENT (OUTPATIENT)
Dept: PHYSICAL THERAPY | Facility: CLINIC | Age: 47
End: 2022-06-02
Payer: COMMERCIAL

## 2022-06-07 ENCOUNTER — OFFICE VISIT (OUTPATIENT)
Dept: PHYSICAL THERAPY | Facility: CLINIC | Age: 47
End: 2022-06-07
Payer: COMMERCIAL

## 2022-06-07 DIAGNOSIS — G89.29 CHRONIC LOW BACK PAIN, UNSPECIFIED BACK PAIN LATERALITY, UNSPECIFIED WHETHER SCIATICA PRESENT: Primary | ICD-10-CM

## 2022-06-07 DIAGNOSIS — M54.50 CHRONIC LOW BACK PAIN, UNSPECIFIED BACK PAIN LATERALITY, UNSPECIFIED WHETHER SCIATICA PRESENT: Primary | ICD-10-CM

## 2022-06-07 PROCEDURE — 97110 THERAPEUTIC EXERCISES: CPT

## 2022-06-07 PROCEDURE — 97112 NEUROMUSCULAR REEDUCATION: CPT

## 2022-06-07 NOTE — PROGRESS NOTES
Daily Note     Today's date: 2022  Patient name: Alex Blanco  : 1975  MRN: [id number]  Referring provider: Stephany Weinstein MD  Dx:   Encounter Diagnosis     ICD-10-CM    1  Chronic low back pain, unspecified back pain laterality, unspecified whether sciatica present  M54 50     G89 29                   Subjective:  Pt returns to therapy today after missing secondary to not feeling well and states his body felt like it was "locking up "   Pt reports pain level 7/10 low back and right LE  Pt reports trying to do home exercise as tolerated with temporary relief  Objective: See treatment diary below  Assessment:  Pt tolerated treatment fair  Gait, mobility and transfers remain slow and antalgic  Pt able to perform exercise as noted with minimal c/o pain  Pt cont's to report feeling "looser" post treatment with temporary relief  Patient would benefit from continued PT      Plan: Continue per plan of care  Pt has follow up appt with MD 22  Will await MD orders        Precautions: DM2       Manuals 6/7 4/22 4/25 4/29 5/2 5/5 5/10 5/12 5/13 5/17                                                       Neuro Re-Ed                          Power vibe stance 5x1 min GH 1 min x8 1 min x12 1 min x5 1 min x5 1 min x6 1 min x7 1 min x6 1 min x5   PPT 10"x10  10"x  10 with tc's 10"x  10 GH 10"x  10 [address] [facility] [address] [facility]   Supine active HS elongation Held pain  10"x3 b/l with tc's 10"x5 ea Held today 10"x5 each 10"x5 L 10"x4 R 10"x5 each 0 [facility] Held today pain on R   abd bracing with march 2x10     x10 2x10 2x10 [facility]   Low bridge 10'x10     5"x5 with tc's 10"x8 10"x  10 [facility] GH   V[employer]or gym double leg squat 45% x5 min        40% x5 min GH   Ther Ex                          Seated lumbar flexion 10x10"  [address]   LTR with pillow squeeze 10x10" [address]   Hip flexor stretch at EOT 2 min each [address]                                                       Ther Activity                                       Gait Training                                       Modalities             H-wave (low setting low back and b/l LE)       10'  np

## 2022-06-09 ENCOUNTER — APPOINTMENT (OUTPATIENT)
Dept: PHYSICAL THERAPY | Facility: CLINIC | Age: 47
End: 2022-06-09
Payer: COMMERCIAL

## 2022-06-14 ENCOUNTER — APPOINTMENT (OUTPATIENT)
Dept: PHYSICAL THERAPY | Facility: CLINIC | Age: 47
End: 2022-06-14
Payer: COMMERCIAL

## 2022-06-16 ENCOUNTER — APPOINTMENT (OUTPATIENT)
Dept: PHYSICAL THERAPY | Facility: CLINIC | Age: 47
End: 2022-06-16
Payer: COMMERCIAL

## 2022-06-21 ENCOUNTER — APPOINTMENT (OUTPATIENT)
Dept: RADIOLOGY | Facility: MEDICAL CENTER | Age: 47
End: 2022-06-21
Payer: COMMERCIAL

## 2022-06-21 ENCOUNTER — OFFICE VISIT (OUTPATIENT)
Dept: PHYSICAL THERAPY | Facility: CLINIC | Age: 47
End: 2022-06-21
Payer: COMMERCIAL

## 2022-06-21 DIAGNOSIS — M79.675 PAIN IN TOE OF LEFT FOOT: ICD-10-CM

## 2022-06-21 DIAGNOSIS — M54.50 CHRONIC LOW BACK PAIN, UNSPECIFIED BACK PAIN LATERALITY, UNSPECIFIED WHETHER SCIATICA PRESENT: Primary | ICD-10-CM

## 2022-06-21 DIAGNOSIS — G89.29 CHRONIC LOW BACK PAIN, UNSPECIFIED BACK PAIN LATERALITY, UNSPECIFIED WHETHER SCIATICA PRESENT: Primary | ICD-10-CM

## 2022-06-21 PROCEDURE — 97112 NEUROMUSCULAR REEDUCATION: CPT

## 2022-06-21 PROCEDURE — 97110 THERAPEUTIC EXERCISES: CPT

## 2022-06-21 PROCEDURE — 73630 X-RAY EXAM OF FOOT: CPT

## 2022-06-21 NOTE — PROGRESS NOTES
Daily Note     Today's date: 2022  Patient name: Carolina Mejia  : 1975  MRN: [id number]  Referring provider: Shree Stanton MD  Dx:   Encounter Diagnosis     ICD-10-CM    1  Chronic low back pain, unspecified back pain laterality, unspecified whether sciatica present  M54 50     G89 29                   Subjective:  Pt states he had virtual MD visit secondary to having Covid and started new medication yesterday  Will be scheduling follow up appt  Cont'd c/o low back and right LE pain with pain level 6/10 pre treatment  Pt reports having foot surgery today  Objective: See treatment diary below  Assessment:  Pt tolerated treatment fair  Gait, mobility and transfers remain slow and antalgic  Pt able to perform exercise as noted with minimal c/o pain  Held power vibe today secondary to foot surgery  Patient would benefit from continued PT      Plan: Continue per plan of care          Precautions: DM2       Manuals 6/7 6/21 4/25 4/29 5/2 5/5 5/10 5/12 5/13 5/17                                                       Neuro Re-Ed                          Power vibe stance 5x1 min Held today  1 min x8 1 min x12 1 min x5 1 min x5 1 min x6 1 min x7 1 min x6 1 min x5   PPT 10"x10 GH 10"x  10 with tc's 10"x  10 GH 10"x  10 [facility] [address] [place] [facility]   Supine active HS elongation Held pain  10"x3 b/l with tc's 10"x5 ea Held today 10"x5 each 10"x5 L 10"x4 R 10"x5 each [facility] [place] Held today pain on R   abd bracing with march 2x10 GH    x10 2x10 2x10 [address] [place] [facility]   Low bridge 10'x10 GH    5"x5 with tc's 10"x8 10"x  10 [address] [place] [facility]   Vigor gym double leg squat 45% x5 min GH       40% x5 min GH   Ther Ex                          Seated lumbar flexion 10x10" [address]   LTR with pillow squeeze 10x10" [address]   Hip flexor stretch at EOT 2 min each [address]                                                       Ther Activity                                       Gait Training Modalities             H-wave (low setting low back and b/l LE)       10'  np

## 2022-06-22 ENCOUNTER — TELEPHONE (OUTPATIENT)
Dept: FAMILY MEDICINE CLINIC | Facility: CLINIC | Age: 47
End: 2022-06-22

## 2022-06-27 DIAGNOSIS — I10 ESSENTIAL HYPERTENSION: ICD-10-CM

## 2022-06-27 DIAGNOSIS — E13.9 DIABETES MELLITUS OF OTHER TYPE WITHOUT COMPLICATION, UNSPECIFIED WHETHER LONG TERM INSULIN USE (HCC): ICD-10-CM

## 2022-06-27 RX ORDER — LOSARTAN POTASSIUM AND HYDROCHLOROTHIAZIDE 25; 100 MG/1; MG/1
TABLET ORAL
Qty: 90 TABLET | Refills: 1 | Status: SHIPPED | OUTPATIENT
Start: 2022-06-27

## 2022-06-27 RX ORDER — AMLODIPINE BESYLATE 10 MG/1
TABLET ORAL
Qty: 90 TABLET | Refills: 1 | Status: SHIPPED | OUTPATIENT
Start: 2022-06-27

## 2022-06-27 RX ORDER — DULOXETIN HYDROCHLORIDE 60 MG/1
CAPSULE, DELAYED RELEASE ORAL
Qty: 90 CAPSULE | Refills: 1 | Status: SHIPPED | OUTPATIENT
Start: 2022-06-27

## 2022-06-28 ENCOUNTER — APPOINTMENT (OUTPATIENT)
Dept: PHYSICAL THERAPY | Facility: CLINIC | Age: 47
End: 2022-06-28
Payer: COMMERCIAL

## 2022-06-30 ENCOUNTER — APPOINTMENT (OUTPATIENT)
Dept: PHYSICAL THERAPY | Facility: CLINIC | Age: 47
End: 2022-06-30
Payer: COMMERCIAL

## 2022-07-14 DIAGNOSIS — I10 ESSENTIAL HYPERTENSION: ICD-10-CM

## 2022-07-14 RX ORDER — ATENOLOL 100 MG/1
TABLET ORAL
Qty: 90 TABLET | Refills: 1 | Status: SHIPPED | OUTPATIENT
Start: 2022-07-14

## 2022-09-03 DIAGNOSIS — A08.4 VIRAL GASTROENTERITIS: ICD-10-CM

## 2022-09-04 NOTE — ASSESSMENT & PLAN NOTE
BIPAP removed at request of family .  Placed on NC 3lpm    Blood pressure is elevated secondary to acute distress  Continue lisinopril and atenolol in a m  Alan Thomas Hydralazine p r n   For SBP greater than 160 None

## 2022-09-06 RX ORDER — METFORMIN HYDROCHLORIDE 500 MG/1
TABLET, EXTENDED RELEASE ORAL
Qty: 180 TABLET | Refills: 1 | Status: SHIPPED | OUTPATIENT
Start: 2022-09-06

## 2022-09-23 ENCOUNTER — OFFICE VISIT (OUTPATIENT)
Dept: FAMILY MEDICINE CLINIC | Facility: CLINIC | Age: 47
End: 2022-09-23

## 2022-09-23 VITALS
RESPIRATION RATE: 16 BRPM | BODY MASS INDEX: 47.74 KG/M2 | WEIGHT: 315 LBS | OXYGEN SATURATION: 98 % | TEMPERATURE: 98.6 F | HEART RATE: 98 BPM | HEIGHT: 68 IN

## 2022-09-23 DIAGNOSIS — G89.29 CHRONIC NECK PAIN: ICD-10-CM

## 2022-09-23 DIAGNOSIS — E66.01 MORBID OBESITY (HCC): Primary | ICD-10-CM

## 2022-09-23 DIAGNOSIS — G89.29 CHRONIC BILATERAL LOW BACK PAIN WITHOUT SCIATICA: ICD-10-CM

## 2022-09-23 DIAGNOSIS — M54.50 CHRONIC BILATERAL LOW BACK PAIN WITHOUT SCIATICA: ICD-10-CM

## 2022-09-23 DIAGNOSIS — M54.2 CHRONIC NECK PAIN: ICD-10-CM

## 2022-09-23 PROCEDURE — 99214 OFFICE O/P EST MOD 30 MIN: CPT | Performed by: PHYSICIAN ASSISTANT

## 2022-09-23 RX ORDER — CEFDINIR 300 MG/1
CAPSULE ORAL
COMMUNITY
Start: 2022-06-21

## 2022-09-23 RX ORDER — MECLIZINE HYDROCHLORIDE 25 MG/1
25 TABLET ORAL 3 TIMES DAILY PRN
COMMUNITY
Start: 2022-08-24 | End: 2023-08-24

## 2022-09-23 NOTE — PROGRESS NOTES
Name: Suzan Faustin      : 1975      MRN: [id number]  Encounter Provider: Asa Gowers, PA-C  Encounter Date: 2022   Encounter department: [address] [place] [address]     1  Morbid obesity ([address] )  -     Ambulatory Referral to Weight Management; Future    2  Chronic bilateral low back pain without sciatica  -     Ambulatory Referral to Physical Therapy; Future    3  Chronic neck pain  -     Ambulatory Referral to Physical Therapy; Future    -I did review the last note from physical therapy from  and the therapist did recommend that he continue with physical therapy  I did advise Barbara Martinez that he needs to go to physical therapy again  I also added neck pain since at that time he was only treated for his chronic low back pain  I did advise him on the importance of finish the full course of physical therapy before any studies or referrals to Pain Management are recommended  He has been advised to follow-up with any increasing symptoms  Apply moist heat 3 times daily for 10 minutes as needed  -I also advised him that his weight is also a contributing factor for his pain  I did recommend a referral to medical weight management  -I did recommend that he follow-up with Dr Tonio Santiago in the near future for his other chronic medical conditions  M*Modal software was used to dictate this note  It may contain errors with dictating incorrect words/spelling  Please contact provider directly for any questions  Subjective      Patient presents today for acute visit for evaluation of chronic low back pain with continued radiation in to bilateral thighs  He is also complaining of chronic neck pain  He states he started noticing a lot of the symptoms in February of this year  He denies any numbness and tingling or radiation of pain down his upper extremities  He states he does get numbness in his hands but he has noticed this for quite a while    He also gets numbness in his feet but he states that he does have neuropathy which is been ongoing for years  He did go to physical therapy for his low back but he does not remember if he finish the full course of the therapy  He is on Cymbalta for his pain  Review of Systems   Musculoskeletal:        As stated in HPI   Neurological:        As stated in HPI       Current Outpatient Medications on File Prior to Visit   Medication Sig    amLODIPine (NORVASC) 10 mg tablet TAKE 1 TABLET BY MOUTH EVERYDAY AT BEDTIME    atenolol (TENORMIN) 100 mg tablet TAKE 1 TABLET BY MOUTH EVERY DAY    carisoprodol (SOMA) 350 mg tablet Take 1 tablet (350 mg total) by mouth 3 (three) times a day    cefdinir (OMNICEF) 300 mg capsule TAKE 1 CAPSULE BY MOUTH EVERY 12 HOURS FOR 7 DAYS    DULoxetine (CYMBALTA) 60 mg delayed release capsule TAKE 1 CAPSULE BY MOUTH EVERY DAY    insulin degludec (Tresiba FlexTouch) 200 units/mL CONCENTRATED U-200 injection pen Inject 55 Units under the skin daily at bedtime    Insulin Pen Needle (PEN NEEDLES) 31G X 6 MM MISC Inject under the skin 3 (three) times a day    losartan-hydrochlorothiazide (HYZAAR) 100-25 MG per tablet TAKE 1 TABLET BY MOUTH EVERY DAY    meclizine (ANTIVERT) 25 mg tablet Take 25 mg by mouth Three times daily as needed    metFORMIN (GLUCOPHAGE-XR) 500 mg 24 hr tablet TAKE 1 TABLET BY MOUTH TWICE A DAY WITH MEALS    ondansetron (Zofran ODT) 4 mg disintegrating tablet Take 1 tablet (4 mg total) by mouth every 8 (eight) hours as needed for nausea or vomiting    semaglutide, 1 mg/dose, (Ozempic, 1 MG/DOSE,) 4 MG/3ML SOPN injection pen Inject 0 75 mL (1 mg total) under the skin once a week    collagenase (SANTYL) ointment Apply topically daily (Patient not taking: No sig reported)       Objective     Pulse 98   Temp 98 6 °F (37 °C) (Tympanic)   Resp 16   Ht 5' 8" (1 727 m)   Wt (!) 153 kg (338 lb)   SpO2 98%   BMI 51 39 kg/m²     Physical Exam  Vitals reviewed  Constitutional:       General: He is not in acute distress  Appearance: Normal appearance  He is not ill-appearing, toxic-appearing or diaphoretic  HENT:      Head: Normocephalic and atraumatic  Musculoskeletal:      Cervical back: Neck supple  Comments: Cervical spine: He does have some paraspinal tenderness  He does have reduced rotation bilaterally     Lumbar spine: No skin changes, no tenderness with palpation  He does ambulate with a cane  Neurological:      General: No focal deficit present  Mental Status: He is alert  Psychiatric:         Mood and Affect: Mood normal          Behavior: Behavior normal          Thought Content:  Thought content normal          Judgment: Judgment normal        Alice Neville PA-C

## 2022-10-12 PROBLEM — R05.8 COUGH WITH EXPOSURE TO COVID-19 VIRUS: Status: RESOLVED | Noted: 2021-11-18 | Resolved: 2022-10-12

## 2022-10-12 PROBLEM — J06.9 ACUTE UPPER RESPIRATORY INFECTION: Status: RESOLVED | Noted: 2022-01-06 | Resolved: 2022-10-12

## 2022-10-12 PROBLEM — Z20.822 COUGH WITH EXPOSURE TO COVID-19 VIRUS: Status: RESOLVED | Noted: 2021-11-18 | Resolved: 2022-10-12

## 2022-10-17 ENCOUNTER — APPOINTMENT (OUTPATIENT)
Dept: LAB | Facility: HOSPITAL | Age: 47
End: 2022-10-17

## 2022-10-17 ENCOUNTER — HOSPITAL ENCOUNTER (OUTPATIENT)
Dept: RADIOLOGY | Facility: HOSPITAL | Age: 47
Discharge: HOME | End: 2022-10-17

## 2022-10-17 ENCOUNTER — OFFICE VISIT (OUTPATIENT)
Dept: FAMILY MEDICINE CLINIC | Facility: CLINIC | Age: 47
End: 2022-10-17
Payer: COMMERCIAL

## 2022-10-17 VITALS
OXYGEN SATURATION: 98 % | HEIGHT: 68 IN | HEART RATE: 79 BPM | SYSTOLIC BLOOD PRESSURE: 166 MMHG | WEIGHT: 315 LBS | DIASTOLIC BLOOD PRESSURE: 100 MMHG | RESPIRATION RATE: 14 BRPM | BODY MASS INDEX: 47.74 KG/M2 | TEMPERATURE: 99.2 F

## 2022-10-17 DIAGNOSIS — G89.29 CHRONIC LEFT-SIDED LOW BACK PAIN WITHOUT SCIATICA: ICD-10-CM

## 2022-10-17 DIAGNOSIS — M25.59 PAIN IN OTHER JOINT: ICD-10-CM

## 2022-10-17 DIAGNOSIS — R78.81 MSSA BACTEREMIA: ICD-10-CM

## 2022-10-17 DIAGNOSIS — M25.60 MORNING STIFFNESS OF JOINTS: ICD-10-CM

## 2022-10-17 DIAGNOSIS — M54.50 CHRONIC LEFT-SIDED LOW BACK PAIN WITHOUT SCIATICA: Primary | ICD-10-CM

## 2022-10-17 DIAGNOSIS — B95.61 MSSA BACTEREMIA: ICD-10-CM

## 2022-10-17 DIAGNOSIS — M54.50 CHRONIC LEFT-SIDED LOW BACK PAIN WITHOUT SCIATICA: ICD-10-CM

## 2022-10-17 DIAGNOSIS — G89.29 CHRONIC LEFT-SIDED LOW BACK PAIN WITHOUT SCIATICA: Primary | ICD-10-CM

## 2022-10-17 LAB
ALBUMIN SERPL BCP-MCNC: 3.9 G/DL (ref 3.5–5)
ALP SERPL-CCNC: 100 U/L (ref 43–122)
ALT SERPL W P-5'-P-CCNC: 18 U/L
ANION GAP SERPL CALCULATED.3IONS-SCNC: 13 MMOL/L (ref 5–14)
AST SERPL W P-5'-P-CCNC: 24 U/L (ref 17–59)
BASOPHILS # BLD AUTO: 0.07 THOUSANDS/ΜL (ref 0–0.1)
BASOPHILS NFR BLD AUTO: 1 % (ref 0–1)
BILIRUB SERPL-MCNC: 0.51 MG/DL (ref 0.2–1)
BUN SERPL-MCNC: 26 MG/DL (ref 5–25)
CALCIUM SERPL-MCNC: 9.1 MG/DL (ref 8.4–10.2)
CHLORIDE SERPL-SCNC: 101 MMOL/L (ref 96–108)
CO2 SERPL-SCNC: 21 MMOL/L (ref 21–32)
CREAT SERPL-MCNC: 1.21 MG/DL (ref 0.7–1.5)
EOSINOPHIL # BLD AUTO: 0.36 THOUSAND/ΜL (ref 0–0.61)
EOSINOPHIL NFR BLD AUTO: 2 % (ref 0–6)
ERYTHROCYTE [DISTWIDTH] IN BLOOD BY AUTOMATED COUNT: 12.2 % (ref 11.6–15.1)
GFR SERPL CREATININE-BSD FRML MDRD: 71 ML/MIN/1.73SQ M
GLUCOSE P FAST SERPL-MCNC: 259 MG/DL (ref 70–99)
HCT VFR BLD AUTO: 37.2 % (ref 36.5–49.3)
HGB BLD-MCNC: 12.3 G/DL (ref 12–17)
IMM GRANULOCYTES # BLD AUTO: 0.05 THOUSAND/UL (ref 0–0.2)
IMM GRANULOCYTES NFR BLD AUTO: 0 % (ref 0–2)
LYMPHOCYTES # BLD AUTO: 1.93 THOUSANDS/ΜL (ref 0.6–4.47)
LYMPHOCYTES NFR BLD AUTO: 13 % (ref 14–44)
MCH RBC QN AUTO: 29.8 PG (ref 26.8–34.3)
MCHC RBC AUTO-ENTMCNC: 33.1 G/DL (ref 31.4–37.4)
MCV RBC AUTO: 90 FL (ref 82–98)
MONOCYTES # BLD AUTO: 0.88 THOUSAND/ΜL (ref 0.17–1.22)
MONOCYTES NFR BLD AUTO: 6 % (ref 4–12)
NEUTROPHILS # BLD AUTO: 11.67 THOUSANDS/ΜL (ref 1.85–7.62)
NEUTS SEG NFR BLD AUTO: 78 % (ref 43–75)
NRBC BLD AUTO-RTO: 0 /100 WBCS
PLATELET # BLD AUTO: 309 THOUSANDS/UL (ref 149–390)
PMV BLD AUTO: 10 FL (ref 8.9–12.7)
POTASSIUM SERPL-SCNC: 5 MMOL/L (ref 3.5–5.3)
PROT SERPL-MCNC: 7.7 G/DL (ref 6.4–8.4)
RBC # BLD AUTO: 4.13 MILLION/UL (ref 3.88–5.62)
SODIUM SERPL-SCNC: 135 MMOL/L (ref 135–147)
WBC # BLD AUTO: 14.96 THOUSAND/UL (ref 4.31–10.16)

## 2022-10-17 PROCEDURE — 87040 BLOOD CULTURE FOR BACTERIA: CPT

## 2022-10-17 PROCEDURE — 86235 NUCLEAR ANTIGEN ANTIBODY: CPT

## 2022-10-17 PROCEDURE — 86618 LYME DISEASE ANTIBODY: CPT

## 2022-10-17 PROCEDURE — 80053 COMPREHEN METABOLIC PANEL: CPT | Performed by: INTERNAL MEDICINE

## 2022-10-17 PROCEDURE — 72110 X-RAY EXAM L-2 SPINE 4/>VWS: CPT

## 2022-10-17 PROCEDURE — 85025 COMPLETE CBC W/AUTO DIFF WBC: CPT | Performed by: INTERNAL MEDICINE

## 2022-10-17 PROCEDURE — 86038 ANTINUCLEAR ANTIBODIES: CPT

## 2022-10-17 PROCEDURE — 86200 CCP ANTIBODY: CPT

## 2022-10-17 PROCEDURE — 36415 COLL VENOUS BLD VENIPUNCTURE: CPT | Performed by: INTERNAL MEDICINE

## 2022-10-17 PROCEDURE — 86430 RHEUMATOID FACTOR TEST QUAL: CPT

## 2022-10-17 PROCEDURE — 99214 OFFICE O/P EST MOD 30 MIN: CPT | Performed by: INTERNAL MEDICINE

## 2022-10-17 NOTE — PROGRESS NOTES
Assessment/Plan:           Problem List Items Addressed This Visit    None     Visit Diagnoses     Chronic left-sided low back pain without sciatica    -  Primary    Relevant Orders    Comprehensive metabolic panel    CBC and differential    Blood culture    XR spine lumbar minimum 4 views non injury    Lyme Total Antibody Profile with reflex to WB    History of MSSA bacteremia        Relevant Orders    Comprehensive metabolic panel    CBC and differential    Blood culture    XR spine lumbar minimum 4 views non injury    Pain in other joint        Relevant Orders    Cyclic citrul peptide antibody, IgG    RF Screen w/ Reflex to Titer    Sjogren's Antibodies    RUSH w/Reflex if Positive    Morning stiffness of joints        Relevant Orders    Cyclic citrul peptide antibody, IgG    RF Screen w/ Reflex to Titer    Sjogren's Antibodies    RUSH w/Reflex if Positive    Lyme Total Antibody Profile with reflex to WB        arthritis versus inflammatory arthritis  X-ray would be ordered as well as lab studies to look for any other explanation of his worsening back pain  He does not have any neurological deficit at this point the than longstanding neuropathy  We may need to do an MRI in the future  Patient would follow-up with Dr Bimal Farah in 2 days      Subjective:      Patient ID: Krishna Crews is a 56 y o  male  HPI   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 who is here for an acute visit complaining of worsening low back pain that he has been battling since February  The pain is localized to the lower back more to the left side does not radiate to the leg  Patient denies any fevers and chills however has a low-grade temperature today  Patient has history of MSSA bacteremia status post left hallux amputation secondary to osteomyelitis in January  He also is diabetic with neuropathy  He does volunteer that he has been getting more morning stiffness denies aches and pains    He has strong family history of lupus with his mother and sister affected by lupus  He does not smoke  He does use marijuana he admits as it helps with the pain  The following portions of the patient's history were reviewed and updated as appropriate: allergies, current medications, past family history, past medical history, past social history, past surgical history and problem list     Review of Systems      Objective:      /100 (BP Location: Left arm, Patient Position: Sitting, Cuff Size: Large)   Pulse 79   Temp 99 2 °F (37 3 °C) (Tympanic)   Resp 14   Ht 5' 8" (1 727 m)   Wt (!) 149 kg (329 lb)   SpO2 98%   BMI 50 02 kg/m²          Physical Exam  Musculoskeletal:         General: No tenderness  Comments: Negative spine tenderness however side-to-side movement is uncomfortable localized to the left side   Skin:     Findings: No rash  Neurological:      Mental Status: He is alert  Motor: No weakness  Gait: Gait abnormal (Cane assisted ambulation)

## 2022-10-18 LAB
B BURGDOR IGG+IGM SER-ACNC: <0.2 AI
ENA SS-A AB SER-ACNC: <0.2 AI (ref 0–0.9)
ENA SS-B AB SER-ACNC: <0.2 AI (ref 0–0.9)
RHEUMATOID FACT SER QL LA: NEGATIVE
RYE IGE QN: NEGATIVE

## 2022-10-20 ENCOUNTER — OFFICE VISIT (OUTPATIENT)
Dept: FAMILY MEDICINE CLINIC | Facility: CLINIC | Age: 47
End: 2022-10-20
Payer: COMMERCIAL

## 2022-10-20 ENCOUNTER — TELEPHONE (OUTPATIENT)
Dept: FAMILY MEDICINE CLINIC | Facility: CLINIC | Age: 47
End: 2022-10-20

## 2022-10-20 ENCOUNTER — TELEPHONE (OUTPATIENT)
Dept: NEUROLOGY | Facility: CLINIC | Age: 47
End: 2022-10-20

## 2022-10-20 VITALS
TEMPERATURE: 98.9 F | HEART RATE: 82 BPM | WEIGHT: 315 LBS | SYSTOLIC BLOOD PRESSURE: 140 MMHG | HEIGHT: 68 IN | OXYGEN SATURATION: 98 % | DIASTOLIC BLOOD PRESSURE: 88 MMHG | BODY MASS INDEX: 47.74 KG/M2

## 2022-10-20 DIAGNOSIS — G89.29 CHRONIC NECK PAIN: ICD-10-CM

## 2022-10-20 DIAGNOSIS — M54.2 CHRONIC NECK PAIN: ICD-10-CM

## 2022-10-20 DIAGNOSIS — M54.50 CHRONIC BILATERAL LOW BACK PAIN WITHOUT SCIATICA: ICD-10-CM

## 2022-10-20 DIAGNOSIS — E66.01 MORBID OBESITY (HCC): ICD-10-CM

## 2022-10-20 DIAGNOSIS — Z00.00 ROUTINE ADULT HEALTH MAINTENANCE: ICD-10-CM

## 2022-10-20 DIAGNOSIS — E13.9 DIABETES MELLITUS OF OTHER TYPE WITHOUT COMPLICATION, UNSPECIFIED WHETHER LONG TERM INSULIN USE (HCC): Primary | ICD-10-CM

## 2022-10-20 DIAGNOSIS — G89.29 CHRONIC BILATERAL LOW BACK PAIN WITHOUT SCIATICA: ICD-10-CM

## 2022-10-20 DIAGNOSIS — E11.42 DIABETIC POLYNEUROPATHY ASSOCIATED WITH TYPE 2 DIABETES MELLITUS (HCC): ICD-10-CM

## 2022-10-20 LAB — SL AMB POCT HEMOGLOBIN AIC: 8.2 (ref ?–6.5)

## 2022-10-20 PROCEDURE — 99396 PREV VISIT EST AGE 40-64: CPT | Performed by: FAMILY MEDICINE

## 2022-10-20 PROCEDURE — 99214 OFFICE O/P EST MOD 30 MIN: CPT | Performed by: FAMILY MEDICINE

## 2022-10-20 PROCEDURE — 83036 HEMOGLOBIN GLYCOSYLATED A1C: CPT | Performed by: FAMILY MEDICINE

## 2022-10-20 RX ORDER — DULOXETIN HYDROCHLORIDE 30 MG/1
CAPSULE, DELAYED RELEASE ORAL
Qty: 30 CAPSULE | Refills: 1 | Status: SHIPPED | OUTPATIENT
Start: 2022-10-20 | End: 2022-10-28

## 2022-10-20 RX ORDER — INSULIN DEGLUDEC 200 U/ML
65 INJECTION, SOLUTION SUBCUTANEOUS
Qty: 12 ML | Refills: 2 | Status: SHIPPED | OUTPATIENT
Start: 2022-10-20

## 2022-10-20 RX ORDER — DULOXETIN HYDROCHLORIDE 60 MG/1
CAPSULE, DELAYED RELEASE ORAL
Qty: 30 CAPSULE | Refills: 1 | Status: SHIPPED | OUTPATIENT
Start: 2022-10-20 | End: 2022-10-28

## 2022-10-20 RX ORDER — CEPHALEXIN 500 MG/1
500 CAPSULE ORAL 4 TIMES DAILY
COMMUNITY
Start: 2022-10-18 | End: 2022-11-01

## 2022-10-20 NOTE — TELEPHONE ENCOUNTER
Spoke to patient and confirmed his 10/25/2022 appointment with Dr Thony Keating at the [facility] office

## 2022-10-20 NOTE — LETTER
October 20, 2022     Patient: Emerita Wayne  YOB: 1975  Date of Visit: 10/20/2022      To Whom it May Concern:    Yenifer Aggarwal is under my professional care  Ainsley Brown was seen in my office on 10/20/2022  Ainsley Kevin may return to work on 10/24/2022  If you have any questions or concerns, please don't hesitate to call           Sincerely,          Glenny Kellogg MD        CC: No Recipients

## 2022-10-21 LAB — CCP AB SER IA-ACNC: 0.5

## 2022-10-22 LAB — BACTERIA BLD CULT: NORMAL

## 2022-10-24 NOTE — PROGRESS NOTES
Assessment/Plan:    64-year-old male with: chronic neck pain and low back pain and diffuse body pain type 2 diabetes with diabetic neuropathy morbid obesity discussed workup and treatment options with risks and benefits will continue current medications will titrate up Cymbalta discussed supportive care return parameters follow-up in 3 months regarding annual well visit encourage various safety and health maintenance issues including healthy diet exercise ample sleep stress reduction strategies healthy weight next tolerating discussed supportive care return parameters otherwise    No problem-specific Assessment & Plan notes found for this encounter  Diagnoses and all orders for this visit:    Diabetes mellitus of other type without complication, unspecified whether long term insulin use ([facility] )  -     POCT hemoglobin A1c  -     Ambulatory Referral to Ophthalmology; Future  -     insulin degludec [employer]ill[employer] FlexTouch) 200 units/mL CONCENTRATED U-200 injection pen; Inject 65 Units under the skin daily at bedtime  -     DULoxetine (CYMBALTA) 60 mg delayed release capsule; Take 1 tab with a 30mg tab to equal 90mg daily   -     DULoxetine (Cymbalta) 30 mg delayed release capsule; Take 1 tab with a 60mg tab to equal 90mg daily  Diabetic polyneuropathy associated with type 2 diabetes mellitus (HCC)  -     DULoxetine (CYMBALTA) 60 mg delayed release capsule; Take 1 tab with a 30mg tab to equal 90mg daily   -     DULoxetine (Cymbalta) 30 mg delayed release capsule; Take 1 tab with a 60mg tab to equal 90mg daily  -     Ambulatory Referral to Pain Management; Future    Chronic neck pain  -     DULoxetine (CYMBALTA) 60 mg delayed release capsule; Take 1 tab with a 30mg tab to equal 90mg daily   -     DULoxetine (Cymbalta) 30 mg delayed release capsule; Take 1 tab with a 60mg tab to equal 90mg daily  -     Ambulatory Referral to Pain Management;  Future    Chronic bilateral low back pain without sciatica  -     DULoxetine (CYMBALTA) 60 mg delayed release capsule; Take 1 tab with a 30mg tab to equal 90mg daily   -     DULoxetine (Cymbalta) 30 mg delayed release capsule; Take 1 tab with a 60mg tab to equal 90mg daily  -     Ambulatory Referral to Pain Management; Future    Routine adult health maintenance    Other orders  -     cephalexin (KEFLEX) 500 mg capsule; Take 500 mg by mouth 4 (four) times a day          Subjective:     Chief Complaint   Patient presents with   • Physical Exam     Annual physical   • Follow-up     Follow up to back pain        Patient ID: Ronen Díaz is a 56 y o  male  Patient is a 60-year-old male who presents for follow-up on chronic neck pain and low back pain and diffuse body pain type 2 diabetes with diabetic neuropathy morbid obesity patient admits being stable medications denies other acute complaints other than some breakthrough pain no fevers chills nausea vomiting tolerating p o  intake is also here for a annual well visit he admits he is trying to be active but is limited by his pain when he tries to eat healthfully he sleeps with difficulty due to the pain no other health maintenance issues      The following portions of the patient's history were reviewed and updated as appropriate: allergies, current medications, past family history, past medical history, past social history, past surgical history and problem list     Review of Systems   Constitutional: Negative  HENT: Negative  Eyes: Negative  Respiratory: Negative  Cardiovascular: Negative  Gastrointestinal: Negative  Endocrine: Negative  Genitourinary: Negative  Musculoskeletal: Negative  Allergic/Immunologic: Negative  Neurological: Negative  Hematological: Negative  Psychiatric/Behavioral: Negative  All other systems reviewed and are negative          Objective:      /88 (BP Location: Left arm, Patient Position: Sitting, Cuff Size: Large)   Pulse 82   Temp 98 9 °F (37 2 °C) (Tympanic)  5' 8" (1 727 m)   Wt (!) 152 kg (335 lb 3 2 oz)   SpO2 98%   BMI 50 97 kg/m²          Physical Exam  Constitutional:       Appearance: He is well-developed  HENT:      Head: Atraumatic  Right Ear: External ear normal       Left Ear: External ear normal    Eyes:      Conjunctiva/sclera: Conjunctivae normal       Pupils: Pupils are equal, round, and reactive to light  Cardiovascular:      Rate and Rhythm: Normal rate and regular rhythm  Heart sounds: Normal heart sounds  Pulmonary:      Effort: Pulmonary effort is normal  No respiratory distress  Breath sounds: Normal breath sounds  Abdominal:      General: Bowel sounds are normal  There is no distension  Palpations: Abdomen is soft  Tenderness: There is no abdominal tenderness  There is no guarding or rebound  Musculoskeletal:         General: Normal range of motion  Cervical back: Normal range of motion  Skin:     General: Skin is warm and dry  Neurological:      Mental Status: He is alert and oriented to person, place, and time  Cranial Nerves: No cranial nerve deficit  Psychiatric:         Behavior: Behavior normal          Thought Content:  Thought content normal          Judgment: Judgment normal

## 2022-10-24 NOTE — PATIENT INSTRUCTIONS

## 2022-10-28 DIAGNOSIS — M54.50 CHRONIC BILATERAL LOW BACK PAIN WITHOUT SCIATICA: ICD-10-CM

## 2022-10-28 DIAGNOSIS — G89.29 CHRONIC BILATERAL LOW BACK PAIN WITHOUT SCIATICA: ICD-10-CM

## 2022-10-28 DIAGNOSIS — E13.9 DIABETES MELLITUS OF OTHER TYPE WITHOUT COMPLICATION, UNSPECIFIED WHETHER LONG TERM INSULIN USE (HCC): ICD-10-CM

## 2022-10-28 DIAGNOSIS — G89.29 CHRONIC NECK PAIN: ICD-10-CM

## 2022-10-28 DIAGNOSIS — M54.2 CHRONIC NECK PAIN: ICD-10-CM

## 2022-10-28 DIAGNOSIS — E11.42 DIABETIC POLYNEUROPATHY ASSOCIATED WITH TYPE 2 DIABETES MELLITUS (HCC): ICD-10-CM

## 2022-10-28 RX ORDER — DULOXETIN HYDROCHLORIDE 30 MG/1
CAPSULE, DELAYED RELEASE ORAL
Qty: 90 CAPSULE | Refills: 1 | Status: SHIPPED | OUTPATIENT
Start: 2022-10-28

## 2022-10-28 RX ORDER — DULOXETIN HYDROCHLORIDE 60 MG/1
CAPSULE, DELAYED RELEASE ORAL
Qty: 90 CAPSULE | Refills: 1 | Status: SHIPPED | OUTPATIENT
Start: 2022-10-28

## 2022-11-01 ENCOUNTER — TELEPHONE (OUTPATIENT)
Dept: FAMILY MEDICINE CLINIC | Facility: CLINIC | Age: 47
End: 2022-11-01

## 2022-11-01 ENCOUNTER — OFFICE VISIT (OUTPATIENT)
Dept: FAMILY MEDICINE CLINIC | Facility: CLINIC | Age: 47
End: 2022-11-01

## 2022-11-01 VITALS
HEIGHT: 68 IN | WEIGHT: 315 LBS | OXYGEN SATURATION: 98 % | SYSTOLIC BLOOD PRESSURE: 164 MMHG | BODY MASS INDEX: 47.74 KG/M2 | HEART RATE: 98 BPM | DIASTOLIC BLOOD PRESSURE: 118 MMHG | RESPIRATION RATE: 22 BRPM | TEMPERATURE: 98.9 F

## 2022-11-01 DIAGNOSIS — E11.42 DIABETIC POLYNEUROPATHY ASSOCIATED WITH TYPE 2 DIABETES MELLITUS (HCC): ICD-10-CM

## 2022-11-01 DIAGNOSIS — E13.42 OTHER SPECIFIED DIABETES MELLITUS WITH DIABETIC POLYNEUROPATHY, UNSPECIFIED WHETHER LONG TERM INSULIN USE (HCC): ICD-10-CM

## 2022-11-01 DIAGNOSIS — G47.19 EXCESSIVE DAYTIME SLEEPINESS: ICD-10-CM

## 2022-11-01 DIAGNOSIS — M25.50 ARTHRALGIA, UNSPECIFIED JOINT: Primary | ICD-10-CM

## 2022-11-01 DIAGNOSIS — I10 ESSENTIAL HYPERTENSION: ICD-10-CM

## 2022-11-01 RX ORDER — ATENOLOL 100 MG/1
100 TABLET ORAL DAILY
Qty: 90 TABLET | Refills: 1 | Status: SHIPPED | OUTPATIENT
Start: 2022-11-01

## 2022-11-01 RX ORDER — SPIRONOLACTONE 25 MG/1
25 TABLET ORAL
Qty: 30 TABLET | Refills: 1 | Status: SHIPPED | OUTPATIENT
Start: 2022-11-01

## 2022-11-01 RX ORDER — LOSARTAN POTASSIUM AND HYDROCHLOROTHIAZIDE 25; 100 MG/1; MG/1
1 TABLET ORAL DAILY
Qty: 90 TABLET | Refills: 1 | Status: SHIPPED | OUTPATIENT
Start: 2022-11-01

## 2022-11-01 NOTE — PROGRESS NOTES
Diabetic Foot Exam    Patient's shoes and socks removed  Right Foot/Ankle   Right Foot Inspection  Skin Exam: skin normal and skin intact  No dry skin, no warmth, no callus, no erythema, no maceration, no abnormal color, no pre-ulcer, no ulcer and no callus  Toe Exam: ROM and strength within normal limits  Sensory   Vibration: absent  Proprioception: absent  Monofilament testing: absent    Vascular  Capillary refills: < 3 seconds  The right DP pulse is 2+  The right PT pulse is 2+  Left Foot/Ankle  Left Foot Inspection  Skin Exam: skin normal and skin intact  No dry skin, no warmth, no erythema, no maceration, normal color, no pre-ulcer, no ulcer and no callus  Toe Exam: ROM and strength within normal limits and left toe deformity  Sensory   Vibration: absent  Proprioception: absent  Monofilament testing: absent    Vascular  Capillary refills: < 3 seconds  The left DP pulse is 2+  The left PT pulse is 2+       Assign Risk Category  Deformity present  Loss of protective sensation  No weak pulses  Risk: 2

## 2022-11-03 PROBLEM — M25.50 ARTHRALGIA: Status: ACTIVE | Noted: 2022-11-03

## 2022-11-03 PROBLEM — G47.19 EXCESSIVE DAYTIME SLEEPINESS: Status: ACTIVE | Noted: 2022-11-03

## 2022-11-03 PROBLEM — I10 ESSENTIAL HYPERTENSION: Status: ACTIVE | Noted: 2022-11-03

## 2022-11-03 NOTE — PROGRESS NOTES
Assessment/Plan:    43-year-old male with: chronic pain arthralgias type 2 diabetes with peripheral neuropathy hypertension along with excessive daytime sleepiness discussed workup and treatment options with risks and benefits will refer to Rheumatology will begin trial of spironolactone check follow-up labs will refer for sleep study discussed supportive care return parameters otherwise follow-up in 1 month    No problem-specific Assessment & Plan notes found for this encounter  Diagnoses and all orders for this visit:    Arthralgia, unspecified joint  -     Ambulatory Referral to Rheumatology; Future  -     spironolactone (ALDACTONE) 25 mg tablet; Take 1 tablet (25 mg total) by mouth daily at bedtime  -     Comprehensive metabolic panel; Future    Essential hypertension  -     losartan-hydrochlorothiazide (HYZAAR) 100-25 MG per tablet; Take 1 tablet by mouth daily  -     atenolol (TENORMIN) 100 mg tablet; Take 1 tablet (100 mg total) by mouth daily  -     spironolactone (ALDACTONE) 25 mg tablet; Take 1 tablet (25 mg total) by mouth daily at bedtime  -     Comprehensive metabolic panel; Future    Excessive daytime sleepiness  -     Home Study; Future  -     spironolactone (ALDACTONE) 25 mg tablet; Take 1 tablet (25 mg total) by mouth daily at bedtime  -     Comprehensive metabolic panel; Future    Diabetic polyneuropathy associated with type 2 diabetes mellitus ([facility] )    Other specified diabetes mellitus with diabetic polyneuropathy, unspecified whether long term insulin use ([facility] )          Subjective:     Chief Complaint   Patient presents with   • Back Pain   • disabilty forms    • Flu Vaccine     Declined the flu vaccine         Patient ID: Cy Primrose is a 56 y o  male      Patient is a 43-year-old male presents for follow-up on chronic pain arthralgias type 2 diabetes with peripheral neuropathy hypertension along with excessive daytime sleepiness is having breakthrough blood pressures no fevers chills nausea vomiting tolerating p o  intake no other complaints acutely except for the axis chronic pain     Back Pain        The following portions of the patient's history were reviewed and updated as appropriate: allergies, current medications, past family history, past medical history, past social history, past surgical history and problem list     Review of Systems   Constitutional: Negative  HENT: Negative  Eyes: Negative  Respiratory: Negative  Cardiovascular: Negative  Gastrointestinal: Negative  Endocrine: Negative  Genitourinary: Negative  Musculoskeletal: Positive for arthralgias, back pain and myalgias  Allergic/Immunologic: Negative  Neurological: Negative  Hematological: Negative  Psychiatric/Behavioral: Negative  All other systems reviewed and are negative  Objective:      BP (!) 164/118   Pulse 98   Temp 98 9 °F (37 2 °C)   Resp 22   Ht 5' 8" (1 727 m)   Wt (!) 156 kg (345 lb)   SpO2 98%   BMI 52 46 kg/m²          Physical Exam  Constitutional:       Appearance: He is well-developed  HENT:      Head: Atraumatic  Right Ear: External ear normal       Left Ear: External ear normal    Eyes:      Conjunctiva/sclera: Conjunctivae normal       Pupils: Pupils are equal, round, and reactive to light  Cardiovascular:      Rate and Rhythm: Normal rate and regular rhythm  Heart sounds: Normal heart sounds  Pulmonary:      Effort: Pulmonary effort is normal  No respiratory distress  Breath sounds: Normal breath sounds  Abdominal:      General: There is no distension  Palpations: Abdomen is soft  Tenderness: There is no abdominal tenderness  There is no guarding or rebound  Musculoskeletal:         General: Normal range of motion  Cervical back: Normal range of motion  Skin:     General: Skin is warm and dry  Neurological:      Mental Status: He is alert and oriented to person, place, and time  Cranial Nerves:  No cranial nerve deficit  Psychiatric:         Behavior: Behavior normal          Thought Content: Thought content normal          Judgment: Judgment normal          BMI Counseling: Body mass index is 52 46 kg/m²  The BMI is above normal  Nutrition recommendations include encouraging healthy choices of fruits and vegetables  Exercise recommendations include moderate physical activity 150 minutes/week  Rationale for BMI follow-up plan is due to patient being overweight or obese  Depression Screening and Follow-up Plan: Patient was screened for depression during today's encounter  They screened negative with a PHQ-2 score of 0

## 2022-11-23 DIAGNOSIS — M25.50 ARTHRALGIA, UNSPECIFIED JOINT: ICD-10-CM

## 2022-11-23 DIAGNOSIS — I10 ESSENTIAL HYPERTENSION: ICD-10-CM

## 2022-11-23 DIAGNOSIS — G47.19 EXCESSIVE DAYTIME SLEEPINESS: ICD-10-CM

## 2022-11-23 RX ORDER — SPIRONOLACTONE 25 MG/1
TABLET ORAL
Qty: 90 TABLET | Refills: 1 | Status: SHIPPED | OUTPATIENT
Start: 2022-11-23

## 2023-01-10 ENCOUNTER — OFFICE VISIT (OUTPATIENT)
Dept: RHEUMATOLOGY | Facility: CLINIC | Age: 48
End: 2023-01-10

## 2023-01-10 VITALS
WEIGHT: 315 LBS | BODY MASS INDEX: 47.74 KG/M2 | HEIGHT: 68 IN | SYSTOLIC BLOOD PRESSURE: 140 MMHG | DIASTOLIC BLOOD PRESSURE: 90 MMHG

## 2023-01-10 DIAGNOSIS — G89.29 CHRONIC LOW BACK PAIN, UNSPECIFIED BACK PAIN LATERALITY, UNSPECIFIED WHETHER SCIATICA PRESENT: Primary | ICD-10-CM

## 2023-01-10 DIAGNOSIS — R53.81 MALAISE AND FATIGUE: ICD-10-CM

## 2023-01-10 DIAGNOSIS — R53.83 MALAISE AND FATIGUE: ICD-10-CM

## 2023-01-10 DIAGNOSIS — M54.50 CHRONIC LOW BACK PAIN, UNSPECIFIED BACK PAIN LATERALITY, UNSPECIFIED WHETHER SCIATICA PRESENT: Primary | ICD-10-CM

## 2023-01-10 DIAGNOSIS — M25.50 ARTHRALGIA, UNSPECIFIED JOINT: ICD-10-CM

## 2023-01-10 NOTE — PROGRESS NOTES
Rheumatology Consult   Swapna Garcia 56 y o  male 1975    DATE: 1/10/2023    Reason for Consult: arthralgia/back pain    Assessment and Plan:  Inflammatory spinal pain--check HLA B27, esr/crp, xray SI joints  PT LS spine--comprehensive spine referral placed  F/u with cardiology given diagnosis of CHF and bilateral LE pitting edema  Anti-inflammatory diet/exercise/weight control  Increase CV fitness/aerobic activity  Patient to continue to monitor symptoms  Topical NSAIDs/analgesics PRN joint pain  Continue to monitor labs including CRP  Continue duloxetine for neuropathic pain and chronic low back pain  F/u in 6 mos  or sooner if necessary    History of Present Illness:  Swapna Garcia is a 56 y o  male Here for initial evaluation for polyarthralgias, chronic low back pain and malaise  He has had some improvement on oral NSAIDs and PT but is limited in his ability to take NSAIDs given his CRI  He has had multiple tendinopathies and inflammatory spinal pain  Review of Systems  Review of Systems   Constitutional: Positive for fatigue  HENT: Negative for mouth sores  Respiratory: Negative for cough and shortness of breath  Cardiovascular: Negative for chest pain and leg swelling  Gastrointestinal: Negative for abdominal pain, constipation and diarrhea  Musculoskeletal: Positive for arthralgias and back pain  Negative for joint swelling and myalgias  Skin: Negative for color change and rash  Neurological: Negative for weakness  Hematological: Negative for adenopathy  Psychiatric/Behavioral: Negative for sleep disturbance  Allergies  Allergies   Allergen Reactions   • Penicillins Other (See Comments)     Tolerated Cefazolin previously in 2018 without a problem         Current Medications      Past Medical History  Past Medical History:   Diagnosis Date   • Diabetes mellitus ([facility] [address] )    • Hyperlipemia    • Hypertension        Past Surgical History  Past Surgical History:   Procedure Laterality Date   • CATARACT EXTRACTION     • IR PICC PLACEMENT SINGLE LUMEN  2/1/2022   • TOE AMPUTATION Left 1/26/2022    Procedure: PARTIAL LEFT HALLUX AMPUTATION;  Surgeon: Heather Chisholm DPM;  Location: [facility] MAIN OR;  Service: Podiatry       Family History  No known autoimmune or inflammatory diseases in the family  Family History   Problem Relation Age of Onset   • Diabetes Mother    • Breast cancer Mother    • Heart disease Father    • Prostate cancer Father        Social History  Occupation: administrative  Social History     Substance and Sexual Activity   Alcohol Use Not Currently     Social History     Substance and Sexual Activity   Drug Use No     Social History     Tobacco Use   Smoking Status Former   Smokeless Tobacco Never   Tobacco Comments    [employer] says never smoker and no passive smoke exposure        Objective:  /90   Ht 5' 8" (1 727 m)   Wt (!) 161 kg (354 lb 3 2 oz)   BMI 53 86 kg/m²     Physical Exam  Constitutional:       General: He is not in acute distress  HENT:      Head: Normocephalic and atraumatic  Eyes:      Conjunctiva/sclera: Conjunctivae normal    Cardiovascular:      Rate and Rhythm: Normal rate and regular rhythm  Heart sounds: S1 normal and S2 normal      No friction rub  Pulmonary:      Effort: Pulmonary effort is normal  No respiratory distress  Breath sounds: Normal breath sounds  No wheezing, rhonchi or rales  Musculoskeletal:      Cervical back: Neck supple  Skin:     General: Skin is warm and dry  Coloration: Skin is not pale  Findings: No rash  Neurological:      Mental Status: He is alert  Mental status is at baseline  Psychiatric:         Mood and Affect: Mood normal          Behavior: Behavior normal             Lab Results: I have personally reviewed pertinent reports        CBC:   , Chemistry Profile:       Invalid input(s): ALBUMIN, Coagulation Studies:   , Cardiac Studies:   , Additional Labs:   , iSTAT CHEM 8: Invalid input(s): POTASSIUMIS, ABG:   , Toxicology:   , Last A1C/Lipid Panel/Thyroid Panel:   Lab Results   Component Value Date    HGBA1C 8 2 (A) 10/20/2022    HGBA1C 6 9 (A) 03/30/2022    TRIG 252 (H) 09/27/2018    HDL 23 (L) 09/27/2018    LDLCALC 102 (H) 09/27/2018   ,   Lab Results   Component Value Date    RUSH Negative 10/17/2022    RF Negative 10/17/2022           Invalid input(s): URIBILINOGEN         Imaging: I have personally reviewed pertinent films in PACS

## 2023-01-10 NOTE — PATIENT INSTRUCTIONS
Please have your blood work done as soon as you are able to  It is non-fasting  I also ordered an xray of your sacroiliac joints  You can try over the counter Salonpas lidocaine patches and apply it to your painful back and leave on for 12 hours  Continue to eat healthy and exercise and follow with your cardiologist   Winnie Bird can try over the counter Aspercreme with lidocaine and apply it to your painful joints 2-3 times per day if necessary  Take Tylenol Arthritis strength 2 tabs once of twice a day for your joint pain

## 2023-01-12 ENCOUNTER — TELEPHONE (OUTPATIENT)
Dept: PHYSICAL THERAPY | Facility: OTHER | Age: 48
End: 2023-01-12

## 2023-02-09 ENCOUNTER — TELEPHONE (OUTPATIENT)
Dept: FAMILY MEDICINE CLINIC | Facility: CLINIC | Age: 48
End: 2023-02-09

## 2023-02-09 ENCOUNTER — OFFICE VISIT (OUTPATIENT)
Dept: FAMILY MEDICINE CLINIC | Facility: CLINIC | Age: 48
End: 2023-02-09

## 2023-02-09 VITALS
BODY MASS INDEX: 47.74 KG/M2 | SYSTOLIC BLOOD PRESSURE: 140 MMHG | OXYGEN SATURATION: 98 % | HEART RATE: 103 BPM | HEIGHT: 68 IN | DIASTOLIC BLOOD PRESSURE: 86 MMHG | WEIGHT: 315 LBS | TEMPERATURE: 99.7 F

## 2023-02-09 DIAGNOSIS — J06.9 ACUTE UPPER RESPIRATORY INFECTION: Primary | ICD-10-CM

## 2023-02-09 RX ORDER — AZITHROMYCIN 250 MG/1
TABLET, FILM COATED ORAL
Qty: 6 TABLET | Refills: 0 | Status: SHIPPED | OUTPATIENT
Start: 2023-02-09 | End: 2023-02-14

## 2023-02-09 NOTE — LETTER
February 9, 2023     Patient: Malcolm Ballesteros  YOB: 1975  Date of Visit: 2/9/2023      To Whom it May Concern:    Thaddeusalena Clemnete is under my professional care  Eunice Coello was seen in my office on 2/9/2023  Eunice Coello may return to work on 2/13/2023  Please excuse patient's absence 2/8 - 2/10       If you have any questions or concerns, please don't hesitate to call           Sincerely,          Reyna Hi MD        CC: No Recipients

## 2023-02-10 LAB
FLUAV RNA RESP QL NAA+PROBE: NEGATIVE
FLUBV RNA RESP QL NAA+PROBE: NEGATIVE
SARS-COV-2 RNA RESP QL NAA+PROBE: NEGATIVE

## 2023-02-10 NOTE — RESULT ENCOUNTER NOTE
Please call patient  Labs show covid and flu tests are negative Will discuss more fully at next visit

## 2023-02-13 NOTE — PROGRESS NOTES
COVID-19 Outpatient Progress Note    Assessment/Plan:    Problem List Items Addressed This Visit        Respiratory    Acute upper respiratory infection - Primary    Relevant Medications    azithromycin (Zithromax) 250 mg tablet    Other Relevant Orders    Covid/Flu- Office Collect (Completed)      Disposition:     PCR testing will be performed to test for COVID-19/Influenza  I have spent 10 minutes directly with the patient  Greater than 50% of this time was spent in counseling/coordination of care regarding: risks and benefits of treatment options, instructions for management and patient and family education  Encounter provider: Olivier Rob MD     Provider located at: [address] [place] [address] , 2000  [address] [address]  [address] [id number]  [phone]     Recent Visits  Date Type Provider Dept   02/09/23 Telephone Olivier Rob MD 1 [facility],[address] [facility]   02/09/23 Office Visit Olivier Rob MD [facility] 93 recent visits within past 7 days and meeting all other requirements  Future Appointments  No visits were found meeting these conditions  Showing future appointments within next 150 days and meeting all other requirements     Subjective:   Tom Joseph is a 56 y o  male who is concerned about COVID-19  Patient's symptoms include nasal congestion and cough  COVID-19 vaccination status: Fully vaccinated with booster    Exposure:     Hospitalized recently for fever and/or lower respiratory symptoms?: No      Currently a healthcare worker that is involved in direct patient care?: No      Works in a special setting where the risk of COVID-19 transmission may be high? (this may include long-term care, correctional and FPC facilities; homeless shelters; assisted-living facilities and group homes ): No      Resident in a special setting where the risk of COVID-19 transmission may be high? (this may include long-term care, correctional and longterm facilities; homeless shelters; assisted-living facilities and group homes ): No      Lab Results   Component Value Date    SARSCOV2 Negative 02/09/2023    SARSCOV2 Not Detected 10/12/2020       Review of Systems   Constitutional: Negative  HENT: Positive for congestion and sinus pressure  Eyes: Negative  Respiratory: Positive for cough  Cardiovascular: Negative  Gastrointestinal: Negative  Endocrine: Negative  Genitourinary: Negative  Musculoskeletal: Negative  Allergic/Immunologic: Negative  Neurological: Negative  Hematological: Negative  Psychiatric/Behavioral: Negative  All other systems reviewed and are negative  Current Outpatient Medications on File Prior to Visit   Medication Sig   • amLODIPine (NORVASC) 10 mg tablet TAKE 1 TABLET BY MOUTH EVERYDAY AT BEDTIME   • atenolol (TENORMIN) 100 mg tablet Take 1 tablet (100 mg total) by mouth daily   • carisoprodol (SOMA) 350 mg tablet Take 1 tablet (350 mg total) by mouth 3 (three) times a day   • DULoxetine (CYMBALTA) 30 mg delayed release capsule TAKE 1 TAB WITH A 60MG TAB TO EQUAL 90MG DAILY  • DULoxetine (CYMBALTA) 60 mg delayed release capsule TAKE 1 TABLET BY MOUTH WITH A 30MG TAB TO EQUAL 90MG DAILY     • insulin degludec Ema Levy FlexTouch) 200 units/mL CONCENTRATED U-200 injection pen Inject 65 Units under the skin daily at bedtime   • Insulin Pen Needle (PEN NEEDLES) 31G X 6 MM MISC Inject under the skin 3 (three) times a day   • losartan-hydrochlorothiazide (HYZAAR) 100-25 MG per tablet Take 1 tablet by mouth daily   • metFORMIN (GLUCOPHAGE-XR) 500 mg 24 hr tablet TAKE 1 TABLET BY MOUTH TWICE A DAY WITH MEALS   • semaglutide, 1 mg/dose, (Ozempic, 1 MG/DOSE,) 4 MG/3ML SOPN injection pen Inject 0 75 mL (1 mg total) under the skin once a week   • spironolactone (ALDACTONE) 25 mg tablet TAKE 1 TABLET BY MOUTH DAILY AT BEDTIME   • cefdinir (OMNICEF) 300 mg capsule TAKE 1 CAPSULE BY MOUTH EVERY 12 HOURS FOR 7 DAYS (Patient not taking: Reported on 1/10/2023)   • collagenase (SANTYL) ointment Apply topically daily (Patient not taking: Reported on 3/30/2022)   • meclizine (ANTIVERT) 25 mg tablet Take 25 mg by mouth Three times daily as needed (Patient not taking: Reported on 1/10/2023)   • ondansetron (Zofran ODT) 4 mg disintegrating tablet Take 1 tablet (4 mg total) by mouth every 8 (eight) hours as needed for nausea or vomiting (Patient not taking: Reported on 1/10/2023)       Objective:    /86 (BP Location: Left arm, Patient Position: Sitting, Cuff Size: Large)   Pulse 103   Temp 99 7 °F (37 6 °C) (Tympanic)   Ht 5' 8" (1 727 m)   Wt (!) 155 kg (342 lb)   SpO2 98%   BMI 52 00 kg/m²      Physical Exam  Vitals reviewed  Constitutional:       General: He is not in acute distress  Appearance: He is well-developed  He is not diaphoretic  HENT:      Head: Normocephalic and atraumatic  Right Ear: External ear normal       Left Ear: External ear normal       Nose: Nose normal    Eyes:      General: No scleral icterus  Right eye: No discharge  Left eye: No discharge  Conjunctiva/sclera: Conjunctivae normal       Pupils: Pupils are equal, round, and reactive to light  Neck:      Thyroid: No thyromegaly  Trachea: No tracheal deviation  Cardiovascular:      Rate and Rhythm: Normal rate and regular rhythm  Heart sounds: Normal heart sounds  No murmur heard  No friction rub  Pulmonary:      Effort: Pulmonary effort is normal  No respiratory distress  Breath sounds: Normal breath sounds  No stridor  No wheezing or rales  Abdominal:      General: There is no distension  Palpations: Abdomen is soft  There is no mass  Tenderness: There is no abdominal tenderness  There is no guarding or rebound  Musculoskeletal:         General: Normal range of motion        Cervical back: Normal range of motion and neck supple  Lymphadenopathy:      Cervical: No cervical adenopathy  Skin:     General: Skin is warm  Neurological:      Mental Status: He is alert and oriented to person, place, and time  Cranial Nerves: Cranial nerve deficit present  Psychiatric:         Behavior: Behavior normal          Thought Content:  Thought content normal          Judgment: Judgment normal        Tomi Mckee MD

## 2023-03-21 ENCOUNTER — TELEPHONE (OUTPATIENT)
Dept: RHEUMATOLOGY | Facility: CLINIC | Age: 48
End: 2023-03-21

## 2023-04-10 PROBLEM — J06.9 ACUTE UPPER RESPIRATORY INFECTION: Status: RESOLVED | Noted: 2023-02-09 | Resolved: 2023-04-10

## 2023-06-21 ENCOUNTER — OFFICE VISIT (OUTPATIENT)
Dept: FAMILY MEDICINE CLINIC | Facility: CLINIC | Age: 48
End: 2023-06-21
Payer: COMMERCIAL

## 2023-06-21 VITALS
BODY MASS INDEX: 46.65 KG/M2 | TEMPERATURE: 97.4 F | OXYGEN SATURATION: 98 % | HEART RATE: 103 BPM | WEIGHT: 315 LBS | DIASTOLIC BLOOD PRESSURE: 120 MMHG | SYSTOLIC BLOOD PRESSURE: 212 MMHG | HEIGHT: 69 IN

## 2023-06-21 DIAGNOSIS — E13.9 DIABETES MELLITUS OF OTHER TYPE WITHOUT COMPLICATION, UNSPECIFIED WHETHER LONG TERM INSULIN USE (HCC): ICD-10-CM

## 2023-06-21 DIAGNOSIS — I10 ESSENTIAL HYPERTENSION: ICD-10-CM

## 2023-06-21 DIAGNOSIS — I50.9 ACUTE CONGESTIVE HEART FAILURE, UNSPECIFIED HEART FAILURE TYPE (HCC): ICD-10-CM

## 2023-06-21 DIAGNOSIS — G89.29 CHRONIC MIDLINE THORACIC BACK PAIN: ICD-10-CM

## 2023-06-21 DIAGNOSIS — E11.65 UNCONTROLLED TYPE 2 DIABETES MELLITUS WITH HYPERGLYCEMIA (HCC): ICD-10-CM

## 2023-06-21 DIAGNOSIS — E11.42 DIABETIC POLYNEUROPATHY ASSOCIATED WITH TYPE 2 DIABETES MELLITUS (HCC): Primary | ICD-10-CM

## 2023-06-21 DIAGNOSIS — E66.01 MORBID OBESITY (HCC): ICD-10-CM

## 2023-06-21 DIAGNOSIS — M54.6 CHRONIC MIDLINE THORACIC BACK PAIN: ICD-10-CM

## 2023-06-21 DIAGNOSIS — E78.5 HYPERLIPIDEMIA, UNSPECIFIED HYPERLIPIDEMIA TYPE: ICD-10-CM

## 2023-06-21 DIAGNOSIS — E13.42 OTHER SPECIFIED DIABETES MELLITUS WITH DIABETIC POLYNEUROPATHY, UNSPECIFIED WHETHER LONG TERM INSULIN USE (HCC): ICD-10-CM

## 2023-06-21 LAB
CREAT UR-MCNC: 59.9 MG/DL
MICROALBUMIN UR-MCNC: 2020 MG/L (ref 0–20)
MICROALBUMIN/CREAT 24H UR: 3372 MG/G CREATININE (ref 0–30)
SL AMB POCT HEMOGLOBIN AIC: 10.4 (ref ?–6.5)

## 2023-06-21 PROCEDURE — 83036 HEMOGLOBIN GLYCOSYLATED A1C: CPT | Performed by: FAMILY MEDICINE

## 2023-06-21 PROCEDURE — 82043 UR ALBUMIN QUANTITATIVE: CPT | Performed by: FAMILY MEDICINE

## 2023-06-21 PROCEDURE — 82570 ASSAY OF URINE CREATININE: CPT | Performed by: FAMILY MEDICINE

## 2023-06-21 PROCEDURE — 99214 OFFICE O/P EST MOD 30 MIN: CPT | Performed by: FAMILY MEDICINE

## 2023-06-21 RX ORDER — INSULIN DEGLUDEC 200 U/ML
50 INJECTION, SOLUTION SUBCUTANEOUS
Qty: 12 ML | Refills: 2 | Status: SHIPPED | OUTPATIENT
Start: 2023-06-21

## 2023-06-21 RX ORDER — PREGABALIN 75 MG/1
75 CAPSULE ORAL 3 TIMES DAILY
Qty: 90 CAPSULE | Refills: 1 | Status: SHIPPED | OUTPATIENT
Start: 2023-06-21

## 2023-06-21 RX ORDER — ATENOLOL 100 MG/1
100 TABLET ORAL DAILY
Qty: 90 TABLET | Refills: 1 | Status: SHIPPED | OUTPATIENT
Start: 2023-06-21

## 2023-06-22 DIAGNOSIS — I10 RESISTANT HYPERTENSION: ICD-10-CM

## 2023-06-22 DIAGNOSIS — R80.9 NEPHROTIC RANGE PROTEINURIA: ICD-10-CM

## 2023-06-22 DIAGNOSIS — E11.42 DIABETIC POLYNEUROPATHY ASSOCIATED WITH TYPE 2 DIABETES MELLITUS (HCC): Primary | ICD-10-CM

## 2023-06-22 NOTE — RESULT ENCOUNTER NOTE
Please call patient   Recommend seeing nephrology due to the significant nephrotic range proteinuria

## 2023-06-23 DIAGNOSIS — R80.9 PROTEINURIA, UNSPECIFIED TYPE: Primary | ICD-10-CM

## 2023-06-23 NOTE — PROGRESS NOTES
Assessment/Plan:    55 y/o male with:  DM2 with diabetic polyneuropathy, HTN, HLD, CHF, chronic pain and morbid obesity  Will restart meds  Discussed supportive care and return parameters  Follow-up BP check in 2 weeks, follow-up in 3 mo  PAPDMP reviewed and refilled  No problem-specific Assessment & Plan notes found for this encounter  Diagnoses and all orders for this visit:    Diabetic polyneuropathy associated with type 2 diabetes mellitus ([facility] [address] )  -     Albumin / creatinine urine ratio  -     POCT hemoglobin A1c  -     Ambulatory Referral to Ophthalmology; Future  -     semaglutide, 2 mg/dose, (Ozempic) 8 mg/ mL injection pen; Inject 0 75 mL (2 mg total) under the skin every 7 days  -     pregabalin (LYRICA) 75 mg capsule; Take 1 capsule (75 mg total) by mouth 3 (three) times a day  -     CBC and differential; Future  -     Comprehensive metabolic panel; Future  -     TSH, 3rd generation with Free T4 reflex; Future  -     Lipid Panel with Direct LDL reflex; Future  -     Albumin / creatinine urine ratio  -     UA (URINE) with reflex to Scope    Uncontrolled type 2 diabetes mellitus with hyperglycemia (HCC)  -     semaglutide, 2 mg/dose, (Ozempic) 8 mg/ mL injection pen; Inject 0 75 mL (2 mg total) under the skin every 7 days  -     CBC and differential; Future  -     Comprehensive metabolic panel; Future  -     TSH, 3rd generation with Free T4 reflex; Future  -     Lipid Panel with Direct LDL reflex; Future  -     Albumin / creatinine urine ratio  -     UA (URINE) with reflex to Scope    Diabetes mellitus of other type without complication, unspecified whether long term insulin use (HCC)  -     insulin degludec Shadi Roca FlexTouch) 200 units/mL CONCENTRATED U-200 injection pen; Inject 50 Units under the skin daily at bedtime  -     CBC and differential; Future  -     Comprehensive metabolic panel; Future  -     TSH, 3rd generation with Free T4 reflex;  Future  -     Lipid Panel with Direct LDL reflex; Future  -     Albumin / creatinine urine ratio  -     UA (URINE) with reflex to Scope    Essential hypertension  -     atenolol (TENORMIN) 100 mg tablet; Take 1 tablet (100 mg total) by mouth daily  -     CBC and differential; Future  -     Comprehensive metabolic panel; Future  -     TSH, 3rd generation with Free T4 reflex; Future  -     Lipid Panel with Direct LDL reflex; Future  -     Albumin / creatinine urine ratio  -     UA (URINE) with reflex to Scope    Other specified diabetes mellitus with diabetic polyneuropathy, unspecified whether long term insulin use (HCC)  -     CBC and differential; Future  -     Comprehensive metabolic panel; Future  -     TSH, 3rd generation with Free T4 reflex; Future  -     Lipid Panel with Direct LDL reflex; Future  -     Albumin / creatinine urine ratio  -     UA (URINE) with reflex to Scope    Acute congestive heart failure, unspecified heart failure type [facility])  -     Ambulatory Referral to Cardiology; Future  -     CBC and differential; Future  -     Comprehensive metabolic panel; Future  -     TSH, 3rd generation with Free T4 reflex; Future  -     Lipid Panel with Direct LDL reflex; Future  -     Albumin / creatinine urine ratio  -     UA (URINE) with reflex to Scope    Hyperlipidemia, unspecified hyperlipidemia type  -     CBC and differential; Future  -     Comprehensive metabolic panel; Future  -     TSH, 3rd generation with Free T4 reflex; Future  -     Lipid Panel with Direct LDL reflex; Future  -     Albumin / creatinine urine ratio  -     UA (URINE) with reflex to Scope    Morbid obesity (HCC)  -     CBC and differential; Future  -     Comprehensive metabolic panel; Future  -     TSH, 3rd generation with Free T4 reflex; Future  -     Lipid Panel with Direct LDL reflex;  Future  -     Albumin / creatinine urine ratio  -     UA (URINE) with reflex to Scope    Chronic midline thoracic back pain  -     XR spine thoracic 3 vw; Future  -     XR spine lumbar minimum 4 views non injury; Future  -     Ambulatory Referral to Sports Medicine; Future  -     pregabalin (LYRICA) 75 mg capsule; Take 1 capsule (75 mg total) by mouth 3 (three) times a day  -     CBC and differential; Future  -     Comprehensive metabolic panel; Future  -     TSH, 3rd generation with Free T4 reflex; Future  -     Lipid Panel with Direct LDL reflex; Future  -     Albumin / creatinine urine ratio  -     UA (URINE) with reflex to Scope          Subjective:     Chief Complaint   Patient presents with   • Back Pain     Patient is complaining of back pain along with abdominal pain  He states he is experiencing leg weakness and fatigue in both legs  Patient states he has been having this problem for some time but feels as though it is has been getting worse over the course of the last 4 months  Urine was collected for microalbumin today and A1C was done at today's visit  No further concerns, ng   • Abdominal Pain   • Fatigue        Patient ID: Howard Mata is a 56 y o  male  Patient is a 57 y/o male who presents for follow-up on DM2 with diabetic polyneuropathy, HTN, HLD, CHF, chronic pain and morbid obesity  Patient admits that he has been off of many of his meds for a while and denies acute complaints otherwise no fevers chills nausea or vomiting    Back Pain  Associated symptoms include abdominal pain  Abdominal Pain  Associated symptoms include arthralgias and myalgias  Fatigue  Associated symptoms include abdominal pain, arthralgias, fatigue and myalgias  The following portions of the patient's history were reviewed and updated as appropriate: allergies, current medications, past family history, past medical history, past social history, past surgical history and problem list     Review of Systems   Constitutional: Positive for fatigue  HENT: Negative  Eyes: Negative  Respiratory: Negative  Cardiovascular: Negative  Gastrointestinal: Positive for abdominal pain  Endocrine: Negative  "  Genitourinary: Negative  Musculoskeletal: Positive for arthralgias, back pain and myalgias  Allergic/Immunologic: Negative  Neurological: Negative  Hematological: Negative  Psychiatric/Behavioral: Negative  All other systems reviewed and are negative  Objective:      BP (!) 212/120 (BP Location: Right arm, Patient Position: Sitting, Cuff Size: Large)   Pulse 103   Temp (!) 97 4 °F (36 3 °C) (Tympanic)   Ht 5' 9\" (1 753 m)   Wt (!) 159 kg (349 lb 9 6 oz)   SpO2 98%   BMI 51 63 kg/m²          Physical Exam  Constitutional:       Appearance: He is well-developed  HENT:      Head: Atraumatic  Right Ear: External ear normal       Left Ear: External ear normal    Eyes:      Conjunctiva/sclera: Conjunctivae normal       Pupils: Pupils are equal, round, and reactive to light  Cardiovascular:      Rate and Rhythm: Normal rate and regular rhythm  Heart sounds: Normal heart sounds  Pulmonary:      Effort: Pulmonary effort is normal  No respiratory distress  Breath sounds: Normal breath sounds  Abdominal:      General: Bowel sounds are normal  There is no distension  Palpations: Abdomen is soft  Tenderness: There is no abdominal tenderness  There is no guarding or rebound  Musculoskeletal:         General: Normal range of motion  Cervical back: Normal range of motion  Skin:     General: Skin is warm and dry  Neurological:      Mental Status: He is alert and oriented to person, place, and time  Cranial Nerves: No cranial nerve deficit  Psychiatric:         Behavior: Behavior normal          Thought Content: Thought content normal          Judgment: Judgment normal          BMI Counseling: Body mass index is 51 63 kg/m²  The BMI is above normal  Nutrition recommendations include encouraging healthy choices of fruits and vegetables  Exercise recommendations include moderate physical activity 150 minutes/week   Rationale for BMI follow-up plan is due " to patient being overweight or obese  Depression Screening and Follow-up Plan: Patient was screened for depression during today's encounter  They screened negative with a PHQ-2 score of 0

## 2023-06-23 NOTE — PATIENT INSTRUCTIONS
Type 2 Diabetes Management for Adults   AMBULATORY CARE:   Type 2 diabetes  is a disease that affects how your body uses glucose (sugar)  Either your body cannot make enough insulin, or it cannot use the insulin correctly  It is important to keep diabetes controlled to prevent damage to your heart, blood vessels, and other organs  Management will help you feel well and enjoy your daily activities  Your diabetes care team providers can help you make a plan to fit diabetes care into your schedule  Your plan can change over time to fit your needs and your family's needs  Have someone call your local emergency number (911 in the [address]) if:   • You cannot be woken  • You have signs of diabetic ketoacidosis:     ? confusion, fatigue    ? vomiting    ? rapid heartbeat    ? fruity smelling breath    ? extreme thirst    ? dry mouth and skin    • You have any of the following signs of a heart attack:      ? Squeezing, pressure, or pain in your chest    ? You may  also have any of the following:     - Discomfort or pain in your back, neck, jaw, stomach, or arm    - Shortness of breath    - Nausea or vomiting    - Lightheadedness or a sudden cold sweat    • You have any of the following signs of a stroke:      ? Numbness or drooping on one side of your face     ? Weakness in an arm or leg    ? Confusion or difficulty speaking    ? Dizziness, a severe headache, or vision loss    Call your doctor or diabetes care team provider if:   • You have a sore or wound that will not heal     • You have a change in the amount you urinate  • Your blood sugar levels are higher than your target goals  • You often have lower blood sugar levels than your target goals  • Your skin is red, dry, warm, or swollen  • You have trouble coping with diabetes, or you feel anxious or depressed  • You have questions or concerns about your condition or care      What you need to know about high blood sugar levels:  High blood sugar levels may not cause any symptoms  You may feel more thirsty or urinate more often than usual  Over time, high blood sugar levels can damage your nerves, blood vessels, tissues, and organs  The following can increase your blood sugar levels:  • Large meals or large amounts of carbohydrates at one time    • Less physical activity    • Stress    • Illness    • A lower dose of diabetes medicine or insulin, or a late dose    What you need to know about low blood sugar levels:  Symptoms include feeling shaky, dizzy, irritable, or confused  You can prevent symptoms by keeping your blood sugar levels from going too low  • Treat a low blood sugar level right away:      ? Drink 4 ounces of juice or have 1 tube of glucose gel  ? Check your blood sugar level again 10 to 15 minutes later  ? When the level goes back to normal, eat a meal or snack to prevent another decrease  • Keep glucose gel, raisins, or hard candy with you at all times to treat a low blood sugar level  • Your blood sugar level can get too low if you take diabetes medicine or insulin and do not eat enough food  • If you use insulin, check your blood sugar level before you exercise  ? If your blood sugar level is below 100 mg/dL, eat 4 crackers or 2 ounces of raisins, or drink 4 ounces of juice  ? Check your level every 30 minutes if you exercise longer than 1 hour  ? You may need a snack during or after exercise  What you can do to manage your blood sugar levels:   • Check your blood sugar levels as directed and as needed  Several items are available to use to check your levels  You may need to check by testing a drop of blood in a glucose monitor  You may instead be given a continuous glucose monitoring (CGM) device  The device is worn at all times  The CGM checks your blood sugar level every 5 minutes  It sends results to an electronic device such as a smart phone  A CGM can be used with or without an insulin pump   You and your diabetes care team providers will decide on the best method for you  The goal for blood sugar levels before meals  is between 80 and 130 mg/dL and 2 hours after eating  is lower than 180 mg/dL  • Make healthy food choices  Work with a dietitian to develop a meal plan that works for you and your schedule  A dietitian can help you learn how to eat the right amount of carbohydrates during your meals and snacks  Carbohydrates can raise your blood sugar level if you eat too many at one time  Examples of foods that contain carbohydrates are breads, cereals, rice, pasta, and sweets  • Eat high-fiber foods as directed  Fiber helps improve blood sugar levels  Fiber also lowers your risk for heart disease and other problems diabetes can cause  Examples of high-fiber foods include vegetables, whole-grain bread, and beans such as pope beans  Your dietitian can tell you how much fiber to have each day  • Get regular physical activity  Physical activity can help you get to your target blood sugar level goal and manage your weight  Get at least 150 minutes of moderate to vigorous aerobic physical activity each week  Do not miss more than 2 days in a row  Do not sit longer than 30 minutes at a time  Your healthcare provider can help you create an activity plan  The plan can include the best activities for you and can help you build your strength and endurance  • Maintain a healthy weight  Ask your team what a healthy weight is for you  A healthy weight can help you control diabetes and prevent heart disease  Ask your team to help you create a weight loss plan, if needed  Weight loss can help make a difference in managing diabetes  Your team will help you set a weight-loss goal, such as 10 to 15 pounds, or 5% of your extra weight  Together you and your team can set manageable weight loss goals  • Take your diabetes medicine or insulin as directed    You may need diabetes medicine, insulin, or both to help control your blood sugar levels  Your healthcare provider will teach you how and when to take your diabetes medicine or insulin  You will also be taught about side effects oral diabetes medicine can cause  Insulin may be injected or given through a pump or pen  You and your providers will decide on the best method for you:    ? An insulin pump  is an implanted device that gives your insulin 24 hours a day  An insulin pump prevents the need for multiple insulin injections in a day  ? An insulin pen  is a device prefilled with the right amount of insulin  ? You and your family members will be taught how to draw up and give insulin  if this is the best method for you  Your providers will also teach you how to dispose of needles and syringes  ? You will learn how much insulin you need  and when to give it  You will be taught when not to give insulin  You will also be taught what to do if your blood sugar level drops too low  This may happen if you take insulin and do not eat the right amount of carbohydrates  More ways to manage type 2 diabetes:   • Wear medical alert identification  Wear medical alert jewelry or carry a card that says you have diabetes  Ask your provider where to get these items  • Do not smoke  Nicotine and other chemicals in cigarettes and cigars can cause lung and blood vessel damage  It also makes it more difficult to manage your diabetes  Ask your provider for information if you currently smoke and need help to quit  Do not use e-cigarettes or smokeless tobacco in place of cigarettes or to help you quit  They still contain nicotine  • Check your feet each day for cuts, scratches, calluses, or other wounds  Look for redness and swelling, and feel for warmth  Wear shoes that fit well  Check your shoes for rocks or other objects that can hurt your feet  Do not walk barefoot or wear shoes without socks   Wear cotton socks to help keep your feet dry  • Ask about vaccines you may need  You have a higher risk for serious illness if you get the flu, pneumonia, COVID-19, or hepatitis  Ask your provider if you should get vaccines to prevent these or other diseases, and when to get the vaccines  • Talk to your provider if you become stressed about diabetes care  Sometimes being able to fit diabetes care into your life can cause increased stress  The stress can cause you not to take care of yourself properly  Your care team providers can help by offering tips about self-care  Your providers may suggest you talk to a mental health provider who can listen and offer help with self-care issues  • Have your A1c checked as directed  Your provider may check your A1c every 3 months, or 2 times each year if your diabetes is controlled  An A1c test shows the average amount of sugar in your blood over the past 2 to 3 months  Your provider will tell you what your A1c level should be  • Have screening tests as directed  Your provider may recommend screening for complications of diabetes and other conditions that may develop  Some screenings may begin right away and some may happen within the first 5 years of diagnosis:    ? Examples of diabetes complications  include kidney problems, high cholesterol, high blood pressure, blood vessel problems, eye problems, and sleep apnea  ? You may be screened for a low vitamin B level  if you take oral diabetes medicine for a long time  ? Women of childbearing years may be screened  for polycystic ovarian syndrome (PCOS)  Follow up with your doctor or diabetes care team providers as directed: You may need to have blood tests done before your follow-up visit  The test results will show if changes need to be made in your treatment or self-care  Talk to your provider if you cannot afford your medicine  Write down your questions so you remember to ask them during your visits    © Copyright [employer] 2022 Information is for End User's use only and may not be sold, redistributed or otherwise used for commercial purposes  The above information is an  only  It is not intended as medical advice for individual conditions or treatments  Talk to your doctor, nurse or pharmacist before following any medical regimen to see if it is safe and effective for you

## 2023-06-26 DIAGNOSIS — R80.9 PROTEINURIA, UNSPECIFIED TYPE: Primary | ICD-10-CM

## 2023-06-27 ENCOUNTER — TELEPHONE (OUTPATIENT)
Dept: NEPHROLOGY | Facility: CLINIC | Age: 48
End: 2023-06-27

## 2023-06-27 NOTE — TELEPHONE ENCOUNTER
New Patient Intake Form   Patient Details   Vianney Flores     1975     [id number]     Insurance Information   Name of [address] [place] [address] [address]    Does the patient need an insurance referral? NO   If patient has [employer], please ask if they will be using their [employer]  Appointment Information   Who is calling to schedule? If not patient, what is callers name? patient   Referring Provider Natalia Avalos MD (PCP)   Reason for Appt (Diagnosis) E11 42 (ICD-10-CM) - Diabetic polyneuropathy associated with type 2 diabetes mellitus ([facility] [address] )  R80 9 (ICD-10-CM) - Nephrotic range proteinuria  I10 (ICD-10-CM) - Resistant hypertension  R80 9 (ICD-10-CM) - Proteinuria, unspecified type   Does Patient have labs/urine done at [place] [address]? If not, where do they go? List the date of last lab / urine  *Please try to get labs 2 years back if not at [address]   Has patient been hospitalized recently? If yes, list name and location of hospital they were in NO   Has patient been seen by a Nephrologist before? If yes, list name, location and phone number NO   Has the patient had renal imaging done? If so, list the most recent date and type of imaging NO   Does patient have a history of Kidney Stones? NO   Appointment Details   Is there a referral on file?  YES  - there were two   Appointment Date 9/20/23   Location [place]   Miscellaneous   added to waitlist

## 2023-06-28 ENCOUNTER — TELEPHONE (OUTPATIENT)
Dept: CARDIOLOGY CLINIC | Facility: CLINIC | Age: 48
End: 2023-06-28

## 2023-06-28 NOTE — TELEPHONE ENCOUNTER
Good Morning,     Patient referred to Cardiology  Please reach out to offer an appointment to establish care   Patient can be seen by a heart failure cardiologist

## 2023-07-03 NOTE — TELEPHONE ENCOUNTER
Can someone call this patient to schedule please. Patient can be seen in [facility] by Dr. Racheal Farooq, Dr. Radha Cabrera, or Dr. Taryn Pink and have follow ups in [facility] when [facility].      Thank you,    Hilary Currie

## 2023-09-07 ENCOUNTER — OFFICE VISIT (OUTPATIENT)
Dept: FAMILY MEDICINE CLINIC | Facility: CLINIC | Age: 48
End: 2023-09-07
Payer: COMMERCIAL

## 2023-09-07 VITALS
HEIGHT: 69 IN | WEIGHT: 315 LBS | DIASTOLIC BLOOD PRESSURE: 90 MMHG | OXYGEN SATURATION: 98 % | BODY MASS INDEX: 46.65 KG/M2 | RESPIRATION RATE: 18 BRPM | HEART RATE: 80 BPM | SYSTOLIC BLOOD PRESSURE: 150 MMHG | TEMPERATURE: 99 F

## 2023-09-07 DIAGNOSIS — M54.6 CHRONIC MIDLINE THORACIC BACK PAIN: ICD-10-CM

## 2023-09-07 DIAGNOSIS — E11.42 DIABETIC POLYNEUROPATHY ASSOCIATED WITH TYPE 2 DIABETES MELLITUS (HCC): ICD-10-CM

## 2023-09-07 DIAGNOSIS — E66.01 MORBID OBESITY (HCC): Primary | ICD-10-CM

## 2023-09-07 DIAGNOSIS — G89.29 CHRONIC MIDLINE THORACIC BACK PAIN: ICD-10-CM

## 2023-09-07 DIAGNOSIS — M54.16 LUMBAR RADICULOPATHY: ICD-10-CM

## 2023-09-07 PROCEDURE — 99495 TRANSJ CARE MGMT MOD F2F 14D: CPT | Performed by: FAMILY MEDICINE

## 2023-09-07 RX ORDER — PREGABALIN 100 MG/1
100 CAPSULE ORAL 3 TIMES DAILY
Qty: 90 CAPSULE | Refills: 1 | Status: SHIPPED | OUTPATIENT
Start: 2023-09-07

## 2023-09-07 RX ORDER — LIDOCAINE 50 MG/G
1 PATCH TOPICAL DAILY
Qty: 30 PATCH | Refills: 2 | Status: SHIPPED | OUTPATIENT
Start: 2023-09-07

## 2023-09-07 NOTE — LETTER
September 7, 2023     Patient: Mary Eller  YOB: 1975  Date of Visit: 9/7/2023      To Whom it May Concern:    Greer Lerma is under my professional care. Anup Gonzalez was seen in my office on 9/7/2023. Anup Gonzalez may return to work on 9/11/2023 . If you have any questions or concerns, please don't hesitate to call.          Sincerely,          Rolena Goldberg, MD        CC: No Recipients

## 2023-09-11 NOTE — PROGRESS NOTES
Assessment & Plan     1. Morbid obesity ([address])  -     Ambulatory Referral to Bariatric Surgery; Future    2. Diabetic polyneuropathy associated with type 2 diabetes mellitus (HCC)  -     pregabalin (LYRICA) 100 mg capsule; Take 1 capsule (100 mg total) by mouth 3 (three) times a day    3. Chronic midline thoracic back pain  -     pregabalin (LYRICA) 100 mg capsule; Take 1 capsule (100 mg total) by mouth 3 (three) times a day  -     lidocaine (Lidoderm) 5 %; Apply 1 patch topically over 12 hours daily Remove & Discard patch within 12 hours or as directed by MD    4. Lumbar radiculopathy  -     lidocaine (Lidoderm) 5 %; Apply 1 patch topically over 12 hours daily Remove & Discard patch within 12 hours or as directed by MD  -     Ambulatory Referral to Neurosurgery; Future    53y/o male with: TCM visit pt had marked worsening of back pain with inability to ambulate, in the setting of pt with morbid obesity, DM2 with diabetic polyneuropathy, along with radiculopathy. Will continue lidocaine patch, titrate up lyrica. PADPMP reviewed and refilled. Will refer to neurosurgery. Discussed supportive care and return parameters. Subjective     Transitional Care Management Review:   Eugene Carrion is a 53 y.o. male here for TCM follow up. During the TCM phone call patient stated:  TCM Call     Date and time call was made  2/2/2022  9:23 AM    Patient was hospitialized at  [facility]    Date of Admission  01/24/22    Date of discharge  02/01/22    Diagnosis  STAPERASMO Mccallum      TCM Call     Scheduled for follow up? No    I have advised the patient to call PCP with any new or worsening symptoms  AUBREE RIGGS CMA QL    Comments  pt never called to schedule        Patient is a 54 y/o male who presents for TCM visit pt had marked worsening of back pain with inability to ambulate, in the setting of pt with morbid obesity, DM2 with diabetic polyneuropathy, along with radiculopathy.  Patient admits pain is slightly better admits being otherwise stable on meds. And denies other acute complaints no fevers chills nausea or vomiting. Review of Systems   Constitutional: Negative. HENT: Negative. Eyes: Negative. Respiratory: Negative. Cardiovascular: Negative. Gastrointestinal: Negative. Endocrine: Negative. Genitourinary: Negative. Musculoskeletal: Positive for arthralgias, back pain and myalgias. Allergic/Immunologic: Negative. Neurological: Positive for weakness. Hematological: Negative. Psychiatric/Behavioral: Negative. All other systems reviewed and are negative. Objective     /90 (BP Location: Left arm, Patient Position: Sitting, Cuff Size: Large)   Pulse 80   Temp 99 °F (37.2 °C) (Tympanic)   Resp 18   Ht 5' 9" (1.753 m)   Wt (!) 155 kg (341 lb)   SpO2 98%   BMI 50.36 kg/m²      Physical Exam  Vitals reviewed. Constitutional:       General: He is not in acute distress. Appearance: He is well-developed. He is not diaphoretic. HENT:      Head: Normocephalic and atraumatic. Right Ear: External ear normal.      Left Ear: External ear normal.      Nose: Nose normal.   Eyes:      General: No scleral icterus. Right eye: No discharge. Left eye: No discharge. Conjunctiva/sclera: Conjunctivae normal.      Pupils: Pupils are equal, round, and reactive to light. Neck:      Thyroid: No thyromegaly. Trachea: No tracheal deviation. Cardiovascular:      Rate and Rhythm: Normal rate and regular rhythm. Heart sounds: Normal heart sounds. No murmur heard. No friction rub. Pulmonary:      Effort: Pulmonary effort is normal. No respiratory distress. Breath sounds: Normal breath sounds. No stridor. No wheezing or rales. Abdominal:      General: There is no distension. Palpations: Abdomen is soft. There is no mass. Tenderness: There is no abdominal tenderness. There is no guarding or rebound.    Musculoskeletal: General: Normal range of motion. Cervical back: Normal range of motion and neck supple. Lymphadenopathy:      Cervical: No cervical adenopathy. Skin:     General: Skin is warm. Neurological:      Mental Status: He is alert and oriented to person, place, and time. Cranial Nerves: No cranial nerve deficit. Psychiatric:         Behavior: Behavior normal.         Thought Content:  Thought content normal.         Judgment: Judgment normal.       Medications have been reviewed by provider in current encounter    Noel Villa MD

## 2023-09-11 NOTE — PATIENT INSTRUCTIONS
Type 2 Diabetes Management for Adults   AMBULATORY CARE:   Type 2 diabetes  is a disease that affects how your body uses glucose (sugar). Either your body cannot make enough insulin, or it cannot use the insulin correctly. It is important to keep diabetes controlled to prevent damage to your heart, blood vessels, and other organs. Management will help you feel well and enjoy your daily activities. Your diabetes care team providers can help you make a plan to fit diabetes care into your schedule. Your plan can change over time to fit your needs and your family's needs. Have someone call your local emergency number (911 in the [address]) if:   • You cannot be woken. • You have signs of diabetic ketoacidosis:     ? confusion, fatigue    ? vomiting    ? rapid heartbeat    ? fruity smelling breath    ? extreme thirst    ? dry mouth and skin    • You have any of the following signs of a heart attack:      ? Squeezing, pressure, or pain in your chest    ? You may  also have any of the following:     - Discomfort or pain in your back, neck, jaw, stomach, or arm    - Shortness of breath    - Nausea or vomiting    - Lightheadedness or a sudden cold sweat    • You have any of the following signs of a stroke:      ? Numbness or drooping on one side of your face     ? Weakness in an arm or leg    ? Confusion or difficulty speaking    ? Dizziness, a severe headache, or vision loss    Call your doctor or diabetes care team provider if:   • You have a sore or wound that will not heal.    • You have a change in the amount you urinate. • Your blood sugar levels are higher than your target goals. • You often have lower blood sugar levels than your target goals. • Your skin is red, dry, warm, or swollen. • You have trouble coping with diabetes, or you feel anxious or depressed. • You have questions or concerns about your condition or care.     What you need to know about high blood sugar levels:  High blood sugar levels may not cause any symptoms. You may feel more thirsty or urinate more often than usual. Over time, high blood sugar levels can damage your nerves, blood vessels, tissues, and organs. The following can increase your blood sugar levels:  • Large meals or large amounts of carbohydrates at one time    • Less physical activity    • Stress    • Illness    • A lower dose of diabetes medicine or insulin, or a late dose    What you need to know about low blood sugar levels:  Symptoms include feeling shaky, dizzy, irritable, or confused. You can prevent symptoms by keeping your blood sugar levels from going too low. • Treat a low blood sugar level right away:      ? Drink 4 ounces of juice or have 1 tube of glucose gel. ? Check your blood sugar level again 10 to 15 minutes later. ? When the level goes back to normal, eat a meal or snack to prevent another decrease. • Keep glucose gel, raisins, or hard candy with you at all times to treat a low blood sugar level. • Your blood sugar level can get too low if you take diabetes medicine or insulin and do not eat enough food. • If you use insulin, check your blood sugar level before you exercise. ? If your blood sugar level is below 100 mg/dL, eat 4 crackers or 2 ounces of raisins, or drink 4 ounces of juice. ? Check your level every 30 minutes if you exercise longer than 1 hour. ? You may need a snack during or after exercise. What you can do to manage your blood sugar levels:   • Check your blood sugar levels as directed and as needed. Several items are available to use to check your levels. You may need to check by testing a drop of blood in a glucose monitor. You may instead be given a continuous glucose monitoring (CGM) device. The device is worn at all times. The CGM checks your blood sugar level every 5 minutes. It sends results to an electronic device such as a smart phone. A CGM can be used with or without an insulin pump.  You and your diabetes care team providers will decide on the best method for you. The goal for blood sugar levels before meals  is between 80 and 130 mg/dL and 2 hours after eating  is lower than 180 mg/dL. • Make healthy food choices. Work with a dietitian to develop a meal plan that works for you and your schedule. A dietitian can help you learn how to eat the right amount of carbohydrates during your meals and snacks. Carbohydrates can raise your blood sugar level if you eat too many at one time. Examples of foods that contain carbohydrates are breads, cereals, rice, pasta, and sweets. • Eat high-fiber foods as directed. Fiber helps improve blood sugar levels. Fiber also lowers your risk for heart disease and other problems diabetes can cause. Examples of high-fiber foods include vegetables, whole-grain bread, and beans such as pope beans. Your dietitian can tell you how much fiber to have each day. • Get regular physical activity. Physical activity can help you get to your target blood sugar level goal and manage your weight. Get at least 150 minutes of moderate to vigorous aerobic physical activity each week. Do not miss more than 2 days in a row. Do not sit longer than 30 minutes at a time. Your healthcare provider can help you create an activity plan. The plan can include the best activities for you and can help you build your strength and endurance. • Maintain a healthy weight. Ask your team what a healthy weight is for you. A healthy weight can help you control diabetes and prevent heart disease. Ask your team to help you create a weight loss plan, if needed. Weight loss can help make a difference in managing diabetes. Your team will help you set a weight-loss goal, such as 10 to 15 pounds, or 5% of your extra weight. Together you and your team can set manageable weight loss goals. • Take your diabetes medicine or insulin as directed.   You may need diabetes medicine, insulin, or both to help control your blood sugar levels. Your healthcare provider will teach you how and when to take your diabetes medicine or insulin. You will also be taught about side effects oral diabetes medicine can cause. Insulin may be injected or given through a pump or pen. You and your providers will decide on the best method for you:    ? An insulin pump  is an implanted device that gives your insulin 24 hours a day. An insulin pump prevents the need for multiple insulin injections in a day. ? An insulin pen  is a device prefilled with the right amount of insulin. ? You and your family members will be taught how to draw up and give insulin  if this is the best method for you. Your providers will also teach you how to dispose of needles and syringes. ? You will learn how much insulin you need  and when to give it. You will be taught when not to give insulin. You will also be taught what to do if your blood sugar level drops too low. This may happen if you take insulin and do not eat the right amount of carbohydrates. More ways to manage type 2 diabetes:   • Wear medical alert identification. Wear medical alert jewelry or carry a card that says you have diabetes. Ask your provider where to get these items. • Do not smoke. Nicotine and other chemicals in cigarettes and cigars can cause lung and blood vessel damage. It also makes it more difficult to manage your diabetes. Ask your provider for information if you currently smoke and need help to quit. Do not use e-cigarettes or smokeless tobacco in place of cigarettes or to help you quit. They still contain nicotine. • Check your feet each day for cuts, scratches, calluses, or other wounds. Look for redness and swelling, and feel for warmth. Wear shoes that fit well. Check your shoes for rocks or other objects that can hurt your feet. Do not walk barefoot or wear shoes without socks.  Wear cotton socks to help keep your feet dry. • Ask about vaccines you may need. You have a higher risk for serious illness if you get the flu, pneumonia, COVID-19, or hepatitis. Ask your provider if you should get vaccines to prevent these or other diseases, and when to get the vaccines. • Talk to your provider if you become stressed about diabetes care. Sometimes being able to fit diabetes care into your life can cause increased stress. The stress can cause you not to take care of yourself properly. Your care team providers can help by offering tips about self-care. Your providers may suggest you talk to a mental health provider who can listen and offer help with self-care issues. • Have your A1c checked as directed. Your provider may check your A1c every 3 months, or 2 times each year if your diabetes is controlled. An A1c test shows the average amount of sugar in your blood over the past 2 to 3 months. Your provider will tell you what your A1c level should be. • Have screening tests as directed. Your provider may recommend screening for complications of diabetes and other conditions that may develop. Some screenings may begin right away and some may happen within the first 5 years of diagnosis:    ? Examples of diabetes complications  include kidney problems, high cholesterol, high blood pressure, blood vessel problems, eye problems, and sleep apnea. ? You may be screened for a low vitamin B level  if you take oral diabetes medicine for a long time. ? Women of childbearing years may be screened  for polycystic ovarian syndrome (PCOS). Follow up with your doctor or diabetes care team providers as directed: You may need to have blood tests done before your follow-up visit. The test results will show if changes need to be made in your treatment or self-care. Talk to your provider if you cannot afford your medicine. Write down your questions so you remember to ask them during your visits.   © Copyright [employer] 2022 Information is for End User's use only and may not be sold, redistributed or otherwise used for commercial purposes. The above information is an  only. It is not intended as medical advice for individual conditions or treatments. Talk to your doctor, nurse or pharmacist before following any medical regimen to see if it is safe and effective for you.

## 2023-09-19 ENCOUNTER — TELEPHONE (OUTPATIENT)
Dept: NEPHROLOGY | Facility: CLINIC | Age: 48
End: 2023-09-19

## 2023-09-19 NOTE — TELEPHONE ENCOUNTER
Pt cancelled appt via My Chart. Called and LVM asking pt to call office if they would like to reschedule.

## 2023-09-26 ENCOUNTER — OFFICE VISIT (OUTPATIENT)
Dept: FAMILY MEDICINE CLINIC | Facility: CLINIC | Age: 48
End: 2023-09-26
Payer: COMMERCIAL

## 2023-09-26 VITALS
DIASTOLIC BLOOD PRESSURE: 120 MMHG | HEIGHT: 69 IN | RESPIRATION RATE: 16 BRPM | BODY MASS INDEX: 46.65 KG/M2 | HEART RATE: 101 BPM | SYSTOLIC BLOOD PRESSURE: 220 MMHG | OXYGEN SATURATION: 98 % | TEMPERATURE: 98.5 F | WEIGHT: 315 LBS

## 2023-09-26 DIAGNOSIS — I1A.0 RESISTANT HYPERTENSION: ICD-10-CM

## 2023-09-26 DIAGNOSIS — R80.9 NEPHROTIC RANGE PROTEINURIA: ICD-10-CM

## 2023-09-26 DIAGNOSIS — E11.42 DIABETIC POLYNEUROPATHY ASSOCIATED WITH TYPE 2 DIABETES MELLITUS (HCC): ICD-10-CM

## 2023-09-26 DIAGNOSIS — G47.19 EXCESSIVE DAYTIME SLEEPINESS: Primary | ICD-10-CM

## 2023-09-26 DIAGNOSIS — M54.6 CHRONIC MIDLINE THORACIC BACK PAIN: ICD-10-CM

## 2023-09-26 DIAGNOSIS — M54.16 LUMBAR RADICULOPATHY: ICD-10-CM

## 2023-09-26 DIAGNOSIS — G89.29 CHRONIC MIDLINE THORACIC BACK PAIN: ICD-10-CM

## 2023-09-26 PROCEDURE — 99214 OFFICE O/P EST MOD 30 MIN: CPT | Performed by: FAMILY MEDICINE

## 2023-09-26 RX ORDER — LOSARTAN POTASSIUM AND HYDROCHLOROTHIAZIDE 25; 100 MG/1; MG/1
1 TABLET ORAL DAILY
Qty: 90 TABLET | Refills: 1 | Status: SHIPPED | OUTPATIENT
Start: 2023-09-26

## 2023-09-26 RX ORDER — CLONIDINE HYDROCHLORIDE 0.1 MG/1
0.1 TABLET ORAL 2 TIMES DAILY
Qty: 60 TABLET | Refills: 1 | Status: SHIPPED | OUTPATIENT
Start: 2023-09-26

## 2023-09-26 RX ORDER — METHOCARBAMOL 750 MG/1
750 TABLET, FILM COATED ORAL EVERY 6 HOURS PRN
Qty: 90 TABLET | Refills: 1 | Status: SHIPPED | OUTPATIENT
Start: 2023-09-26

## 2023-09-26 RX ORDER — TRAMADOL HYDROCHLORIDE 50 MG/1
TABLET ORAL
Status: CANCELLED | OUTPATIENT
Start: 2023-09-26

## 2023-09-26 RX ORDER — CARISOPRODOL 350 MG/1
350 TABLET ORAL 3 TIMES DAILY
Qty: 90 TABLET | Refills: 1 | Status: SHIPPED | OUTPATIENT
Start: 2023-09-26

## 2023-09-26 RX ORDER — PREGABALIN 150 MG/1
150 CAPSULE ORAL 3 TIMES DAILY
Qty: 90 CAPSULE | Refills: 1 | Status: SHIPPED | OUTPATIENT
Start: 2023-09-26

## 2023-09-26 RX ORDER — PREDNISONE 10 MG/1
TABLET ORAL
Qty: 45 TABLET | Refills: 0 | Status: SHIPPED | OUTPATIENT
Start: 2023-09-26

## 2023-09-26 NOTE — PROGRESS NOTES
Diabetic Foot Exam    Patient's shoes and socks were not removed. Right Foot/Ankle   Right Foot Inspection  Skin Exam: skin normal and skin intact. No dry skin, no warmth, no callus, no erythema, no maceration, no abnormal color, no pre-ulcer, no ulcer and no callus. Toe Exam: ROM and strength within normal limits. Sensory   Monofilament testing: absent    Vascular  The right DP pulse is 2+. The right PT pulse is 2+. Left Foot/Ankle  Left Foot Inspection  Skin Exam: skin normal and skin intact. No dry skin, no warmth, no erythema, no maceration, normal color, no pre-ulcer, no ulcer and no callus. Toe Exam: ROM and strength within normal limits. Sensory   Monofilament testing: absent    Vascular  The left DP pulse is 2+. The left PT pulse is 2+.      Assign Risk Category  No deformity present  Loss of protective sensation  No weak pulses  Risk: 1

## 2023-09-26 NOTE — LETTER
September 26, 2023     Patient: Kishor Garcia  YOB: 1975  Date of Visit: 9/26/2023      To Whom it May Concern:    Zoey Wright is under my professional care. Andre Skinner was seen in my office on 9/26/2023. Andre Skinner may return to work on 9/27/2023 . If you have any questions or concerns, please don't hesitate to call.          Sincerely,          Priyanka Love MD        CC: No Recipients

## 2023-09-28 PROBLEM — G89.29 CHRONIC MIDLINE THORACIC BACK PAIN: Status: ACTIVE | Noted: 2023-09-28

## 2023-09-28 PROBLEM — R80.9 NEPHROTIC RANGE PROTEINURIA: Status: ACTIVE | Noted: 2023-09-28

## 2023-09-28 PROBLEM — M54.6 CHRONIC MIDLINE THORACIC BACK PAIN: Status: ACTIVE | Noted: 2023-09-28

## 2023-09-28 NOTE — PROGRESS NOTES
Assessment/Plan:    54 y/o male with: resistant hypertension, excessive daytime sleepiness, DM2 with diabetic polyneuropathy, nephrotic range proteinuria, lumbar radiculopathy, and chronic back pain, stable, continue meds. Discussed supportive care and return parameters. PAPDMP reviewed and refilled and will titrate up Lyrica. Follow-up in 1-2 mo. No problem-specific Assessment & Plan notes found for this encounter. Diagnoses and all orders for this visit:    Excessive daytime sleepiness  -     Home Study; Future    Resistant hypertension  -     losartan-hydrochlorothiazide (HYZAAR) 100-25 MG per tablet; Take 1 tablet by mouth daily  -     Ambulatory Referral to Nephrology; Future  -     cloNIDine (CATAPRES) 0.1 mg tablet; Take 1 tablet (0.1 mg total) by mouth 2 (two) times a day    Diabetic polyneuropathy associated with type 2 diabetes mellitus (HCC)  -     methocarbamol (ROBAXIN) 750 mg tablet; Take 1 tablet (750 mg total) by mouth every 6 (six) hours as needed for muscle spasms  -     carisoprodol (SOMA) 350 mg tablet; Take 1 tablet (350 mg total) by mouth 3 (three) times a day  -     Ambulatory Referral to Nephrology; Future  -     pregabalin (LYRICA) 150 mg capsule; Take 1 capsule (150 mg total) by mouth 3 (three) times a day    Nephrotic range proteinuria  -     Ambulatory Referral to Nephrology; Future    Lumbar radiculopathy  -     predniSONE 10 mg tablet; Take 4 tabs daily x 4d then 3x4d then 2x4d then 1x4d then 0.5x4d then stop. Chronic midline thoracic back pain  -     pregabalin (LYRICA) 150 mg capsule; Take 1 capsule (150 mg total) by mouth 3 (three) times a day          Subjective:     Chief Complaint   Patient presents with   • Follow-up     Patient has paperwork to be completed , Colorectal screen is due, foot exam and eye exam completed at today's visit. Patient ID: Daryle Makua is a 53 y.o. male.     Patient is a 54 y/o male who presents for follow-up on resistant hypertension, excessive daytime sleepiness, DM2 with diabetic polyneuropathy, nephrotic range proteinuria, lumbar radiculopathy, and chronic back pain, pt admit stable symptoms on medications no fevers chills nausea or vomiting. The following portions of the patient's history were reviewed and updated as appropriate: allergies, current medications, past family history, past medical history, past social history, past surgical history and problem list.    Review of Systems   Constitutional: Negative. HENT: Negative. Eyes: Negative. Respiratory: Negative. Cardiovascular: Negative. Gastrointestinal: Negative. Endocrine: Negative. Genitourinary: Negative. Musculoskeletal: Positive for arthralgias, back pain and myalgias. Allergic/Immunologic: Negative. Neurological: Positive for weakness and numbness. Hematological: Negative. Psychiatric/Behavioral: Negative. All other systems reviewed and are negative. Objective:      BP (!) 220/120 (BP Location: Left arm, Patient Position: Sitting, Cuff Size: Large)   Pulse 101   Temp 98.5 °F (36.9 °C) (Tympanic)   Resp 16   Ht 5' 9" (1.753 m)   Wt (!) 152 kg (334 lb)   SpO2 98%   BMI 49.32 kg/m²          Physical Exam  Constitutional:       Appearance: He is well-developed. HENT:      Head: Atraumatic. Right Ear: External ear normal.      Left Ear: External ear normal.   Eyes:      Conjunctiva/sclera: Conjunctivae normal.      Pupils: Pupils are equal, round, and reactive to light. Cardiovascular:      Rate and Rhythm: Normal rate and regular rhythm. Heart sounds: Normal heart sounds. Pulmonary:      Effort: Pulmonary effort is normal. No respiratory distress. Breath sounds: Normal breath sounds. Abdominal:      General: There is no distension. Palpations: Abdomen is soft. Tenderness: There is no abdominal tenderness. There is no guarding or rebound.    Musculoskeletal:         General: Normal range of motion. Cervical back: Normal range of motion. Skin:     General: Skin is warm and dry. Neurological:      Mental Status: He is alert and oriented to person, place, and time. Cranial Nerves: No cranial nerve deficit. Psychiatric:         Behavior: Behavior normal.         Thought Content: Thought content normal.         Judgment: Judgment normal.         BMI Counseling: Body mass index is 49.32 kg/m². The BMI is above normal. Nutrition recommendations include encouraging healthy choices of fruits and vegetables. Exercise recommendations include moderate physical activity 150 minutes/week. Rationale for BMI follow-up plan is due to patient being overweight or obese. Depression Screening and Follow-up Plan: Patient was screened for depression during today's encounter. They screened negative with a PHQ-2 score of 0.

## 2023-09-29 ENCOUNTER — TELEPHONE (OUTPATIENT)
Dept: NEUROSURGERY | Facility: CLINIC | Age: 48
End: 2023-09-29

## 2023-10-02 ENCOUNTER — TELEPHONE (OUTPATIENT)
Dept: FAMILY MEDICINE CLINIC | Facility: CLINIC | Age: 48
End: 2023-10-02

## 2023-10-02 NOTE — TELEPHONE ENCOUNTER
Received fax of [facility]/[place] paperwork for Kareem. Paperwork was previously filled out by Dr. Bustamante but some information was missing.

## 2023-10-18 ENCOUNTER — TELEPHONE (OUTPATIENT)
Dept: FAMILY MEDICINE CLINIC | Facility: CLINIC | Age: 48
End: 2023-10-18

## 2023-10-18 ENCOUNTER — OFFICE VISIT (OUTPATIENT)
Dept: FAMILY MEDICINE CLINIC | Facility: CLINIC | Age: 48
End: 2023-10-18
Payer: COMMERCIAL

## 2023-10-18 VITALS
OXYGEN SATURATION: 98 % | DIASTOLIC BLOOD PRESSURE: 90 MMHG | TEMPERATURE: 98.4 F | BODY MASS INDEX: 46.65 KG/M2 | SYSTOLIC BLOOD PRESSURE: 180 MMHG | HEIGHT: 69 IN | HEART RATE: 80 BPM | WEIGHT: 315 LBS

## 2023-10-18 DIAGNOSIS — I10 ESSENTIAL HYPERTENSION: Primary | ICD-10-CM

## 2023-10-18 DIAGNOSIS — M54.16 LUMBAR RADICULOPATHY: ICD-10-CM

## 2023-10-18 PROCEDURE — 99214 OFFICE O/P EST MOD 30 MIN: CPT | Performed by: FAMILY MEDICINE

## 2023-10-18 RX ORDER — CAPSAICIN 0.75 MG/G
CREAM TOPICAL 3 TIMES DAILY PRN
COMMUNITY
Start: 2023-10-12 | End: 2024-01-10

## 2023-10-21 DIAGNOSIS — I1A.0 RESISTANT HYPERTENSION: ICD-10-CM

## 2023-10-23 RX ORDER — CLONIDINE HYDROCHLORIDE 0.1 MG/1
0.1 TABLET ORAL 2 TIMES DAILY
Qty: 180 TABLET | Refills: 1 | Status: SHIPPED | OUTPATIENT
Start: 2023-10-23

## 2023-10-23 NOTE — PROGRESS NOTES
Assessment/Plan:    54 y/o male with: HTN, lumbar radiculopathy. Will continue meds. Discussed supportive care and return parameters. Forms filled out, pt to return for BP check in 2-3 weeks. No problem-specific Assessment & Plan notes found for this encounter. Diagnoses and all orders for this visit:    Essential hypertension    Lumbar radiculopathy    Other orders  -     capsicum (ZOSTRIX) 0.075 % topical cream; Apply topically Three times daily as needed          Subjective:     Chief Complaint   Patient presents with    Back Pain     Lower back pain, severity on and off from 6-10   Disability paperwork and handicap placard papers  Declines flu vaccine  No other concerns  SW        Patient ID: Shakeel  is a 53 y.o. male. Patient is a 54 y/o male who presents for follow-up on HTN and lumbar radiculopathy pt still having breakthrough symptoms, attempting to get ADA forms requesting working from home 3 days a week no fevers chills nausea or vomiting. Back Pain        The following portions of the patient's history were reviewed and updated as appropriate: allergies, current medications, past family history, past medical history, past social history, past surgical history and problem list.    Review of Systems   Constitutional: Negative. HENT: Negative. Eyes: Negative. Respiratory: Negative. Cardiovascular: Negative. Gastrointestinal: Negative. Endocrine: Negative. Genitourinary: Negative. Musculoskeletal:  Positive for back pain. Allergic/Immunologic: Negative. Neurological: Negative. Hematological: Negative. Psychiatric/Behavioral: Negative. All other systems reviewed and are negative.         Objective:      BP (!) 180/90 (BP Location: Left arm, Patient Position: Sitting, Cuff Size: Large)   Pulse 80   Temp 98.4 °F (36.9 °C) (Temporal)   Ht 5' 9" (1.753 m)   Wt (!) 151 kg (333 lb)   SpO2 98%   BMI 49.18 kg/m²          Physical Exam  Constitutional:       Appearance: He is well-developed. HENT:      Head: Atraumatic. Right Ear: External ear normal.      Left Ear: External ear normal.   Eyes:      Conjunctiva/sclera: Conjunctivae normal.      Pupils: Pupils are equal, round, and reactive to light. Cardiovascular:      Rate and Rhythm: Normal rate and regular rhythm. Heart sounds: Normal heart sounds. Pulmonary:      Effort: Pulmonary effort is normal. No respiratory distress. Breath sounds: Normal breath sounds. Abdominal:      General: There is no distension. Palpations: Abdomen is soft. Tenderness: There is no abdominal tenderness. There is no guarding or rebound. Musculoskeletal:         General: Normal range of motion. Cervical back: Normal range of motion. Skin:     General: Skin is warm and dry. Neurological:      Mental Status: He is alert and oriented to person, place, and time. Cranial Nerves: No cranial nerve deficit. Psychiatric:         Behavior: Behavior normal.         Thought Content: Thought content normal.         Judgment: Judgment normal.       BMI Counseling: Body mass index is 49.18 kg/m². The BMI is above normal. Nutrition recommendations include encouraging healthy choices of fruits and vegetables. Exercise recommendations include moderate physical activity 150 minutes/week. Rationale for BMI follow-up plan is due to patient being overweight or obese.

## 2023-10-31 ENCOUNTER — RA CDI HCC (OUTPATIENT)
Dept: OTHER | Facility: HOSPITAL | Age: 48
End: 2023-10-31

## 2023-10-31 NOTE — PROGRESS NOTES
[facility] coding opportunities       Chart reviewed, no opportunity found: CHART REVIEWED, NO OPPORTUNITY FOUND        Patients Insurance        Commercial Insurance: [employer]

## 2023-11-07 ENCOUNTER — OFFICE VISIT (OUTPATIENT)
Dept: FAMILY MEDICINE CLINIC | Facility: CLINIC | Age: 48
End: 2023-11-07
Payer: COMMERCIAL

## 2023-11-07 VITALS
WEIGHT: 315 LBS | OXYGEN SATURATION: 98 % | BODY MASS INDEX: 50.62 KG/M2 | RESPIRATION RATE: 16 BRPM | HEART RATE: 80 BPM | HEIGHT: 66 IN | TEMPERATURE: 98.7 F | DIASTOLIC BLOOD PRESSURE: 96 MMHG | SYSTOLIC BLOOD PRESSURE: 140 MMHG

## 2023-11-07 DIAGNOSIS — M54.6 CHRONIC MIDLINE THORACIC BACK PAIN: ICD-10-CM

## 2023-11-07 DIAGNOSIS — G89.29 CHRONIC MIDLINE THORACIC BACK PAIN: ICD-10-CM

## 2023-11-07 DIAGNOSIS — E13.42 OTHER SPECIFIED DIABETES MELLITUS WITH DIABETIC POLYNEUROPATHY, UNSPECIFIED WHETHER LONG TERM INSULIN USE (HCC): Primary | ICD-10-CM

## 2023-11-07 DIAGNOSIS — E66.01 MORBID OBESITY (HCC): ICD-10-CM

## 2023-11-07 DIAGNOSIS — E11.42 DIABETIC POLYNEUROPATHY ASSOCIATED WITH TYPE 2 DIABETES MELLITUS (HCC): ICD-10-CM

## 2023-11-07 DIAGNOSIS — Z00.00 ROUTINE ADULT HEALTH MAINTENANCE: ICD-10-CM

## 2023-11-07 PROCEDURE — 99396 PREV VISIT EST AGE 40-64: CPT | Performed by: FAMILY MEDICINE

## 2023-11-07 PROCEDURE — 99214 OFFICE O/P EST MOD 30 MIN: CPT | Performed by: FAMILY MEDICINE

## 2023-11-07 RX ORDER — PREGABALIN 200 MG/1
200 CAPSULE ORAL 3 TIMES DAILY
Qty: 90 CAPSULE | Refills: 1 | Status: SHIPPED | OUTPATIENT
Start: 2023-11-07

## 2023-11-07 RX ORDER — METHOCARBAMOL 750 MG/1
750 TABLET, FILM COATED ORAL EVERY 6 HOURS PRN
Qty: 90 TABLET | Refills: 1 | Status: SHIPPED | OUTPATIENT
Start: 2023-11-07

## 2023-11-07 NOTE — PROGRESS NOTES
Diabetic Foot Exam    Patient's shoes and socks removed. Right Foot/Ankle   Right Foot Inspection  Skin Exam: skin normal and skin intact. No dry skin, no warmth, no callus, no erythema, no maceration, no abnormal color, no pre-ulcer, no ulcer and no callus. Toe Exam: ROM and strength within normal limits. Sensory   Monofilament testing: absent    Vascular  The right DP pulse is 2+. The right PT pulse is 2+. Left Foot/Ankle  Left Foot Inspection  Skin Exam: skin normal and skin intact. No dry skin, no warmth, no erythema, no maceration, normal color, no pre-ulcer, no ulcer and no callus. Amputation: amputation left foot (Comments: great toe)    Toe Exam: ROM and strength within normal limits. Sensory   Monofilament testing: absent    Vascular  The left DP pulse is 2+. The left PT pulse is 2+.      Assign Risk Category  Deformity present  Loss of protective sensation  No weak pulses  Risk: 2

## 2023-11-10 NOTE — PROGRESS NOTES
Assessment/Plan:    50-year-old male with: type 2 diabetes with diabetic polyneuropathy chronic back pain along with morbid obesity and annual well visit. We will continue current medications we will titrate up [place] PDMP was reviewed and medications were refilled discussed working return parameters. Regarding Annual well visit. Discussed various safety and health maintenance issues including healthy diet like the Mediterranean diet, exercise, ample sleep, stress reduction, and healthy weight as tolerated. Discussed supportive care and return parameters. No problem-specific Assessment & Plan notes found for this encounter. Diagnoses and all orders for this visit:    Other specified diabetes mellitus with diabetic polyneuropathy, unspecified whether long term insulin use ([address])    Diabetic polyneuropathy associated with type 2 diabetes mellitus (HCC)  -     methocarbamol (ROBAXIN) 750 mg tablet; Take 1 tablet (750 mg total) by mouth every 6 (six) hours as needed for muscle spasms  -     pregabalin (LYRICA) 200 MG capsule; Take 1 capsule (200 mg total) by mouth 3 (three) times a day    Chronic midline thoracic back pain  -     pregabalin (LYRICA) 200 MG capsule; Take 1 capsule (200 mg total) by mouth 3 (three) times a day    Morbid obesity ([address])    Routine adult health maintenance          Subjective:     Chief Complaint   Patient presents with    Physical Exam     No questions or concerns. JT        Patient ID: Sabrina Brar is a 53 y.o. male.     Patient is a 50-year-old male who presents for follow-up on type 2 diabetes with diabetic polyneuropathy chronic back pain along with morbid obesity patient admits some slight improvement in his symptoms no fevers chills nausea vomiting tolerating p.o. intake patient is also here for annual well visit he admits he is trying to stay active try and eat healthfully no other health maintenance issues        The following portions of the patient's history were reviewed and updated as appropriate: allergies, current medications, past family history, past medical history, past social history, past surgical history and problem list.    Review of Systems   Constitutional: Negative. HENT: Negative. Eyes: Negative. Respiratory: Negative. Cardiovascular: Negative. Gastrointestinal: Negative. Endocrine: Negative. Genitourinary: Negative. Musculoskeletal:  Positive for back pain. Allergic/Immunologic: Negative. Neurological: Negative. Hematological: Negative. Psychiatric/Behavioral: Negative. All other systems reviewed and are negative. Objective:      /96 (BP Location: Left arm, Patient Position: Sitting, Cuff Size: Standard)   Pulse 80   Temp 98.7 °F (37.1 °C) (Tympanic)   Resp 16   Ht 5' 6" (1.676 m)   Wt (!) 149 kg (329 lb)   SpO2 98%   BMI 53.10 kg/m²          Physical Exam  Constitutional:       Appearance: He is well-developed. HENT:      Head: Atraumatic. Right Ear: External ear normal.      Left Ear: External ear normal.   Eyes:      Conjunctiva/sclera: Conjunctivae normal.      Pupils: Pupils are equal, round, and reactive to light. Cardiovascular:      Rate and Rhythm: Normal rate and regular rhythm. Heart sounds: Normal heart sounds. Pulmonary:      Effort: Pulmonary effort is normal. No respiratory distress. Breath sounds: Normal breath sounds. Abdominal:      General: There is no distension. Palpations: Abdomen is soft. Tenderness: There is no abdominal tenderness. There is no guarding or rebound. Musculoskeletal:         General: Normal range of motion. Cervical back: Normal range of motion. Skin:     General: Skin is warm and dry. Neurological:      Mental Status: He is alert and oriented to person, place, and time. Cranial Nerves: No cranial nerve deficit. Psychiatric:         Behavior: Behavior normal.         Thought Content:  Thought content normal. Judgment: Judgment normal.

## 2023-11-10 NOTE — PATIENT INSTRUCTIONS

## 2023-12-08 ENCOUNTER — TELEPHONE (OUTPATIENT)
Dept: PSYCHIATRY | Facility: CLINIC | Age: 48
End: 2023-12-08

## 2023-12-08 ENCOUNTER — OFFICE VISIT (OUTPATIENT)
Dept: FAMILY MEDICINE CLINIC | Facility: CLINIC | Age: 48
End: 2023-12-08
Payer: COMMERCIAL

## 2023-12-08 VITALS
HEART RATE: 88 BPM | WEIGHT: 315 LBS | TEMPERATURE: 98.1 F | HEIGHT: 69 IN | SYSTOLIC BLOOD PRESSURE: 166 MMHG | BODY MASS INDEX: 46.65 KG/M2 | DIASTOLIC BLOOD PRESSURE: 97 MMHG | OXYGEN SATURATION: 97 %

## 2023-12-08 DIAGNOSIS — F32.A DEPRESSION, UNSPECIFIED DEPRESSION TYPE: ICD-10-CM

## 2023-12-08 DIAGNOSIS — E11.65 UNCONTROLLED TYPE 2 DIABETES MELLITUS WITH HYPERGLYCEMIA (HCC): Primary | ICD-10-CM

## 2023-12-08 DIAGNOSIS — G89.29 OTHER CHRONIC PAIN: ICD-10-CM

## 2023-12-08 DIAGNOSIS — E13.9 DIABETES MELLITUS OF OTHER TYPE WITHOUT COMPLICATION, UNSPECIFIED WHETHER LONG TERM INSULIN USE (HCC): ICD-10-CM

## 2023-12-08 LAB
LEFT EYE DIABETIC RETINOPATHY: NORMAL
LEFT EYE IMAGE QUALITY: NORMAL
LEFT EYE MACULAR EDEMA: NORMAL
LEFT EYE OTHER RETINOPATHY: NORMAL
RIGHT EYE DIABETIC RETINOPATHY: NORMAL
RIGHT EYE IMAGE QUALITY: NORMAL
RIGHT EYE MACULAR EDEMA: NORMAL
RIGHT EYE OTHER RETINOPATHY: NORMAL
SEVERITY (EYE EXAM): NORMAL
SL AMB POCT HEMOGLOBIN AIC: 8.8 (ref ?–6.5)

## 2023-12-08 PROCEDURE — 83036 HEMOGLOBIN GLYCOSYLATED A1C: CPT | Performed by: FAMILY MEDICINE

## 2023-12-08 PROCEDURE — 99214 OFFICE O/P EST MOD 30 MIN: CPT | Performed by: FAMILY MEDICINE

## 2023-12-08 RX ORDER — INSULIN DEGLUDEC 200 U/ML
60 INJECTION, SOLUTION SUBCUTANEOUS
Qty: 15 ML | Refills: 2 | Status: SHIPPED | OUTPATIENT
Start: 2023-12-08

## 2023-12-08 NOTE — LETTER
December 8, 2023     Patient: Samm Hoover  YOB: 1975  Date of Visit: 12/8/2023      To Whom it May Concern:    Delores Bowman is under my professional care. Cady Iglesias was seen in my office on 12/8/2023. Cady Iglesias may return to work on 12/11/2023 . If you have any questions or concerns, please don't hesitate to call.          Sincerely,          Isela Stewart MD        CC: No Recipients

## 2023-12-11 NOTE — TELEPHONE ENCOUNTER
Contacted patient in regards to ASAP Referral  in attempts to verify patient's needs of services and add patient to proper wait list. LVM for patient to contact intake dept  in regards to referral.

## 2023-12-12 NOTE — PATIENT INSTRUCTIONS

## 2023-12-12 NOTE — PROGRESS NOTES
Assessment/Plan:    52 y/o male with: DM2, depression and chronic pain. Discussed treatment options. Will refer for counseling, continue meds. Discussed supportive care and return parameters. No problem-specific Assessment & Plan notes found for this encounter. Diagnoses and all orders for this visit:    Uncontrolled type 2 diabetes mellitus with hyperglycemia ([address])  -     POCT hemoglobin A1c  -     IRIS Diabetic eye exam  -     semaglutide, 1 mg/dose, (Ozempic) 4 mg/3 mL injection pen; Inject 0.75 mL (1 mg total) under the skin once a week  -     Ambulatory Referral to Social Work Care Management Program; Future    Diabetes mellitus of other type without complication, unspecified whether long term insulin use (HCC)  -     insulin degludec Elaine Conor FlexTouch) 200 units/mL CONCENTRATED U-200 injection pen; Inject 60 Units under the skin daily at bedtime  -     Ambulatory Referral to Social Work Care Management Program; Future    Depression, unspecified depression type  -     Ambulatory referral to [employer]; Future  -     Ambulatory Referral to Social Work Care Management Program; Future    Other chronic pain  -     Ambulatory referral to [employer]; Future  -     Ambulatory Referral to Social Work Care Management Program; Future          Subjective:     Chief Complaint   Patient presents with    Back Pain     Patient is complaining of back pain, upper leg pain of both legs and leg weakness. A1C done today. Patient due for colorectal screening, order pended for cologuard. Please discuss. Flu vaccine declined. No further concerns, ng        Patient ID: Ladonna Snyder is a 53 y.o. male. Patient is a 52 y/o male who presents for follow-up on DM2, depression and chronic pain. He admits significant breakthrough pain no fevers chills nausea or vomiting.     Back Pain        The following portions of the patient's history were reviewed and updated as appropriate: allergies, current medications, past family history, past medical history, past social history, past surgical history and problem list.    Review of Systems   Constitutional: Negative. HENT: Negative. Eyes: Negative. Respiratory: Negative. Cardiovascular: Negative. Gastrointestinal: Negative. Endocrine: Negative. Genitourinary: Negative. Musculoskeletal:  Positive for arthralgias, back pain and myalgias. Allergic/Immunologic: Negative. Neurological: Negative. Hematological: Negative. Psychiatric/Behavioral: Negative. All other systems reviewed and are negative. Objective:      /97 (BP Location: Left arm, Patient Position: Sitting, Cuff Size: Large)   Pulse 88   Temp 98.1 °F (36.7 °C) (Temporal)   Ht 5' 9" (1.753 m)   Wt (!) 153 kg (338 lb)   SpO2 97%   BMI 49.91 kg/m²          Physical Exam  Constitutional:       Appearance: He is well-developed. HENT:      Head: Atraumatic. Right Ear: External ear normal.      Left Ear: External ear normal.   Eyes:      Conjunctiva/sclera: Conjunctivae normal.      Pupils: Pupils are equal, round, and reactive to light. Cardiovascular:      Rate and Rhythm: Normal rate and regular rhythm. Heart sounds: Normal heart sounds. Pulmonary:      Effort: Pulmonary effort is normal. No respiratory distress. Breath sounds: Normal breath sounds. Abdominal:      General: There is no distension. Palpations: Abdomen is soft. Tenderness: There is no abdominal tenderness. There is no guarding or rebound. Musculoskeletal:         General: Normal range of motion. Cervical back: Normal range of motion. Skin:     General: Skin is warm and dry. Neurological:      Mental Status: He is alert and oriented to person, place, and time. Cranial Nerves: No cranial nerve deficit. Psychiatric:         Behavior: Behavior normal.         Thought Content: Thought content normal.         Judgment: Judgment normal.       BMI Counseling:  Body mass index is 49.91 kg/m². The BMI is above normal. Nutrition recommendations include encouraging healthy choices of fruits and vegetables. Exercise recommendations include moderate physical activity 150 minutes/week. Rationale for BMI follow-up plan is due to patient being overweight or obese. Depression Screening and Follow-up Plan: Patient was screened for depression during today's encounter. They screened negative with a PHQ-2 score of 0.

## 2023-12-14 ENCOUNTER — PATIENT OUTREACH (OUTPATIENT)
Dept: FAMILY MEDICINE CLINIC | Facility: CLINIC | Age: 48
End: 2023-12-14

## 2023-12-14 NOTE — PROGRESS NOTES
BING WALKER received referral regarding depression. BING WALKER placed call to the patient, Sami Frazier who indicated having a lot of personal issues with health and depression. He saw a therapist a long time ago. He is interested just in talk therapy. [facility] [place] [employer] were attempting to call him. BING WALKER provided their intake contact information. Sami Frazier confirmed email. BING WALKER will look into mental health providers that accept his insurance. Regarding chronic medical conditions, BING WALKER explained role of RN CM. Sami Frazier declined stating he is able to manage it with just the PCP. Sami Frazier is employed and reported ability to afford rent and other utilities. He drives self to appointments. Following conversation BING WALKER conducted provider search on patient's insurance website and sent the following provider's contact information:     Gunjan Mcdaniels and [facility] and [address], [address] for Counseling and [address] for [address] and [address] will continue to remain available for psychosocial support as needed.

## 2023-12-24 DIAGNOSIS — I10 ESSENTIAL HYPERTENSION: ICD-10-CM

## 2023-12-26 RX ORDER — ATENOLOL 100 MG/1
100 TABLET ORAL DAILY
Qty: 90 TABLET | Refills: 1 | Status: SHIPPED | OUTPATIENT
Start: 2023-12-26

## 2023-12-28 ENCOUNTER — PATIENT OUTREACH (OUTPATIENT)
Dept: FAMILY MEDICINE CLINIC | Facility: CLINIC | Age: 48
End: 2023-12-28

## 2023-12-28 NOTE — PROGRESS NOTES
BING WALKER placed follow up call to the patient, Kareem who advised did not review yet the mental health provider list that BING WALKER sent him at last outreach. He will review it and let BING WALKER know if he has any questions.     BING WALKER will close referral as patient has the requested information. BING WALKER will remain available for psychosocial support as needed.

## 2024-01-11 ENCOUNTER — TELEMEDICINE (OUTPATIENT)
Dept: FAMILY MEDICINE CLINIC | Facility: CLINIC | Age: 49
End: 2024-01-11
Payer: COMMERCIAL

## 2024-01-11 VITALS — BODY MASS INDEX: 46.65 KG/M2 | WEIGHT: 315 LBS | TEMPERATURE: 100 F | HEIGHT: 69 IN

## 2024-01-11 DIAGNOSIS — A08.4 VIRAL GASTROENTERITIS: Primary | ICD-10-CM

## 2024-01-11 PROCEDURE — 99213 OFFICE O/P EST LOW 20 MIN: CPT | Performed by: FAMILY MEDICINE

## 2024-01-11 NOTE — LETTER
January 11, 2024     Patient: Kareem Leiva  YOB: 1975  Date of Visit: 1/11/2024      To Whom it May Concern:    Kareem Leiva is under my professional care. Kareem was seen in my office on 1/11/2024. Kareem may return to work on 1/15/2024 .    If you have any questions or concerns, please don't hesitate to call.         Sincerely,          Erick Bustamante MD        CC: No Recipients

## 2024-02-05 ENCOUNTER — TELEPHONE (OUTPATIENT)
Age: 49
End: 2024-02-05

## 2024-02-05 ENCOUNTER — OFFICE VISIT (OUTPATIENT)
Dept: FAMILY MEDICINE CLINIC | Facility: CLINIC | Age: 49
End: 2024-02-05
Payer: COMMERCIAL

## 2024-02-05 VITALS
DIASTOLIC BLOOD PRESSURE: 108 MMHG | OXYGEN SATURATION: 97 % | SYSTOLIC BLOOD PRESSURE: 170 MMHG | HEART RATE: 100 BPM | TEMPERATURE: 98.3 F

## 2024-02-05 DIAGNOSIS — M25.50 ARTHRALGIA, UNSPECIFIED JOINT: ICD-10-CM

## 2024-02-05 DIAGNOSIS — I10 ESSENTIAL HYPERTENSION: ICD-10-CM

## 2024-02-05 DIAGNOSIS — G47.19 EXCESSIVE DAYTIME SLEEPINESS: ICD-10-CM

## 2024-02-05 DIAGNOSIS — E08.00 DIABETES MELLITUS DUE TO UNDERLYING CONDITION WITH HYPEROSMOLARITY WITHOUT COMA, UNSPECIFIED WHETHER LONG TERM INSULIN USE (HCC): ICD-10-CM

## 2024-02-05 DIAGNOSIS — E11.42 DIABETIC POLYNEUROPATHY ASSOCIATED WITH TYPE 2 DIABETES MELLITUS (HCC): ICD-10-CM

## 2024-02-05 DIAGNOSIS — M54.16 LUMBAR RADICULOPATHY: Primary | ICD-10-CM

## 2024-02-05 PROCEDURE — 99214 OFFICE O/P EST MOD 30 MIN: CPT | Performed by: FAMILY MEDICINE

## 2024-02-05 RX ORDER — TRAMADOL HYDROCHLORIDE 50 MG/1
50 TABLET ORAL EVERY 8 HOURS PRN
Qty: 30 TABLET | Refills: 0 | Status: SHIPPED | OUTPATIENT
Start: 2024-02-05

## 2024-02-05 RX ORDER — SPIRONOLACTONE 25 MG/1
25 TABLET ORAL
Qty: 90 TABLET | Refills: 1 | Status: SHIPPED | OUTPATIENT
Start: 2024-02-05

## 2024-02-05 RX ORDER — CARISOPRODOL 350 MG/1
350 TABLET ORAL 3 TIMES DAILY
Qty: 90 TABLET | Refills: 1 | Status: SHIPPED | OUTPATIENT
Start: 2024-02-05

## 2024-02-05 RX ORDER — METHOCARBAMOL 750 MG/1
750 TABLET, FILM COATED ORAL EVERY 6 HOURS PRN
Qty: 90 TABLET | Refills: 1 | Status: SHIPPED | OUTPATIENT
Start: 2024-02-05

## 2024-02-05 NOTE — TELEPHONE ENCOUNTER
Last seen today 02/05/2024    Patient stated that he needs an excuse returning back to work on Thursday 02/08/2023 placed in his mychart.

## 2024-02-05 NOTE — PROGRESS NOTES
Assessment/Plan: Patient will restart spironolactone for hypertension which is uncontrolled at this time.  Will need to recheck blood pressure once patient's pain is improved.  MRI lumbar spine reviewed with the patient.  CAT scan reviewed.  Patient has done physical therapy in the past.  Patient does see pain management on a routine basis.  Patient will try tramadol sparingly as needed.  Guidance given.  Patient will follow-up with Dr. Lewis appropriately       Diagnoses and all orders for this visit:    Lumbar radiculopathy  -     traMADol (ULTRAM) 50 mg tablet; Take 1 tablet (50 mg total) by mouth every 8 (eight) hours as needed for moderate pain    Essential hypertension  -     spironolactone (ALDACTONE) 25 mg tablet; Take 1 tablet (25 mg total) by mouth daily at bedtime    Arthralgia, unspecified joint  -     spironolactone (ALDACTONE) 25 mg tablet; Take 1 tablet (25 mg total) by mouth daily at bedtime    Excessive daytime sleepiness  -     spironolactone (ALDACTONE) 25 mg tablet; Take 1 tablet (25 mg total) by mouth daily at bedtime    Diabetes mellitus due to underlying condition with hyperosmolarity without coma, unspecified whether long term insulin use (HCC)    Diabetic polyneuropathy associated with type 2 diabetes mellitus (HCC)  -     methocarbamol (ROBAXIN) 750 mg tablet; Take 1 tablet (750 mg total) by mouth every 6 (six) hours as needed for muscle spasms  -     carisoprodol (SOMA) 350 mg tablet; Take 1 tablet (350 mg total) by mouth 3 (three) times a day            Subjective:        Patient ID: Kareem Leiva is a 48 y.o. male.      Patient is here with ongoing chronic back pain with radicular symptoms into bilateral legs.  Patient also with numbness in bilateral legs.  No new issues with urination or defecation.  No recent trauma.  Patient has been off spironolactone.  Back pain 9 out of 10 at this time.    Leg Pain   Associated symptoms include numbness.   Back Pain  Associated symptoms include  leg pain and numbness.         The following portions of the patient's history were reviewed and updated as appropriate: allergies, current medications, past family history, past medical history, past social history, past surgical history and problem list.      Review of Systems   Constitutional: Negative.    HENT: Negative.     Eyes: Negative.    Respiratory: Negative.     Cardiovascular: Negative.    Gastrointestinal: Negative.    Endocrine: Negative.    Genitourinary: Negative.    Musculoskeletal:  Positive for arthralgias, back pain and gait problem.   Skin: Negative.    Allergic/Immunologic: Negative.    Neurological:  Positive for numbness.   Hematological: Negative.    Psychiatric/Behavioral: Negative.             Objective:               BP (!) 170/108 (BP Location: Left arm, Patient Position: Sitting, Cuff Size: Large)   Pulse 100   Temp 98.3 °F (36.8 °C) (Temporal)   SpO2 97%          Physical Exam  Vitals and nursing note reviewed.   Musculoskeletal:         General: Tenderness present.   Skin:     Capillary Refill: Capillary refill takes less than 2 seconds.   Psychiatric:         Mood and Affect: Mood normal.

## 2024-02-06 NOTE — TELEPHONE ENCOUNTER
PA for traMADol (ULTRAM) 50 mg tablet     Submitted via  []CMM-KEY   [x]Listen Edition-Case ID # [id number]   []Faxed to plan   []Other website   []Phone call Case ID #     Office notes sent, clinical questions answered. Awaiting determination  
PA for traMADol (ULTRAM) 50 mg tablet  Approved   Date(s) approved 2/5/24-8/3/24  Case #    Patient advised by [x] Expand[employer]hart Message                      [] Phone call       Pharmacy advised by [x]Fax                                     []Phone call    Approval letter scanned into Media Yes  
Patient unable to complete

## 2024-02-21 ENCOUNTER — OFFICE VISIT (OUTPATIENT)
Dept: FAMILY MEDICINE CLINIC | Facility: CLINIC | Age: 49
End: 2024-02-21
Payer: COMMERCIAL

## 2024-02-21 VITALS
OXYGEN SATURATION: 98 % | TEMPERATURE: 98.5 F | HEIGHT: 69 IN | BODY MASS INDEX: 46.65 KG/M2 | DIASTOLIC BLOOD PRESSURE: 110 MMHG | SYSTOLIC BLOOD PRESSURE: 188 MMHG | HEART RATE: 98 BPM | WEIGHT: 315 LBS

## 2024-02-21 DIAGNOSIS — I1A.0 RESISTANT HYPERTENSION: ICD-10-CM

## 2024-02-21 DIAGNOSIS — G47.19 EXCESSIVE DAYTIME SLEEPINESS: ICD-10-CM

## 2024-02-21 DIAGNOSIS — Z12.11 COLON CANCER SCREENING: ICD-10-CM

## 2024-02-21 DIAGNOSIS — M54.16 LUMBAR RADICULOPATHY: ICD-10-CM

## 2024-02-21 DIAGNOSIS — E66.01 MORBID OBESITY (HCC): ICD-10-CM

## 2024-02-21 DIAGNOSIS — K59.00 CONSTIPATION, UNSPECIFIED CONSTIPATION TYPE: Primary | ICD-10-CM

## 2024-02-21 PROBLEM — Z00.00 ROUTINE ADULT HEALTH MAINTENANCE: Status: RESOLVED | Noted: 2019-07-19 | Resolved: 2024-02-21

## 2024-02-21 PROBLEM — A08.4 VIRAL GASTROENTERITIS: Status: RESOLVED | Noted: 2019-04-25 | Resolved: 2024-02-21

## 2024-02-21 PROBLEM — L03.90 SEPSIS DUE TO CELLULITIS (HCC): Status: RESOLVED | Noted: 2018-09-26 | Resolved: 2024-02-21

## 2024-02-21 PROBLEM — A41.9 SEPSIS DUE TO CELLULITIS (HCC): Status: RESOLVED | Noted: 2018-09-26 | Resolved: 2024-02-21

## 2024-02-21 PROCEDURE — 99214 OFFICE O/P EST MOD 30 MIN: CPT | Performed by: FAMILY MEDICINE

## 2024-02-21 RX ORDER — PREDNISONE 10 MG/1
TABLET ORAL
Qty: 45 TABLET | Refills: 0 | Status: SHIPPED | OUTPATIENT
Start: 2024-02-21

## 2024-02-21 RX ORDER — CLONIDINE HYDROCHLORIDE 0.2 MG/1
0.2 TABLET ORAL 2 TIMES DAILY
Qty: 180 TABLET | Refills: 1 | Status: SHIPPED | OUTPATIENT
Start: 2024-02-21

## 2024-02-21 RX ORDER — IBUPROFEN 600 MG/1
TABLET ORAL
COMMUNITY
Start: 2024-01-29

## 2024-02-21 RX ORDER — OXYCODONE HYDROCHLORIDE 5 MG/1
5 TABLET ORAL EVERY 6 HOURS PRN
COMMUNITY
Start: 2024-01-29

## 2024-02-21 RX ORDER — POLYETHYLENE GLYCOL 3350 17 G/17G
17 POWDER, FOR SOLUTION ORAL DAILY PRN
Qty: 578 G | Refills: 0 | Status: SHIPPED | OUTPATIENT
Start: 2024-02-21

## 2024-02-23 NOTE — PROGRESS NOTES
Assessment/Plan:    49 y/o male with: lumbar radicular symptoms, constipation, morbid obesity, excessive daytime sleepiness, resistant hypertension. Will continue meds. Will refer for sleep study and titrate up Clonidine. Discussed supportive care and return parameters.     No problem-specific Assessment & Plan notes found for this encounter.       Diagnoses and all orders for this visit:    Constipation, unspecified constipation type  -     polyethylene glycol (GLYCOLAX) 17 GM/SCOOP powder; Take 17 g by mouth daily as needed (constipation)    Colon cancer screening  -     Cologuard    Lumbar radiculopathy  -     predniSONE 10 mg tablet; Take 4 tabs daily x 4d then 3x4d then 2x4d then 1x4d then 0.5x4d then stop.    Morbid obesity (HCC)  -     Ambulatory Referral to Weight Management; Future    Excessive daytime sleepiness  -     Ambulatory Referral to Sleep Medicine; Future    Resistant hypertension  -     cloNIDine (CATAPRES) 0.2 mg tablet; Take 1 tablet (0.2 mg total) by mouth 2 (two) times a day    Other orders  -     ibuprofen (MOTRIN) 600 mg tablet; TAKE 1 TABLET BY MOUTH EVERY 6 HOURS AS NEEDED FOR MILD PAIN (PAIN SCORE 1-3).  -     oxyCODONE (ROXICODONE) 5 immediate release tablet; Take 5 mg by mouth every 6 (six) hours as needed for moderate pain          Subjective:     Chief Complaint   Patient presents with    Follow-up     Follow up and la paperwork.    Constipation     Patient states lately he has been having problems moving his bowel. Sometimes he goes 3-4 days without bowel movement. Patient due for colorectal screening please discuss order pended. No further concerns, ng        Patient ID: Kareem Leiva is a 48 y.o. male.    Patient is a 49 y/o male who presents for follow-up on lumbar radicular symptoms, constipation, morbid obesity, excessive daytime sleepiness, resistant hypertension. No fevers chills nausea or vomiting no CP or SOB.    Constipation  Associated symptoms include back pain.  "      The following portions of the patient's history were reviewed and updated as appropriate: allergies, current medications, past family history, past medical history, past social history, past surgical history and problem list.    Review of Systems   Constitutional: Negative.    HENT: Negative.     Eyes: Negative.    Respiratory: Negative.     Cardiovascular: Negative.    Gastrointestinal:  Positive for constipation.   Endocrine: Negative.    Genitourinary: Negative.    Musculoskeletal:  Positive for arthralgias, back pain and myalgias.   Allergic/Immunologic: Negative.    Neurological: Negative.    Hematological: Negative.    Psychiatric/Behavioral: Negative.     All other systems reviewed and are negative.        Objective:      BP (!) 188/110 (BP Location: Right arm, Patient Position: Sitting, Cuff Size: Large)   Pulse 98   Temp 98.5 °F (36.9 °C) (Temporal)   Ht 5' 9\" (1.753 m)   Wt (!) 150 kg (330 lb)   SpO2 98%   BMI 48.73 kg/m²          Physical Exam  Constitutional:       Appearance: He is well-developed.   HENT:      Head: Atraumatic.      Right Ear: External ear normal.      Left Ear: External ear normal.   Eyes:      Conjunctiva/sclera: Conjunctivae normal.      Pupils: Pupils are equal, round, and reactive to light.   Cardiovascular:      Rate and Rhythm: Normal rate and regular rhythm.      Heart sounds: Normal heart sounds.   Pulmonary:      Effort: Pulmonary effort is normal. No respiratory distress.      Breath sounds: Normal breath sounds.   Abdominal:      General: Bowel sounds are normal. There is no distension.      Palpations: Abdomen is soft.      Tenderness: There is no abdominal tenderness. There is no guarding or rebound.   Musculoskeletal:         General: Normal range of motion.      Cervical back: Normal range of motion.   Skin:     General: Skin is warm and dry.   Neurological:      Mental Status: He is alert and oriented to person, place, and time.      Cranial Nerves: No " cranial nerve deficit.   Psychiatric:         Behavior: Behavior normal.         Thought Content: Thought content normal.         Judgment: Judgment normal.

## 2024-02-27 DIAGNOSIS — I10 HYPERTENSION, UNSPECIFIED TYPE: ICD-10-CM

## 2024-02-27 DIAGNOSIS — G47.19 EXCESSIVE DAYTIME SLEEPINESS: Primary | ICD-10-CM

## 2024-02-27 DIAGNOSIS — R06.83 SNORING: ICD-10-CM

## 2024-02-27 DIAGNOSIS — E66.01 CLASS 3 SEVERE OBESITY WITH BODY MASS INDEX (BMI) OF 45.0 TO 49.9 IN ADULT, UNSPECIFIED OBESITY TYPE, UNSPECIFIED WHETHER SERIOUS COMORBIDITY PRESENT (HCC): ICD-10-CM

## 2024-03-11 ENCOUNTER — CLINICAL SUPPORT (OUTPATIENT)
Dept: BARIATRICS | Facility: CLINIC | Age: 49
End: 2024-03-11

## 2024-03-11 ENCOUNTER — TELEPHONE (OUTPATIENT)
Dept: BARIATRICS | Facility: CLINIC | Age: 49
End: 2024-03-11

## 2024-03-11 VITALS — WEIGHT: 315 LBS | BODY MASS INDEX: 47.74 KG/M2 | HEIGHT: 68 IN

## 2024-03-11 DIAGNOSIS — E66.01 MORBID OBESITY (HCC): Primary | ICD-10-CM

## 2024-03-11 PROCEDURE — RECHECK

## 2024-03-11 NOTE — TELEPHONE ENCOUNTER
Reached out to Pt re: new pt velia. Left VM explaining to Pt I will attempt to call him again at 10:50.

## 2024-03-11 NOTE — PROGRESS NOTES
Spoke to patient on the phone:    Patient is interested in the bariatric surgical process. Patient qualifies for surgery with current comorbidities and BMI. Discussed the entire surgical workup process and all requirements of [facility] program and patient's insurance. Answered all questions at the time of the phone call. All SW/RD questions redirected to the next appointment, the 2-hour evaluation.      Patient verbalized understanding of the surgical process at [facility] and had no further questions at this time.

## 2024-03-21 DIAGNOSIS — A08.4 VIRAL GASTROENTERITIS: ICD-10-CM

## 2024-03-21 DIAGNOSIS — M54.16 LUMBAR RADICULOPATHY: ICD-10-CM

## 2024-03-21 RX ORDER — METFORMIN HYDROCHLORIDE 500 MG/1
500 TABLET, EXTENDED RELEASE ORAL 2 TIMES DAILY WITH MEALS
Qty: 180 TABLET | Refills: 1 | Status: SHIPPED | OUTPATIENT
Start: 2024-03-21

## 2024-03-21 RX ORDER — OXYCODONE HYDROCHLORIDE 5 MG/1
5 TABLET ORAL EVERY 6 HOURS PRN
Status: CANCELLED | OUTPATIENT
Start: 2024-03-21

## 2024-03-21 RX ORDER — PREDNISONE 20 MG/1
TABLET ORAL
Status: CANCELLED | OUTPATIENT
Start: 2024-03-21

## 2024-03-21 NOTE — TELEPHONE ENCOUNTER
Reason for call:   [x] Refill   [] Prior Auth  [] Other:     Office:   [x] PCP/Provider - [facility]  [] Specialty/Provider -     Medication:           Pharmacy:     Does the patient have enough for 3 days?   [] Yes   [x] No - Send as HP to POD

## 2024-03-22 RX ORDER — TRAMADOL HYDROCHLORIDE 50 MG/1
50 TABLET ORAL EVERY 8 HOURS PRN
Qty: 30 TABLET | Refills: 0 | Status: SHIPPED | OUTPATIENT
Start: 2024-03-22

## 2024-03-22 NOTE — TELEPHONE ENCOUNTER
Pt called to check refill status. Pt was advised meds were pending providers approval.Please review.

## 2024-04-08 ENCOUNTER — PATIENT MESSAGE (OUTPATIENT)
Dept: FAMILY MEDICINE CLINIC | Facility: CLINIC | Age: 49
End: 2024-04-08

## 2024-04-12 NOTE — PATIENT COMMUNICATION
Spoke with patient verbally. He confirmed he has never been on the medication due to shortages.   He has a follow up appt with Dr Bustamante 5/15/2024. He is in agreement to hold off on any auth renewals until after that appointment to determine how they will proceed with medications.     No auth renewals will be done at this time.

## 2024-05-21 ENCOUNTER — OFFICE VISIT (OUTPATIENT)
Dept: FAMILY MEDICINE CLINIC | Facility: CLINIC | Age: 49
End: 2024-05-21
Payer: COMMERCIAL

## 2024-05-21 VITALS
OXYGEN SATURATION: 99 % | WEIGHT: 315 LBS | BODY MASS INDEX: 46.65 KG/M2 | SYSTOLIC BLOOD PRESSURE: 180 MMHG | HEART RATE: 65 BPM | HEIGHT: 69 IN | DIASTOLIC BLOOD PRESSURE: 100 MMHG

## 2024-05-21 DIAGNOSIS — E13.9 DIABETES MELLITUS OF OTHER TYPE WITHOUT COMPLICATION, UNSPECIFIED WHETHER LONG TERM INSULIN USE (HCC): Primary | ICD-10-CM

## 2024-05-21 DIAGNOSIS — I10 ACCELERATED HYPERTENSION: ICD-10-CM

## 2024-05-21 DIAGNOSIS — E11.42 DIABETIC POLYNEUROPATHY ASSOCIATED WITH TYPE 2 DIABETES MELLITUS (HCC): ICD-10-CM

## 2024-05-21 DIAGNOSIS — K59.00 CONSTIPATION, UNSPECIFIED CONSTIPATION TYPE: ICD-10-CM

## 2024-05-21 DIAGNOSIS — E11.65 UNCONTROLLED TYPE 2 DIABETES MELLITUS WITH HYPERGLYCEMIA (HCC): ICD-10-CM

## 2024-05-21 LAB — SL AMB POCT HEMOGLOBIN AIC: 7 (ref ?–6.5)

## 2024-05-21 PROCEDURE — 99214 OFFICE O/P EST MOD 30 MIN: CPT | Performed by: FAMILY MEDICINE

## 2024-05-21 PROCEDURE — 83036 HEMOGLOBIN GLYCOSYLATED A1C: CPT | Performed by: FAMILY MEDICINE

## 2024-05-21 RX ORDER — METHOCARBAMOL 750 MG/1
750 TABLET, FILM COATED ORAL EVERY 6 HOURS PRN
Qty: 90 TABLET | Refills: 1 | Status: SHIPPED | OUTPATIENT
Start: 2024-05-21

## 2024-05-21 RX ORDER — CARISOPRODOL 350 MG/1
350 TABLET ORAL 3 TIMES DAILY
Qty: 90 TABLET | Refills: 1 | Status: SHIPPED | OUTPATIENT
Start: 2024-05-21

## 2024-05-21 RX ORDER — DOCUSATE SODIUM 100 MG/1
100 CAPSULE, LIQUID FILLED ORAL 2 TIMES DAILY PRN
Qty: 60 CAPSULE | Refills: 1 | Status: SHIPPED | OUTPATIENT
Start: 2024-05-21

## 2024-05-21 NOTE — LETTER
May 21, 2024     Patient: Kareem Leiva  YOB: 1975  Date of Visit: 5/21/2024      To Whom it May Concern:    Kareem Leiva is under my professional care. Kareem was seen in my office on 5/21/2024. Kareem may return to work on 5/23/2024 .    If you have any questions or concerns, please don't hesitate to call.         Sincerely,          Erick Bustamante MD        CC: No Recipients

## 2024-05-23 NOTE — PROGRESS NOTES
Assessment/Plan:    47 y/o male with: DM2 with diabetic polyneuropathy, constipation and accelerated HTN. Will continue meds. Will check labs and adjust meds. Discussed supportive care and return parameters. PAPDMP reviewed and refilled.    No problem-specific Assessment & Plan notes found for this encounter.       Diagnoses and all orders for this visit:    Diabetes mellitus of other type without complication, unspecified whether long term insulin use (HCC)  -     POCT hemoglobin A1c  -     semaglutide, 0.25 or 0.5 mg/dose, (Ozempic, 0.25 or 0.5 MG/DOSE,) 2 mg/3 mL injection pen; 0.5 mg under the skin every 7 days  -     Comprehensive metabolic panel; Future  -     CBC and differential; Future  -     Albumin / creatinine urine ratio; Future  -     Lipid Panel with Direct LDL reflex; Future  -     TSH, 3rd generation with Free T4 reflex; Future  -     Comprehensive metabolic panel  -     CBC and differential  -     Lipid Panel with Direct LDL reflex  -     TSH, 3rd generation with Free T4 reflex    Diabetic polyneuropathy associated with type 2 diabetes mellitus (HCC)  -     methocarbamol (ROBAXIN) 750 mg tablet; Take 1 tablet (750 mg total) by mouth every 6 (six) hours as needed for muscle spasms  -     carisoprodol (SOMA) 350 mg tablet; Take 1 tablet (350 mg total) by mouth 3 (three) times a day  -     semaglutide, 0.25 or 0.5 mg/dose, (Ozempic, 0.25 or 0.5 MG/DOSE,) 2 mg/3 mL injection pen; 0.5 mg under the skin every 7 days  -     Comprehensive metabolic panel; Future  -     CBC and differential; Future  -     Albumin / creatinine urine ratio; Future  -     Lipid Panel with Direct LDL reflex; Future  -     TSH, 3rd generation with Free T4 reflex; Future  -     Comprehensive metabolic panel  -     CBC and differential  -     Lipid Panel with Direct LDL reflex  -     TSH, 3rd generation with Free T4 reflex    Constipation, unspecified constipation type  -     docusate sodium (COLACE) 100 mg capsule; Take 1 capsule  (100 mg total) by mouth 2 (two) times a day as needed for constipation  -     Comprehensive metabolic panel; Future  -     CBC and differential; Future  -     Albumin / creatinine urine ratio; Future  -     Lipid Panel with Direct LDL reflex; Future  -     TSH, 3rd generation with Free T4 reflex; Future  -     Comprehensive metabolic panel  -     CBC and differential  -     Lipid Panel with Direct LDL reflex  -     TSH, 3rd generation with Free T4 reflex    Uncontrolled type 2 diabetes mellitus with hyperglycemia (HCC)  -     Comprehensive metabolic panel; Future  -     CBC and differential; Future  -     Albumin / creatinine urine ratio; Future  -     Lipid Panel with Direct LDL reflex; Future  -     TSH, 3rd generation with Free T4 reflex; Future  -     Comprehensive metabolic panel  -     CBC and differential  -     Lipid Panel with Direct LDL reflex  -     TSH, 3rd generation with Free T4 reflex    Accelerated hypertension          Subjective:     Chief Complaint   Patient presents with    Fatigue     Per pt since Friday night per pt getting worst all day.    Nausea     Per pt since Friday on and off    Colonoscopy     Order place on 2/21/24        Patient ID: Kareem Leiva is a 48 y.o. male.    Patient is a 49 y/o male who presents for follow-up on DM2 with diabetic polyneuropathy, constipation and accelerated HTN. Pt admits being stable on meds. And denies acute complaints no fevers chills nausea or vomiting, no CP or SOB.    Fatigue  Associated symptoms include fatigue and nausea.   Nausea  Associated symptoms include fatigue and nausea.       The following portions of the patient's history were reviewed and updated as appropriate: allergies, current medications, past family history, past medical history, past social history, past surgical history and problem list.    Review of Systems   Constitutional:  Positive for fatigue.   HENT: Negative.     Eyes: Negative.    Respiratory: Negative.    "  Cardiovascular: Negative.    Gastrointestinal:  Positive for nausea.   Endocrine: Negative.    Genitourinary: Negative.    Musculoskeletal: Negative.    Allergic/Immunologic: Negative.    Neurological: Negative.    Hematological: Negative.    Psychiatric/Behavioral: Negative.     All other systems reviewed and are negative.        Objective:      BP (!) 180/100 (BP Location: Left arm, Patient Position: Sitting, Cuff Size: Large)   Pulse 65   Ht 5' 9\" (1.753 m)   Wt (!) 145 kg (319 lb)   SpO2 99%   BMI 47.11 kg/m²          Physical Exam  Constitutional:       Appearance: He is well-developed.   HENT:      Head: Atraumatic.      Right Ear: External ear normal.      Left Ear: External ear normal.   Eyes:      Extraocular Movements: EOM normal.      Conjunctiva/sclera: Conjunctivae normal.      Pupils: Pupils are equal, round, and reactive to light.   Cardiovascular:      Rate and Rhythm: Normal rate and regular rhythm.      Heart sounds: Normal heart sounds.   Pulmonary:      Effort: Pulmonary effort is normal. No respiratory distress.      Breath sounds: Normal breath sounds.   Abdominal:      General: There is no distension.      Palpations: Abdomen is soft.      Tenderness: There is no abdominal tenderness. There is no guarding or rebound.   Musculoskeletal:         General: Normal range of motion.      Cervical back: Normal range of motion.   Skin:     General: Skin is warm and dry.   Neurological:      Mental Status: He is alert and oriented to person, place, and time.      Cranial Nerves: No cranial nerve deficit.   Psychiatric:         Mood and Affect: Mood and affect normal.         Behavior: Behavior normal.         Thought Content: Thought content normal.         Judgment: Judgment normal.         "

## 2024-06-18 ENCOUNTER — OFFICE VISIT (OUTPATIENT)
Dept: FAMILY MEDICINE CLINIC | Facility: CLINIC | Age: 49
End: 2024-06-18
Payer: COMMERCIAL

## 2024-06-18 VITALS
HEART RATE: 70 BPM | WEIGHT: 315 LBS | BODY MASS INDEX: 47.14 KG/M2 | OXYGEN SATURATION: 99 % | SYSTOLIC BLOOD PRESSURE: 166 MMHG | DIASTOLIC BLOOD PRESSURE: 110 MMHG

## 2024-06-18 DIAGNOSIS — E11.42 DIABETIC POLYNEUROPATHY ASSOCIATED WITH TYPE 2 DIABETES MELLITUS (HCC): ICD-10-CM

## 2024-06-18 DIAGNOSIS — M25.50 ARTHRALGIA, UNSPECIFIED JOINT: ICD-10-CM

## 2024-06-18 DIAGNOSIS — M54.50 CHRONIC BILATERAL LOW BACK PAIN WITHOUT SCIATICA: Primary | ICD-10-CM

## 2024-06-18 DIAGNOSIS — G89.29 CHRONIC BILATERAL LOW BACK PAIN WITHOUT SCIATICA: Primary | ICD-10-CM

## 2024-06-18 DIAGNOSIS — R26.2 AMBULATORY DYSFUNCTION: ICD-10-CM

## 2024-06-18 DIAGNOSIS — I1A.0 RESISTANT HYPERTENSION: ICD-10-CM

## 2024-06-18 PROCEDURE — 99214 OFFICE O/P EST MOD 30 MIN: CPT | Performed by: FAMILY MEDICINE

## 2024-06-18 RX ORDER — CARISOPRODOL 350 MG/1
350 TABLET ORAL 4 TIMES DAILY
Qty: 120 TABLET | Refills: 1 | Status: SHIPPED | OUTPATIENT
Start: 2024-06-18

## 2024-06-18 RX ORDER — CLONIDINE HYDROCHLORIDE 0.3 MG/1
0.3 TABLET ORAL 2 TIMES DAILY
Qty: 180 TABLET | Refills: 1 | Status: SHIPPED | OUTPATIENT
Start: 2024-06-18

## 2024-06-18 RX ORDER — TIZANIDINE HYDROCHLORIDE 2 MG/1
2 CAPSULE, GELATIN COATED ORAL 3 TIMES DAILY PRN
Qty: 30 CAPSULE | Refills: 1 | Status: SHIPPED | OUTPATIENT
Start: 2024-06-18

## 2024-06-18 NOTE — LETTER
Date: 6/18/2024    To whom it may concern:     This is to certify that Kareem Leiva has been under my care for the following diagnosis: CHF, DM2, morbid obesity, chronic low back pain with ambulatory dysfunction and unable to walk long distances.      I feel that Kareem Leiva is unable to serve on Jury Duty at this time for the above mentioned medical reasons.                  Sincerely,  Erick Bustamante MD

## 2024-06-18 NOTE — LETTER
June 18, 2024     Patient: Kareem Leiva  YOB: 1975  Date of Visit: 6/18/2024      To Whom it May Concern:    Kareem Leiva is under my professional care. Kareem was seen in my office on 6/18/2024. Kareem may return to work on 6/20/2024 .    If you have any questions or concerns, please don't hesitate to call.         Sincerely,          Erick Bustamante MD        CC: No Recipients

## 2024-06-19 ENCOUNTER — TELEPHONE (OUTPATIENT)
Age: 49
End: 2024-06-19

## 2024-06-19 NOTE — TELEPHONE ENCOUNTER
Pt called, is requesting a return call. Was in to see provider, was given a note to return to work however, he is not feeling well enough to return - is kindly requesting the date on the RTW note be updated;  patient can return to work Monday 6/24.  Please upload to MY CHART for visibility, call patient once completed.

## 2024-06-21 PROBLEM — I1A.0 RESISTANT HYPERTENSION: Status: ACTIVE | Noted: 2024-06-21

## 2024-06-21 PROBLEM — R26.2 AMBULATORY DYSFUNCTION: Status: ACTIVE | Noted: 2024-06-21

## 2024-06-21 NOTE — PROGRESS NOTES
Assessment/Plan:    49 y/o male with: chronic low back pain, resistant HTN, DM2 with diabetic polyneuropathy, and arthralgias with ambulatory dysfunction. Will continue meds. Increase clonidine, refer to PT along with trial of tizanidine. Encouraged pt to follow with specialists and to go for sleep study. Discussed supportive care and return parameters.     No problem-specific Assessment & Plan notes found for this encounter.       Diagnoses and all orders for this visit:    Chronic bilateral low back pain without sciatica  -     TiZANidine (ZANAFLEX) 2 MG capsule; Take 1 capsule (2 mg total) by mouth 3 (three) times a day as needed for muscle spasms  -     Ambulatory Referral to Physical Therapy; Future    Resistant hypertension  -     cloNIDine (CATAPRES) 0.3 mg tablet; Take 1 tablet (0.3 mg total) by mouth 2 (two) times a day    Diabetic polyneuropathy associated with type 2 diabetes mellitus (HCC)  -     carisoprodol (SOMA) 350 mg tablet; Take 1 tablet (350 mg total) by mouth 4 (four) times a day  -     Ambulatory Referral to Physical Therapy; Future    Arthralgia, unspecified joint  -     Ambulatory Referral to Physical Therapy; Future    Ambulatory dysfunction  -     Ambulatory Referral to Physical Therapy; Future          Subjective:     Chief Complaint   Patient presents with    Muscle Pain    Fatigue    Back Pain    Leg Pain        Patient ID: Kareem Leiva is a 48 y.o. male.    Patient is a 49 y/o male who presents for follow-up on chronic low back pain, resistant HTN, DM2 with diabetic polyneuropathy, and arthralgias with ambulatory dysfunction. Pt admits being stable on meds. Except for breakthrough pain And denies other acute complaints no fevers chills nausea or vomiting.    Muscle Pain  Associated symptoms include fatigue.   Fatigue  Associated symptoms include fatigue.   Back Pain  Associated symptoms include leg pain.   Leg Pain         The following portions of the patient's history were reviewed  and updated as appropriate: allergies, current medications, past family history, past medical history, past social history, past surgical history and problem list.    Review of Systems   Constitutional:  Positive for fatigue.   HENT: Negative.     Eyes: Negative.    Respiratory: Negative.     Cardiovascular: Negative.    Gastrointestinal: Negative.    Endocrine: Negative.    Genitourinary: Negative.    Musculoskeletal:  Positive for back pain.   Allergic/Immunologic: Negative.    Neurological: Negative.    Hematological: Negative.    Psychiatric/Behavioral: Negative.     All other systems reviewed and are negative.        Objective:      BP (!) 166/110 (BP Location: Left arm, Patient Position: Sitting, Cuff Size: Large)   Pulse 70   Wt (!) 145 kg (319 lb 3.2 oz)   SpO2 99%   BMI 47.14 kg/m²          Physical Exam  Constitutional:       Appearance: He is well-developed.   HENT:      Head: Atraumatic.      Right Ear: External ear normal.      Left Ear: External ear normal.   Eyes:      Extraocular Movements: EOM normal.      Conjunctiva/sclera: Conjunctivae normal.      Pupils: Pupils are equal, round, and reactive to light.   Cardiovascular:      Rate and Rhythm: Normal rate and regular rhythm.      Heart sounds: Normal heart sounds.   Pulmonary:      Effort: Pulmonary effort is normal. No respiratory distress.      Breath sounds: Normal breath sounds.   Abdominal:      General: There is no distension.      Palpations: Abdomen is soft.      Tenderness: There is no abdominal tenderness. There is no guarding or rebound.   Musculoskeletal:         General: Normal range of motion.      Cervical back: Normal range of motion.   Skin:     General: Skin is warm and dry.   Neurological:      Mental Status: He is alert and oriented to person, place, and time.      Cranial Nerves: No cranial nerve deficit.   Psychiatric:         Mood and Affect: Mood and affect normal.         Behavior: Behavior normal.         Thought  Content: Thought content normal.         Judgment: Judgment normal.

## 2024-06-28 ENCOUNTER — OFFICE VISIT (OUTPATIENT)
Dept: FAMILY MEDICINE CLINIC | Facility: CLINIC | Age: 49
End: 2024-06-28
Payer: COMMERCIAL

## 2024-06-28 VITALS
WEIGHT: 315 LBS | BODY MASS INDEX: 47.74 KG/M2 | HEART RATE: 86 BPM | HEIGHT: 68 IN | DIASTOLIC BLOOD PRESSURE: 90 MMHG | OXYGEN SATURATION: 98 % | TEMPERATURE: 98.3 F | SYSTOLIC BLOOD PRESSURE: 150 MMHG

## 2024-06-28 DIAGNOSIS — M54.16 LUMBAR RADICULOPATHY: Primary | ICD-10-CM

## 2024-06-28 PROCEDURE — 99213 OFFICE O/P EST LOW 20 MIN: CPT | Performed by: FAMILY MEDICINE

## 2024-06-28 RX ORDER — PREDNISONE 10 MG/1
TABLET ORAL
Qty: 45 TABLET | Refills: 0 | Status: SHIPPED | OUTPATIENT
Start: 2024-06-28

## 2024-06-28 NOTE — LETTER
June 28, 2024     Patient: Kareem Leiva  YOB: 1975  Date of Visit: 6/28/2024      To Whom it May Concern:    Kareem Leiva is under my professional care. Kareem was seen in my office on 6/28/2024. Kareem may return to work on 7/1/2024 .    If you have any questions or concerns, please don't hesitate to call.         Sincerely,          Erick Bustamante MD        CC: No Recipients

## 2024-07-01 NOTE — PROGRESS NOTES
"Assessment/Plan:    49 y/o male with: lumbar radiculopathy. Will give steroid taper. Will continue meds. Discussed supportive care and return parameters. FMLA paperwork to be redone for up to 8 days off requested per month.    No problem-specific Assessment & Plan notes found for this encounter.       Diagnoses and all orders for this visit:    Lumbar radiculopathy  -     predniSONE 10 mg tablet; Take 4 tabs daily x 4d, then 3x4d, then 2x4d then 1x4d, then 0.5x4d then stop.          Subjective:     Chief Complaint   Patient presents with    Back Pain     Pt is presenting with low back pain and weakness in his legs анна         Patient ID: Kareem Leiva is a 48 y.o. male.    Patient is a 49 y/o male who presents for follow-up on lumbar radicular symptoms in flare no fevers chills nausea or vomiting, needs FMLA paperwork redone    Back Pain        The following portions of the patient's history were reviewed and updated as appropriate: allergies, current medications, past family history, past medical history, past social history, past surgical history and problem list.    Review of Systems   Constitutional: Negative.    HENT: Negative.     Eyes: Negative.    Respiratory: Negative.     Cardiovascular: Negative.    Gastrointestinal: Negative.    Endocrine: Negative.    Genitourinary: Negative.    Musculoskeletal:  Positive for back pain.   Allergic/Immunologic: Negative.    Neurological: Negative.    Hematological: Negative.    Psychiatric/Behavioral: Negative.     All other systems reviewed and are negative.        Objective:      /90 (BP Location: Right arm, Patient Position: Sitting, Cuff Size: Standard)   Pulse 86   Temp 98.3 °F (36.8 °C) (Temporal)   Ht 5' 8\" (1.727 m)   Wt (!) 145 kg (319 lb)   SpO2 98%   BMI 48.50 kg/m²          Physical Exam  Constitutional:       Appearance: He is well-developed.   HENT:      Head: Atraumatic.      Right Ear: External ear normal.      Left Ear: External ear normal. "   Eyes:      Extraocular Movements: EOM normal.      Conjunctiva/sclera: Conjunctivae normal.      Pupils: Pupils are equal, round, and reactive to light.   Cardiovascular:      Rate and Rhythm: Normal rate and regular rhythm.      Heart sounds: Normal heart sounds.   Pulmonary:      Effort: Pulmonary effort is normal. No respiratory distress.      Breath sounds: Normal breath sounds.   Abdominal:      General: There is no distension.      Palpations: Abdomen is soft.      Tenderness: There is no abdominal tenderness. There is no guarding or rebound.   Musculoskeletal:         General: Normal range of motion.      Cervical back: Normal range of motion.   Skin:     General: Skin is warm and dry.   Neurological:      Mental Status: He is alert and oriented to person, place, and time.      Cranial Nerves: No cranial nerve deficit.   Psychiatric:         Mood and Affect: Mood and affect normal.         Behavior: Behavior normal.         Thought Content: Thought content normal.         Judgment: Judgment normal.

## 2024-08-09 ENCOUNTER — TELEPHONE (OUTPATIENT)
Age: 49
End: 2024-08-09

## 2024-08-09 NOTE — TELEPHONE ENCOUNTER
Nurse Kendall called in from [facility] and left her number for record Tel @ [phone] Thanks

## 2024-10-15 ENCOUNTER — TELEPHONE (OUTPATIENT)
Age: 49
End: 2024-10-15

## 2024-10-15 NOTE — TELEPHONE ENCOUNTER
Please call him to reschedule his appointment that he no showed on September 24 with Dr. Bustamante.  I do see that a [email or web address] message was sent at that time but the patient never read the message.  Thank you

## 2024-11-01 ENCOUNTER — OFFICE VISIT (OUTPATIENT)
Age: 49
End: 2024-11-01
Payer: MEDICARE

## 2024-11-01 VITALS
HEIGHT: 69 IN | SYSTOLIC BLOOD PRESSURE: 135 MMHG | DIASTOLIC BLOOD PRESSURE: 88 MMHG | HEART RATE: 68 BPM | BODY MASS INDEX: 46.65 KG/M2 | TEMPERATURE: 98.7 F | WEIGHT: 315 LBS | OXYGEN SATURATION: 98 %

## 2024-11-01 DIAGNOSIS — M54.12 CERVICAL MYELOPATHY WITH CERVICAL RADICULOPATHY  (HCC): ICD-10-CM

## 2024-11-01 DIAGNOSIS — M54.16 LUMBAR RADICULOPATHY: Primary | ICD-10-CM

## 2024-11-01 DIAGNOSIS — Z59.9 FINANCIAL DIFFICULTIES: ICD-10-CM

## 2024-11-01 DIAGNOSIS — E11.42 DIABETIC POLYNEUROPATHY ASSOCIATED WITH TYPE 2 DIABETES MELLITUS (HCC): ICD-10-CM

## 2024-11-01 DIAGNOSIS — G89.29 CHRONIC BILATERAL LOW BACK PAIN WITHOUT SCIATICA: ICD-10-CM

## 2024-11-01 DIAGNOSIS — G95.9 CERVICAL MYELOPATHY WITH CERVICAL RADICULOPATHY  (HCC): ICD-10-CM

## 2024-11-01 DIAGNOSIS — M54.50 CHRONIC BILATERAL LOW BACK PAIN WITHOUT SCIATICA: ICD-10-CM

## 2024-11-01 DIAGNOSIS — I10 ESSENTIAL HYPERTENSION: ICD-10-CM

## 2024-11-01 DIAGNOSIS — I1A.0 RESISTANT HYPERTENSION: ICD-10-CM

## 2024-11-01 DIAGNOSIS — G89.29 CHRONIC MIDLINE THORACIC BACK PAIN: ICD-10-CM

## 2024-11-01 DIAGNOSIS — R26.2 AMBULATORY DYSFUNCTION: ICD-10-CM

## 2024-11-01 DIAGNOSIS — M54.6 CHRONIC MIDLINE THORACIC BACK PAIN: ICD-10-CM

## 2024-11-01 LAB — SL AMB POCT HEMOGLOBIN AIC: 7.8 (ref ?–6.5)

## 2024-11-01 PROCEDURE — 83036 HEMOGLOBIN GLYCOSYLATED A1C: CPT | Performed by: FAMILY MEDICINE

## 2024-11-01 PROCEDURE — 99214 OFFICE O/P EST MOD 30 MIN: CPT | Performed by: FAMILY MEDICINE

## 2024-11-01 RX ORDER — LOSARTAN POTASSIUM 25 MG/1
25 TABLET ORAL DAILY
COMMUNITY
Start: 2024-09-26

## 2024-11-01 RX ORDER — OXYCODONE HYDROCHLORIDE 5 MG/1
5 TABLET ORAL DAILY PRN
Qty: 30 TABLET | Refills: 0 | Status: SHIPPED | OUTPATIENT
Start: 2024-11-01

## 2024-11-01 RX ORDER — ATENOLOL 100 MG/1
100 TABLET ORAL DAILY
Qty: 90 TABLET | Refills: 1 | Status: SHIPPED | OUTPATIENT
Start: 2024-11-01

## 2024-11-01 RX ORDER — GABAPENTIN 300 MG/1
300 CAPSULE ORAL 3 TIMES DAILY
COMMUNITY
Start: 2024-09-26

## 2024-11-01 RX ORDER — GABAPENTIN 300 MG/1
300 CAPSULE ORAL 3 TIMES DAILY
Qty: 270 CAPSULE | Refills: 1 | Status: SHIPPED | OUTPATIENT
Start: 2024-11-01

## 2024-11-01 RX ORDER — METHOCARBAMOL 750 MG/1
750 TABLET, FILM COATED ORAL EVERY 8 HOURS PRN
Qty: 270 TABLET | Refills: 1 | Status: SHIPPED | OUTPATIENT
Start: 2024-11-01

## 2024-11-01 RX ORDER — LOSARTAN POTASSIUM AND HYDROCHLOROTHIAZIDE 25; 100 MG/1; MG/1
1 TABLET ORAL DAILY
Qty: 90 TABLET | Refills: 1 | Status: SHIPPED | OUTPATIENT
Start: 2024-11-01

## 2024-11-01 SDOH — ECONOMIC STABILITY - INCOME SECURITY: PROBLEM RELATED TO HOUSING AND ECONOMIC CIRCUMSTANCES, UNSPECIFIED: Z59.9

## 2024-11-01 NOTE — ASSESSMENT & PLAN NOTE
Orders:    Ambulatory Referral to Physical Therapy; Future    oxyCODONE (ROXICODONE) 5 immediate release tablet; Take 1 tablet (5 mg total) by mouth daily as needed for severe pain Max Daily Amount: 5 mg

## 2024-11-01 NOTE — ASSESSMENT & PLAN NOTE
Stable, continue meds. Discussed supportive care and return parameters.   Orders:    gabapentin (Neurontin) 300 mg capsule; Take 1 capsule (300 mg total) by mouth 3 (three) times a day    Ambulatory Referral to Physical Therapy; Future    oxyCODONE (ROXICODONE) 5 immediate release tablet; Take 1 tablet (5 mg total) by mouth daily as needed for severe pain Max Daily Amount: 5 mg

## 2024-11-01 NOTE — ASSESSMENT & PLAN NOTE
Stable, continue meds. Discussed supportive care and return parameters.     Lab Results   Component Value Date    HGBA1C 7.8 (A) 11/01/2024       Orders:    POCT hemoglobin A1c    Albumin / creatinine urine ratio; Future    methocarbamol (ROBAXIN) 750 mg tablet; Take 1 tablet (750 mg total) by mouth every 8 (eight) hours as needed for muscle spasms    Ambulatory Referral to Physical Therapy; Future    oxyCODONE (ROXICODONE) 5 immediate release tablet; Take 1 tablet (5 mg total) by mouth daily as needed for severe pain Max Daily Amount: 5 mg  Will give one more script of Oxycodone for breakthrough symptoms only, PAPADMP reviewed and refilled. Discussed supportive care and return parameters.

## 2024-11-01 NOTE — ASSESSMENT & PLAN NOTE
Stable, continue meds. Discussed supportive care and return parameters.   Orders:    losartan-hydrochlorothiazide (HYZAAR) 100-25 MG per tablet; Take 1 tablet by mouth daily

## 2024-11-01 NOTE — PROGRESS NOTES
Ambulatory Visit  Name: Kareem Leiva      : 1975      MRN: [id number]  Encounter Provider: Erick Bustamante MD  Encounter Date: 2024   Encounter department: [facility]    Assessment & Plan  Diabetic polyneuropathy associated with type 2 diabetes mellitus (HCC)  Stable, continue meds. Discussed supportive care and return parameters.     Lab Results   Component Value Date    HGBA1C 7.8 (A) 2024       Orders:    POCT hemoglobin A1c    Albumin / creatinine urine ratio; Future    methocarbamol (ROBAXIN) 750 mg tablet; Take 1 tablet (750 mg total) by mouth every 8 (eight) hours as needed for muscle spasms    Ambulatory Referral to Physical Therapy; Future    oxyCODONE (ROXICODONE) 5 immediate release tablet; Take 1 tablet (5 mg total) by mouth daily as needed for severe pain Max Daily Amount: 5 mg  Will give one more script of Oxycodone for breakthrough symptoms only, PAPADMP reviewed and refilled. Discussed supportive care and return parameters.   Lumbar radiculopathy  Stable, continue meds. Discussed supportive care and return parameters.   Orders:    gabapentin (Neurontin) 300 mg capsule; Take 1 capsule (300 mg total) by mouth 3 (three) times a day    Ambulatory Referral to Physical Therapy; Future    oxyCODONE (ROXICODONE) 5 immediate release tablet; Take 1 tablet (5 mg total) by mouth daily as needed for severe pain Max Daily Amount: 5 mg    Cervical myelopathy with cervical radiculopathy  (HCC)    Orders:    gabapentin (Neurontin) 300 mg capsule; Take 1 capsule (300 mg total) by mouth 3 (three) times a day    Ambulatory Referral to Physical Therapy; Future    oxyCODONE (ROXICODONE) 5 immediate release tablet; Take 1 tablet (5 mg total) by mouth daily as needed for severe pain Max Daily Amount: 5 mg    Ambulatory dysfunction    Orders:    Ambulatory Referral to Physical Therapy; Future    oxyCODONE (ROXICODONE) 5 immediate release tablet; Take 1 tablet (5 mg total) by  "mouth daily as needed for severe pain Max Daily Amount: 5 mg    Chronic bilateral low back pain without sciatica    Orders:    Ambulatory Referral to Physical Therapy; Future    oxyCODONE (ROXICODONE) 5 immediate release tablet; Take 1 tablet (5 mg total) by mouth daily as needed for severe pain Max Daily Amount: 5 mg    Chronic midline thoracic back pain    Orders:    Ambulatory Referral to Physical Therapy; Future    oxyCODONE (ROXICODONE) 5 immediate release tablet; Take 1 tablet (5 mg total) by mouth daily as needed for severe pain Max Daily Amount: 5 mg    Essential hypertension    Orders:    atenolol (TENORMIN) 100 mg tablet; Take 1 tablet (100 mg total) by mouth daily    Resistant hypertension  Stable, continue meds. Discussed supportive care and return parameters.   Orders:    losartan-hydrochlorothiazide (HYZAAR) 100-25 MG per tablet; Take 1 tablet by mouth daily    Financial difficulties    Orders:    Ambulatory Referral to Social Work Care Management Program; Future       History of Present Illness     Patient is a 50 y/o male who presents for follow-up on DM2 with diabetic polyneuropathy, cervical radiculopathy causing myelopathy s/p recent cervical fusion, lumbar radiculopathy, HTN and ambulatory dysfunction. Pt significantly improving post surgery no fevers chills nausea or vomiting. Pain and ambulation improving.    Back Pain          Review of Systems   Constitutional: Negative.    HENT: Negative.     Eyes: Negative.    Respiratory: Negative.     Cardiovascular: Negative.    Gastrointestinal: Negative.    Endocrine: Negative.    Genitourinary: Negative.    Musculoskeletal:  Positive for back pain.   Allergic/Immunologic: Negative.    Neurological: Negative.    Hematological: Negative.    Psychiatric/Behavioral: Negative.     All other systems reviewed and are negative.          Objective     /88   Pulse 68   Temp 98.7 °F (37.1 °C) (Temporal)   Ht 5' 9\" (1.753 m)   Wt (!) 147 kg (324 lb 9.6 " oz)   SpO2 98%   BMI 47.94 kg/m²     Physical Exam  Vitals reviewed.   Constitutional:       General: He is not in acute distress.     Appearance: He is well-developed. He is not diaphoretic.   HENT:      Head: Normocephalic and atraumatic.      Right Ear: External ear normal.      Left Ear: External ear normal.      Nose: Nose normal.   Eyes:      General: No scleral icterus.        Right eye: No discharge.         Left eye: No discharge.      Conjunctiva/sclera: Conjunctivae normal.      Pupils: Pupils are equal, round, and reactive to light.   Neck:      Thyroid: No thyromegaly.      Trachea: No tracheal deviation.   Cardiovascular:      Rate and Rhythm: Normal rate and regular rhythm.      Pulses: no weak pulses.           Dorsalis pedis pulses are 2+ on the right side and 2+ on the left side.        Posterior tibial pulses are 2+ on the right side and 2+ on the left side.      Heart sounds: Normal heart sounds. No murmur heard.     No friction rub.   Pulmonary:      Effort: Pulmonary effort is normal. No respiratory distress.      Breath sounds: Normal breath sounds. No stridor. No wheezing or rales.   Abdominal:      General: There is no distension.      Palpations: Abdomen is soft. There is no mass.      Tenderness: There is no abdominal tenderness. There is no guarding or rebound.   Musculoskeletal:         General: Normal range of motion.      Cervical back: Normal range of motion and neck supple.        Feet:    Feet:      Right foot:      Skin integrity: No ulcer, skin breakdown, erythema, warmth, callus or dry skin.      Left foot:      Skin integrity: No ulcer, skin breakdown, erythema, warmth, callus or dry skin.   Lymphadenopathy:      Cervical: No cervical adenopathy.   Skin:     General: Skin is warm.   Neurological:      Mental Status: He is alert and oriented to person, place, and time.      Cranial Nerves: No cranial nerve deficit.   Psychiatric:         Behavior: Behavior normal.          Thought Content: Thought content normal.         Judgment: Judgment normal.           Patient's shoes and socks removed.    Right Foot/Ankle   Right Foot Inspection  Skin Exam: skin normal and skin intact. No dry skin, no warmth, no callus, no erythema, no maceration, no abnormal color, no pre-ulcer, no ulcer and no callus.     Toe Exam: ROM and strength within normal limits.     Sensory   Monofilament testing: diminished    Vascular  The right DP pulse is 2+. The right PT pulse is 2+.     Left Foot/Ankle  Left Foot Inspection  Skin Exam: skin normal and skin intact. No dry skin, no warmth, no erythema, no maceration, normal color, no pre-ulcer, no ulcer and no callus.     Toe Exam: ROM and strength within normal limits.     Sensory   Monofilament testing: diminished    Vascular  The left DP pulse is 2+. The left PT pulse is 2+.     Assign Risk Category  No deformity present  Loss of protective sensation  No weak pulses  Risk: 1

## 2024-11-01 NOTE — ASSESSMENT & PLAN NOTE
Orders:    Ambulatory Referral to Physical Therapy; Future    oxyCODONE (ROXICODONE) 5 immediate release tablet; Take 1 tablet (5 mg total) by mouth daily as needed for severe pain Max Daily Amount: 5 mg     No

## 2024-11-01 NOTE — LETTER
November 1, 2024     Patient: Kareem Leiva  YOB: 1975  Date of Visit: 11/1/2024      To Whom it May Concern:    Kareem Leiva is under my professional care. Kareem was seen in my office on 11/1/2024. Kareem has a number of significant medical issues, and is requested to not have power shut off at this time.    If you have any questions or concerns, please don't hesitate to call.         Sincerely,          Erick Bustamante MD        CC: No Recipients

## 2024-11-04 ENCOUNTER — TELEPHONE (OUTPATIENT)
Age: 49
End: 2024-11-04

## 2024-11-04 ENCOUNTER — PATIENT OUTREACH (OUTPATIENT)
Dept: CASE MANAGEMENT | Facility: OTHER | Age: 49
End: 2024-11-04

## 2024-11-04 DIAGNOSIS — Z59.9 FINANCIAL DIFFICULTIES: Primary | ICD-10-CM

## 2024-11-04 DIAGNOSIS — E11.69 TYPE 2 DIABETES MELLITUS WITH OTHER SPECIFIED COMPLICATION, UNSPECIFIED WHETHER LONG TERM INSULIN USE (HCC): Primary | ICD-10-CM

## 2024-11-04 SDOH — ECONOMIC STABILITY - INCOME SECURITY: PROBLEM RELATED TO HOUSING AND ECONOMIC CIRCUMSTANCES, UNSPECIFIED: Z59.9

## 2024-11-04 NOTE — PROGRESS NOTES
BING WALKER reviewed chart. Pt was referred by PCP following an office visit d/t financial difficulties.    BING WALKER placed call to pt to introduce self and discuss any needs. Pt did not answer and vm is not set up. BING WALKER unable to leave a message. BING WALKER will try to reach pt again within one week.    ADDENDUM  BING WALKER received vm from pt and returned call. BING WALKER spoke with pt and explained role and reason for calling.     Pt stated he is fighting with unemployment for benefits and is having difficulty paying his rent and electricity bill.     BING WALKER provided information on PPL Operation HELP. Pt stated he would be interested applying, but does not have internet access. BING WALKER offered referral to [facility] for assistance and pt was agreeable to referral.    BING WALKER shared that she is unaware of available funding for rental assistance. BING WALKER encouraged pt to call 211 today.    BING WALKER shared information on [facility] [employer] should pt need assistance with obtaining unemployment compensation.     BING WALKER sent information for PPL Operation HELP and [facility] [employer] via [employer] by text to pt. Pt stated he will call 211 today to discuss needs and referrals to resources for assistance with rent.

## 2024-11-04 NOTE — TELEPHONE ENCOUNTER
Patient returned call and I advised that he would need to submit another urine specimen.  He verbally understood.

## 2024-11-04 NOTE — TELEPHONE ENCOUNTER
PA for oxyCODONE 5 MG IR tablet SUBMITTED to [facility]    via    [x]CMM-KEY: [id number]  []Surescripts-Case ID #   []Availity-Auth ID # NDC #   []Faxed to plan   []Other website   []Phone call Case ID #     Office notes sent, clinical questions answered. Awaiting determination    Turnaround time for your insurance to make a decision on your Prior Authorization can take 7-21 business days.

## 2024-11-04 NOTE — TELEPHONE ENCOUNTER
Received a call from [facility] BUITRAGO That they did not received Albumin/Creatine Ratio Urine Specimen. I went to check the specimen was in the in basket at  not picked up. Per [facility] the Specimen is no longer good.     [facility] also states Albumin/Creation Ratio Urine Specimens must be REFRIGERATED.    I will put new order in so patient can go to lab to get the specimen recollected.

## 2024-11-04 NOTE — TELEPHONE ENCOUNTER
----- Message from Shameka ANTONIO sent at 11/4/2024  8:43 AM EST -----  Regarding: Urine Specimen.  Pt needs to give new sample of urine for Albumin/Creation Ratio due to specimen not being picked up 11/01/2024  
Tried to call the patient to let him know he needs to go to the lab to give a new urine specimen. The voicemail box has not been set up yet. So unable to leave a message  
No

## 2024-11-05 ENCOUNTER — PATIENT OUTREACH (OUTPATIENT)
Dept: CASE MANAGEMENT | Facility: OTHER | Age: 49
End: 2024-11-05

## 2024-11-05 NOTE — TELEPHONE ENCOUNTER
PA for oxyCODONE 5 MG IR tablet DENIED    Reason:    Message sent to office clinical pool Yes    Denial letter scanned into Media Yes    Appeal started No (Provider will need to decide if appeal is warranted and send clinical documentation to Prior Authorization Team for initiation.)    **Please follow up with your patient regarding denial and next steps**

## 2024-11-05 NOTE — PROGRESS NOTES
received referral from workanthony to assist pt with applying for operation hell through [employer].  CMOC name has been added to the care plan.    OC made outreach call to pt and discussed applying for operation help.  Pt stated that he has applied for LIHEAP and submitted the application today.    OC informed pt that I can assist with applying for operation help via online, pt agreed. OC will reach out pt in one week for follow up.     Patient was referred on [employer] to Operation Help for money.

## 2024-11-12 ENCOUNTER — PATIENT OUTREACH (OUTPATIENT)
Dept: CASE MANAGEMENT | Facility: OTHER | Age: 49
End: 2024-11-12

## 2024-11-12 NOTE — PROGRESS NOTES
placed call to pt to inform him that I have submitted an Ontrack application through [facility] website, pt understood.    OC informed pt that he should here back from [facility] 1-2 weeks.  OC will follow up.

## 2024-11-18 ENCOUNTER — PATIENT OUTREACH (OUTPATIENT)
Dept: CASE MANAGEMENT | Facility: OTHER | Age: 49
End: 2024-11-18

## 2024-11-18 NOTE — PROGRESS NOTES
BING WALKER reviewed chart. Pt was referred for assistance with applying for Operation HELP with [employer], but application for OnTrack was submitted instead. BING WALKER will route note to CM OC to f/u regarding Operation HELP application in case that also needs to be submitted. BING WALKER will continue to follow.

## 2024-11-19 ENCOUNTER — PATIENT OUTREACH (OUTPATIENT)
Dept: CASE MANAGEMENT | Facility: OTHER | Age: 49
End: 2024-11-19

## 2024-11-19 ENCOUNTER — TELEPHONE (OUTPATIENT)
Age: 49
End: 2024-11-19

## 2024-11-19 NOTE — PROGRESS NOTES
made call to pt to inform him that he has been approved for the [facility] Program (Operation Help).    OC explained to pt that he will be receiving a packet and will explain the program as well as a new payment plan.  Pt understood.     OC discussed if there are any resources or assistance that he may need, and pt stated no.    OC will close case, no further outreach.

## 2024-11-19 NOTE — TELEPHONE ENCOUNTER
The patent called requesting a letter from the PCP stating his work restrictions and why he couldn't work. Please call him to advise.

## 2024-12-18 ENCOUNTER — PATIENT OUTREACH (OUTPATIENT)
Dept: CASE MANAGEMENT | Facility: OTHER | Age: 49
End: 2024-12-18

## 2024-12-18 NOTE — PROGRESS NOTES
BING WALKER placed call to pt to discuss any other needs. AARON OC assisted pt with Ontrack.     Pt did not answer. BING WALKER resolved episode at this time d/t pt's needs being met. BING WALKER is available should pt return call and have any other needs for us to assist with.

## 2025-07-24 ENCOUNTER — TELEPHONE (OUTPATIENT)
Age: 50
End: 2025-07-24

## (undated) DEVICE — SPONGE STICK WITH PVP-I: Brand: KENDALL

## (undated) DEVICE — SUT ETHILON 3-0 PS-1 18 IN 1663G

## (undated) DEVICE — SYRINGE 10ML LL

## (undated) DEVICE — SINGLE PORT MANIFOLD: Brand: NEPTUNE 2

## (undated) DEVICE — INTENDED FOR TISSUE SEPARATION, AND OTHER PROCEDURES THAT REQUIRE A SHARP SURGICAL BLADE TO PUNCTURE OR CUT.: Brand: BARD-PARKER SAFETY BLADES SIZE 15, STERILE

## (undated) DEVICE — KERLIX BANDAGE ROLL: Brand: KERLIX

## (undated) DEVICE — SPECIMEN CONTAINER STERILE PEEL PACK

## (undated) DEVICE — SUT VICRYL 3-0 SH 27 IN J416H

## (undated) DEVICE — INTENDED FOR TISSUE SEPARATION, AND OTHER PROCEDURES THAT REQUIRE A SHARP SURGICAL BLADE TO PUNCTURE OR CUT.: Brand: BARD-PARKER ® SAFETYLOCK CARBON RIB-BACK BLADES

## (undated) DEVICE — POV-IOD SOLUTION 4OZ BT

## (undated) DEVICE — NEEDLE 25G X 1 1/2

## (undated) DEVICE — BETHLEHEM UNIVERSAL  MIONR EXT: Brand: CARDINAL HEALTH

## (undated) DEVICE — COBAN 4 IN STERILE

## (undated) DEVICE — PAD GROUNDING ADULT

## (undated) DEVICE — CURITY NON-ADHERENT STRIPS: Brand: CURITY

## (undated) DEVICE — GLOVE SRG LF STRL BGL SKNSNS 7 PF

## (undated) DEVICE — STERILE POLYISOPRENE POWDER-FREE SURGICAL GLOVES WITH EMOLLIENT COATING: Brand: PROTEXIS

## (undated) DEVICE — NEEDLE BLUNT 18 G X 1 1/2IN

## (undated) DEVICE — GAUZE SPONGES,16 PLY: Brand: CURITY

## (undated) DEVICE — THIN OFFSET (9.0 X 0.38 X 25.0MM)

## (undated) DEVICE — STOCKINETTE 2P PREROLLD 6X60